# Patient Record
Sex: MALE | Race: WHITE | NOT HISPANIC OR LATINO | Employment: OTHER | URBAN - METROPOLITAN AREA
[De-identification: names, ages, dates, MRNs, and addresses within clinical notes are randomized per-mention and may not be internally consistent; named-entity substitution may affect disease eponyms.]

---

## 2017-01-19 ENCOUNTER — LAB (OUTPATIENT)
Dept: FAMILY MEDICINE CLINIC | Facility: CLINIC | Age: 62
End: 2017-01-19

## 2017-01-19 DIAGNOSIS — Z12.5 PROSTATE CANCER SCREENING: ICD-10-CM

## 2017-01-19 DIAGNOSIS — D69.6 THROMBOCYTOPENIA (HCC): ICD-10-CM

## 2017-01-19 DIAGNOSIS — IMO0001 DIABETES MELLITUS TYPE 1, UNCONTROLLED, WITHOUT COMPLICATIONS: ICD-10-CM

## 2017-01-19 DIAGNOSIS — E78.5 HYPERLIPIDEMIA, UNSPECIFIED: Primary | ICD-10-CM

## 2017-01-19 DIAGNOSIS — I10 BENIGN HYPERTENSION: ICD-10-CM

## 2017-01-19 LAB
ALBUMIN SERPL-MCNC: 4.4 G/DL (ref 3.2–4.8)
ALBUMIN/GLOB SERPL: 1.1 G/DL (ref 1.5–2.5)
ALP SERPL-CCNC: 34 U/L (ref 25–100)
ALT SERPL-CCNC: 63 U/L (ref 7–40)
AST SERPL-CCNC: 60 U/L (ref 0–33)
BASOPHILS # BLD AUTO: 0.03 10*3/MM3 (ref 0–0.2)
BASOPHILS NFR BLD AUTO: 0.5 % (ref 0–1)
BILIRUB SERPL-MCNC: 0.5 MG/DL (ref 0.3–1.2)
BUN SERPL-MCNC: 18 MG/DL (ref 9–23)
BUN/CREAT SERPL: 15 (ref 7–25)
CALCIUM SERPL-MCNC: 10 MG/DL (ref 8.7–10.4)
CHLORIDE SERPL-SCNC: 105 MMOL/L (ref 99–109)
CHOLEST SERPL-MCNC: 122 MG/DL (ref 0–200)
CHOLEST/HDLC SERPL: 3.3 {RATIO}
CO2 SERPL-SCNC: 29 MMOL/L (ref 20–31)
CREAT SERPL-MCNC: 1.2 MG/DL (ref 0.6–1.3)
EOSINOPHIL # BLD AUTO: 0.14 10*3/MM3 (ref 0.1–0.3)
EOSINOPHIL NFR BLD AUTO: 2.4 % (ref 0–3)
ERYTHROCYTE [DISTWIDTH] IN BLOOD BY AUTOMATED COUNT: 13.3 % (ref 11.3–14.5)
GLOBULIN SER CALC-MCNC: 4 GM/DL
GLUCOSE SERPL-MCNC: 127 MG/DL (ref 70–100)
HBA1C MFR BLD: 7.2 % (ref 4.8–5.6)
HCT VFR BLD AUTO: 41.7 % (ref 38.9–50.9)
HDLC SERPL-MCNC: 37 MG/DL (ref 40–60)
HGB BLD-MCNC: 13.7 G/DL (ref 13.1–17.5)
IMM GRANULOCYTES # BLD: 0.01 10*3/MM3 (ref 0–0.03)
IMM GRANULOCYTES NFR BLD: 0.2 % (ref 0–0.6)
LDLC SERPL CALC-MCNC: 56 MG/DL (ref 0–100)
LYMPHOCYTES # BLD AUTO: 2.07 10*3/MM3 (ref 0.6–4.8)
LYMPHOCYTES NFR BLD AUTO: 35.5 % (ref 24–44)
MCH RBC QN AUTO: 31.6 PG (ref 27–31)
MCHC RBC AUTO-ENTMCNC: 32.9 G/DL (ref 32–36)
MCV RBC AUTO: 96.3 FL (ref 80–99)
MONOCYTES # BLD AUTO: 0.65 10*3/MM3 (ref 0–1)
MONOCYTES NFR BLD AUTO: 11.1 % (ref 0–12)
NEUTROPHILS # BLD AUTO: 2.93 10*3/MM3 (ref 1.5–8.3)
NEUTROPHILS NFR BLD AUTO: 50.3 % (ref 41–71)
PLATELET # BLD AUTO: 123 10*3/MM3 (ref 150–450)
POTASSIUM SERPL-SCNC: 4.6 MMOL/L (ref 3.5–5.5)
PROT SERPL-MCNC: 8.4 G/DL (ref 5.7–8.2)
PSA SERPL-MCNC: 0.8 NG/ML (ref 0–4)
RBC # BLD AUTO: 4.33 10*6/MM3 (ref 4.2–5.76)
SODIUM SERPL-SCNC: 147 MMOL/L (ref 132–146)
TRIGL SERPL-MCNC: 144 MG/DL (ref 0–150)
VLDLC SERPL CALC-MCNC: 28.8 MG/DL
WBC # BLD AUTO: 5.83 10*3/MM3 (ref 3.5–10.8)

## 2017-01-20 ENCOUNTER — OFFICE VISIT (OUTPATIENT)
Dept: FAMILY MEDICINE CLINIC | Facility: CLINIC | Age: 62
End: 2017-01-20

## 2017-01-20 ENCOUNTER — RESULTS ENCOUNTER (OUTPATIENT)
Dept: FAMILY MEDICINE CLINIC | Facility: CLINIC | Age: 62
End: 2017-01-20

## 2017-01-20 VITALS
OXYGEN SATURATION: 97 % | SYSTOLIC BLOOD PRESSURE: 144 MMHG | HEIGHT: 67 IN | WEIGHT: 249.5 LBS | DIASTOLIC BLOOD PRESSURE: 82 MMHG | TEMPERATURE: 97.1 F | HEART RATE: 63 BPM | BODY MASS INDEX: 39.16 KG/M2 | RESPIRATION RATE: 18 BRPM

## 2017-01-20 DIAGNOSIS — IMO0001 UNCONTROLLED TYPE 2 DIABETES MELLITUS WITHOUT COMPLICATION, WITHOUT LONG-TERM CURRENT USE OF INSULIN: Primary | ICD-10-CM

## 2017-01-20 DIAGNOSIS — IMO0001 UNCONTROLLED TYPE 2 DIABETES MELLITUS WITHOUT COMPLICATION, WITHOUT LONG-TERM CURRENT USE OF INSULIN: ICD-10-CM

## 2017-01-20 DIAGNOSIS — E78.00 PURE HYPERCHOLESTEROLEMIA: ICD-10-CM

## 2017-01-20 DIAGNOSIS — I25.10 CORONARY ARTERY DISEASE INVOLVING NATIVE CORONARY ARTERY OF NATIVE HEART WITHOUT ANGINA PECTORIS: ICD-10-CM

## 2017-01-20 DIAGNOSIS — I10 ESSENTIAL HYPERTENSION: ICD-10-CM

## 2017-01-20 DIAGNOSIS — R80.9 MICROALBUMINURIA: ICD-10-CM

## 2017-01-20 LAB
POC CREATININE URINE: 200
POC MICROALBUMIN URINE: 150

## 2017-01-20 PROCEDURE — 99214 OFFICE O/P EST MOD 30 MIN: CPT | Performed by: FAMILY MEDICINE

## 2017-01-20 PROCEDURE — 82044 UR ALBUMIN SEMIQUANTITATIVE: CPT | Performed by: FAMILY MEDICINE

## 2017-01-20 NOTE — PROGRESS NOTES
Chief Complaint   Patient presents with   • Diabetes   • Hypertension       Subjective     Diabetes   He presents for his follow-up diabetic visit. He has type 2 diabetes mellitus. His disease course has been stable. There are no hypoglycemic associated symptoms. Pertinent negatives for hypoglycemia include no dizziness. Associated symptoms include blurred vision. Pertinent negatives for diabetes include no chest pain and no visual change. There are no hypoglycemic complications. Symptoms are stable. There are no diabetic complications. Pertinent negatives for diabetic complications include no peripheral neuropathy. (Hands go to sleep but feet ok) When asked about current treatments, none were reported. His weight is increasing steadily (increased 5 pounds). He never participates in exercise. Home blood sugar record trend: does not check. An ACE inhibitor/angiotensin II receptor blocker is being taken. Eye exam is current (6/2016).   Hypertension   This is a chronic problem. The current episode started more than 1 year ago. The problem is unchanged. The problem is controlled. Associated symptoms include blurred vision. Pertinent negatives include no chest pain, palpitations or shortness of breath. There are no associated agents to hypertension. Risk factors for coronary artery disease include dyslipidemia, male gender and obesity. Past treatments include ACE inhibitors. The current treatment provides moderate improvement. There are no compliance problems.  Hypertensive end-organ damage includes CAD/MI. There is no history of angina or kidney disease. There is no history of chronic renal disease.   Coronary Artery Disease   Presents for follow-up visit. Pertinent negatives include no chest pain, chest pressure, chest tightness, dizziness, palpitations or shortness of breath. The symptoms have been stable. Compliance with exercise is poor. Compliance with medications is good.     Frustrated about recent cruise and  poor trip.     Not eating the best right now. Had done well till cruise and then came back and overate.       Past Medical History,Medications, Allergies, and social history was reviewed.    Review of Systems   Constitutional: Negative.    HENT: Negative.    Eyes: Positive for blurred vision.   Respiratory: Negative.  Negative for chest tightness and shortness of breath.    Cardiovascular: Negative.  Negative for chest pain and palpitations.   Gastrointestinal: Negative.    Endocrine: Negative.    Genitourinary: Negative.    Musculoskeletal: Negative.    Neurological: Negative.  Negative for dizziness.   Psychiatric/Behavioral: Negative.        Objective     Physical Exam   Constitutional: He is oriented to person, place, and time. Vital signs are normal. He appears well-developed and well-nourished.   Obese   HENT:   Head: Normocephalic and atraumatic.   Right Ear: Hearing, tympanic membrane, external ear and ear canal normal.   Left Ear: Hearing, tympanic membrane, external ear and ear canal normal.   Nose: Nose normal.   Mouth/Throat: Oropharynx is clear and moist.   Eyes: Conjunctivae, EOM and lids are normal. Pupils are equal, round, and reactive to light.   Neck: Normal range of motion. Neck supple. No thyromegaly present.   Cardiovascular: Normal rate, regular rhythm and normal heart sounds.  Exam reveals no gallop and no friction rub.    No murmur heard.  Pulmonary/Chest: Effort normal and breath sounds normal. No respiratory distress. He has no wheezes. He has no rales.   Abdominal: Soft. Normal appearance and bowel sounds are normal. He exhibits no distension and no mass. There is no tenderness. There is no rebound and no guarding.   Obese   Musculoskeletal: Normal range of motion.   Neurological: He is alert and oriented to person, place, and time. He has normal strength and normal reflexes. No cranial nerve deficit. Coordination normal.   Skin: Skin is warm and dry.   Psychiatric: He has a normal mood  and affect. His speech is normal and behavior is normal. Judgment and thought content normal. Cognition and memory are normal.   Nursing note and vitals reviewed.        Assessment/Plan     Problem List Items Addressed This Visit        Cardiovascular and Mediastinum    Hyperlipidemia    Relevant Orders    Lipid Panel    Coronary artery disease involving native coronary artery of native heart without angina pectoris    Relevant Orders    Comprehensive Metabolic Panel    Lipid Panel    Hypertension    Relevant Orders    Lipid Panel       Endocrine    Type II diabetes mellitus, uncontrolled - Primary    Relevant Orders    Ambulatory Referral to Diabetic Education    Comprehensive Metabolic Panel    Lipid Panel    Hemoglobin A1c    POC Microalbumin    Protein, Urine, 24 Hour    Creatinine Clearance, Urine, 24 Hour      Other Visit Diagnoses     Microalbuminuria        Relevant Orders    Protein, Urine, 24 Hour    Creatinine Clearance, Urine, 24 Hour                DISCUSSION  Overall stable but A1c increased a little.  Recommend diabetes education.  Check labs as noted in 3 months.  Micro-albumen was brought back in by patient and was abnormal.  Recommend check 24 hour urine for protein and creatinine clearance.  Discussed with him the importance of proper nutrition, checking feet, routine eye exams.  Since his A1c is only minimally increased and he has previously in the past but able to get this under control with diet, will give him a chance with diet and then start medication if not improving.  He agrees for diabetes education.    Hypertension.  Stable.  Continue medication.    Coronary artery disease.  Stable.  Follow-up with cardiology.    Hyperlipidemia.  Continue medication.    Return in about 3 months (around 4/20/2017).     MEDICATIONS PRESCRIBED  Requested Prescriptions      No prescriptions requested or ordered in this encounter          Cheo Weiss MD

## 2017-01-20 NOTE — MR AVS SNAPSHOT
Kai Garcia   1/20/2017 8:30 AM   Office Visit    Dept Phone:  [phone]   Encounter #:  [id number]    Provider:  Cheo Weiss MD   Department:  [facility]                Your Full Care Plan              Your Updated Medication List          This list is accurate as of: 1/20/17  9:19 AM.  Always use your most recent med list.                aspirin 81 MG EC tablet       bisoprolol 10 MG tablet   Commonly known as:  ZEBeta   TAKE ONE (1) TABLET BY MOUTH ONCE DAILY       clopidogrel 75 MG tablet   Commonly known as:  PLAVIX   TAKE ONE (1) TABLET BY MOUTH ONCE DAILY       CRESTOR 10 MG tablet   Generic drug:  rosuvastatin   TAKE ONE (1) TABLET BY MOUTH AT BEDTIME       eplerenone 25 MG tablet   Commonly known as:  INSPRA   TAKE ONE (1) TABLET BY MOUTH ONCE DAILY       lisinopril 20 MG tablet   Commonly known as:  PRINIVIL,ZESTRIL       nitroglycerin 0.4 MG SL tablet   Commonly known as:  NITROSTAT   Place 1 tablet under the tongue every 5 (five) minutes as needed for chest pain.       omeprazole 40 MG capsule   Commonly known as:  priLOSEC               We Performed the Following     Ambulatory Referral to Diabetic Education     POC Microalbumin       You Were Diagnosed With        Codes Comments    Uncontrolled type 2 diabetes mellitus without complication, without long-term current use of insulin    -  Primary ICD-10-CM: E11.65  ICD-9-CM: 250.02     Essential hypertension     ICD-10-CM: I10  ICD-9-CM: 401.9     Pure hypercholesterolemia     ICD-10-CM: E78.00  ICD-9-CM: 272.0     Coronary artery disease involving native coronary artery of native heart without angina pectoris     ICD-10-CM: I25.10  ICD-9-CM: 414.01       Instructions     None    Patient Instructions History      Upcoming Appointments     Visit Type Date Time Department    FOLLOW UP 1/20/2017  8:30 AM [facility]    FOLLOW UP 2/1/2017  9:15 AM [facility]    OFFICE VISIT  "2017  9:00 AM [facility]      RebelMail Signup     [facility] RebelMail allows you to send messages to your doctor, view your test results, renew your prescriptions, schedule appointments, and more. To sign up, go to [employer] and click on the Sign Up Now link in the New User? box. Enter your RebelMail Activation Code exactly as it appears below along with the last four digits of your Social Security Number and your Date of Birth () to complete the sign-up process. If you do not sign up before the expiration date, you must request a new code.    RebelMail Activation Code: [email or web address]  Expires: 2017  9:30 AM    If you have questions, you can email [email or web address] or call [phone] to talk to our Rebel[employer] staff. Remember, RebelMail is NOT to be used for urgent needs. For medical emergencies, dial 911.               Other Info from Your Visit           Your Appointments     2017  9:15 AM EST   Follow Up with Uri Willis MD   [facility] CARDIOLOGY (--)    [address] [address] [phone]           Arrive 15 minutes prior to appointment.            2017  9:00 AM EDT   Office Visit with Cheo Weiss MD   [facility] FAM[facility] MEDICINE (--)    [address] [facility] [facility] [address] [phone]           Arrive 15 minutes prior to appointment.              Allergies     Ativan [Lorazepam]  Other (See Comments)    agitation    Zetia [Ezetimibe]  Other (See Comments)    increased LFTs    Zocor [Simvastatin]  Myalgia      Reason for Visit     Diabetes     Hypertension           Vital Signs     Blood Pressure Pulse Temperature Respirations Height Weight    144/82 63 97.1 °F (36.2 °C) (Temporal Artery ) 18 67\" (170.2 cm) 249 lb 8 oz (113 kg)    Oxygen Saturation Body Mass Index Smoking Status             97% 39.08 kg/m2 Former Smoker         Problems and Diagnoses Noted     " Coronary artery disease involving native coronary artery of native heart without angina pectoris    High cholesterol or triglycerides    High blood pressure    Type II diabetes mellitus without control

## 2017-02-01 ENCOUNTER — OFFICE VISIT (OUTPATIENT)
Dept: CARDIOLOGY | Facility: CLINIC | Age: 62
End: 2017-02-01

## 2017-02-01 VITALS
WEIGHT: 249 LBS | HEIGHT: 67 IN | SYSTOLIC BLOOD PRESSURE: 118 MMHG | HEART RATE: 72 BPM | DIASTOLIC BLOOD PRESSURE: 64 MMHG | BODY MASS INDEX: 39.08 KG/M2

## 2017-02-01 DIAGNOSIS — I10 ESSENTIAL HYPERTENSION: ICD-10-CM

## 2017-02-01 DIAGNOSIS — E78.5 DYSLIPIDEMIA: ICD-10-CM

## 2017-02-01 DIAGNOSIS — I25.10 CORONARY ARTERY DISEASE INVOLVING NATIVE CORONARY ARTERY OF NATIVE HEART WITHOUT ANGINA PECTORIS: Primary | ICD-10-CM

## 2017-02-01 PROCEDURE — 99213 OFFICE O/P EST LOW 20 MIN: CPT | Performed by: NURSE PRACTITIONER

## 2017-02-01 NOTE — PROGRESS NOTES
Subjective:     Encounter Date:02/01/2017      Patient ID: Kai Garcia is a 61 y.o. male.    Chief Complaint:Hypertension and Hyperlipidemia  and CAD      PROBLEM LIST:   1. Dissecting aortic aneurysm:  a. 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 Helton, stage I in June 2006 and stage II in March 2007.   b. Followed chronically with annual CTs by Dr. Helton.   2. Coronary artery disease:  a. Cardiac cath prior to “elephant trunk” procedure in June 2006 and the patient underwent CABG at that time consisting of SVG to the OM and SVG to the PDA.   b. On 02/08/2007 - inferoposterior MI, cardiac cath showed 100% left circumflex with severe OM-1 stenosis and 100% vein graft to the left circumflex. RCA was diffuse, 90% ostial stenosis at the SVG to the RCA. LAD had only 40% stenosis.   c. Cardiac cath in March 2007 due to recurrent angina - 2.5 Microdriver stent to the distal native RCA.   3. Dyslipidemia.   4. Hypertension.   5. Diabetes  6. Chronic low back pain/multiple lumbar surgeries, currently disabled:  a. L4-L5 discectomy in 2015.   7. History of bleeding peptic ulcer disease.   8. Severe obstructive sleep apnea.   9. Appendectomy      Allergies   Allergen Reactions   • Ativan [Lorazepam] Other (See Comments)     agitation   • Zetia [Ezetimibe] Other (See Comments)     increased LFTs   • Zocor [Simvastatin] Myalgia         Current Outpatient Prescriptions:   •  aspirin 81 MG EC tablet, Take  by mouth daily., Disp: , Rfl:   •  bisoprolol (ZEBETA) 10 MG tablet, TAKE ONE (1) TABLET BY MOUTH ONCE DAILY, Disp: 90 tablet, Rfl: 2  •  clopidogrel (PLAVIX) 75 MG tablet, TAKE ONE (1) TABLET BY MOUTH ONCE DAILY, Disp: 90 tablet, Rfl: 0  •  CRESTOR 10 MG tablet, TAKE ONE (1) TABLET BY MOUTH AT BEDTIME, Disp: 90 tablet, Rfl: 2  •  eplerenone (INSPRA) 25 MG tablet, TAKE ONE (1) TABLET BY MOUTH ONCE DAILY, Disp: 90 tablet, Rfl:  0  •  lisinopril (PRINIVIL,ZESTRIL) 20 MG tablet, Take  by mouth daily., Disp: , Rfl:   •  nitroglycerin (NITROSTAT) 0.4 MG SL tablet, Place 1 tablet under the tongue every 5 (five) minutes as needed for chest pain., Disp: 25 tablet, Rfl: 5  •  omeprazole (PriLOSEC) 40 MG capsule, Take 40 mg by mouth daily., Disp: , Rfl:         History of Present Illness    Patient is a 61-year-old  male who we are seeing today for annual follow-up of coronary disease.  Since last being seen patient notes to be overall a baseline.  Does still have some recurrent chest pain.  No specific triggering factors noted.  Has some shortness of breath with activity but notes that he has not been able to get out and work like he usually does in the summer.  He is in following with his primary care physician and was recently diagnosed with diabetes.  He is currently attempting dietary changes as treatment.  He has an appointment to see a nutritionist this week.  Denies any syncope or near syncope.  Does note some occasional dizziness.  Overall feels that his baseline for the typical winter months.  Complains of some right arm/hand numbness at times with different position changes.  Complains of decreased energy.  Denies claudication.  Patient has had recent lab work which showed mild LFT elevation.  Patient notes he is taking a significant amount of Tylenol.    The following portions of the patient's history were reviewed and updated as appropriate: allergies, current medications, past family history, past medical history, past social history, past surgical history and problem list.    Family History   Problem Relation Age of Onset   • Coronary artery disease Other    • Rheum arthritis Other    • Heart disease Mother    • Rheum arthritis Mother    • Heart disease Father    • Rheum arthritis Father        Social History   Substance Use Topics   • Smoking status: Former Smoker     Quit date: 1/15/2006   • Smokeless tobacco: Never Used  "  • Alcohol use No         Review of Systems   Constitution: Positive for weakness. Negative for fever and malaise/fatigue.   HENT: Positive for headaches, hearing loss and tinnitus. Negative for nosebleeds.    Eyes: Negative for redness and visual disturbance.   Cardiovascular: Positive for chest pain. Negative for orthopnea, palpitations and paroxysmal nocturnal dyspnea.   Respiratory: Positive for shortness of breath. Negative for cough, snoring, sputum production and wheezing.    Endocrine: Positive for cold intolerance, heat intolerance and polyuria.   Hematologic/Lymphatic: Negative for bleeding problem. Bruises/bleeds easily.   Skin: Negative for flushing, itching and rash.   Musculoskeletal: Positive for back pain and muscle weakness. Negative for falls, joint pain and muscle cramps.   Gastrointestinal: Positive for change in bowel habit. Negative for abdominal pain, diarrhea, heartburn, nausea and vomiting.   Genitourinary: Positive for frequency. Negative for hematuria.   Neurological: Positive for excessive daytime sleepiness and dizziness. Negative for tremors.   Psychiatric/Behavioral: Negative for substance abuse. The patient is not nervous/anxious.           Objective:    height is 67\" (170.2 cm) and weight is 249 lb (113 kg). His blood pressure is 118/64 and his pulse is 72.         Physical Exam   Constitutional: He is oriented to person, place, and time. He appears well-developed and well-nourished. No distress.   HENT:   Head: Normocephalic and atraumatic.   Neck: No tracheal deviation present.   Cardiovascular: Normal rate, regular rhythm and normal heart sounds.    Pulmonary/Chest: Effort normal and breath sounds normal.   Abdominal: Soft. Bowel sounds are normal. There is no tenderness.   Musculoskeletal: He exhibits no edema.   Neurological: He is alert and oriented to person, place, and time.   Skin: Skin is warm and dry.       Procedures          Assessment:   Assessment/Plan      Kai was " seen today for hypertension and hyperlipidemia.    Diagnoses and all orders for this visit:    Coronary artery disease involving native coronary artery of native heart without angina pectoris    Dyslipidemia, on statin therapy    Essential hypertension, controlled      Plan:    At this time we'll make no changes to the patient's medication regimen.  Discussed with the patient and his family they possibility further cardiac testing given his symptoms, however patient would like to see how things go the summer when he is able to be more active.  Discussed with the patient that if he has any worsening of symptoms or concerns of recurrent angina to repeat present to our office for further evaluation.  Labs were reviewed with LFTs in the 60s.  At this time would not place his statin therapy on hold.  This was discussed with the patient and his wife who verbalized understanding.  This was also discussed with Dr. Willis who agrees.  We'll see him back in one year's time or sooner if needed.    CONNOR Islas         Please note that portions of this note may have been completed with a voice recognition program. Efforts were made to edit the dictations, but occasionally words are mistranscribed.

## 2017-02-07 ENCOUNTER — TELEPHONE (OUTPATIENT)
Dept: FAMILY MEDICINE CLINIC | Facility: CLINIC | Age: 62
End: 2017-02-07

## 2017-02-07 ENCOUNTER — OFFICE VISIT (OUTPATIENT)
Dept: DIABETES SERVICES | Facility: HOSPITAL | Age: 62
End: 2017-02-07

## 2017-02-07 PROCEDURE — G0109 DIAB MANAGE TRN IND/GROUP: HCPCS | Performed by: DIETITIAN, REGISTERED

## 2017-02-07 NOTE — TELEPHONE ENCOUNTER
----- Message from Ariadna Lacy sent at 2/7/2017  2:24 PM EST -----  Contact: WALLACE;PT STOP BY  PT REQUESTING A RX CALLED INTO [facility] FOR STRIPS AND NEEDLES FOR ONE TOUCH VERIO    [phone]  MESSAGE OK

## 2017-02-07 NOTE — TELEPHONE ENCOUNTER
OK to call in x 1 + 2 refills. For both. Suspect he means lancets but may need to clarify.   Not sure if he gets 50 or 100  bds

## 2017-02-08 NOTE — TELEPHONE ENCOUNTER
----- Message from Ariadna Naranjo sent at 2/8/2017 11:05 AM EST -----  Contact: SWEETIE  PATIENT REQUESTING :    TEST STRIPS AND FANNIE HENNING

## 2017-03-07 ENCOUNTER — APPOINTMENT (OUTPATIENT)
Dept: DIABETES SERVICES | Facility: HOSPITAL | Age: 62
End: 2017-03-07

## 2017-03-09 RX ORDER — ROSUVASTATIN CALCIUM 10 MG/1
TABLET, COATED ORAL
Qty: 90 TABLET | Refills: 3 | Status: SHIPPED | OUTPATIENT
Start: 2017-03-09 | End: 2018-02-08 | Stop reason: SDUPTHER

## 2017-03-09 RX ORDER — CLOPIDOGREL BISULFATE 75 MG/1
TABLET ORAL
Qty: 90 TABLET | Refills: 3 | Status: SHIPPED | OUTPATIENT
Start: 2017-03-09 | End: 2018-02-08 | Stop reason: SDUPTHER

## 2017-03-09 RX ORDER — LISINOPRIL 20 MG/1
TABLET ORAL
Qty: 90 TABLET | Refills: 3 | Status: SHIPPED | OUTPATIENT
Start: 2017-03-09 | End: 2018-02-08 | Stop reason: SDUPTHER

## 2017-03-09 RX ORDER — OMEPRAZOLE 40 MG/1
CAPSULE, DELAYED RELEASE ORAL
Qty: 90 CAPSULE | Refills: 3 | Status: SHIPPED | OUTPATIENT
Start: 2017-03-09 | End: 2018-03-02 | Stop reason: SDUPTHER

## 2017-03-09 RX ORDER — EPLERENONE 25 MG/1
TABLET, FILM COATED ORAL
Qty: 90 TABLET | Refills: 3 | Status: SHIPPED | OUTPATIENT
Start: 2017-03-09 | End: 2018-02-08 | Stop reason: SDUPTHER

## 2017-03-09 RX ORDER — BISOPROLOL FUMARATE 10 MG/1
TABLET, FILM COATED ORAL
Qty: 90 TABLET | Refills: 3 | Status: SHIPPED | OUTPATIENT
Start: 2017-03-09 | End: 2018-02-08 | Stop reason: SDUPTHER

## 2017-04-10 ENCOUNTER — RESULTS ENCOUNTER (OUTPATIENT)
Dept: FAMILY MEDICINE CLINIC | Facility: CLINIC | Age: 62
End: 2017-04-10

## 2017-04-10 DIAGNOSIS — I25.10 CORONARY ARTERY DISEASE INVOLVING NATIVE CORONARY ARTERY OF NATIVE HEART WITHOUT ANGINA PECTORIS: ICD-10-CM

## 2017-04-10 DIAGNOSIS — IMO0001 UNCONTROLLED TYPE 2 DIABETES MELLITUS WITHOUT COMPLICATION, WITHOUT LONG-TERM CURRENT USE OF INSULIN: ICD-10-CM

## 2017-04-10 DIAGNOSIS — I10 ESSENTIAL HYPERTENSION: ICD-10-CM

## 2017-04-10 DIAGNOSIS — E78.00 PURE HYPERCHOLESTEROLEMIA: ICD-10-CM

## 2017-04-19 LAB
ALBUMIN SERPL-MCNC: 4.6 G/DL (ref 3.2–4.8)
ALBUMIN/GLOB SERPL: 1.3 G/DL (ref 1.5–2.5)
ALP SERPL-CCNC: 38 U/L (ref 25–100)
ALT SERPL-CCNC: 54 U/L (ref 7–40)
AST SERPL-CCNC: 54 U/L (ref 0–33)
BILIRUB SERPL-MCNC: 0.5 MG/DL (ref 0.3–1.2)
BUN SERPL-MCNC: 15 MG/DL (ref 9–23)
BUN/CREAT SERPL: 15 (ref 7–25)
CALCIUM SERPL-MCNC: 10.1 MG/DL (ref 8.7–10.4)
CHLORIDE SERPL-SCNC: 105 MMOL/L (ref 99–109)
CHOLEST SERPL-MCNC: 123 MG/DL (ref 0–200)
CO2 SERPL-SCNC: 32 MMOL/L (ref 20–31)
CREAT SERPL-MCNC: 1 MG/DL (ref 0.6–1.3)
GLOBULIN SER CALC-MCNC: 3.6 GM/DL
GLUCOSE SERPL-MCNC: 128 MG/DL (ref 70–100)
HBA1C MFR BLD: 6.7 % (ref 4.8–5.6)
HDLC SERPL-MCNC: 37 MG/DL (ref 40–60)
LDLC SERPL CALC-MCNC: 62 MG/DL (ref 0–100)
POTASSIUM SERPL-SCNC: 4.8 MMOL/L (ref 3.5–5.5)
PROT SERPL-MCNC: 8.2 G/DL (ref 5.7–8.2)
SODIUM SERPL-SCNC: 141 MMOL/L (ref 132–146)
TRIGL SERPL-MCNC: 119 MG/DL (ref 0–150)
VLDLC SERPL CALC-MCNC: 23.8 MG/DL

## 2017-04-20 ENCOUNTER — OFFICE VISIT (OUTPATIENT)
Dept: FAMILY MEDICINE CLINIC | Facility: CLINIC | Age: 62
End: 2017-04-20

## 2017-04-20 VITALS
TEMPERATURE: 98 F | SYSTOLIC BLOOD PRESSURE: 122 MMHG | DIASTOLIC BLOOD PRESSURE: 76 MMHG | WEIGHT: 246.5 LBS | HEIGHT: 67 IN | OXYGEN SATURATION: 98 % | BODY MASS INDEX: 38.69 KG/M2 | HEART RATE: 68 BPM | RESPIRATION RATE: 16 BRPM

## 2017-04-20 DIAGNOSIS — I10 ESSENTIAL HYPERTENSION: ICD-10-CM

## 2017-04-20 DIAGNOSIS — IMO0001 UNCONTROLLED TYPE 2 DIABETES MELLITUS WITHOUT COMPLICATION, WITHOUT LONG-TERM CURRENT USE OF INSULIN: Primary | ICD-10-CM

## 2017-04-20 DIAGNOSIS — I25.10 CORONARY ARTERY DISEASE INVOLVING NATIVE CORONARY ARTERY OF NATIVE HEART WITHOUT ANGINA PECTORIS: ICD-10-CM

## 2017-04-20 PROCEDURE — 99214 OFFICE O/P EST MOD 30 MIN: CPT | Performed by: FAMILY MEDICINE

## 2017-04-20 RX ORDER — LANCETS 33 GAUGE
EACH MISCELLANEOUS
Qty: 100 EACH | Refills: 5 | Status: SHIPPED | OUTPATIENT
Start: 2017-04-20 | End: 2018-04-23

## 2017-04-20 RX ORDER — LANCETS 33 GAUGE
EACH MISCELLANEOUS
COMMUNITY
Start: 2017-02-08 | End: 2017-04-20 | Stop reason: SDUPTHER

## 2017-04-20 NOTE — PROGRESS NOTES
Chief Complaint   Patient presents with   • Diabetes   • Hyperlipidemia   • Med Refill       Subjective     Diabetes   He presents for his follow-up diabetic visit. He has type 2 diabetes mellitus. His disease course has been stable. There are no hypoglycemic associated symptoms. Pertinent negatives for hypoglycemia include no dizziness. Associated symptoms include blurred vision. Pertinent negatives for diabetes include no chest pain and no visual change. There are no hypoglycemic complications. Symptoms are stable. There are no diabetic complications. Pertinent negatives for diabetic complications include no peripheral neuropathy. (Hands go to sleep but feet ok) When asked about current treatments, none were reported. His weight is increasing steadily (increased 5 pounds). He never participates in exercise. Home blood sugar record trend: checks daily and has been less than 160. An ACE inhibitor/angiotensin II receptor blocker is being taken. Eye exam is current (6/2016).   Hypertension   This is a chronic problem. The current episode started more than 1 year ago. The problem is unchanged. The problem is controlled. Associated symptoms include blurred vision. Pertinent negatives include no chest pain, palpitations or shortness of breath. There are no associated agents to hypertension. Risk factors for coronary artery disease include dyslipidemia, male gender and obesity. Past treatments include ACE inhibitors. The current treatment provides moderate improvement. There are no compliance problems.  Hypertensive end-organ damage includes CAD/MI. There is no history of angina or kidney disease. There is no history of chronic renal disease.   Coronary Artery Disease   Presents for follow-up visit. Pertinent negatives include no chest pain, chest pressure, chest tightness, dizziness, palpitations or shortness of breath. The symptoms have been stable. Compliance with exercise is poor. Compliance with medications is  good.       Hurting all over. Has been working outside and play set.     Past Medical History,Medications, Allergies, and social history was reviewed.    Review of Systems   Constitutional: Negative.    HENT: Negative.    Eyes: Positive for blurred vision.   Respiratory: Negative.  Negative for chest tightness and shortness of breath.    Cardiovascular: Negative.  Negative for chest pain and palpitations.   Gastrointestinal: Negative.    Genitourinary: Negative.    Neurological: Negative.  Negative for dizziness.   Psychiatric/Behavioral: Negative.        Objective     Physical Exam   Constitutional: He is oriented to person, place, and time. Vital signs are normal. He appears well-developed and well-nourished.   Obese   HENT:   Head: Normocephalic and atraumatic.   Right Ear: Hearing, tympanic membrane, external ear and ear canal normal.   Left Ear: Hearing, tympanic membrane, external ear and ear canal normal.   Nose: Nose normal.   Mouth/Throat: Oropharynx is clear and moist.   Eyes: Conjunctivae, EOM and lids are normal. Pupils are equal, round, and reactive to light.   Neck: Normal range of motion. Neck supple. No thyromegaly present.   Cardiovascular: Normal rate, regular rhythm and normal heart sounds.  Exam reveals no friction rub.    No murmur heard.  Pulmonary/Chest: Effort normal and breath sounds normal. No respiratory distress. He has no wheezes. He has no rales.   Abdominal: Soft. Normal appearance and bowel sounds are normal. He exhibits no distension and no mass. There is no tenderness. There is no rebound and no guarding.   Musculoskeletal: He exhibits no edema.    Kai had a diabetic foot exam performed (callous at toes bilateral) today.   During the foot exam he had a monofilament test performed (decreased sensastion of the left great toe. ).    Vascular Status -  His exam exhibits right foot vasculature normal. His exam exhibits no right foot edema. His exam exhibits left foot vasculature  normal. His exam exhibits no left foot edema.  Neurological: He is alert and oriented to person, place, and time. He has normal strength.   Skin: Skin is warm and dry.   Psychiatric: He has a normal mood and affect. His speech is normal and behavior is normal. Cognition and memory are normal.   Nursing note and vitals reviewed.        Assessment/Plan     Problem List Items Addressed This Visit        Cardiovascular and Mediastinum    Coronary artery disease involving native coronary artery of native heart without angina pectoris    Relevant Orders    Comprehensive Metabolic Panel    Lipid Panel With / Chol / HDL Ratio    Hypertension    Relevant Orders    Comprehensive Metabolic Panel    Lipid Panel With / Chol / HDL Ratio       Endocrine    Type II diabetes mellitus, uncontrolled - Primary    Relevant Medications    ONE TOUCH ULTRA TEST test strip    ONETOUCH DELICA LANCETS 33G misc    Other Relevant Orders    Comprehensive Metabolic Panel    Lipid Panel With / Chol / HDL Ratio    Hemoglobin A1c              DISCUSSION  Overall improving.  Numbers improved somewhat.  Continue diet, weight loss and recheck in 3 months.  Reviewed labs with patient.    Check old record for hep c. He does not want testing for this.     Refuses pneumonia shot    Continue current medications.    Return in about 3 months (around 7/20/2017).     MEDICATIONS PRESCRIBED  Requested Prescriptions     Signed Prescriptions Disp Refills   • ONE TOUCH ULTRA TEST test strip 100 each 5     Sig: check sugars daily and prn   • ONETOUCH DELICA LANCETS 33G misc 100 each 5     Sig: check sugars daily and prn          Cheo Wiess MD

## 2017-07-10 ENCOUNTER — RESULTS ENCOUNTER (OUTPATIENT)
Dept: FAMILY MEDICINE CLINIC | Facility: CLINIC | Age: 62
End: 2017-07-10

## 2017-07-10 DIAGNOSIS — I25.10 CORONARY ARTERY DISEASE INVOLVING NATIVE CORONARY ARTERY OF NATIVE HEART WITHOUT ANGINA PECTORIS: ICD-10-CM

## 2017-07-10 DIAGNOSIS — I10 ESSENTIAL HYPERTENSION: ICD-10-CM

## 2017-07-10 DIAGNOSIS — IMO0001 UNCONTROLLED TYPE 2 DIABETES MELLITUS WITHOUT COMPLICATION, WITHOUT LONG-TERM CURRENT USE OF INSULIN: ICD-10-CM

## 2017-07-18 LAB
ALBUMIN SERPL-MCNC: 4.3 G/DL (ref 3.2–4.8)
ALBUMIN/GLOB SERPL: 1.3 G/DL (ref 1.5–2.5)
ALP SERPL-CCNC: 36 U/L (ref 25–100)
ALT SERPL-CCNC: 46 U/L (ref 7–40)
AST SERPL-CCNC: 38 U/L (ref 0–33)
BILIRUB SERPL-MCNC: 0.5 MG/DL (ref 0.3–1.2)
BUN SERPL-MCNC: 14 MG/DL (ref 9–23)
BUN/CREAT SERPL: 14 (ref 7–25)
CALCIUM SERPL-MCNC: 9.7 MG/DL (ref 8.7–10.4)
CHLORIDE SERPL-SCNC: 106 MMOL/L (ref 99–109)
CHOLEST SERPL-MCNC: 115 MG/DL (ref 0–200)
CHOLEST/HDLC SERPL: 3.48 {RATIO}
CO2 SERPL-SCNC: 28 MMOL/L (ref 20–31)
CREAT SERPL-MCNC: 1 MG/DL (ref 0.6–1.3)
GLOBULIN SER CALC-MCNC: 3.2 GM/DL
GLUCOSE SERPL-MCNC: 114 MG/DL (ref 70–100)
HBA1C MFR BLD: 7.2 % (ref 4.8–5.6)
HDLC SERPL-MCNC: 33 MG/DL (ref 40–60)
LDLC SERPL CALC-MCNC: 55 MG/DL (ref 0–100)
POTASSIUM SERPL-SCNC: 4.7 MMOL/L (ref 3.5–5.5)
PROT SERPL-MCNC: 7.5 G/DL (ref 5.7–8.2)
SODIUM SERPL-SCNC: 140 MMOL/L (ref 132–146)
TRIGL SERPL-MCNC: 135 MG/DL (ref 0–150)
VLDLC SERPL CALC-MCNC: 27 MG/DL

## 2017-07-20 ENCOUNTER — OFFICE VISIT (OUTPATIENT)
Dept: FAMILY MEDICINE CLINIC | Facility: CLINIC | Age: 62
End: 2017-07-20

## 2017-07-20 VITALS
RESPIRATION RATE: 16 BRPM | BODY MASS INDEX: 38.77 KG/M2 | OXYGEN SATURATION: 95 % | TEMPERATURE: 96.6 F | SYSTOLIC BLOOD PRESSURE: 124 MMHG | DIASTOLIC BLOOD PRESSURE: 74 MMHG | HEART RATE: 69 BPM | WEIGHT: 247 LBS | HEIGHT: 67 IN

## 2017-07-20 DIAGNOSIS — IMO0001 UNCONTROLLED TYPE 2 DIABETES MELLITUS WITHOUT COMPLICATION, WITHOUT LONG-TERM CURRENT USE OF INSULIN: Primary | ICD-10-CM

## 2017-07-20 DIAGNOSIS — R79.89 ABNORMAL LIVER FUNCTION TESTS: ICD-10-CM

## 2017-07-20 DIAGNOSIS — I10 ESSENTIAL HYPERTENSION: ICD-10-CM

## 2017-07-20 DIAGNOSIS — I25.10 MULTIPLE VESSEL CORONARY ARTERY DISEASE: ICD-10-CM

## 2017-07-20 PROCEDURE — 99214 OFFICE O/P EST MOD 30 MIN: CPT | Performed by: FAMILY MEDICINE

## 2017-07-20 NOTE — PROGRESS NOTES
Chief Complaint   Patient presents with   • Hypertension   • Hyperlipidemia   • Diabetes   • Med Refill       Subjective     Hypertension   This is a chronic problem. The current episode started more than 1 year ago. The problem is unchanged. The problem is controlled. Associated symptoms include blurred vision. Pertinent negatives include no chest pain, palpitations or shortness of breath. There are no associated agents to hypertension. Risk factors for coronary artery disease include dyslipidemia, male gender and obesity. Past treatments include ACE inhibitors. The current treatment provides moderate improvement. There are no compliance problems.  Hypertensive end-organ damage includes CAD/MI. There is no history of angina or kidney disease. There is no history of chronic renal disease.   Hyperlipidemia   He has no history of chronic renal disease. Pertinent negatives include no chest pain or shortness of breath.   Diabetes   He presents for his follow-up diabetic visit. He has type 2 diabetes mellitus. His disease course has been stable. There are no hypoglycemic associated symptoms. Pertinent negatives for hypoglycemia include no dizziness. Associated symptoms include blurred vision. Pertinent negatives for diabetes include no chest pain and no visual change. There are no hypoglycemic complications. Symptoms are stable. There are no diabetic complications. Pertinent negatives for diabetic complications include no peripheral neuropathy. (Hands go to sleep but feet ok) When asked about current treatments, none were reported. His weight is increasing steadily (increased .5 pounds). He is following a generally healthy (has ice cream nightly. Decreased soft drinks) diet. He never participates in exercise. Home blood sugar record trend: 150-160 fasting in am. An ACE inhibitor/angiotensin II receptor blocker is being taken. Eye exam is current (6/2016).   Coronary Artery Disease   Presents for follow-up visit.  "Pertinent negatives include no chest pain, chest pressure, chest tightness, dizziness, palpitations or shortness of breath. Risk factors include hyperlipidemia. The symptoms have been stable. Compliance with exercise is poor. Compliance with medications is good.       Not been active in last 3 weeks due to heat. Gets overheated.     Left abd pain, hurts more to bend or twist for a few months. Has daily pain.   No nausea. BM good. No blood in BM  No melena        left foot hurts some due to DM      Past Medical History,Medications, Allergies, and social history was reviewed.    Review of Systems   Eyes: Positive for blurred vision.   Respiratory: Negative for chest tightness and shortness of breath.    Cardiovascular: Negative for chest pain and palpitations.   Neurological: Negative for dizziness.       Objective     Vitals:    07/20/17 0852   BP: 124/74   Pulse: 69   Resp: 16   Temp: 96.6 °F (35.9 °C)   TempSrc: Temporal Artery    SpO2: 95%   Weight: 247 lb (112 kg)   Height: 67\" (170.2 cm)        Physical Exam   Constitutional: He is oriented to person, place, and time. Vital signs are normal. He appears well-developed and well-nourished.   HENT:   Head: Normocephalic and atraumatic.   Right Ear: Hearing, tympanic membrane, external ear and ear canal normal.   Left Ear: Hearing, tympanic membrane, external ear and ear canal normal.   Nose: Nose normal.   Mouth/Throat: Oropharynx is clear and moist.   Eyes: Conjunctivae, EOM and lids are normal. Pupils are equal, round, and reactive to light.   Neck: Normal range of motion. Neck supple. No thyromegaly present.   Cardiovascular: Normal rate, regular rhythm and normal heart sounds.  Exam reveals no friction rub.    No murmur heard.  Pulmonary/Chest: Effort normal and breath sounds normal. No respiratory distress. He has no wheezes. He has no rales.   Abdominal: Soft. Normal appearance and bowel sounds are normal. He exhibits no distension and no mass. There is " tenderness (mild to light palpatation and if moves or twist) in the left upper quadrant. There is no rebound and no guarding.   Musculoskeletal: He exhibits no edema.   Neurological: He is alert and oriented to person, place, and time. He has normal strength.   Skin: Skin is warm and dry.   Psychiatric: He has a normal mood and affect. His speech is normal and behavior is normal. Cognition and memory are normal.   Nursing note and vitals reviewed.    obese.     Assessment/Plan     Problem List Items Addressed This Visit        Cardiovascular and Mediastinum    Multiple vessel coronary artery disease    Relevant Orders    Comprehensive Metabolic Panel    Lipid Panel With / Chol / HDL Ratio    Hypertension    Relevant Orders    Comprehensive Metabolic Panel    Lipid Panel With / Chol / HDL Ratio       Endocrine    Type II diabetes mellitus, uncontrolled - Primary    Relevant Orders    Comprehensive Metabolic Panel    Lipid Panel With / Chol / HDL Ratio    Hemoglobin A1c       Other    Abnormal liver function tests    Relevant Orders    Comprehensive Metabolic Panel           Follow up: Return in about 3 months (around 10/20/2017).         DISCUSSION  Overall stable and doing well. Continue current medications. Chronic problems noted are stable. Check labs as noted in 3 months and Return in about 3 months (around 10/20/2017).       Review blood work.  A1c increased slightly.  Liver function tests have improved a little.    Continue efforts to lose weight    Blood pressure stable.    Coronary artery disease is stable.  Follow-up with cardiology as scheduled in January 2018.  Continue medications.    Rec decreased sweets and ice cream and increase activity    Follow up in 3 months      MEDICATIONS PRESCRIBED  Requested Prescriptions      No prescriptions requested or ordered in this encounter          Cheo Weiss MD

## 2017-07-25 ENCOUNTER — TELEPHONE (OUTPATIENT)
Dept: FAMILY MEDICINE CLINIC | Facility: CLINIC | Age: 62
End: 2017-07-25

## 2017-07-25 DIAGNOSIS — G47.33 SEVERE OBSTRUCTIVE SLEEP APNEA: Primary | ICD-10-CM

## 2017-07-25 NOTE — TELEPHONE ENCOUNTER
----- Message from Ariadna Lacy sent at 2017 10:29 AM EDT -----  Contact: WALLACE;WIFE CALLED  PT REQUESTING AN UPDATED RX FOR C PAP SUPPLIES-HIS HAS   SENT TO [facility]    [phone]    HX-[phone]

## 2017-10-09 ENCOUNTER — RESULTS ENCOUNTER (OUTPATIENT)
Dept: FAMILY MEDICINE CLINIC | Facility: CLINIC | Age: 62
End: 2017-10-09

## 2017-10-09 DIAGNOSIS — I25.10 MULTIPLE VESSEL CORONARY ARTERY DISEASE: ICD-10-CM

## 2017-10-09 DIAGNOSIS — IMO0001 UNCONTROLLED TYPE 2 DIABETES MELLITUS WITHOUT COMPLICATION, WITHOUT LONG-TERM CURRENT USE OF INSULIN: ICD-10-CM

## 2017-10-09 DIAGNOSIS — I10 ESSENTIAL HYPERTENSION: ICD-10-CM

## 2017-10-09 DIAGNOSIS — R79.89 ABNORMAL LIVER FUNCTION TESTS: ICD-10-CM

## 2017-10-19 LAB
ALBUMIN SERPL-MCNC: 4.4 G/DL (ref 3.2–4.8)
ALBUMIN/GLOB SERPL: 1.4 G/DL (ref 1.5–2.5)
ALP SERPL-CCNC: 36 U/L (ref 25–100)
ALT SERPL-CCNC: 47 U/L (ref 7–40)
AST SERPL-CCNC: 38 U/L (ref 0–33)
BILIRUB SERPL-MCNC: 0.7 MG/DL (ref 0.3–1.2)
BUN SERPL-MCNC: 18 MG/DL (ref 9–23)
BUN/CREAT SERPL: 18 (ref 7–25)
CALCIUM SERPL-MCNC: 9.9 MG/DL (ref 8.7–10.4)
CHLORIDE SERPL-SCNC: 104 MMOL/L (ref 99–109)
CHOLEST SERPL-MCNC: 126 MG/DL (ref 0–200)
CHOLEST/HDLC SERPL: 3.6 {RATIO}
CO2 SERPL-SCNC: 26 MMOL/L (ref 20–31)
CREAT SERPL-MCNC: 1 MG/DL (ref 0.6–1.3)
GFR SERPLBLD CREATININE-BSD FMLA CKD-EPI: 75 ML/MIN/1.73
GFR SERPLBLD CREATININE-BSD FMLA CKD-EPI: 91 ML/MIN/1.73
GLOBULIN SER CALC-MCNC: 3.2 GM/DL
GLUCOSE SERPL-MCNC: 141 MG/DL (ref 70–100)
HBA1C MFR BLD: 7 % (ref 4.8–5.6)
HDLC SERPL-MCNC: 35 MG/DL (ref 40–60)
LDLC SERPL CALC-MCNC: 61 MG/DL (ref 0–100)
POTASSIUM SERPL-SCNC: 4.2 MMOL/L (ref 3.5–5.5)
PROT SERPL-MCNC: 7.6 G/DL (ref 5.7–8.2)
SODIUM SERPL-SCNC: 137 MMOL/L (ref 132–146)
TRIGL SERPL-MCNC: 148 MG/DL (ref 0–150)
VLDLC SERPL CALC-MCNC: 29.6 MG/DL

## 2017-10-20 ENCOUNTER — OFFICE VISIT (OUTPATIENT)
Dept: FAMILY MEDICINE CLINIC | Facility: CLINIC | Age: 62
End: 2017-10-20

## 2017-10-20 VITALS
HEART RATE: 60 BPM | DIASTOLIC BLOOD PRESSURE: 82 MMHG | SYSTOLIC BLOOD PRESSURE: 120 MMHG | WEIGHT: 251 LBS | TEMPERATURE: 97.4 F | BODY MASS INDEX: 39.31 KG/M2

## 2017-10-20 DIAGNOSIS — E78.00 PURE HYPERCHOLESTEROLEMIA: ICD-10-CM

## 2017-10-20 DIAGNOSIS — F51.02 ADJUSTMENT INSOMNIA: ICD-10-CM

## 2017-10-20 DIAGNOSIS — R10.12 LUQ ABDOMINAL PAIN: ICD-10-CM

## 2017-10-20 DIAGNOSIS — I25.10 CORONARY ARTERY DISEASE INVOLVING NATIVE CORONARY ARTERY OF NATIVE HEART WITHOUT ANGINA PECTORIS: ICD-10-CM

## 2017-10-20 DIAGNOSIS — G47.33 OBSTRUCTIVE SLEEP APNEA SYNDROME: ICD-10-CM

## 2017-10-20 DIAGNOSIS — IMO0001 UNCONTROLLED TYPE 2 DIABETES MELLITUS WITHOUT COMPLICATION, WITHOUT LONG-TERM CURRENT USE OF INSULIN: Primary | ICD-10-CM

## 2017-10-20 DIAGNOSIS — I71.40 ABDOMINAL AORTIC ANEURYSM (AAA) WITHOUT RUPTURE (HCC): ICD-10-CM

## 2017-10-20 DIAGNOSIS — I10 ESSENTIAL HYPERTENSION: ICD-10-CM

## 2017-10-20 PROCEDURE — 99214 OFFICE O/P EST MOD 30 MIN: CPT | Performed by: FAMILY MEDICINE

## 2017-10-20 RX ORDER — ZOLPIDEM TARTRATE 10 MG/1
5-10 TABLET ORAL NIGHTLY PRN
Qty: 15 TABLET | Refills: 1 | Status: SHIPPED | OUTPATIENT
Start: 2017-10-20 | End: 2018-07-23 | Stop reason: SDUPTHER

## 2017-10-20 NOTE — PROGRESS NOTES
Assessment/Plan     Problem List Items Addressed This Visit        Cardiovascular and Mediastinum    Aortic aneurysm    Relevant Orders    CT Abdomen Pelvis With Contrast    Hyperlipidemia    Relevant Orders    Comprehensive Metabolic Panel    Lipid Panel With / Chol / HDL Ratio    Coronary artery disease involving native coronary artery of native heart without angina pectoris    Hypertension       Endocrine    Type II diabetes mellitus, uncontrolled - Primary    Relevant Orders    Comprehensive Metabolic Panel    Lipid Panel With / Chol / HDL Ratio    Hemoglobin A1c       Other    Sleep apnea      Other Visit Diagnoses     LUQ abdominal pain        Relevant Orders    CT Abdomen Pelvis With Contrast    Adjustment insomnia        Relevant Medications    zolpidem (AMBIEN) 10 MG tablet           Follow up: Return in about 3 months (around 1/20/2018), or if symptoms worsen or fail to improve.     DISCUSSION    Review blood work.  All stable.  A1c is a little better.  Although his diet does not sound like it is doing well.  Recommend continuing to decrease carbohydrates and improved nutrition.  Recheck in 3 months.    Left upper quadrant abdominal pain.  History of abdominal aortic aneurysm.  Check CT scan as noted.  The pain is been present for more than 1 year so possibly could have a small hernia but we will check the aneurysm while doing the scan.    Insomnia.  Increased stress.  Refilled Ambien 10 mg one half to one as needed.    Coronary artery disease.  Hyperlipidemia.  Stable.  Continue medication.    Obstructive sleep apnea on CPAP.  Doing well.    Elevated liver function tests.  Mildly increased still but overall stable    Hypertension.  Stable.  Continue medication.      Overall, continue to work on diet, weight loss.      Recheck labs in 3 months.    MEDICATIONS PRESCRIBED  Requested Prescriptions     Signed Prescriptions Disp Refills   • zolpidem (AMBIEN) 10 MG tablet 15 tablet 1     Sig: Take 0.5-1  tablets by mouth At Night As Needed for Sleep.            Loi dated on 10/20/2017   was reviewed and appropriate.        -------------------------------------------    Subjective     Chief Complaint   Patient presents with   • Diabetes     3 month f/u, requesting sleeping medicaiton       Diabetes   He presents for his follow-up diabetic visit. He has type 2 diabetes mellitus. His disease course has been stable. There are no hypoglycemic associated symptoms. Pertinent negatives for hypoglycemia include no dizziness. Pertinent negatives for diabetes include no chest pain and no visual change. There are no hypoglycemic complications. Symptoms are stable. There are no diabetic complications. Pertinent negatives for diabetic complications include no peripheral neuropathy. (Hands go to sleep but feet ok) When asked about current treatments, none were reported. His weight is increasing steadily (increased a little this time). He is following a generally healthy (has ice cream nightly. Still eating. ) diet. He never participates in exercise. Home blood sugar record trend: Sugars 171-175 in 14 days. An ACE inhibitor/angiotensin II receptor blocker is being taken. Eye exam is current (8/2017).   Hypertension   This is a chronic problem. The current episode started more than 1 year ago. The problem is unchanged. The problem is controlled. Associated symptoms include shortness of breath (has to rest some but working alot). Pertinent negatives include no chest pain or palpitations. There are no associated agents to hypertension. Risk factors for coronary artery disease include dyslipidemia, male gender and obesity. Past treatments include ACE inhibitors. The current treatment provides moderate improvement. There are no compliance problems.  Hypertensive end-organ damage includes CAD/MI. There is no history of angina or kidney disease. There is no history of chronic renal disease.   Hyperlipidemia   This is a chronic problem.  He has no history of chronic renal disease. 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 Pertinent negatives include no chest pain. Current antihyperlipidemic treatment includes statins. The current treatment provides moderate improvement of lipids. There are no compliance problems.    Coronary Artery Disease   Presents for follow-up visit. Symptoms include shortness of breath (has to rest some but working alot). Pertinent negatives include no chest pain, chest pressure, chest tightness, dizziness or palpitations. Risk factors include hyperlipidemia. The symptoms have been stable. Compliance with exercise is poor. Compliance with medications is good.     Abd Pain  Left side abd pain is still there.   No better. Does not think it is a muscle.  No nausea  occ diarrhea  Loose stool  No blood in BM  Colon: 5 yrs in Jan 2018. + polyps. then. His GI left.       Insomnia  Not sleeping well and had some Ambien (helped). Takes occ. 10 of 20 left. 2-3 years old.     Increased stress, Son moving to [place]. Harder to sleep.     LUCIAN on CPAP  uses cpap nightly and during day if nap  Using 9 hrs per day  AHI but he does not know it        Past Medical History,Medications, Allergies, and social history was reviewed.    Review of Systems   Constitutional: Negative.    HENT: Negative.    Respiratory: Positive for shortness of breath (has to rest some but working alot). Negative for chest tightness.    Cardiovascular: Negative.  Negative for chest pain and palpitations.   Gastrointestinal: Positive for abdominal pain. Negative for blood in stool.   Musculoskeletal: Positive for myalgias (due to extra work helping son).   Neurological: Negative.  Negative for dizziness.   Psychiatric/Behavioral: Positive for sleep disturbance.       Objective     Vitals:    10/20/17 0831   BP: 120/82   BP Location: Right arm   Patient Position: Sitting   Cuff Size:  Large Adult   Pulse: 60   Temp: 97.4 °F (36.3 °C)   Weight: 251 lb (114 kg)        Physical Exam   Constitutional: He is oriented to person, place, and time. Vital signs are normal. He appears well-developed and well-nourished.   HENT:   Head: Normocephalic and atraumatic.   Right Ear: Hearing, tympanic membrane, external ear and ear canal normal.   Left Ear: Hearing, tympanic membrane, external ear and ear canal normal.   Nose: Nose normal.   Mouth/Throat: Oropharynx is clear and moist.   Eyes: Conjunctivae, EOM and lids are normal. Pupils are equal, round, and reactive to light.   Neck: Normal range of motion. Neck supple. No thyromegaly present.   Cardiovascular: Normal rate, regular rhythm and normal heart sounds.  Exam reveals no friction rub.    No murmur heard.  Pulmonary/Chest: Effort normal and breath sounds normal. No respiratory distress. He has no wheezes. He has no rales.   Abdominal: Soft. Normal appearance and bowel sounds are normal. He exhibits no distension and no mass. There is tenderness (mild to light palpatation, some mild bulging LUQ. Not red or hot). There is no rebound and no guarding.   Musculoskeletal: He exhibits no edema.   Neurological: He is alert and oriented to person, place, and time. He has normal strength.   Skin: Skin is warm and dry.   Psychiatric: He has a normal mood and affect. His speech is normal and behavior is normal. Cognition and memory are normal.   Nursing note and vitals reviewed.              Cheo Weiss MD

## 2017-10-23 ENCOUNTER — HOSPITAL ENCOUNTER (OUTPATIENT)
Dept: CT IMAGING | Facility: HOSPITAL | Age: 62
Discharge: HOME | End: 2017-10-23
Admitting: FAMILY MEDICINE

## 2017-10-23 DIAGNOSIS — I71.40 ABDOMINAL AORTIC ANEURYSM (AAA) WITHOUT RUPTURE (HCC): ICD-10-CM

## 2017-10-23 DIAGNOSIS — R10.12 LUQ ABDOMINAL PAIN: ICD-10-CM

## 2017-10-23 PROCEDURE — 74177 CT ABD & PELVIS W/CONTRAST: CPT

## 2017-10-23 PROCEDURE — 0 IOPAMIDOL 61 % SOLUTION: Performed by: FAMILY MEDICINE

## 2017-10-23 RX ADMIN — IOPAMIDOL 95 ML: 612 INJECTION, SOLUTION INTRAVENOUS at 13:30

## 2018-01-15 ENCOUNTER — RESULTS ENCOUNTER (OUTPATIENT)
Dept: FAMILY MEDICINE CLINIC | Facility: CLINIC | Age: 63
End: 2018-01-15

## 2018-01-15 DIAGNOSIS — E78.00 PURE HYPERCHOLESTEROLEMIA: ICD-10-CM

## 2018-01-15 DIAGNOSIS — IMO0001 UNCONTROLLED TYPE 2 DIABETES MELLITUS WITHOUT COMPLICATION, WITHOUT LONG-TERM CURRENT USE OF INSULIN: ICD-10-CM

## 2018-01-17 LAB
ALBUMIN SERPL-MCNC: 4.3 G/DL (ref 3.2–4.8)
ALBUMIN/GLOB SERPL: 1.3 G/DL (ref 1.5–2.5)
ALP SERPL-CCNC: 41 U/L (ref 25–100)
ALT SERPL-CCNC: 66 U/L (ref 7–40)
AST SERPL-CCNC: 60 U/L (ref 0–33)
BILIRUB SERPL-MCNC: 0.7 MG/DL (ref 0.3–1.2)
BUN SERPL-MCNC: 13 MG/DL (ref 9–23)
BUN/CREAT SERPL: 11.8 (ref 7–25)
CALCIUM SERPL-MCNC: 9.4 MG/DL (ref 8.7–10.4)
CHLORIDE SERPL-SCNC: 102 MMOL/L (ref 99–109)
CHOLEST SERPL-MCNC: 118 MG/DL (ref 0–200)
CHOLEST/HDLC SERPL: 3.81 {RATIO}
CO2 SERPL-SCNC: 28 MMOL/L (ref 20–31)
CREAT SERPL-MCNC: 1.1 MG/DL (ref 0.6–1.3)
GLOBULIN SER CALC-MCNC: 3.4 GM/DL
GLUCOSE SERPL-MCNC: 160 MG/DL (ref 70–100)
HBA1C MFR BLD: 8 % (ref 4.8–5.6)
HDLC SERPL-MCNC: 31 MG/DL (ref 40–60)
LDLC SERPL CALC-MCNC: 63 MG/DL (ref 0–100)
POTASSIUM SERPL-SCNC: 4.3 MMOL/L (ref 3.5–5.5)
PROT SERPL-MCNC: 7.7 G/DL (ref 5.7–8.2)
SODIUM SERPL-SCNC: 139 MMOL/L (ref 132–146)
TRIGL SERPL-MCNC: 120 MG/DL (ref 0–150)
VLDLC SERPL CALC-MCNC: 24 MG/DL

## 2018-01-19 ENCOUNTER — OFFICE VISIT (OUTPATIENT)
Dept: FAMILY MEDICINE CLINIC | Facility: CLINIC | Age: 63
End: 2018-01-19

## 2018-01-19 VITALS
BODY MASS INDEX: 40.1 KG/M2 | WEIGHT: 255.5 LBS | TEMPERATURE: 97.5 F | OXYGEN SATURATION: 96 % | HEART RATE: 68 BPM | HEIGHT: 67 IN | DIASTOLIC BLOOD PRESSURE: 84 MMHG | RESPIRATION RATE: 16 BRPM | SYSTOLIC BLOOD PRESSURE: 122 MMHG

## 2018-01-19 DIAGNOSIS — I10 ESSENTIAL HYPERTENSION: ICD-10-CM

## 2018-01-19 DIAGNOSIS — E78.00 PURE HYPERCHOLESTEROLEMIA: ICD-10-CM

## 2018-01-19 DIAGNOSIS — I25.10 MULTIPLE VESSEL CORONARY ARTERY DISEASE: ICD-10-CM

## 2018-01-19 DIAGNOSIS — E11.65 UNCONTROLLED TYPE 2 DIABETES MELLITUS WITH HYPERGLYCEMIA, WITHOUT LONG-TERM CURRENT USE OF INSULIN (HCC): Primary | ICD-10-CM

## 2018-01-19 PROCEDURE — 99214 OFFICE O/P EST MOD 30 MIN: CPT | Performed by: FAMILY MEDICINE

## 2018-01-19 RX ORDER — METFORMIN HYDROCHLORIDE 500 MG/1
500 TABLET, FILM COATED, EXTENDED RELEASE ORAL
Qty: 30 TABLET | Refills: 3 | Status: SHIPPED | OUTPATIENT
Start: 2018-01-19 | End: 2018-04-23 | Stop reason: SDUPTHER

## 2018-01-19 NOTE — PROGRESS NOTES
Assessment/Plan     Problem List Items Addressed This Visit        Cardiovascular and Mediastinum    Multiple vessel coronary artery disease    Hyperlipidemia    Relevant Orders    Comprehensive Metabolic Panel    Lipid Panel With / Chol / HDL Ratio    Hypertension    Relevant Orders    Comprehensive Metabolic Panel    Lipid Panel With / Chol / HDL Ratio       Endocrine    Type II diabetes mellitus, uncontrolled - Primary    Relevant Medications    metFORMIN (GLUMETZA) 500 MG (MOD) 24 hr tablet    Other Relevant Orders    Comprehensive Metabolic Panel    Hemoglobin A1c    Lipid Panel With / Chol / HDL Ratio           Follow up: Return in about 3 months (around 4/19/2018), or if symptoms worsen or fail to improve.     DISCUSSION  Must lose weight by decreasing carbohydrates, sweets. Goal is to lose 10 pounds over the next 3 months.  Blood pressure stable  Follow up with cardiology in Feb as scheduled. Call if chest pain develops. He does have exertional shortness of breath but unchanged and chronic.     Start Metformin  mg one po daily .  Call with any side effects.  Side effects of diarrhea explained.  Recheck labs and follow up in 3 months    Recheck labs in 3 months.  Orders placed.    MEDICATIONS PRESCRIBED  Requested Prescriptions     Signed Prescriptions Disp Refills   • metFORMIN (GLUMETZA) 500 MG (MOD) 24 hr tablet 30 tablet 3     Sig: Take 1 tablet by mouth Daily With Breakfast.            -------------------------------------------    Subjective     Chief Complaint   Patient presents with   • Diabetes     3 month follow up   • Hyperlipidemia   • Hypertension   • Med Refill       Diabetes   He presents for his follow-up diabetic visit. He has type 2 diabetes mellitus. His disease course has been worsening. There are no hypoglycemic associated symptoms. Pertinent negatives for hypoglycemia include no dizziness. Pertinent negatives for diabetes include no chest pain and no visual change. There are no  hypoglycemic complications. Symptoms are stable. There are no diabetic complications. Pertinent negatives for diabetic complications include no peripheral neuropathy. When asked about current treatments, none were reported. His weight is increasing steadily (increased a little this time). He is following a generally unhealthy (eating alot of cookies and candy) diet. He never participates in exercise. Home blood sugar record trend: 195 average. An ACE inhibitor/angiotensin II receptor blocker is being taken. Eye exam is current (8/2017).   Hyperlipidemia   This is a chronic problem. He has no history of chronic renal disease. Associated symptoms include myalgias (with work) and shortness of breath (with activity and not new). Pertinent negatives include no chest pain. Current antihyperlipidemic treatment includes statins. The current treatment provides moderate improvement of lipids. There are no compliance problems.    Hypertension   This is a chronic problem. The current episode started more than 1 year ago. The problem is unchanged. The problem is controlled. Associated symptoms include shortness of breath (with activity and not new). Pertinent negatives include no chest pain or palpitations. There are no associated agents to hypertension. Risk factors for coronary artery disease include dyslipidemia, male gender and obesity. Past treatments include ACE inhibitors. The current treatment provides moderate improvement. There are no compliance problems.  Hypertensive end-organ damage includes CAD/MI. There is no history of angina or kidney disease. There is no history of chronic renal disease.   Coronary Artery Disease   Presents for follow-up visit. Symptoms include shortness of breath (with activity and not new). Pertinent negatives include no chest pain, chest pressure, chest tightness, dizziness, leg swelling or palpitations. (Chest burning at times, arms burning, legs burning with exercise. No pain like prior  "MI) Risk factors include hyperlipidemia. The symptoms have been stable. Compliance with medications is good.           Past Medical History,Medications, Allergies, and social history was reviewed.    Review of Systems   Constitutional: Positive for unexpected weight change (increased 4 pounds (increased cookies)).   HENT: Negative.    Respiratory: Positive for shortness of breath (with activity and not new). Negative for chest tightness.    Cardiovascular: Negative for chest pain, palpitations and leg swelling.   Gastrointestinal: Negative.         No change in chronic abdominal pain   Genitourinary: Negative.    Musculoskeletal: Positive for myalgias (with work).   Neurological: Negative.  Negative for dizziness.   Psychiatric/Behavioral: Negative.        Objective     Vitals:    01/19/18 0824   BP: 122/84   Pulse: 68   Resp: 16   Temp: 97.5 °F (36.4 °C)   TempSrc: Temporal Artery    SpO2: 96%   Weight: 116 kg (255 lb 8 oz)   Height: 170.2 cm (67.01\")        Physical Exam   Constitutional: He is oriented to person, place, and time. Vital signs are normal. He appears well-developed and well-nourished.   HENT:   Head: Normocephalic and atraumatic.   Right Ear: Hearing, tympanic membrane, external ear and ear canal normal.   Left Ear: Hearing, tympanic membrane, external ear and ear canal normal.   Nose: Nose normal.   Mouth/Throat: Oropharynx is clear and moist.   Eyes: Conjunctivae, EOM and lids are normal. Pupils are equal, round, and reactive to light.   Neck: Normal range of motion. Neck supple. No thyromegaly present.   Cardiovascular: Normal rate, regular rhythm and normal heart sounds.  Exam reveals no friction rub.    No murmur heard.  Pulmonary/Chest: Effort normal and breath sounds normal. No respiratory distress. He has no wheezes. He has no rales.   Abdominal: Soft. Normal appearance and bowel sounds are normal. He exhibits no distension and no mass. There is tenderness (mild to light palpatation. " diffusely , no change). There is no rebound and no guarding.   Musculoskeletal: He exhibits no edema.   Neurological: He is alert and oriented to person, place, and time. He has normal strength.   Skin: Skin is warm and dry.   Psychiatric: He has a normal mood and affect. His speech is normal and behavior is normal. Cognition and memory are normal.   Nursing note and vitals reviewed.    Review blood work with him in A1c is increased 8.0.  Liver function tests also increased a little with his weight gain.  Cholesterol is good.          Cheo Weiss MD

## 2018-02-07 ENCOUNTER — OFFICE VISIT (OUTPATIENT)
Dept: CARDIOLOGY | Facility: CLINIC | Age: 63
End: 2018-02-07

## 2018-02-07 VITALS
HEART RATE: 66 BPM | BODY MASS INDEX: 39.87 KG/M2 | WEIGHT: 254 LBS | HEIGHT: 67 IN | SYSTOLIC BLOOD PRESSURE: 130 MMHG | DIASTOLIC BLOOD PRESSURE: 83 MMHG

## 2018-02-07 DIAGNOSIS — E78.5 DYSLIPIDEMIA: ICD-10-CM

## 2018-02-07 DIAGNOSIS — I25.10 CORONARY ARTERY DISEASE INVOLVING NATIVE CORONARY ARTERY OF NATIVE HEART WITHOUT ANGINA PECTORIS: ICD-10-CM

## 2018-02-07 DIAGNOSIS — I71.9 AORTIC ANEURYSM WITHOUT RUPTURE, UNSPECIFIED PORTION OF AORTA (HCC): Primary | ICD-10-CM

## 2018-02-07 DIAGNOSIS — I10 ESSENTIAL HYPERTENSION: ICD-10-CM

## 2018-02-07 PROCEDURE — 93010 ELECTROCARDIOGRAM REPORT: CPT | Performed by: INTERNAL MEDICINE

## 2018-02-07 PROCEDURE — 99214 OFFICE O/P EST MOD 30 MIN: CPT | Performed by: INTERNAL MEDICINE

## 2018-02-07 RX ORDER — ISOSORBIDE MONONITRATE 60 MG/1
60 TABLET, EXTENDED RELEASE ORAL DAILY
Qty: 30 TABLET | Refills: 3 | Status: SHIPPED | OUTPATIENT
Start: 2018-02-07 | End: 2018-04-11

## 2018-02-07 NOTE — PROGRESS NOTES
Subjective:     Encounter Date:02/07/2018      Patient ID: Kai Garcia is a 62 y.o. male.    Chief Complaint: Aortic Aneurysm and Multiple vessel coronary artery disease      PROBLEM LIST:  1. Dissecting aortic aneurysm:  a. 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 Helton, stage I in June 2006 and stage II in March 2007.   b. Followed chronically with annual CTs by Dr. Helton.   2. Coronary artery disease:  a. Cardiac cath prior to “elephant trunk” procedure in June 2006 and the patient underwent CABG at that time consisting of SVG to the OM and SVG to the PDA.   b. On 02/08/2007 - inferoposterior MI, cardiac cath showed 100% left circumflex with severe OM-1 stenosis and 100% vein graft to the left circumflex. RCA was diffuse, 90% ostial stenosis at the SVG to the RCA. LAD had only 40% stenosis.   c. Cardiac cath in March 2007 due to recurrent angina - 2.5 Microdriver stent to the distal native RCA.   3. Dyslipidemia.   4. Hypertension.   5. Diabetes  6. Chronic low back pain/multiple lumbar surgeries, currently disabled:  a. L4-L5 discectomy in 2015.   7. History of bleeding peptic ulcer disease.   8. Severe obstructive sleep apnea.   9. Appendectomy  1.     History of Present Illness  Patient returns today for follow up with a history of Previous thoracic aortic dissection repair and coronary artery disease.  Since her last visit he notes he is having progressive dyspnea on exertion.  Denies chest pain.  Notes he is short of breath even with normal daily activities at this time.  No orthopnea PND peripheral edema or tachycardia palpitations.  No wheezing or cough..     Allergies   Allergen Reactions   • Ativan [Lorazepam] Other (See Comments)     agitation   • Zetia [Ezetimibe] Other (See Comments)     increased LFTs   • Zocor [Simvastatin] Myalgia         Current Outpatient Prescriptions:   •  aspirin 81 MG EC  tablet, Take  by mouth daily., Disp: , Rfl:   •  bisoprolol (ZEBeta) 10 MG tablet, TAKE ONE (1) TABLET BY MOUTH ONCE DAILY, Disp: 90 tablet, Rfl: 3  •  clopidogrel (PLAVIX) 75 MG tablet, TAKE ONE (1) TABLET BY MOUTH ONCE DAILY, Disp: 90 tablet, Rfl: 3  •  eplerenone (INSPRA) 25 MG tablet, TAKE ONE (1) TABLET BY MOUTH ONCE DAILY, Disp: 90 tablet, Rfl: 3  •  lisinopril (PRINIVIL,ZESTRIL) 20 MG tablet, TAKE ONE (1) TABLET BY MOUTH ONCE DAILY, Disp: 90 tablet, Rfl: 3  •  metFORMIN (GLUMETZA) 500 MG (MOD) 24 hr tablet, Take 1 tablet by mouth Daily With Breakfast., Disp: 30 tablet, Rfl: 3  •  nitroglycerin (NITROSTAT) 0.4 MG SL tablet, Place 1 tablet under the tongue every 5 (five) minutes as needed for chest pain., Disp: 25 tablet, Rfl: 5  •  omeprazole (priLOSEC) 40 MG capsule, TAKE ONE (1) CAPSULE BY MOUTH ONCE DAILY (Patient taking differently: taking 20 mg per day), Disp: 90 capsule, Rfl: 3  •  ONE TOUCH ULTRA TEST test strip, check sugars daily and prn, Disp: 100 each, Rfl: 5  •  ONETOUCH DELICA LANCETS 33G misc, check sugars daily and prn, Disp: 100 each, Rfl: 5  •  rosuvastatin (CRESTOR) 10 MG tablet, TAKE ONE (1) TABLET BY MOUTH AT BEDTIME, Disp: 90 tablet, Rfl: 3  •  isosorbide mononitrate (IMDUR) 60 MG 24 hr tablet, Take 1 tablet by mouth Daily., Disp: 30 tablet, Rfl: 3  •  zolpidem (AMBIEN) 10 MG tablet, Take 0.5-1 tablets by mouth At Night As Needed for Sleep., Disp: 15 tablet, Rfl: 1    The following portions of the patient's history were reviewed and updated as appropriate: allergies, current medications, past family history, past medical history, past social history, past surgical history and problem list.    Review of Systems   Constitution: Positive for weakness. Negative for fever and malaise/fatigue.   HENT: Positive for hearing loss and tinnitus. Negative for nosebleeds.    Eyes: Negative for redness and visual disturbance.   Cardiovascular: Positive for chest pain. Negative for orthopnea,  "palpitations and paroxysmal nocturnal dyspnea.   Respiratory: Positive for shortness of breath. Negative for cough, snoring, sputum production and wheezing.    Endocrine: Positive for cold intolerance, heat intolerance and polyuria.   Hematologic/Lymphatic: Negative for bleeding problem. Bruises/bleeds easily.   Skin: Negative for flushing, itching and rash.   Musculoskeletal: Positive for back pain and muscle weakness. Negative for falls, joint pain and muscle cramps.   Gastrointestinal: Positive for change in bowel habit. Negative for abdominal pain, diarrhea, heartburn, nausea and vomiting.   Genitourinary: Positive for frequency. Negative for hematuria.   Neurological: Positive for excessive daytime sleepiness, dizziness and headaches. Negative for tremors.   Psychiatric/Behavioral: Negative for substance abuse. The patient is not nervous/anxious.           Objective:   Blood pressure 130/83, pulse 66, height 168.9 cm (66.5\"), weight 115 kg (254 lb).      Physical Exam   Constitutional: He is oriented to person, place, and time. He appears well-developed and well-nourished.   HENT:   Mouth/Throat: Oropharynx is clear and moist.   Neck: No JVD present. Carotid bruit is not present. No thyromegaly present.   Cardiovascular: Regular rhythm, S1 normal, S2 normal, normal heart sounds and intact distal pulses.  Exam reveals no gallop, no S3 and no S4.    No murmur heard.  Pulses:       Carotid pulses are 2+ on the right side, and 2+ on the left side.       Radial pulses are 2+ on the right side, and 2+ on the left side.   Pulmonary/Chest: Breath sounds normal.   Abdominal: Soft. Bowel sounds are normal. He exhibits no mass. There is no tenderness.   Musculoskeletal: He exhibits no edema.   Neurological: He is alert and oriented to person, place, and time.   Skin: Skin is warm and dry. No rash noted.       Lab Review:      ECG 12 Lead  Date/Time: 2/7/2018 11:34 AM  Performed by: SARA MARTINEZ  Authorized by: JUAN" "URI   Rhythm: sinus rhythm  Conduction: right bundle branch block  Q waves: II, III and aVF                  Assessment:   Kai was seen today for aortic aneurysm and multiple vessel coronary artery disease.    Diagnoses and all orders for this visit:    Aortic aneurysm without rupture, unspecified portion of aorta  -     CT Angiogram Chest With & Without Contrast    Coronary artery disease involving native coronary artery of native heart without angina pectoris    Dyslipidemia    Essential hypertension    Other orders  -     isosorbide mononitrate (IMDUR) 60 MG 24 hr tablet; Take 1 tablet by mouth Daily.        Impression  1. Coronary artery disease, 10 years status post PCI.  2. Progressive dyspnea on exertion, suspect anginal equivalent.  Now functional class III  3. History of type A aortic dissection status post \"elephant trunk\" procedure 2006  4. Hypertension controlled  5. Dyslipidemia controlled  6. Sleep apnea    Plan:  1. We'll check a CTA of his chest and abdomen to evaluate aneurysm (has not had one more than 4 years).  2. Check echocardiogram.  3. I discussed possibility of cardiac catheter versus GXT.  Patient wishes to defer for now.  We will add indoor 60 mg daily as an anti-anginal.  Understands if symptoms progress he is to contact me for further management.  4. Revisit in 2 MO, or sooner as needed.    Uri Willis MD    "

## 2018-02-08 DIAGNOSIS — I25.10 CORONARY ARTERY DISEASE INVOLVING NATIVE CORONARY ARTERY OF NATIVE HEART WITHOUT ANGINA PECTORIS: ICD-10-CM

## 2018-02-08 RX ORDER — EPLERENONE 25 MG/1
25 TABLET, FILM COATED ORAL DAILY
Qty: 90 TABLET | Refills: 3 | Status: SHIPPED | OUTPATIENT
Start: 2018-02-08 | End: 2018-04-11 | Stop reason: SDUPTHER

## 2018-02-08 RX ORDER — ROSUVASTATIN CALCIUM 10 MG/1
10 TABLET, COATED ORAL DAILY
Qty: 90 TABLET | Refills: 3 | Status: SHIPPED | OUTPATIENT
Start: 2018-02-08 | End: 2018-04-11 | Stop reason: SDUPTHER

## 2018-02-08 RX ORDER — BISOPROLOL FUMARATE 10 MG/1
10 TABLET, FILM COATED ORAL DAILY
Qty: 90 TABLET | Refills: 3 | Status: SHIPPED | OUTPATIENT
Start: 2018-02-08 | End: 2018-04-11 | Stop reason: SDUPTHER

## 2018-02-08 RX ORDER — CLOPIDOGREL BISULFATE 75 MG/1
75 TABLET ORAL DAILY
Qty: 90 TABLET | Refills: 3 | Status: SHIPPED | OUTPATIENT
Start: 2018-02-08 | End: 2018-04-11 | Stop reason: SDUPTHER

## 2018-02-08 RX ORDER — LISINOPRIL 20 MG/1
20 TABLET ORAL DAILY
Qty: 90 TABLET | Refills: 3 | Status: SHIPPED | OUTPATIENT
Start: 2018-02-08 | End: 2018-04-11 | Stop reason: SDUPTHER

## 2018-02-08 RX ORDER — NITROGLYCERIN 0.4 MG/1
0.4 TABLET SUBLINGUAL
Qty: 25 TABLET | Refills: 5 | Status: SHIPPED | OUTPATIENT
Start: 2018-02-08 | End: 2018-04-11 | Stop reason: SDUPTHER

## 2018-02-12 ENCOUNTER — HOSPITAL ENCOUNTER (OUTPATIENT)
Dept: CT IMAGING | Facility: HOSPITAL | Age: 63
Discharge: HOME | End: 2018-02-12
Attending: INTERNAL MEDICINE | Admitting: INTERNAL MEDICINE

## 2018-02-12 PROCEDURE — 71275 CT ANGIOGRAPHY CHEST: CPT

## 2018-02-12 PROCEDURE — 0 IOPAMIDOL PER 1 ML: Performed by: INTERNAL MEDICINE

## 2018-02-12 RX ADMIN — IOPAMIDOL 90 ML: 755 INJECTION, SOLUTION INTRAVENOUS at 10:45

## 2018-02-16 ENCOUNTER — TELEPHONE (OUTPATIENT)
Dept: CARDIOLOGY | Facility: CLINIC | Age: 63
End: 2018-02-16

## 2018-02-16 NOTE — TELEPHONE ENCOUNTER
----- Message from Uri Willis MD sent at 2/15/2018  4:26 PM EST -----  CT scan is unchanged from all this previous.    Called and left VM advising that CT scan was unchanged and to call office with any questions or concerns.

## 2018-03-02 RX ORDER — OMEPRAZOLE 40 MG/1
CAPSULE, DELAYED RELEASE ORAL
Qty: 30 CAPSULE | Refills: 0 | Status: SHIPPED | OUTPATIENT
Start: 2018-03-02 | End: 2018-05-14 | Stop reason: SDUPTHER

## 2018-03-05 ENCOUNTER — TELEPHONE (OUTPATIENT)
Dept: FAMILY MEDICINE CLINIC | Facility: CLINIC | Age: 63
End: 2018-03-05

## 2018-03-05 NOTE — TELEPHONE ENCOUNTER
----- Message from Ariadna Lacy sent at 3/5/2018  9:33 AM EST -----  Contact: W[facility]ACE; WIFE CALLED  PHARMACY HAS INFORMED PT THAT A PA IS NEEDED FOR LANCETS  AND STRIPS    SHE IS CHECKING STATUS    [phone]

## 2018-03-06 NOTE — TELEPHONE ENCOUNTER
Okay to get new meter of insurance choice plus test strips to check sugars twice a day and as needed #100 and lancets #100.  +2 refills each.

## 2018-03-06 NOTE — TELEPHONE ENCOUNTER
PT INSURANCE WILL NO LONGER COVER THIS STYLE OF METER AND STRIPS. MARTIN IS GOING TO SEE WHAT THEY COVER AND GET HIM A NEW METER. IS THIS OK

## 2018-04-05 NOTE — PROGRESS NOTES
Subjective:     Encounter Date:04/11/2018    Primary Care Physician: Cheo Weiss MD      Patient ID: Kai Garcia is a 62 y.o. male.    Chief Complaint: dyspnea, CAD    PROBLEM LIST:  1. Dissecting aortic aneurysm:  a. 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 Helton, stage I in June 2006 and stage II in March 2007.   b. Followed chronically with annual CTs by Dr. Helton. Stable by CT 2018  2. Coronary artery disease:  a. Cardiac cath prior to “elephant trunk” procedure in June 2006 and the patient underwent CABG at that time consisting of SVG to the OM and SVG to the PDA.   b. On 02/08/2007 - inferoposterior MI, cardiac cath showed 100% left circumflex with severe OM-1 stenosis and 100% vein graft to the left circumflex. RCA was diffuse, 90% ostial stenosis at the SVG to the RCA. LAD had only 40% stenosis.   c. Cardiac cath in March 2007 due to recurrent angina - 2.5 Microdriver stent to the distal native RCA.   3. Dyslipidemia.   4. Hypertension.   5. Diabetes  6. Chronic low back pain/multiple lumbar surgeries, currently disabled:  a. L4-L5 discectomy in 2015.   7. History of bleeding peptic ulcer disease.   8. Severe obstructive sleep apnea.   9. Appendectomy      Allergies   Allergen Reactions   • Ativan [Lorazepam] Other (See Comments)     agitation   • Isosorbide Unknown (See Comments)     headaches   • Zetia [Ezetimibe] Other (See Comments)     increased LFTs   • Zocor [Simvastatin] Myalgia         Current Outpatient Prescriptions:   •  aspirin 81 MG EC tablet, Take  by mouth daily., Disp: , Rfl:   •  bisoprolol (ZEBeta) 10 MG tablet, Take 1 tablet by mouth Daily., Disp: 90 tablet, Rfl: 3  •  clopidogrel (PLAVIX) 75 MG tablet, Take 1 tablet by mouth Daily., Disp: 90 tablet, Rfl: 3  •  eplerenone (INSPRA) 25 MG tablet, Take 1 tablet by mouth Daily., Disp: 90 tablet, Rfl: 3  •  lisinopril (PRINIVIL,ZESTRIL)  20 MG tablet, Take 1 tablet by mouth Daily., Disp: 90 tablet, Rfl: 3  •  metFORMIN (GLUMETZA) 500 MG (MOD) 24 hr tablet, Take 1 tablet by mouth Daily With Breakfast., Disp: 30 tablet, Rfl: 3  •  nitroglycerin (NITROSTAT) 0.4 MG SL tablet, Place 1 tablet under the tongue Every 5 (Five) Minutes As Needed for Chest Pain., Disp: 25 tablet, Rfl: 5  •  omeprazole (priLOSEC) 40 MG capsule, TAKE ONE (1) CAPSULE BY MOUTH ONCE DAILY, Disp: 30 capsule, Rfl: 0  •  ONE TOUCH ULTRA TEST test strip, check sugars daily and prn, Disp: 100 each, Rfl: 5  •  ONETOUCH DELICA LANCETS 33G misc, check sugars daily and prn, Disp: 100 each, Rfl: 5  •  rosuvastatin (CRESTOR) 10 MG tablet, Take 1 tablet by mouth Daily., Disp: 90 tablet, Rfl: 3  •  zolpidem (AMBIEN) 10 MG tablet, Take 0.5-1 tablets by mouth At Night As Needed for Sleep., Disp: 15 tablet, Rfl: 1        History of Present Illness    Patient is a 62-year-old  male who we are seeing today for 2 month follow-up of shortness of breath, previous aneurysm, and coronary disease.  Since last being seen he notes overall not be doing any better with his symptoms.  He attempted to take isosorbide but developed a terrible headache and discontinued this medication.  He has since continued to have worsening shortness of breath with physical activity.  No reported chest pain, pressure, tightness.  He did have a CTA of his chest to evaluate his previous aneurysm/repair and this appeared overall unchanged.  No reported syncope, near-syncope, or edema.  Complains of some generalized weakness.  Is been having some trouble sleeping.  Notes that he is having to nap during the day on his CPAP.  He notes after awakening he has good for roughly 4 hours and then becomes significantly fatigued again.    The following portions of the patient's history were reviewed and updated as appropriate: allergies, current medications, past family history, past medical history, past social history, past  "surgical history and problem list.      Social History   Substance Use Topics   • Smoking status: Former Smoker     Quit date: 1/15/2006   • Smokeless tobacco: Never Used   • Alcohol use No         Review of Systems   Constitution: Positive for weakness and malaise/fatigue. Negative for chills, decreased appetite and weight loss.   Cardiovascular: Negative.    Respiratory: Positive for shortness of breath and snoring. Negative for cough and wheezing.    Hematologic/Lymphatic: Negative for bleeding problem. Does not bruise/bleed easily.   Skin: Negative for rash.   Musculoskeletal: Positive for muscle weakness. Negative for myalgias.   Gastrointestinal: Negative for heartburn, nausea and vomiting.          Objective:    height is 170.2 cm (67\") and weight is 114 kg (252 lb). His blood pressure is 120/80 and his pulse is 80.         Physical Exam   Constitutional: He is oriented to person, place, and time. He appears well-developed and well-nourished.   Neck: No JVD present. No tracheal deviation present.   Cardiovascular: Normal rate, regular rhythm, normal heart sounds and intact distal pulses.  Exam reveals no friction rub.    No murmur heard.  Left Hemant's test positive   Pulmonary/Chest: Effort normal and breath sounds normal. No respiratory distress.   Abdominal: Bowel sounds are normal. There is no tenderness.   Musculoskeletal: He exhibits no edema or deformity.   Neurological: He is alert and oriented to person, place, and time.       Procedures          Assessment:   Assessment/Plan      Diagnoses and all orders for this visit:    Coronary artery disease involving native coronary artery of native heart with other form of angina pectoris  -     Case Request Cath Lab: Left Heart Cath    Essential hypertension, controlled    Dyslipidemia, on statin      Plan:    Symptoms are very concerning for progressive anginal symptoms.  Secondary to this we'll proceed to cardiac catheterization plus or minus catheter-based " interventionVia left radial artery (chronic aortic dissection, type B).  Patient has a noted previous 40% LAD lesion in 2007.  Cardiac catheterization will be scheduled at the patient's convenience.  At that time we'll also check testosterone levels, vitamin D, vitamin B12, CBC, BMP and acetylcholine esterase antibodies.  Further recommendations to follow cardiac catheterization.       Faye RYAN scribed this dictation for Dr. Uri Willis.  I, Uri Willis MD, personally performed the services described in this documentation as scribed by the above individual in my presence, and it is both accurate and complete      Dictated utilizing Dragon dictation

## 2018-04-09 ENCOUNTER — RESULTS ENCOUNTER (OUTPATIENT)
Dept: FAMILY MEDICINE CLINIC | Facility: CLINIC | Age: 63
End: 2018-04-09

## 2018-04-09 DIAGNOSIS — I10 ESSENTIAL HYPERTENSION: ICD-10-CM

## 2018-04-09 DIAGNOSIS — E78.00 PURE HYPERCHOLESTEROLEMIA: ICD-10-CM

## 2018-04-09 DIAGNOSIS — E11.65 UNCONTROLLED TYPE 2 DIABETES MELLITUS WITH HYPERGLYCEMIA, WITHOUT LONG-TERM CURRENT USE OF INSULIN (HCC): ICD-10-CM

## 2018-04-11 ENCOUNTER — OFFICE VISIT (OUTPATIENT)
Dept: CARDIOLOGY | Facility: CLINIC | Age: 63
End: 2018-04-11

## 2018-04-11 VITALS
HEIGHT: 67 IN | HEART RATE: 80 BPM | DIASTOLIC BLOOD PRESSURE: 80 MMHG | BODY MASS INDEX: 39.55 KG/M2 | WEIGHT: 252 LBS | SYSTOLIC BLOOD PRESSURE: 120 MMHG

## 2018-04-11 DIAGNOSIS — I25.118 CORONARY ARTERY DISEASE INVOLVING NATIVE CORONARY ARTERY OF NATIVE HEART WITH OTHER FORM OF ANGINA PECTORIS (HCC): Primary | ICD-10-CM

## 2018-04-11 DIAGNOSIS — I10 ESSENTIAL HYPERTENSION: ICD-10-CM

## 2018-04-11 DIAGNOSIS — I71.40 ABDOMINAL AORTIC ANEURYSM (AAA) WITHOUT RUPTURE (HCC): ICD-10-CM

## 2018-04-11 DIAGNOSIS — E78.5 DYSLIPIDEMIA: ICD-10-CM

## 2018-04-11 DIAGNOSIS — I25.10 CORONARY ARTERY DISEASE INVOLVING NATIVE CORONARY ARTERY OF NATIVE HEART WITHOUT ANGINA PECTORIS: ICD-10-CM

## 2018-04-11 PROCEDURE — 99214 OFFICE O/P EST MOD 30 MIN: CPT | Performed by: INTERNAL MEDICINE

## 2018-04-11 RX ORDER — BISOPROLOL FUMARATE 10 MG/1
10 TABLET, FILM COATED ORAL DAILY
Qty: 90 TABLET | Refills: 3 | Status: SHIPPED | OUTPATIENT
Start: 2018-04-11 | End: 2019-03-06 | Stop reason: SDUPTHER

## 2018-04-11 RX ORDER — ROSUVASTATIN CALCIUM 10 MG/1
10 TABLET, COATED ORAL DAILY
Qty: 90 TABLET | Refills: 3 | Status: SHIPPED | OUTPATIENT
Start: 2018-04-11 | End: 2019-03-06 | Stop reason: SDUPTHER

## 2018-04-11 RX ORDER — EPLERENONE 25 MG/1
25 TABLET, FILM COATED ORAL DAILY
Qty: 90 TABLET | Refills: 3 | Status: SHIPPED | OUTPATIENT
Start: 2018-04-11 | End: 2019-07-08 | Stop reason: SDUPTHER

## 2018-04-11 RX ORDER — LISINOPRIL 20 MG/1
20 TABLET ORAL DAILY
Qty: 90 TABLET | Refills: 3 | Status: SHIPPED | OUTPATIENT
Start: 2018-04-11 | End: 2018-04-27 | Stop reason: HOSPADM

## 2018-04-11 RX ORDER — CLOPIDOGREL BISULFATE 75 MG/1
75 TABLET ORAL DAILY
Qty: 90 TABLET | Refills: 3 | Status: SHIPPED | OUTPATIENT
Start: 2018-04-11 | End: 2019-03-06 | Stop reason: SDUPTHER

## 2018-04-11 RX ORDER — NITROGLYCERIN 0.4 MG/1
0.4 TABLET SUBLINGUAL
Qty: 25 TABLET | Refills: 5 | Status: SHIPPED | OUTPATIENT
Start: 2018-04-11 | End: 2019-03-06 | Stop reason: SDUPTHER

## 2018-04-12 PROBLEM — I25.10 CORONARY ARTERY DISEASE INVOLVING NATIVE CORONARY ARTERY OF NATIVE HEART WITHOUT ANGINA PECTORIS: Status: ACTIVE | Noted: 2018-04-12

## 2018-04-13 ENCOUNTER — PREP FOR SURGERY (OUTPATIENT)
Dept: OTHER | Facility: HOSPITAL | Age: 63
End: 2018-04-13

## 2018-04-13 DIAGNOSIS — I20.9 ANGINA PECTORIS (HCC): Primary | ICD-10-CM

## 2018-04-13 RX ORDER — ASPIRIN 325 MG
325 TABLET, DELAYED RELEASE (ENTERIC COATED) ORAL DAILY
Status: CANCELLED | OUTPATIENT
Start: 2018-04-14

## 2018-04-13 RX ORDER — NITROGLYCERIN 0.4 MG/1
0.4 TABLET SUBLINGUAL
Status: CANCELLED | OUTPATIENT
Start: 2018-04-13

## 2018-04-13 RX ORDER — ACETAMINOPHEN 325 MG/1
650 TABLET ORAL EVERY 4 HOURS PRN
Status: CANCELLED | OUTPATIENT
Start: 2018-04-13

## 2018-04-13 RX ORDER — ASPIRIN 325 MG
325 TABLET ORAL ONCE
Status: CANCELLED | OUTPATIENT
Start: 2018-04-13 | End: 2018-04-13

## 2018-04-13 RX ORDER — SODIUM CHLORIDE 0.9 % (FLUSH) 0.9 %
1-10 SYRINGE (ML) INJECTION AS NEEDED
Status: CANCELLED | OUTPATIENT
Start: 2018-04-13

## 2018-04-13 RX ORDER — ONDANSETRON 2 MG/ML
4 INJECTION INTRAMUSCULAR; INTRAVENOUS EVERY 6 HOURS PRN
Status: CANCELLED | OUTPATIENT
Start: 2018-04-13

## 2018-04-20 LAB
ALBUMIN SERPL-MCNC: 4.4 G/DL (ref 3.2–4.8)
ALBUMIN/GLOB SERPL: 1.3 G/DL (ref 1.5–2.5)
ALP SERPL-CCNC: 39 U/L (ref 25–100)
ALT SERPL-CCNC: 62 U/L (ref 7–40)
AST SERPL-CCNC: 58 U/L (ref 0–33)
BILIRUB SERPL-MCNC: 0.6 MG/DL (ref 0.3–1.2)
BUN SERPL-MCNC: 15 MG/DL (ref 9–23)
BUN/CREAT SERPL: 13.6 (ref 7–25)
CALCIUM SERPL-MCNC: 9.5 MG/DL (ref 8.7–10.4)
CHLORIDE SERPL-SCNC: 104 MMOL/L (ref 99–109)
CHOLEST SERPL-MCNC: 117 MG/DL (ref 0–200)
CHOLEST/HDLC SERPL: 3.66 {RATIO}
CO2 SERPL-SCNC: 29 MMOL/L (ref 20–31)
CREAT SERPL-MCNC: 1.1 MG/DL (ref 0.6–1.3)
GFR SERPLBLD CREATININE-BSD FMLA CKD-EPI: 68 ML/MIN/1.73
GFR SERPLBLD CREATININE-BSD FMLA CKD-EPI: 82 ML/MIN/1.73
GLOBULIN SER CALC-MCNC: 3.3 GM/DL
GLUCOSE SERPL-MCNC: 158 MG/DL (ref 70–100)
HBA1C MFR BLD: 7.7 % (ref 4.8–5.6)
HDLC SERPL-MCNC: 32 MG/DL (ref 40–60)
LDLC SERPL CALC-MCNC: 59 MG/DL (ref 0–100)
POTASSIUM SERPL-SCNC: 4.5 MMOL/L (ref 3.5–5.5)
PROT SERPL-MCNC: 7.7 G/DL (ref 5.7–8.2)
SODIUM SERPL-SCNC: 141 MMOL/L (ref 132–146)
TRIGL SERPL-MCNC: 129 MG/DL (ref 0–150)
VLDLC SERPL CALC-MCNC: 25.8 MG/DL

## 2018-04-23 ENCOUNTER — OFFICE VISIT (OUTPATIENT)
Dept: FAMILY MEDICINE CLINIC | Facility: CLINIC | Age: 63
End: 2018-04-23

## 2018-04-23 VITALS
HEART RATE: 72 BPM | WEIGHT: 254.5 LBS | TEMPERATURE: 97.3 F | DIASTOLIC BLOOD PRESSURE: 76 MMHG | BODY MASS INDEX: 39.94 KG/M2 | SYSTOLIC BLOOD PRESSURE: 136 MMHG | HEIGHT: 67 IN

## 2018-04-23 DIAGNOSIS — E78.00 PURE HYPERCHOLESTEROLEMIA: ICD-10-CM

## 2018-04-23 DIAGNOSIS — E11.65 UNCONTROLLED TYPE 2 DIABETES MELLITUS WITH HYPERGLYCEMIA, WITHOUT LONG-TERM CURRENT USE OF INSULIN (HCC): Primary | ICD-10-CM

## 2018-04-23 DIAGNOSIS — I25.10 CORONARY ARTERY DISEASE INVOLVING NATIVE CORONARY ARTERY OF NATIVE HEART WITHOUT ANGINA PECTORIS: ICD-10-CM

## 2018-04-23 DIAGNOSIS — I10 ESSENTIAL HYPERTENSION: ICD-10-CM

## 2018-04-23 PROCEDURE — 99214 OFFICE O/P EST MOD 30 MIN: CPT | Performed by: FAMILY MEDICINE

## 2018-04-23 RX ORDER — METFORMIN HYDROCHLORIDE 500 MG/1
500 TABLET, FILM COATED, EXTENDED RELEASE ORAL
Qty: 30 TABLET | Refills: 5 | Status: SHIPPED | OUTPATIENT
Start: 2018-04-23 | End: 2018-05-14 | Stop reason: SDUPTHER

## 2018-04-23 NOTE — PROGRESS NOTES
Assessment/Plan       Problems Addressed this Visit        Cardiovascular and Mediastinum    Hyperlipidemia    Relevant Orders    Comprehensive Metabolic Panel    Lipid Panel With / Chol / HDL Ratio    Essential hypertension    Relevant Orders    Comprehensive Metabolic Panel    Lipid Panel With / Chol / HDL Ratio    CBC & Differential    Coronary artery disease involving native coronary artery of native heart without angina pectoris    Relevant Orders    CBC & Differential       Endocrine    Type II diabetes mellitus, uncontrolled - Primary    Relevant Medications    metFORMIN (GLUMETZA) 500 MG (MOD) 24 hr tablet    Other Relevant Orders    Comprehensive Metabolic Panel    Lipid Panel With / Chol / HDL Ratio    Hemoglobin A1c      Other Visit Diagnoses    None.           Follow up: Return in about 3 months (around 7/23/2018), or if symptoms worsen or fail to improve.     DISCUSSION  Overall stable but having some issues and need to have heart cath done. . Continue current medications. Chronic problems noted are stable. Check labs as noted and Return in about 3 months (around 7/23/2018), or if symptoms worsen or fail to improve.      Increased sugars may be due to issues with heart so no change in meds at this time. The A1c did decrease some.     Continue with meds, diet changes and wt loss efforts. I am concerned that there is an issue with heart so further plan once cath back.     MEDICATIONS PRESCRIBED  Requested Prescriptions     Signed Prescriptions Disp Refills   • metFORMIN (GLUMETZA) 500 MG (MOD) 24 hr tablet 30 tablet 5     Sig: Take 1 tablet by mouth Daily With Breakfast.          -------------------------------------------    Subjective     Chief Complaint   Patient presents with   • Diabetes     3 month F/U, metformin not helping. Have a heart cath on friday with Dr. Willis.          Diabetes   He presents for his follow-up diabetic visit. He has type 2 diabetes mellitus. His disease course has been  stable. There are no hypoglycemic associated symptoms. Associated symptoms include fatigue. Pertinent negatives for diabetes include no chest pain. There are no hypoglycemic complications. Diabetic complications include heart disease. Risk factors for coronary artery disease include diabetes mellitus, hypertension and dyslipidemia. Current diabetic treatment includes oral agent (monotherapy). He is compliant with treatment all of the time. His weight is stable. He is following a generally healthy diet. He never participates in exercise. He monitors blood glucose at home 1-2 x per day (200s or higher in the am). His home blood glucose trend is increasing steadily. An ACE inhibitor/angiotensin II receptor blocker is being taken. Eye exam is current.   Hypertension   This is a chronic problem. The problem is unchanged. The problem is controlled. Associated symptoms include peripheral edema and shortness of breath. Pertinent negatives include no chest pain. There are no associated agents to hypertension. Current antihypertension treatment includes ACE inhibitors. The current treatment provides moderate improvement. Hypertensive end-organ damage includes CAD/MI.   Coronary Artery Disease   Presents for follow-up visit. Symptoms include leg swelling and shortness of breath. Pertinent negatives include no chest pain, chest pressure or weight gain (no weight loss inspite of diet and metformin). (Sleepy. To have a cath done. Dr Willis. Not feeling right. No energy.) Risk factors include hyperlipidemia. The symptoms have been worsening.   Hyperlipidemia   This is a chronic problem. The current episode started more than 1 year ago. The problem is controlled. Recent lipid tests were reviewed and are normal. Associated symptoms include shortness of breath. Pertinent negatives include no chest pain. The current treatment provides moderate improvement of lipids. There are no compliance problems.  Risk factors for coronary artery  "disease include diabetes mellitus, dyslipidemia and hypertension.       Having heart cath on Friday        Past Medical History,Medications, Allergies, and social history was reviewed.      Review of Systems   Constitutional: Positive for fatigue. Negative for unexpected weight gain (no weight loss inspite of diet and metformin).   HENT: Negative.    Respiratory: Positive for shortness of breath.    Cardiovascular: Positive for leg swelling. Negative for chest pain.   Gastrointestinal: Negative.    Neurological: Negative.    Psychiatric/Behavioral: Negative.        Objective     Vitals:    04/23/18 0824   BP: 136/76   Pulse: 72   Temp: 97.3 °F (36.3 °C)   Weight: 115 kg (254 lb 8 oz)   Height: 170.2 cm (67\")          Physical Exam   Constitutional: He is oriented to person, place, and time. Vital signs are normal. He appears well-developed and well-nourished.   HENT:   Head: Normocephalic and atraumatic.   Right Ear: Hearing, tympanic membrane, external ear and ear canal normal.   Left Ear: Hearing, tympanic membrane, external ear and ear canal normal.   Nose: Nose normal.   Mouth/Throat: Oropharynx is clear and moist.   Eyes: Conjunctivae, EOM and lids are normal. Pupils are equal, round, and reactive to light.   Neck: Normal range of motion. Neck supple. No thyromegaly present.   Cardiovascular: Normal rate, regular rhythm and normal heart sounds.  Exam reveals no friction rub.    No murmur heard.  Pulmonary/Chest: Effort normal and breath sounds normal. No respiratory distress. He has no wheezes. He has no rales.   Abdominal: Soft. Normal appearance and bowel sounds are normal. He exhibits no distension and no mass. There is tenderness (mild to light palpatation. diffusely , no change). There is no rebound and no guarding.   Musculoskeletal: He exhibits no edema.   Neurological: He is alert and oriented to person, place, and time. He has normal strength.   Skin: Skin is warm and dry.   Psychiatric: He has a " normal mood and affect. His speech is normal and behavior is normal. Cognition and memory are normal.   Nursing note and vitals reviewed.    Reviewed labs          Cheo Weiss MD

## 2018-04-27 ENCOUNTER — HOSPITAL ENCOUNTER (OUTPATIENT)
Facility: HOSPITAL | Age: 63
Discharge: HOME | End: 2018-04-27
Attending: INTERNAL MEDICINE | Admitting: INTERNAL MEDICINE

## 2018-04-27 ENCOUNTER — APPOINTMENT (OUTPATIENT)
Dept: GENERAL RADIOLOGY | Facility: HOSPITAL | Age: 63
End: 2018-04-27

## 2018-04-27 VITALS
HEART RATE: 66 BPM | DIASTOLIC BLOOD PRESSURE: 71 MMHG | WEIGHT: 249.78 LBS | SYSTOLIC BLOOD PRESSURE: 124 MMHG | OXYGEN SATURATION: 94 % | HEIGHT: 67 IN | BODY MASS INDEX: 39.2 KG/M2 | TEMPERATURE: 97.9 F | RESPIRATION RATE: 16 BRPM

## 2018-04-27 DIAGNOSIS — I20.9 ANGINA PECTORIS (HCC): ICD-10-CM

## 2018-04-27 DIAGNOSIS — I25.10 CORONARY ARTERY DISEASE INVOLVING NATIVE CORONARY ARTERY OF NATIVE HEART WITHOUT ANGINA PECTORIS: ICD-10-CM

## 2018-04-27 LAB
25(OH)D3 SERPL-MCNC: 16.8 NG/ML
ALBUMIN SERPL-MCNC: 4.4 G/DL (ref 3.2–4.8)
ALBUMIN/GLOB SERPL: 1.3 G/DL (ref 1.5–2.5)
ALP SERPL-CCNC: 40 U/L (ref 25–100)
ALT SERPL W P-5'-P-CCNC: 55 U/L (ref 7–40)
ANION GAP SERPL CALCULATED.3IONS-SCNC: 5 MMOL/L (ref 3–11)
ARTICHOKE IGE QN: 62 MG/DL (ref 0–130)
AST SERPL-CCNC: 54 U/L (ref 0–33)
BILIRUB SERPL-MCNC: 0.4 MG/DL (ref 0.3–1.2)
BUN BLD-MCNC: 16 MG/DL (ref 9–23)
BUN/CREAT SERPL: 16 (ref 7–25)
CALCIUM SPEC-SCNC: 9.3 MG/DL (ref 8.7–10.4)
CHLORIDE SERPL-SCNC: 105 MMOL/L (ref 99–109)
CHOLEST SERPL-MCNC: 118 MG/DL (ref 0–200)
CO2 SERPL-SCNC: 31 MMOL/L (ref 20–31)
CREAT BLD-MCNC: 1 MG/DL (ref 0.6–1.3)
DEPRECATED RDW RBC AUTO: 44.9 FL (ref 37–54)
ERYTHROCYTE [DISTWIDTH] IN BLOOD BY AUTOMATED COUNT: 13 % (ref 11.3–14.5)
GFR SERPL CREATININE-BSD FRML MDRD: 75 ML/MIN/1.73
GLOBULIN UR ELPH-MCNC: 3.3 GM/DL
GLUCOSE BLD-MCNC: 150 MG/DL (ref 70–100)
GLUCOSE BLDC GLUCOMTR-MCNC: 143 MG/DL (ref 70–130)
HBA1C MFR BLD: 7.7 % (ref 4.8–5.6)
HCT VFR BLD AUTO: 39 % (ref 38.9–50.9)
HDLC SERPL-MCNC: 33 MG/DL (ref 40–60)
HGB BLD-MCNC: 13.1 G/DL (ref 13.1–17.5)
MCH RBC QN AUTO: 31.6 PG (ref 27–31)
MCHC RBC AUTO-ENTMCNC: 33.6 G/DL (ref 32–36)
MCV RBC AUTO: 94.2 FL (ref 80–99)
PLATELET # BLD AUTO: 114 10*3/MM3 (ref 150–450)
PMV BLD AUTO: 10.6 FL (ref 6–12)
POTASSIUM BLD-SCNC: 4.8 MMOL/L (ref 3.5–5.5)
PROT SERPL-MCNC: 7.7 G/DL (ref 5.7–8.2)
RBC # BLD AUTO: 4.14 10*6/MM3 (ref 4.2–5.76)
SODIUM BLD-SCNC: 141 MMOL/L (ref 132–146)
TESTOST SERPL-MCNC: 163.61 NG/DL (ref 86.98–780.1)
TRIGL SERPL-MCNC: 185 MG/DL (ref 0–150)
VIT B12 BLD-MCNC: 579 PG/ML (ref 211–911)
WBC NRBC COR # BLD: 4.99 10*3/MM3 (ref 3.5–10.8)

## 2018-04-27 PROCEDURE — 80053 COMPREHEN METABOLIC PANEL: CPT | Performed by: NURSE PRACTITIONER

## 2018-04-27 PROCEDURE — 83519 RIA NONANTIBODY: CPT | Performed by: NURSE PRACTITIONER

## 2018-04-27 PROCEDURE — 83036 HEMOGLOBIN GLYCOSYLATED A1C: CPT | Performed by: NURSE PRACTITIONER

## 2018-04-27 PROCEDURE — C1760 CLOSURE DEV, VASC: HCPCS | Performed by: INTERNAL MEDICINE

## 2018-04-27 PROCEDURE — 82306 VITAMIN D 25 HYDROXY: CPT | Performed by: NURSE PRACTITIONER

## 2018-04-27 PROCEDURE — 85027 COMPLETE CBC AUTOMATED: CPT | Performed by: NURSE PRACTITIONER

## 2018-04-27 PROCEDURE — 71045 X-RAY EXAM CHEST 1 VIEW: CPT

## 2018-04-27 PROCEDURE — 25010000002 FENTANYL CITRATE (PF) 100 MCG/2ML SOLUTION: Performed by: INTERNAL MEDICINE

## 2018-04-27 PROCEDURE — 25010000002 MIDAZOLAM PER 1 MG: Performed by: INTERNAL MEDICINE

## 2018-04-27 PROCEDURE — C1769 GUIDE WIRE: HCPCS | Performed by: INTERNAL MEDICINE

## 2018-04-27 PROCEDURE — 0 IOPAMIDOL PER 1 ML: Performed by: INTERNAL MEDICINE

## 2018-04-27 PROCEDURE — 93459 L HRT ART/GRFT ANGIO: CPT | Performed by: INTERNAL MEDICINE

## 2018-04-27 PROCEDURE — 82962 GLUCOSE BLOOD TEST: CPT

## 2018-04-27 PROCEDURE — 80061 LIPID PANEL: CPT | Performed by: NURSE PRACTITIONER

## 2018-04-27 PROCEDURE — 82607 VITAMIN B-12: CPT | Performed by: NURSE PRACTITIONER

## 2018-04-27 PROCEDURE — 84403 ASSAY OF TOTAL TESTOSTERONE: CPT | Performed by: NURSE PRACTITIONER

## 2018-04-27 PROCEDURE — 36415 COLL VENOUS BLD VENIPUNCTURE: CPT

## 2018-04-27 PROCEDURE — C1894 INTRO/SHEATH, NON-LASER: HCPCS | Performed by: INTERNAL MEDICINE

## 2018-04-27 RX ORDER — SODIUM CHLORIDE 0.9 % (FLUSH) 0.9 %
1-10 SYRINGE (ML) INJECTION AS NEEDED
Status: DISCONTINUED | OUTPATIENT
Start: 2018-04-27 | End: 2018-04-27 | Stop reason: HOSPADM

## 2018-04-27 RX ORDER — NITROGLYCERIN 0.4 MG/1
0.4 TABLET SUBLINGUAL
Status: DISCONTINUED | OUTPATIENT
Start: 2018-04-27 | End: 2018-04-27 | Stop reason: HOSPADM

## 2018-04-27 RX ORDER — FENTANYL CITRATE 50 UG/ML
INJECTION, SOLUTION INTRAMUSCULAR; INTRAVENOUS AS NEEDED
Status: DISCONTINUED | OUTPATIENT
Start: 2018-04-27 | End: 2018-04-27 | Stop reason: HOSPADM

## 2018-04-27 RX ORDER — ACETAMINOPHEN 325 MG/1
650 TABLET ORAL EVERY 4 HOURS PRN
Status: DISCONTINUED | OUTPATIENT
Start: 2018-04-27 | End: 2018-04-27 | Stop reason: HOSPADM

## 2018-04-27 RX ORDER — ACETAMINOPHEN 500 MG
1000 TABLET ORAL EVERY 6 HOURS PRN
COMMUNITY

## 2018-04-27 RX ORDER — SODIUM CHLORIDE 9 MG/ML
1-3 INJECTION, SOLUTION INTRAVENOUS CONTINUOUS
Status: DISCONTINUED | OUTPATIENT
Start: 2018-04-27 | End: 2018-04-27 | Stop reason: HOSPADM

## 2018-04-27 RX ORDER — MIDAZOLAM HYDROCHLORIDE 1 MG/ML
INJECTION INTRAMUSCULAR; INTRAVENOUS AS NEEDED
Status: DISCONTINUED | OUTPATIENT
Start: 2018-04-27 | End: 2018-04-27 | Stop reason: HOSPADM

## 2018-04-27 RX ORDER — ONDANSETRON 2 MG/ML
4 INJECTION INTRAMUSCULAR; INTRAVENOUS EVERY 6 HOURS PRN
Status: DISCONTINUED | OUTPATIENT
Start: 2018-04-27 | End: 2018-04-27 | Stop reason: HOSPADM

## 2018-04-27 RX ORDER — HYDROCODONE BITARTRATE AND ACETAMINOPHEN 5; 325 MG/1; MG/1
1 TABLET ORAL EVERY 4 HOURS PRN
Status: DISCONTINUED | OUTPATIENT
Start: 2018-04-27 | End: 2018-04-27 | Stop reason: HOSPADM

## 2018-04-27 RX ORDER — ASPIRIN 325 MG
325 TABLET, DELAYED RELEASE (ENTERIC COATED) ORAL DAILY
Status: DISCONTINUED | OUTPATIENT
Start: 2018-04-28 | End: 2018-04-27 | Stop reason: HOSPADM

## 2018-04-27 RX ORDER — ASPIRIN 325 MG
325 TABLET ORAL ONCE
Status: COMPLETED | OUTPATIENT
Start: 2018-04-27 | End: 2018-04-27

## 2018-04-27 RX ORDER — LIDOCAINE HYDROCHLORIDE 10 MG/ML
INJECTION, SOLUTION EPIDURAL; INFILTRATION; INTRACAUDAL; PERINEURAL AS NEEDED
Status: DISCONTINUED | OUTPATIENT
Start: 2018-04-27 | End: 2018-04-27 | Stop reason: HOSPADM

## 2018-04-27 RX ADMIN — ASPIRIN 325 MG ORAL TABLET 325 MG: 325 PILL ORAL at 08:38

## 2018-04-27 RX ADMIN — SODIUM CHLORIDE 2.92 ML/KG/HR: 9 INJECTION, SOLUTION INTRAVENOUS at 08:38

## 2018-04-30 ENCOUNTER — TELEPHONE (OUTPATIENT)
Dept: CARDIAC SURGERY | Facility: CLINIC | Age: 63
End: 2018-04-30

## 2018-04-30 ENCOUNTER — DOCUMENTATION (OUTPATIENT)
Dept: CARDIAC REHAB | Facility: HOSPITAL | Age: 63
End: 2018-04-30

## 2018-05-08 LAB
ACHR AB SER-SCNC: <0.03 NMOL/L (ref 0–0.24)
ACHR BLOCK AB/ACHR TOTAL SFR SER: 11 % (ref 0–25)
ACHR MOD AB/ACHR TOTAL SFR SER: <12 % (ref 0–20)

## 2018-05-14 ENCOUNTER — OFFICE VISIT (OUTPATIENT)
Dept: FAMILY MEDICINE CLINIC | Facility: CLINIC | Age: 63
End: 2018-05-14

## 2018-05-14 VITALS
HEIGHT: 67 IN | BODY MASS INDEX: 39.16 KG/M2 | WEIGHT: 249.5 LBS | OXYGEN SATURATION: 95 % | TEMPERATURE: 98.1 F | SYSTOLIC BLOOD PRESSURE: 110 MMHG | HEART RATE: 65 BPM | RESPIRATION RATE: 16 BRPM | DIASTOLIC BLOOD PRESSURE: 72 MMHG

## 2018-05-14 DIAGNOSIS — R20.2 TINGLING IN EXTREMITIES: ICD-10-CM

## 2018-05-14 DIAGNOSIS — I25.10 CORONARY ARTERY DISEASE INVOLVING NATIVE CORONARY ARTERY OF NATIVE HEART WITHOUT ANGINA PECTORIS: Primary | ICD-10-CM

## 2018-05-14 DIAGNOSIS — I50.9 CHRONIC CONGESTIVE HEART FAILURE, UNSPECIFIED CONGESTIVE HEART FAILURE TYPE: ICD-10-CM

## 2018-05-14 DIAGNOSIS — E11.65 UNCONTROLLED TYPE 2 DIABETES MELLITUS WITH HYPERGLYCEMIA, WITHOUT LONG-TERM CURRENT USE OF INSULIN (HCC): ICD-10-CM

## 2018-05-14 PROCEDURE — 99213 OFFICE O/P EST LOW 20 MIN: CPT | Performed by: FAMILY MEDICINE

## 2018-05-14 RX ORDER — METFORMIN HYDROCHLORIDE 500 MG/1
TABLET, EXTENDED RELEASE ORAL
COMMUNITY
Start: 2018-04-23 | End: 2018-05-14 | Stop reason: SDUPTHER

## 2018-05-14 RX ORDER — OMEPRAZOLE 40 MG/1
40 CAPSULE, DELAYED RELEASE ORAL DAILY
Qty: 90 CAPSULE | Refills: 1 | Status: SHIPPED | OUTPATIENT
Start: 2018-05-14 | End: 2018-08-07 | Stop reason: SDUPTHER

## 2018-05-14 RX ORDER — METFORMIN HYDROCHLORIDE 500 MG/1
500 TABLET, FILM COATED, EXTENDED RELEASE ORAL
Qty: 90 TABLET | Refills: 1 | Status: SHIPPED | OUTPATIENT
Start: 2018-05-14 | End: 2018-07-23

## 2018-05-14 NOTE — PROGRESS NOTES
Assessment/Plan       Problems Addressed this Visit        Cardiovascular and Mediastinum    Coronary artery disease involving native coronary artery of native heart without angina pectoris - Primary       Endocrine    Type II diabetes mellitus, uncontrolled    Relevant Medications    metFORMIN (GLUMETZA) 500 MG (MOD) 24 hr tablet      Other Visit Diagnoses     Tingling in extremities        Relevant Orders    EMG & Nerve Conduction Test    Chronic congestive heart failure, unspecified congestive heart failure type                Follow up: No Follow-up on file.     DISCUSSION  Coronary artery disease with recent catheter showed blockages in disease but not amenable to percutaneous treatment.  Medical treatment only at this time.  Also had a basal aneurysm of the heart and ejection fraction of around 30%.  Entresto was started.  At this point, he is not feeling much better but not feeling worse either but he is unclear why he is not able to do the things he used to be able to do without getting short of breath or tired.  I explained to him that with his ejection pressure and congestive heart failure, it is not from feel tired and have the shortness of breath.  Since it is not worsening, will continue current treatment and follow-up with cardiology as scheduled.    Tingling in extremities.  Upper extremity seemed to be worse.  Start with nerve conduction study and EMG of that first and then lower extremities pending findings of the upper extremity.    Diabetes mellitus type 2.  Continue metformin    Congestive heart failure.  Per cardiology.  Continue Entresto.      MEDICATIONS PRESCRIBED  Requested Prescriptions     Signed Prescriptions Disp Refills   • omeprazole (priLOSEC) 40 MG capsule 90 capsule 1     Sig: Take 1 capsule by mouth Daily.   • metFORMIN (GLUMETZA) 500 MG (MOD) 24 hr tablet 90 tablet 1     Sig: Take 1 tablet by mouth Daily With Breakfast.     "    -------------------------------------------    Subjective     Chief Complaint   Patient presents with   • follow up heart cath.   • Med Refill     pt needs all rx for 90 day supply         History of Present Illness    Follow up heart cath  + EF 30 %  Basal aneurysm  + CAD but medical fix  + mild chest pain at times  Started on Entresto x 2 weeks ago        Tingling  Gets tingling in the arms and legs at times  Mostly constant in legs and arms  No testing done  Neck pain at times  No injury of the neck.  Hands feel weak.            History   Smoking Status   • Former Smoker   • Quit date: 1/15/2006   Smokeless Tobacco   • Never Used        Past Medical History,Medications, Allergies, and social history was reviewed.      Review of Systems   Constitutional: Positive for fatigue.   HENT: Negative.    Respiratory: Positive for shortness of breath.    Cardiovascular: Positive for chest pain.   Gastrointestinal: Negative.    Psychiatric/Behavioral: Negative.        Objective     Vitals:    05/14/18 0947   BP: 110/72   Pulse: 65   Resp: 16   Temp: 98.1 °F (36.7 °C)   TempSrc: Temporal Artery    SpO2: 95%   Weight: 113 kg (249 lb 8 oz)   Height: 170.2 cm (67.01\")          Physical Exam   Constitutional: He is oriented to person, place, and time. He appears well-developed and well-nourished.   HENT:   Head: Normocephalic and atraumatic.   Right Ear: External ear normal.   Left Ear: External ear normal.   Eyes: EOM are normal. Pupils are equal, round, and reactive to light.   Cardiovascular: Normal rate, regular rhythm and normal heart sounds.    Pulmonary/Chest: Effort normal and breath sounds normal. No respiratory distress. He has no wheezes. He has no rales.   Neurological: He is alert and oriented to person, place, and time.   Skin: Skin is warm and dry.   Psychiatric: He has a normal mood and affect. His behavior is normal.   Nursing note and vitals reviewed.  Good upper extremity strength.  Sensation is decreased " bilaterally upper extremity to light palpation and touch.              Cheo Weiss MD

## 2018-05-17 ENCOUNTER — TELEPHONE (OUTPATIENT)
Dept: FAMILY MEDICINE CLINIC | Facility: CLINIC | Age: 63
End: 2018-05-17

## 2018-05-17 NOTE — TELEPHONE ENCOUNTER
SPOKE WITH PT WIFE AND WE NEED TO PLACE REFERRAL FOR A DR WITHIN [facility] AS PT WILL ONLY SEE DR'S WITH IN [facility].

## 2018-05-17 NOTE — TELEPHONE ENCOUNTER
----- Message from Ariadna Naranjo sent at 5/17/2018  9:34 AM EDT -----  Contact: [facility] OFFICE CALLED AND HE NEEDS A NEW REFERRAL BECAUSE HE HAS [employer]ICARE AND HE NEEDS TO SEE A [facility]

## 2018-05-17 NOTE — TELEPHONE ENCOUNTER
Please call and clarify. Is this for the nerve test? Do we need to change to [facility] or do a referral for Jodie Weiss? bds

## 2018-05-18 NOTE — TELEPHONE ENCOUNTER
It is okay to do it at the radiology department.  I think he just wants to go to a [facility] facility.

## 2018-05-18 NOTE — TELEPHONE ENCOUNTER
These are only done at the [facility] radiology department now. He won't actually see a doctor. If he wants to see a doctor, he will have to see Neurology first. A Neurology referral will have to put entered.

## 2018-06-04 NOTE — PROGRESS NOTES
Subjective:     Encounter Date:06/05/2018    Primary Care Physician: Cheo Weiss MD      Patient ID: Kai Garcia is a 62 y.o. male.    Chief Complaint:Aortic Aneurysm and Multiple vessel coronary artery disease    PROBLEM LIST:  1. Dissecting aortic aneurysm:  a. 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 Helton, stage I in June 2006 and stage II in March 2007.   b. Followed chronically with annual CTs by Dr. Helton. Stable by CT 2018  2. Coronary artery disease:  a. Cardiac cath prior to “elephant trunk” procedure in June 2006 and the patient underwent CABG at that time consisting of SVG to the OM and SVG to the PDA.   b. On 02/08/2007 - inferoposterior MI, cardiac cath showed 100% left circumflex with severe OM-1 stenosis and 100% vein graft to the left circumflex. RCA was diffuse, 90% ostial stenosis at the SVG to the RCA. LAD had only 40% stenosis.   c. Cardiac cath in March 2007 due to recurrent angina - 2.5 Microdriver stent to the distal native RCA.   d. 4/27/18 cardiac catheter occluded vein grafts ×2.  Large posterior basal aneurysm with ejection fraction of 30-35%.  Severe stenosis and distal circumflex which supplies aneurysm.  Non-flow-limiting RCA disease.  3. Dyslipidemia.   4. Hypertension.   5. Diabetes  6. Chronic low back pain/multiple lumbar surgeries, currently disabled:  a. L4-L5 discectomy in 2015.   7. History of bleeding peptic ulcer disease.   8. Severe obstructive sleep apnea.   9. Appendectomy      Allergies   Allergen Reactions   • Ativan [Lorazepam] Other (See Comments)     agitation   • Isosorbide Unknown (See Comments)     headaches   • Zetia [Ezetimibe] Other (See Comments)     increased LFTs   • Zocor [Simvastatin] Myalgia         Current Outpatient Prescriptions:   •  acetaminophen (TYLENOL) 500 MG tablet, Take 500 mg by mouth Every 4 (Four) Hours As Needed for Mild Pain ., Disp: ,  Rfl:   •  aspirin 81 MG EC tablet, Take  by mouth daily., Disp: , Rfl:   •  bisoprolol (ZEBeta) 10 MG tablet, Take 1 tablet by mouth Daily., Disp: 90 tablet, Rfl: 3  •  clopidogrel (PLAVIX) 75 MG tablet, Take 1 tablet by mouth Daily., Disp: 90 tablet, Rfl: 3  •  eplerenone (INSPRA) 25 MG tablet, Take 1 tablet by mouth Daily., Disp: 90 tablet, Rfl: 3  •  metFORMIN (GLUMETZA) 500 MG (MOD) 24 hr tablet, Take 1 tablet by mouth Daily With Breakfast., Disp: 90 tablet, Rfl: 1  •  nitroglycerin (NITROSTAT) 0.4 MG SL tablet, Place 1 tablet under the tongue Every 5 (Five) Minutes As Needed for Chest Pain., Disp: 25 tablet, Rfl: 5  •  omeprazole (priLOSEC) 40 MG capsule, Take 1 capsule by mouth Daily., Disp: 90 capsule, Rfl: 1  •  rosuvastatin (CRESTOR) 10 MG tablet, Take 1 tablet by mouth Daily., Disp: 90 tablet, Rfl: 3  •  sacubitril-valsartan (ENTRESTO) 49-51 MG tablet, Take 1 tablet by mouth Every 12 (Twelve) Hours., Disp: 60 tablet, Rfl: 11  •  zolpidem (AMBIEN) 10 MG tablet, Take 0.5-1 tablets by mouth At Night As Needed for Sleep., Disp: 15 tablet, Rfl: 1        History of Present Illness    Patient returns today for one-month follow-up status post cardiac catheterization and subsequent evaluation showing decreased LVEF with large posterior basal left ventricular aneurysm and no significant coronary artery disease.  He denies chest pain.  He does sometimes have exertional fatigue which she notes is minimally improved since starting the Entresto.  Denies orthopnea PND or peripheral edema.  Does have some labile weights however at home.    The following portions of the patient's history were reviewed and updated as appropriate: allergies, current medications, past family history, past medical history, past social history, past surgical history and problem list.    Social History   Substance Use Topics   • Smoking status: Former Smoker     Quit date: 1/15/2006   • Smokeless tobacco: Never Used   • Alcohol use No  "        Review of Systems   Constitution: Positive for weakness and malaise/fatigue. Negative for chills, decreased appetite and weight loss.   Cardiovascular: Negative.    Respiratory: Positive for shortness of breath and snoring. Negative for cough and wheezing.    Hematologic/Lymphatic: Negative for bleeding problem. Does not bruise/bleed easily.   Skin: Negative for rash.   Musculoskeletal: Positive for muscle weakness. Negative for myalgias.   Gastrointestinal: Negative for heartburn, nausea and vomiting.          Objective:    height is 167.6 cm (66\") and weight is 112 kg (248 lb). His blood pressure is 114/76 and his pulse is 75.         Physical Exam   Constitutional: He is oriented to person, place, and time. He appears well-developed and well-nourished.   HENT:   Mouth/Throat: Oropharynx is clear and moist.   Neck: No JVD present. Carotid bruit is not present. No thyromegaly present.   Cardiovascular: Regular rhythm, S1 normal, S2 normal, normal heart sounds and intact distal pulses.  Exam reveals no gallop, no S3 and no S4.    No murmur heard.  Pulses:       Carotid pulses are 2+ on the right side, and 2+ on the left side.       Radial pulses are 2+ on the right side, and 2+ on the left side.   Pulmonary/Chest: Breath sounds normal.   Abdominal: Soft. Bowel sounds are normal. He exhibits no mass. There is no tenderness.   Musculoskeletal: He exhibits no edema.   Neurological: He is alert and oriented to person, place, and time.   Skin: Skin is warm and dry. No rash noted.       Procedures          Assessment:   Assessment/Plan      Kai was seen today for aortic aneurysm and multiple vessel coronary artery disease.    Diagnoses and all orders for this visit:    Coronary artery disease involving native coronary artery of native heart without angina pectoris    Essential hypertension    Pure hypercholesterolemia    Ischemic cardiomyopathy    Aortic dissection following procedure, subsequent " encounter      1.  Coronary artery disease, with small vessel distal left circumflex supplying only the LV aneurysm.  No angina or ischemia  2.  Ischemic cardio myopathy with evidence of a large posterior basal left ventricular aneurysm.  This abnormal gel when she makes it difficult to assess his LVEF by echocardiograms, and thus he will undergo a cardiac MRI on maximal medical therapy to more adequately define his LVEF, and to guide possible ICD placement.  2.  I do not think he is a biventricular candidate despite his decreased LVEF and right bundle branch block given his lateral wall scar.  We'll discuss this with electrophysiology  4.  Dyslipidemia controlled  5.  History of aortic dissection repair, followed by Dr. Sunitha Willis MD      Dictated utilizing Dragon dictation

## 2018-06-05 ENCOUNTER — OFFICE VISIT (OUTPATIENT)
Dept: CARDIOLOGY | Facility: CLINIC | Age: 63
End: 2018-06-05

## 2018-06-05 VITALS
HEART RATE: 75 BPM | WEIGHT: 248 LBS | SYSTOLIC BLOOD PRESSURE: 114 MMHG | BODY MASS INDEX: 39.86 KG/M2 | HEIGHT: 66 IN | DIASTOLIC BLOOD PRESSURE: 76 MMHG

## 2018-06-05 DIAGNOSIS — I25.10 CORONARY ARTERY DISEASE INVOLVING NATIVE CORONARY ARTERY OF NATIVE HEART WITHOUT ANGINA PECTORIS: Primary | ICD-10-CM

## 2018-06-05 DIAGNOSIS — I10 ESSENTIAL HYPERTENSION: ICD-10-CM

## 2018-06-05 DIAGNOSIS — T81.718D: ICD-10-CM

## 2018-06-05 DIAGNOSIS — I25.5 ISCHEMIC CARDIOMYOPATHY: ICD-10-CM

## 2018-06-05 DIAGNOSIS — E78.00 PURE HYPERCHOLESTEROLEMIA: ICD-10-CM

## 2018-06-05 DIAGNOSIS — I71.00: ICD-10-CM

## 2018-06-05 PROCEDURE — 99214 OFFICE O/P EST MOD 30 MIN: CPT | Performed by: INTERNAL MEDICINE

## 2018-06-15 ENCOUNTER — TELEPHONE (OUTPATIENT)
Dept: CARDIOLOGY | Facility: CLINIC | Age: 63
End: 2018-06-15

## 2018-06-15 ENCOUNTER — HOSPITAL ENCOUNTER (OUTPATIENT)
Dept: MRI IMAGING | Facility: HOSPITAL | Age: 63
Discharge: HOME | End: 2018-06-15
Attending: INTERNAL MEDICINE

## 2018-06-15 DIAGNOSIS — I71.00: ICD-10-CM

## 2018-06-15 DIAGNOSIS — T81.718D: ICD-10-CM

## 2018-06-15 DIAGNOSIS — I25.5 ISCHEMIC CARDIOMYOPATHY: ICD-10-CM

## 2018-06-15 LAB — CREAT BLDA-MCNC: 1 MG/DL (ref 0.6–1.3)

## 2018-06-15 PROCEDURE — 82565 ASSAY OF CREATININE: CPT

## 2018-06-15 NOTE — TELEPHONE ENCOUNTER
"Called patient and advised we can pre med with Valium if he would like to attempt again.  He is agreeable to that, he reports he could not hear \"the lady once I got in the machine\".  States he is hard of hearing and feels like he panicked because he couldn't hear what she was saying.  Advised will reschedule and contact him with specifics.   "

## 2018-06-15 NOTE — TELEPHONE ENCOUNTER
"MRI called to report patient could not complete procedure due to machine feeling \"tight\" and having claustrophobia.    "

## 2018-06-15 NOTE — TELEPHONE ENCOUNTER
Please call patient and see if he would be interested in having pre medication with valium prior to MRI and try again.

## 2018-06-18 NOTE — TELEPHONE ENCOUNTER
Called and left VM for patient advising can have Valium 5 mg, bring with him and take after signing consent, must have someone to drive him home.  Advised will not place orders for anything until he calls office back.

## 2018-06-18 NOTE — TELEPHONE ENCOUNTER
He can have valium 5 mg. He should bring this with him to the hospital and take it after he signs his consent for MRI. Will need someone to drive him home.

## 2018-06-21 NOTE — TELEPHONE ENCOUNTER
Called patient and advised Valium has been called in to our retail pharmacy and he may  on way to MRI.  He verbalized understanding.

## 2018-06-21 NOTE — PROGRESS NOTES
RX for Valium 5 mg, one time dose for pre med MRI,  called in to [facility], for patient to  prior to MRI.

## 2018-06-25 ENCOUNTER — HOSPITAL ENCOUNTER (OUTPATIENT)
Dept: MRI IMAGING | Facility: HOSPITAL | Age: 63
Discharge: HOME | End: 2018-06-25
Attending: INTERNAL MEDICINE | Admitting: INTERNAL MEDICINE

## 2018-06-25 PROCEDURE — A9577 INJ MULTIHANCE: HCPCS | Performed by: INTERNAL MEDICINE

## 2018-06-25 PROCEDURE — 75565 CARD MRI VELOC FLOW MAPPING: CPT

## 2018-06-25 PROCEDURE — 0 GADOBENATE DIMEGLUMINE 529 MG/ML SOLUTION: Performed by: INTERNAL MEDICINE

## 2018-06-25 RX ADMIN — GADOBENATE DIMEGLUMINE 20 ML: 529 INJECTION, SOLUTION INTRAVENOUS at 10:30

## 2018-07-06 ENCOUNTER — TELEPHONE (OUTPATIENT)
Dept: CARDIOLOGY | Facility: CLINIC | Age: 63
End: 2018-07-06

## 2018-07-06 DIAGNOSIS — I25.5 ISCHEMIC CARDIOMYOPATHY: Primary | ICD-10-CM

## 2018-07-06 NOTE — TELEPHONE ENCOUNTER
Annalise Grier RN 31 minutes ago (3:58 PM)         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 He needs an outpatient evaluation soon with either Dr. Tompkins or Ruddy (per [facility]) for evaluation of ICD implant.      Called and advised patient of results above and that Dr. Willis would like him to see either Dr. Fox or Dr. Tompkins for evaluation for ICD.  He verbalized understanding and is aware that office will be contacting him to set up.

## 2018-07-06 NOTE — TELEPHONE ENCOUNTER
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 He needs an outpatient evaluation soon with either Dr. Tompkins or Ruddy (per [facility]) for evaluation of ICD implant.     Called and advised patient of results above and that Dr. Willis would like him to see either Dr. Fox or Dr. Tompkins for evaluation for ICD.  He verbalized understanding and is aware that office will be contacting him to set up.

## 2018-07-10 ENCOUNTER — HOSPITAL ENCOUNTER (OUTPATIENT)
Dept: NEUROLOGY | Facility: HOSPITAL | Age: 63
Discharge: HOME | End: 2018-07-10
Admitting: FAMILY MEDICINE

## 2018-07-10 DIAGNOSIS — R20.2 TINGLING IN EXTREMITIES: ICD-10-CM

## 2018-07-10 PROCEDURE — 95910 NRV CNDJ TEST 7-8 STUDIES: CPT

## 2018-07-10 PROCEDURE — 95886 MUSC TEST DONE W/N TEST COMP: CPT

## 2018-07-16 ENCOUNTER — RESULTS ENCOUNTER (OUTPATIENT)
Dept: FAMILY MEDICINE CLINIC | Facility: CLINIC | Age: 63
End: 2018-07-16

## 2018-07-16 ENCOUNTER — OFFICE VISIT (OUTPATIENT)
Dept: CARDIOLOGY | Facility: CLINIC | Age: 63
End: 2018-07-16

## 2018-07-16 VITALS
DIASTOLIC BLOOD PRESSURE: 78 MMHG | WEIGHT: 250 LBS | BODY MASS INDEX: 39.24 KG/M2 | HEART RATE: 84 BPM | SYSTOLIC BLOOD PRESSURE: 120 MMHG | HEIGHT: 67 IN

## 2018-07-16 DIAGNOSIS — E78.00 PURE HYPERCHOLESTEROLEMIA: ICD-10-CM

## 2018-07-16 DIAGNOSIS — E11.65 UNCONTROLLED TYPE 2 DIABETES MELLITUS WITH HYPERGLYCEMIA, WITHOUT LONG-TERM CURRENT USE OF INSULIN (HCC): ICD-10-CM

## 2018-07-16 DIAGNOSIS — I25.10 MULTIPLE VESSEL CORONARY ARTERY DISEASE: Primary | ICD-10-CM

## 2018-07-16 DIAGNOSIS — I71.00: ICD-10-CM

## 2018-07-16 DIAGNOSIS — T81.718S: ICD-10-CM

## 2018-07-16 DIAGNOSIS — I25.10 CORONARY ARTERY DISEASE INVOLVING NATIVE CORONARY ARTERY OF NATIVE HEART WITHOUT ANGINA PECTORIS: ICD-10-CM

## 2018-07-16 DIAGNOSIS — I25.5 ISCHEMIC CARDIOMYOPATHY: ICD-10-CM

## 2018-07-16 DIAGNOSIS — I10 ESSENTIAL HYPERTENSION: ICD-10-CM

## 2018-07-16 DIAGNOSIS — G47.33 SEVERE OBSTRUCTIVE SLEEP APNEA: ICD-10-CM

## 2018-07-16 DIAGNOSIS — I45.10 RBBB: ICD-10-CM

## 2018-07-16 PROCEDURE — 99204 OFFICE O/P NEW MOD 45 MIN: CPT | Performed by: INTERNAL MEDICINE

## 2018-07-16 PROCEDURE — 93000 ELECTROCARDIOGRAM COMPLETE: CPT | Performed by: INTERNAL MEDICINE

## 2018-07-16 NOTE — PROGRESS NOTES
Kai Garcia  1955    There is no work phone number on file.      07/16/2018    [facility] CARDIOLOGY    Cheo Weiss MD  [address] [id number]    REFERRING DOCTOR : Dr Uri Willis        Patient ID: Kai Garcia is a 62 y.o. male.    Chief Complaint:   Chief Complaint   Patient presents with   • Cardiomyopathy       PROBLEM LIST:  1. Dissecting aortic aneurysm:  a. 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 Helton, stage I in June 2006 and stage II in March 2007.   b. Followed chronically with annual CTs by Dr. Helton. Stable by CT 2018  2. Coronary artery disease:  a. Cardiac cath prior to “elephant trunk” procedure in June 2006 and the patient underwent CABG at that time consisting of SVG to the OM and SVG to the PDA.   b. On 02/08/2007 - inferoposterior MI, cardiac cath showed 100% left circumflex with severe OM-1 stenosis and 100% vein graft to the left circumflex. RCA was diffuse, 90% ostial stenosis at the SVG to the RCA. LAD had only 40% stenosis.   c. Cardiac cath in March 2007 due to recurrent angina - 2.5 Microdriver stent to the distal native RCA.   d. 4/27/18 cardiac catheter occluded vein grafts ×2.  Large posterior basal aneurysm with ejection fraction of 30-35%.  Severe stenosis and distal circumflex which supplies aneurysm.  Non-flow-limiting RCA disease.         1.  Coronary artery disease, with small vessel distal left circumflex supplying only the LV aneurysm.  No angina or ischemia         2.  Ischemic cardiomyopathy with evidence of a large posterior basal left ventricular aneurysm.              E. Cardiac MRI at  27% 6/25/2018  3. Dyslipidemia.   4. Hypertension.   5. Diabetes  6. Chronic low back pain/multiple lumbar surgeries, currently disabled:  a. L4-L5 discectomy in 2015.   7. History of bleeding peptic ulcer disease.   8. Severe  obstructive sleep apnea.   9. Appendectomy    Allergies   Allergen Reactions   • Ativan [Lorazepam] Other (See Comments)     agitation   • Isosorbide Unknown (See Comments)     headaches   • Zetia [Ezetimibe] Other (See Comments)     increased LFTs   • Zocor [Simvastatin] Myalgia       Current Outpatient Prescriptions:   •  acetaminophen (TYLENOL) 500 MG tablet, Take 500 mg by mouth Every 4 (Four) Hours As Needed for Mild Pain ., Disp: , Rfl:   •  aspirin 81 MG EC tablet, Take  by mouth daily., Disp: , Rfl:   •  bisoprolol (ZEBeta) 10 MG tablet, Take 1 tablet by mouth Daily., Disp: 90 tablet, Rfl: 3  •  clopidogrel (PLAVIX) 75 MG tablet, Take 1 tablet by mouth Daily., Disp: 90 tablet, Rfl: 3  •  eplerenone (INSPRA) 25 MG tablet, Take 1 tablet by mouth Daily., Disp: 90 tablet, Rfl: 3  •  metFORMIN (GLUMETZA) 500 MG (MOD) 24 hr tablet, Take 1 tablet by mouth Daily With Breakfast., Disp: 90 tablet, Rfl: 1  •  nitroglycerin (NITROSTAT) 0.4 MG SL tablet, Place 1 tablet under the tongue Every 5 (Five) Minutes As Needed for Chest Pain., Disp: 25 tablet, Rfl: 5  •  omeprazole (priLOSEC) 40 MG capsule, Take 1 capsule by mouth Daily., Disp: 90 capsule, Rfl: 1  •  rosuvastatin (CRESTOR) 10 MG tablet, Take 1 tablet by mouth Daily., Disp: 90 tablet, Rfl: 3  •  sacubitril-valsartan (ENTRESTO) 49-51 MG tablet, Take 1 tablet by mouth Every 12 (Twelve) Hours., Disp: 60 tablet, Rfl: 11  •  zolpidem (AMBIEN) 10 MG tablet, Take 0.5-1 tablets by mouth At Night As Needed for Sleep., Disp: 15 tablet, Rfl: 1    History of Present Illness  Patient presents today for consultation referred by Dr Uri Willis regarding CMP, RBBB and need for a ICD. Overall doing well but has some trouble with SOB and is FC III. No chest pain noted at this time. [facility] in April showed some disease but optimal medical Rx. Has been dealing with a lot of fatigue and has to take a lot of breaks. This has happened in the past 12-18 mths. On optimal medical Rx and  following with Dr Willis. Also, with insomnia. Getting Rx for LUCIAN. Dealing with some pain in joints as well. S/p two back surgeries.     The following portions of the patient's history were reviewed and updated as appropriate: allergies, current medications, past family history, past medical history, past social history, past surgical history and problem list.    Past Medical History:   Diagnosis Date   • Back pain    • Chest pain     Atypical chest pain, noncardiac.  Suspect either musculoskeletal versus gastrointestinal   • Chronic low back pain     /multiple lumbar surgeries, currently disabled: L4-L5 discectomy in 2015.    • Coronary artery disease    • Diabetes mellitus (CMS/HCC)    • Dissecting aortic aneurysm (CMS/HCC)    • Dyslipidemia    • Elevated liver function tests    • History of bleeding peptic ulcer    • Hyperlipidemia    • Hypertension    • Left leg pain    • Lumbar pain    • Morbid obesity (CMS/HCC)    • Severe obstructive sleep apnea    • Shortness of breath          Past Surgical History:   Procedure Laterality Date   • APPENDECTOMY     • ARTERIOGRAM AORTIC  2006    aortic disection with elephant trunk procedure   • CARDIAC CATHETERIZATION N/A 4/27/2018    Procedure: Left Heart Cath;  Surgeon: Uri Willis MD;  Location: [facility] INVASIVE LOCATION;  Service: Cardiovascular   • CORONARY ANGIOPLASTY WITH STENT PLACEMENT  2007    x 2   • CORONARY ARTERY BYPASS GRAFT  2006   • LUMBAR DISCECTOMY     • LUMBAR FUSION         Social History     Social History   • Marital status:      Spouse name: N/A   • Number of children: N/A   • Years of education: N/A     Occupational History   • Not on file.     Social History Main Topics   • Smoking status: Former Smoker     Quit date: 1/15/2006   • Smokeless tobacco: Never Used   • Alcohol use No   • Drug use: No   • Sexual activity: Defer     Other Topics Concern   • Not on file     Social History Narrative   • No narrative on file       Family  "History   Problem Relation Age of Onset   • Coronary artery disease Other    • Rheum arthritis Other    • Heart disease Mother    • Rheum arthritis Mother    • Heart disease Father    • Rheum arthritis Father        REVIEW OF SYSTEMS:   CONSTITUTIONAL: No weight loss, fever, chills, + fatigue  HEENT: Eyes: No visual loss, blurred vision, double vision or yellow sclerae. Ears, Nose, Throat: No hearing loss, sneezing, congestion, runny nose or sore throat.   SKIN: No rash or itching.     RESPIRATORY: No hemoptysis, cough or sputum. + SOB  GASTROINTESTINAL: No anorexia, nausea, vomiting or diarrhea. No abdominal pain, bright red blood per rectum or melena.  GENITOURINARY: No burning on urination, hematuria or increased frequency.  NEUROLOGICAL: No headache, dizziness, syncope, paralysis, ataxia, numbness or tingling in the extremities. No change in bowel or bladder control.   MUSCULOSKELETAL: No  joint pain or stiffness. + back pain  HEMATOLOGIC: No anemia, bleeding or bruising.   LYMPHATICS: No enlarged nodes. No history of splenectomy.   PSYCHIATRIC: No history of depression, anxiety, hallucinations.   ENDOCRINOLOGIC: No reports of sweating, cold or heat intolerance. No polyuria or polydipsia.   Ext: No edema or bruising      The patient's old records including ambulatory rhythm recordings (ECGs, Holter/event monitor) were reviewed and discussed.           Objective:       Vitals:    07/16/18 1558   BP: 120/78   BP Location: Right arm   Patient Position: Sitting   Pulse: 84   Weight: 113 kg (250 lb)   Height: 170.2 cm (67\")       Constitutional: oriented to person, place, and time.  well-developed and well-nourished. No distress.   HENT: Normocephalic.   Eyes: Conjunctivae are normal. No scleral icterus.   Neck: Normal carotid pulses, no hepatojugular reflux and no JVD present. Carotid bruit is not present. No tracheal deviation, no edema and no erythema present. No thyromegaly present.   Cardiovascular: Normal rate, " regular rhythm, S1 normal, S2 normal, normal heart sounds and intact distal pulses.   No extrasystoles are present. PMI is not displaced.  Exam reveals no gallop, no distant heart sounds and no friction rub.    + MARIE  Pulses:       Radial pulses are 2+ on the right side, and 2+ on the left side.       Dorsalis pedis pulses are 2+ on the right side, and 2+ on the left side.   Pulmonary/Chest: Effort normal and breath sounds normal. No respiratory distress. She has no decreased breath sounds.  no wheezes,  Rhonchi or rales.  no tenderness.   Abdominal: Soft. Bowel sounds are normal. She exhibits no distension and no mass. There is no hepatosplenomegaly. There is no tenderness. There is no rebound and no guarding.   Musculoskeletal:  exhibits no edema, tenderness or deformity.   Neurological: is alert and oriented to person, place, and time.   Skin: Skin is warm and dry. No rash noted. No diaphoretic. No cyanosis or erythema. No pallor. Nails show no clubbing.   Psychiatric: Normal mood and affect.Speech is normal and behavior is normal.    Lab Review:   Results for orders placed or performed during the hospital encounter of 06/15/18   POC Creatinine   Result Value Ref Range    Creatinine 1.00 0.60 - 1.30 mg/dL         ECG 12 Lead  Date/Time: 7/16/2018 4:21 PM  Performed by: MARICRUZ ACEVEDO  Authorized by: MARICRUZ ACEVEDO   Rhythm: sinus rhythm  Conduction: right bundle branch block and LAFB  Comments: NSR 84 bpm,  msec,                 Diagnosis:   1. Multiple vessel coronary artery disease  2. Ischemic cardiomyopathy  3. Aortic dissection following procedure, sequela (CMS/HCC)  4. Coronary artery disease involving native coronary artery of native heart without angina pectoris  5. Severe obstructive sleep apnea  6. Essential hypertension  7. RBBB      Assessment & Plan:   1. CAD/CABG and ischemic cardiomyopathy [facility] showed Large posterior basal aneurysm with ejection fraction of 30-35%.  Severe stenosis and  distal circumflex which supplies aneurysm with coronary artery disease, with small vessel distal left circumflex supplying only the LV aneurysm.   Ischemic cardiomyopathy with evidence of a large posterior basal left ventricular aneurysm. On optimal medical Rx. EF on 6/25/2018 MRI showed EF of 27%. FC III symptoms  2. LUCIAN on CPAP  3. HTN stable  4. 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 Aortic Dissection s/p repair    Plan:   Long discussion with the patient and his wife. I think the patient would benefit from a BIV ICD with ischemic CMP EF has been <=35% , on optimal medical therapy, FC III symptoms and RBBB with a QRS duration > 150 msec and LAFB even with the lateral scar. Check Echo on day of the procedure. The risks, benefits, and alternatives of the procedure have been reviewed and the patient wishes to proceed. Continue on current Rx        CC: Dr Uri Fox,   07/16/18  4:20 PM      EMR Dragon/Transcription disclaimer:  This note was created with the use of Dragon

## 2018-07-19 LAB
ALBUMIN SERPL-MCNC: 4.25 G/DL (ref 3.2–4.8)
ALBUMIN/GLOB SERPL: 1.3 G/DL (ref 1.5–2.5)
ALP SERPL-CCNC: 37 U/L (ref 25–100)
ALT SERPL-CCNC: 38 U/L (ref 7–40)
AST SERPL-CCNC: 41 U/L (ref 0–33)
BASOPHILS # BLD AUTO: 0.03 10*3/MM3 (ref 0–0.2)
BASOPHILS NFR BLD AUTO: 0.6 % (ref 0–1)
BILIRUB SERPL-MCNC: 0.5 MG/DL (ref 0.3–1.2)
BUN SERPL-MCNC: 20 MG/DL (ref 9–23)
BUN/CREAT SERPL: 17.9 (ref 7–25)
CALCIUM SERPL-MCNC: 9 MG/DL (ref 8.7–10.4)
CHLORIDE SERPL-SCNC: 105 MMOL/L (ref 99–109)
CHOLEST SERPL-MCNC: 116 MG/DL (ref 0–200)
CHOLEST/HDLC SERPL: 3.41 {RATIO}
CO2 SERPL-SCNC: 27 MMOL/L (ref 20–31)
CREAT SERPL-MCNC: 1.12 MG/DL (ref 0.6–1.3)
EOSINOPHIL # BLD AUTO: 0.11 10*3/MM3 (ref 0–0.3)
EOSINOPHIL NFR BLD AUTO: 2.3 % (ref 0–3)
ERYTHROCYTE [DISTWIDTH] IN BLOOD BY AUTOMATED COUNT: 13.3 % (ref 11.3–14.5)
GLOBULIN SER CALC-MCNC: 3.2 GM/DL
GLUCOSE SERPL-MCNC: 151 MG/DL (ref 70–100)
HBA1C MFR BLD: 8.4 % (ref 4.8–5.6)
HCT VFR BLD AUTO: 39.9 % (ref 38.9–50.9)
HDLC SERPL-MCNC: 34 MG/DL (ref 40–60)
HGB BLD-MCNC: 12.8 G/DL (ref 13.1–17.5)
IMM GRANULOCYTES # BLD: 0.01 10*3/MM3 (ref 0–0.03)
IMM GRANULOCYTES NFR BLD: 0.2 % (ref 0–0.6)
LDLC SERPL CALC-MCNC: 52 MG/DL (ref 0–100)
LYMPHOCYTES # BLD AUTO: 1.87 10*3/MM3 (ref 0.6–4.8)
LYMPHOCYTES NFR BLD AUTO: 39.6 % (ref 24–44)
MCH RBC QN AUTO: 31.5 PG (ref 27–31)
MCHC RBC AUTO-ENTMCNC: 32.1 G/DL (ref 32–36)
MCV RBC AUTO: 98.3 FL (ref 80–99)
MONOCYTES # BLD AUTO: 0.48 10*3/MM3 (ref 0–1)
MONOCYTES NFR BLD AUTO: 10.2 % (ref 0–12)
NEUTROPHILS # BLD AUTO: 2.22 10*3/MM3 (ref 1.5–8.3)
NEUTROPHILS NFR BLD AUTO: 47.1 % (ref 41–71)
PLATELET # BLD AUTO: 110 10*3/MM3 (ref 150–450)
POTASSIUM SERPL-SCNC: 4.6 MMOL/L (ref 3.5–5.5)
PROT SERPL-MCNC: 7.4 G/DL (ref 5.7–8.2)
RBC # BLD AUTO: 4.06 10*6/MM3 (ref 4.2–5.76)
SODIUM SERPL-SCNC: 139 MMOL/L (ref 132–146)
TRIGL SERPL-MCNC: 150 MG/DL (ref 0–150)
VLDLC SERPL CALC-MCNC: 30 MG/DL
WBC # BLD AUTO: 4.72 10*3/MM3 (ref 3.5–10.8)

## 2018-07-23 ENCOUNTER — PREP FOR SURGERY (OUTPATIENT)
Dept: OTHER | Facility: HOSPITAL | Age: 63
End: 2018-07-23

## 2018-07-23 ENCOUNTER — OFFICE VISIT (OUTPATIENT)
Dept: FAMILY MEDICINE CLINIC | Facility: CLINIC | Age: 63
End: 2018-07-23

## 2018-07-23 VITALS
HEIGHT: 67 IN | HEART RATE: 64 BPM | DIASTOLIC BLOOD PRESSURE: 76 MMHG | WEIGHT: 253.5 LBS | TEMPERATURE: 98.5 F | SYSTOLIC BLOOD PRESSURE: 120 MMHG | BODY MASS INDEX: 39.79 KG/M2 | RESPIRATION RATE: 16 BRPM

## 2018-07-23 DIAGNOSIS — F51.01 PRIMARY INSOMNIA: ICD-10-CM

## 2018-07-23 DIAGNOSIS — G58.9 MONONEUROPATHY: ICD-10-CM

## 2018-07-23 DIAGNOSIS — I10 ESSENTIAL HYPERTENSION: ICD-10-CM

## 2018-07-23 DIAGNOSIS — I25.10 CORONARY ARTERY DISEASE INVOLVING NATIVE CORONARY ARTERY OF NATIVE HEART WITHOUT ANGINA PECTORIS: ICD-10-CM

## 2018-07-23 DIAGNOSIS — E78.00 PURE HYPERCHOLESTEROLEMIA: ICD-10-CM

## 2018-07-23 DIAGNOSIS — E11.65 UNCONTROLLED TYPE 2 DIABETES MELLITUS WITH HYPERGLYCEMIA, WITHOUT LONG-TERM CURRENT USE OF INSULIN (HCC): Primary | ICD-10-CM

## 2018-07-23 DIAGNOSIS — I42.8 NON-ISCHEMIC CARDIOMYOPATHY (HCC): Primary | ICD-10-CM

## 2018-07-23 DIAGNOSIS — F51.02 ADJUSTMENT INSOMNIA: ICD-10-CM

## 2018-07-23 LAB
POC CREATININE URINE: 100
POC MICROALBUMIN URINE: 150

## 2018-07-23 PROCEDURE — 99214 OFFICE O/P EST MOD 30 MIN: CPT | Performed by: FAMILY MEDICINE

## 2018-07-23 PROCEDURE — 82044 UR ALBUMIN SEMIQUANTITATIVE: CPT | Performed by: FAMILY MEDICINE

## 2018-07-23 RX ORDER — CEFAZOLIN SODIUM 2 G/100ML
2 INJECTION, SOLUTION INTRAVENOUS
Status: CANCELLED | OUTPATIENT
Start: 2018-07-24 | End: 2018-07-25

## 2018-07-23 RX ORDER — PROMETHAZINE HYDROCHLORIDE 25 MG/ML
12.5 INJECTION, SOLUTION INTRAMUSCULAR; INTRAVENOUS EVERY 4 HOURS PRN
Status: CANCELLED | OUTPATIENT
Start: 2018-07-23 | End: 2018-08-22

## 2018-07-23 RX ORDER — ZOLPIDEM TARTRATE 10 MG/1
10 TABLET ORAL NIGHTLY PRN
Qty: 30 TABLET | Refills: 2 | Status: SHIPPED | OUTPATIENT
Start: 2018-07-23 | End: 2019-03-07 | Stop reason: SDUPTHER

## 2018-07-23 RX ORDER — ACETAMINOPHEN 325 MG/1
650 TABLET ORAL EVERY 4 HOURS PRN
Status: CANCELLED | OUTPATIENT
Start: 2018-07-23 | End: 2019-07-23

## 2018-07-23 RX ORDER — ZOLPIDEM TARTRATE 10 MG/1
10 TABLET ORAL NIGHTLY PRN
Qty: 30 TABLET | Refills: 2 | Status: SHIPPED | OUTPATIENT
Start: 2018-07-23 | End: 2018-07-23 | Stop reason: SDUPTHER

## 2018-07-23 RX ORDER — SODIUM CHLORIDE 0.9 % (FLUSH) 0.9 %
1-10 SYRINGE (ML) INJECTION AS NEEDED
Status: CANCELLED | OUTPATIENT
Start: 2018-07-23 | End: 2019-07-23

## 2018-07-23 NOTE — PROGRESS NOTES
Assessment/Plan       Problems Addressed this Visit        Cardiovascular and Mediastinum    Hyperlipidemia    Essential hypertension    Coronary artery disease involving native coronary artery of native heart without angina pectoris       Endocrine    Type II diabetes mellitus, uncontrolled (CMS/[facility]) - Primary    Relevant Medications    SITagliptin (JANUVIA) 100 MG tablet    Other Relevant Orders    POC Microalbumin (Completed)      Other Visit Diagnoses     Mononeuropathy        Primary insomnia        Adjustment insomnia        Relevant Medications    zolpidem (AMBIEN) 10 MG tablet            Follow up: Return in about 3 months (around 10/23/2018).     DISCUSSION  Diabetes mellitus type 2.  A1c is increased.  Did not tolerate metformin.  Caused diarrhea.  We will try Januvia.  Continue efforts with decreasing carbohydrates in diet and weight loss.    Hypertension.  Stable.    Hyperlipidemia.  Cholesterol is doing well.    Insomnia.  We will try Ambien 10 mg instead of 5 mg.  Side effects explained.  Denies any side effects from previous dosage.    Nerve conduction study showed a neuropathy of the upper extremities.  Peripheral neuropathy mild.  No cervical radiculopathy.  Since it affecting the feet and hands, suspect diabetes related.  B12 previously was normal.    Proceed with ICD placement by cardiology.      MEDICATIONS PRESCRIBED  Requested Prescriptions     Signed Prescriptions Disp Refills   • SITagliptin (JANUVIA) 100 MG tablet 90 tablet 1     Sig: Take 1 tablet by mouth Daily.   • zolpidem (AMBIEN) 10 MG tablet 30 tablet 2     Sig: Take 1 tablet by mouth At Night As Needed for Sleep.          Loi dated on 7/23/2018   was reviewed and appropriate.        -------------------------------------------    Subjective     Chief Complaint   Patient presents with   • Hypertension     3 month follow up   • Hyperlipidemia   • Diabetes   • Med Refill   • Labs Only   • discuss all results of all test     He is  here for follow-up of his regular problems.  He also is going to be undergoing an ICD implantation.        Diabetes   He presents for his follow-up diabetic visit. He has type 2 diabetes mellitus. His disease course has been stable. There are no hypoglycemic associated symptoms. Associated symptoms include chest pain (very little) and fatigue. There are no hypoglycemic complications. Diabetic complications include heart disease. Risk factors for coronary artery disease include diabetes mellitus, hypertension and dyslipidemia. When asked about current treatments, none (tried metformin and caused diarrhea and had to stop) were reported. He is compliant with treatment all of the time. His weight is stable. He is following a generally healthy diet. He never participates in exercise. He monitors blood glucose at home 1-2 x per day (160-170). There is no change in his home blood glucose trend. An ACE inhibitor/angiotensin II receptor blocker is being taken (on entresto). Eye exam is current.   Hypertension   This is a chronic problem. The problem is unchanged. The problem is controlled. Associated symptoms include chest pain (very little), peripheral edema and shortness of breath. There are no associated agents to hypertension. Current antihypertension treatment includes angiotensin blockers. The current treatment provides moderate improvement. Hypertensive end-organ damage includes CAD/MI.   Coronary Artery Disease   Presents for follow-up visit. Symptoms include chest pain (very little), leg swelling and shortness of breath. Pertinent negatives include no chest pressure or weight gain. (Sleepy. To have a cath done. Dr Willis. Not feeling right. No energy.) Risk factors include hyperlipidemia. The symptoms have been stable.   Hyperlipidemia   This is a chronic problem. The current episode started more than 1 year ago. The problem is controlled. Recent lipid tests were reviewed and are normal. Associated symptoms include  "chest pain (very little), myalgias and shortness of breath. The current treatment provides moderate improvement of lipids. There are no compliance problems.  Risk factors for coronary artery disease include diabetes mellitus, dyslipidemia and hypertension.               History   Smoking Status   • Former Smoker   • Quit date: 1/15/2006   Smokeless Tobacco   • Never Used        Past Medical History,Medications, Allergies, and social history was reviewed.      Review of Systems   Constitutional: Positive for fatigue. Negative for unexpected weight gain.   HENT: Negative.    Respiratory: Positive for shortness of breath.    Cardiovascular: Positive for chest pain (very little) and leg swelling.   Gastrointestinal: Negative.    Musculoskeletal: Positive for arthralgias and myalgias.   Neurological: Negative.    Psychiatric/Behavioral: Negative.        Objective     Vitals:    07/23/18 0838   BP: 120/76   Pulse: 64   Resp: 16   Temp: 98.5 °F (36.9 °C)   TempSrc: Temporal Artery    Weight: 115 kg (253 lb 8 oz)   Height: 170.2 cm (67.01\")          Physical Exam   Constitutional: He is oriented to person, place, and time. Vital signs are normal. He appears well-developed and well-nourished.   Obese   HENT:   Head: Normocephalic and atraumatic.   Right Ear: Hearing, tympanic membrane, external ear and ear canal normal.   Left Ear: Hearing, tympanic membrane, external ear and ear canal normal.   Mouth/Throat: Oropharynx is clear and moist.   Eyes: Pupils are equal, round, and reactive to light. Conjunctivae, EOM and lids are normal.   Neck: Normal range of motion. Neck supple. No thyromegaly present.   Cardiovascular: Normal rate, regular rhythm and normal heart sounds.  Exam reveals no friction rub.    No murmur heard.  Pulmonary/Chest: Effort normal and breath sounds normal. No respiratory distress. He has no wheezes. He has no rales.   Abdominal: Soft. Normal appearance and bowel sounds are normal. He exhibits no " distension and no mass. There is no tenderness. There is no rebound and no guarding.   Musculoskeletal: He exhibits no edema.   Neurological: He is alert and oriented to person, place, and time. He has normal strength.   Skin: Skin is warm and dry.   Psychiatric: He has a normal mood and affect. His speech is normal and behavior is normal. Cognition and memory are normal.   Nursing note and vitals reviewed.                Cheo Weiss MD

## 2018-08-07 ENCOUNTER — TELEPHONE (OUTPATIENT)
Dept: FAMILY MEDICINE CLINIC | Facility: CLINIC | Age: 63
End: 2018-08-07

## 2018-08-07 RX ORDER — OMEPRAZOLE 40 MG/1
40 CAPSULE, DELAYED RELEASE ORAL DAILY
Qty: 90 CAPSULE | Refills: 1 | Status: SHIPPED | OUTPATIENT
Start: 2018-08-07 | End: 2019-07-03 | Stop reason: SDUPTHER

## 2018-08-07 NOTE — TELEPHONE ENCOUNTER
----- Message from Sherry Cherry sent at 8/7/2018  1:01 PM EDT -----  Contact: WALLACE, PATIENT  PATIENT NEEDS OMEPRAZOLE 40MG TAKEN 1X  DAY, HE NEEDS A 90 DAY SUPPLY INSTEAD OF 30 DAYS, [facility], [phone] MAY LEAVE A MESSAGE

## 2018-08-08 ENCOUNTER — APPOINTMENT (OUTPATIENT)
Dept: PREADMISSION TESTING | Facility: HOSPITAL | Age: 63
End: 2018-08-08

## 2018-08-08 ENCOUNTER — HOSPITAL ENCOUNTER (OUTPATIENT)
Dept: CARDIOLOGY | Facility: HOSPITAL | Age: 63
Discharge: HOME | End: 2018-08-08
Attending: INTERNAL MEDICINE | Admitting: INTERNAL MEDICINE

## 2018-08-08 DIAGNOSIS — I42.8 NON-ISCHEMIC CARDIOMYOPATHY (HCC): ICD-10-CM

## 2018-08-08 DIAGNOSIS — I25.5 ISCHEMIC CARDIOMYOPATHY: ICD-10-CM

## 2018-08-08 DIAGNOSIS — I45.10 RBBB: ICD-10-CM

## 2018-08-08 LAB
ANION GAP SERPL CALCULATED.3IONS-SCNC: 8 MMOL/L (ref 3–11)
BUN BLD-MCNC: 20 MG/DL (ref 9–23)
BUN/CREAT SERPL: 19.8 (ref 7–25)
CALCIUM SPEC-SCNC: 9.4 MG/DL (ref 8.7–10.4)
CHLORIDE SERPL-SCNC: 107 MMOL/L (ref 99–109)
CO2 SERPL-SCNC: 26 MMOL/L (ref 20–31)
CREAT BLD-MCNC: 1.01 MG/DL (ref 0.6–1.3)
DEPRECATED RDW RBC AUTO: 45.3 FL (ref 37–54)
ERYTHROCYTE [DISTWIDTH] IN BLOOD BY AUTOMATED COUNT: 13 % (ref 11.3–14.5)
GFR SERPL CREATININE-BSD FRML MDRD: 75 ML/MIN/1.73
GLUCOSE BLD-MCNC: 111 MG/DL (ref 70–100)
HBA1C MFR BLD: 8.1 % (ref 4.8–5.6)
HCT VFR BLD AUTO: 39 % (ref 38.9–50.9)
HGB BLD-MCNC: 13 G/DL (ref 13.1–17.5)
INR PPP: 1.1 (ref 0.91–1.09)
MCH RBC QN AUTO: 31.8 PG (ref 27–31)
MCHC RBC AUTO-ENTMCNC: 33.3 G/DL (ref 32–36)
MCV RBC AUTO: 95.4 FL (ref 80–99)
PLATELET # BLD AUTO: 108 10*3/MM3 (ref 150–450)
PMV BLD AUTO: 10.6 FL (ref 6–12)
POTASSIUM BLD-SCNC: 4.4 MMOL/L (ref 3.5–5.5)
PROTHROMBIN TIME: 11.5 SECONDS (ref 9.6–11.5)
RBC # BLD AUTO: 4.09 10*6/MM3 (ref 4.2–5.76)
SODIUM BLD-SCNC: 141 MMOL/L (ref 132–146)
WBC NRBC COR # BLD: 4.87 10*3/MM3 (ref 3.5–10.8)

## 2018-08-08 PROCEDURE — 93308 TTE F-UP OR LMTD: CPT

## 2018-08-08 PROCEDURE — 85027 COMPLETE CBC AUTOMATED: CPT | Performed by: NURSE PRACTITIONER

## 2018-08-08 PROCEDURE — 93306 TTE W/DOPPLER COMPLETE: CPT | Performed by: INTERNAL MEDICINE

## 2018-08-08 PROCEDURE — 80048 BASIC METABOLIC PNL TOTAL CA: CPT | Performed by: NURSE PRACTITIONER

## 2018-08-08 PROCEDURE — 25010000002 SULFUR HEXAFLUORIDE MICROSPH 60.7-25 MG RECONSTITUTED SUSPENSION: Performed by: INTERNAL MEDICINE

## 2018-08-08 PROCEDURE — 93325 DOPPLER ECHO COLOR FLOW MAPG: CPT

## 2018-08-08 PROCEDURE — 85610 PROTHROMBIN TIME: CPT | Performed by: NURSE PRACTITIONER

## 2018-08-08 PROCEDURE — 83036 HEMOGLOBIN GLYCOSYLATED A1C: CPT | Performed by: NURSE PRACTITIONER

## 2018-08-08 PROCEDURE — 93321 DOPPLER ECHO F-UP/LMTD STD: CPT

## 2018-08-08 PROCEDURE — 36415 COLL VENOUS BLD VENIPUNCTURE: CPT

## 2018-08-08 RX ADMIN — SULFUR HEXAFLUORIDE 2 ML: KIT at 11:00

## 2018-08-08 NOTE — DISCHARGE INSTRUCTIONS

## 2018-08-09 ENCOUNTER — HOSPITAL ENCOUNTER (OUTPATIENT)
Facility: HOSPITAL | Age: 63
Discharge: HOME | End: 2018-08-10
Attending: INTERNAL MEDICINE | Admitting: INTERNAL MEDICINE

## 2018-08-09 DIAGNOSIS — I42.8 NON-ISCHEMIC CARDIOMYOPATHY (HCC): ICD-10-CM

## 2018-08-09 DIAGNOSIS — I25.5 ISCHEMIC CARDIOMYOPATHY: ICD-10-CM

## 2018-08-09 DIAGNOSIS — I45.10 RBBB: ICD-10-CM

## 2018-08-09 LAB
GLUCOSE BLDC GLUCOMTR-MCNC: 125 MG/DL (ref 70–130)
GLUCOSE BLDC GLUCOMTR-MCNC: 132 MG/DL (ref 70–130)
GLUCOSE BLDC GLUCOMTR-MCNC: 145 MG/DL (ref 70–130)
GLUCOSE BLDC GLUCOMTR-MCNC: 196 MG/DL (ref 70–130)
MAXIMAL PREDICTED HEART RATE: 157 BPM
STRESS TARGET HR: 133 BPM

## 2018-08-09 PROCEDURE — 25010000003 CEFAZOLIN IN DEXTROSE 2-4 GM/100ML-% SOLUTION: Performed by: INTERNAL MEDICINE

## 2018-08-09 PROCEDURE — A9270 NON-COVERED ITEM OR SERVICE: HCPCS | Performed by: NURSE PRACTITIONER

## 2018-08-09 PROCEDURE — 93641 EP EVL 1/2CHMB PAC CVDFB TST: CPT | Performed by: INTERNAL MEDICINE

## 2018-08-09 PROCEDURE — C1892 INTRO/SHEATH,FIXED,PEEL-AWAY: HCPCS | Performed by: INTERNAL MEDICINE

## 2018-08-09 PROCEDURE — 94799 UNLISTED PULMONARY SVC/PX: CPT

## 2018-08-09 PROCEDURE — 25010000002 ONDANSETRON PER 1 MG: Performed by: INTERNAL MEDICINE

## 2018-08-09 PROCEDURE — 33249 INSJ/RPLCMT DEFIB W/LEAD(S): CPT | Performed by: INTERNAL MEDICINE

## 2018-08-09 PROCEDURE — 63710000001 PANTOPRAZOLE 40 MG TABLET DELAYED-RELEASE: Performed by: NURSE PRACTITIONER

## 2018-08-09 PROCEDURE — 33225 L VENTRIC PACING LEAD ADD-ON: CPT | Performed by: INTERNAL MEDICINE

## 2018-08-09 PROCEDURE — 94660 CPAP INITIATION&MGMT: CPT

## 2018-08-09 PROCEDURE — 63710000001 OXYCODONE-ACETAMINOPHEN 5-325 MG TABLET: Performed by: INTERNAL MEDICINE

## 2018-08-09 PROCEDURE — 93010 ELECTROCARDIOGRAM REPORT: CPT | Performed by: INTERNAL MEDICINE

## 2018-08-09 PROCEDURE — A9270 NON-COVERED ITEM OR SERVICE: HCPCS | Performed by: INTERNAL MEDICINE

## 2018-08-09 PROCEDURE — 63710000001 INSULIN LISPRO (HUMAN) PER 5 UNITS: Performed by: NURSE PRACTITIONER

## 2018-08-09 PROCEDURE — C1882 AICD, OTHER THAN SING/DUAL: HCPCS | Performed by: INTERNAL MEDICINE

## 2018-08-09 PROCEDURE — 25010000002 FENTANYL CITRATE (PF) 100 MCG/2ML SOLUTION: Performed by: INTERNAL MEDICINE

## 2018-08-09 PROCEDURE — C1730 CATH, EP, 19 OR FEW ELECT: HCPCS | Performed by: INTERNAL MEDICINE

## 2018-08-09 PROCEDURE — 93005 ELECTROCARDIOGRAM TRACING: CPT | Performed by: INTERNAL MEDICINE

## 2018-08-09 PROCEDURE — C1777 LEAD, AICD, ENDO SINGLE COIL: HCPCS | Performed by: INTERNAL MEDICINE

## 2018-08-09 PROCEDURE — 82962 GLUCOSE BLOOD TEST: CPT

## 2018-08-09 PROCEDURE — 25010000003 CEFAZOLIN IN DEXTROSE 2-4 GM/100ML-% SOLUTION: Performed by: NURSE PRACTITIONER

## 2018-08-09 PROCEDURE — 63710000001 SACUBITRIL-VALSARTAN 49-51 MG TABLET: Performed by: NURSE PRACTITIONER

## 2018-08-09 PROCEDURE — G0378 HOSPITAL OBSERVATION PER HR: HCPCS

## 2018-08-09 PROCEDURE — 63710000001 ROSUVASTATIN 10 MG TABLET: Performed by: NURSE PRACTITIONER

## 2018-08-09 PROCEDURE — C1900 LEAD, CORONARY VENOUS: HCPCS | Performed by: INTERNAL MEDICINE

## 2018-08-09 PROCEDURE — C1898 LEAD, PMKR, OTHER THAN TRANS: HCPCS | Performed by: INTERNAL MEDICINE

## 2018-08-09 PROCEDURE — 25010000002 MIDAZOLAM PER 1 MG: Performed by: INTERNAL MEDICINE

## 2018-08-09 PROCEDURE — C1769 GUIDE WIRE: HCPCS | Performed by: INTERNAL MEDICINE

## 2018-08-09 PROCEDURE — C1887 CATHETER, GUIDING: HCPCS | Performed by: INTERNAL MEDICINE

## 2018-08-09 DEVICE — PACE/SENSE LEAD
Type: IMPLANTABLE DEVICE | Site: CHEST | Status: FUNCTIONAL
Brand: ACUITY™ X4 SPIRAL S

## 2018-08-09 DEVICE — STEROID-ELUTING BIPOLAR PACE/SENSE AND DEFIBRILLATION LEAD
Type: IMPLANTABLE DEVICE | Site: CHEST | Status: FUNCTIONAL
Brand: ENDOTAK RELIANCE® SG

## 2018-08-09 DEVICE — IMPLANTABLE DEVICE: Type: IMPLANTABLE DEVICE | Site: CHEST | Status: FUNCTIONAL

## 2018-08-09 DEVICE — CARDIAC RESYNCHRONIZATION THERAPY DEFIBRILLATOR
Type: IMPLANTABLE DEVICE | Site: CHEST | Status: FUNCTIONAL
Brand: VIGILANT™ X4 CRT-D

## 2018-08-09 RX ORDER — CEFAZOLIN SODIUM 2 G/100ML
2 INJECTION, SOLUTION INTRAVENOUS EVERY 8 HOURS
Status: COMPLETED | OUTPATIENT
Start: 2018-08-09 | End: 2018-08-10

## 2018-08-09 RX ORDER — BUPIVACAINE HYDROCHLORIDE 5 MG/ML
INJECTION, SOLUTION PERINEURAL AS NEEDED
Status: DISCONTINUED | OUTPATIENT
Start: 2018-08-09 | End: 2018-08-09 | Stop reason: HOSPADM

## 2018-08-09 RX ORDER — SODIUM CHLORIDE 0.9 % (FLUSH) 0.9 %
1-10 SYRINGE (ML) INJECTION AS NEEDED
Status: DISCONTINUED | OUTPATIENT
Start: 2018-08-09 | End: 2018-08-10 | Stop reason: HOSPADM

## 2018-08-09 RX ORDER — DEXTROSE MONOHYDRATE 25 G/50ML
25 INJECTION, SOLUTION INTRAVENOUS
Status: DISCONTINUED | OUTPATIENT
Start: 2018-08-09 | End: 2018-08-10 | Stop reason: HOSPADM

## 2018-08-09 RX ORDER — FENTANYL CITRATE 50 UG/ML
INJECTION, SOLUTION INTRAMUSCULAR; INTRAVENOUS AS NEEDED
Status: DISCONTINUED | OUTPATIENT
Start: 2018-08-09 | End: 2018-08-09 | Stop reason: HOSPADM

## 2018-08-09 RX ORDER — ONDANSETRON 2 MG/ML
INJECTION INTRAMUSCULAR; INTRAVENOUS AS NEEDED
Status: DISCONTINUED | OUTPATIENT
Start: 2018-08-09 | End: 2018-08-09 | Stop reason: HOSPADM

## 2018-08-09 RX ORDER — CLOPIDOGREL BISULFATE 75 MG/1
75 TABLET ORAL DAILY
Status: DISCONTINUED | OUTPATIENT
Start: 2018-08-09 | End: 2018-08-10 | Stop reason: HOSPADM

## 2018-08-09 RX ORDER — ASPIRIN 81 MG/1
81 TABLET ORAL DAILY
Status: DISCONTINUED | OUTPATIENT
Start: 2018-08-09 | End: 2018-08-10 | Stop reason: HOSPADM

## 2018-08-09 RX ORDER — SODIUM CHLORIDE 0.9 % (FLUSH) 0.9 %
1-10 SYRINGE (ML) INJECTION AS NEEDED
Status: DISCONTINUED | OUTPATIENT
Start: 2018-08-09 | End: 2018-08-09 | Stop reason: HOSPADM

## 2018-08-09 RX ORDER — LIDOCAINE HYDROCHLORIDE 10 MG/ML
INJECTION, SOLUTION EPIDURAL; INFILTRATION; INTRACAUDAL; PERINEURAL AS NEEDED
Status: DISCONTINUED | OUTPATIENT
Start: 2018-08-09 | End: 2018-08-09 | Stop reason: HOSPADM

## 2018-08-09 RX ORDER — MIDAZOLAM HYDROCHLORIDE 1 MG/ML
INJECTION INTRAMUSCULAR; INTRAVENOUS AS NEEDED
Status: DISCONTINUED | OUTPATIENT
Start: 2018-08-09 | End: 2018-08-09 | Stop reason: HOSPADM

## 2018-08-09 RX ORDER — OXYCODONE HYDROCHLORIDE AND ACETAMINOPHEN 5; 325 MG/1; MG/1
1 TABLET ORAL EVERY 4 HOURS PRN
Status: DISCONTINUED | OUTPATIENT
Start: 2018-08-09 | End: 2018-08-10 | Stop reason: HOSPADM

## 2018-08-09 RX ORDER — BISOPROLOL FUMARATE 5 MG/1
10 TABLET, FILM COATED ORAL DAILY
Status: DISCONTINUED | OUTPATIENT
Start: 2018-08-10 | End: 2018-08-10 | Stop reason: HOSPADM

## 2018-08-09 RX ORDER — ONDANSETRON 2 MG/ML
4 INJECTION INTRAMUSCULAR; INTRAVENOUS EVERY 6 HOURS PRN
Status: DISCONTINUED | OUTPATIENT
Start: 2018-08-09 | End: 2018-08-10 | Stop reason: HOSPADM

## 2018-08-09 RX ORDER — PANTOPRAZOLE SODIUM 40 MG/1
40 TABLET, DELAYED RELEASE ORAL
Status: DISCONTINUED | OUTPATIENT
Start: 2018-08-09 | End: 2018-08-10 | Stop reason: HOSPADM

## 2018-08-09 RX ORDER — EPLERENONE 25 MG/1
25 TABLET, FILM COATED ORAL DAILY
Status: DISCONTINUED | OUTPATIENT
Start: 2018-08-10 | End: 2018-08-10 | Stop reason: HOSPADM

## 2018-08-09 RX ORDER — PROMETHAZINE HYDROCHLORIDE 25 MG/ML
12.5 INJECTION, SOLUTION INTRAMUSCULAR; INTRAVENOUS EVERY 4 HOURS PRN
Status: DISCONTINUED | OUTPATIENT
Start: 2018-08-09 | End: 2018-08-09 | Stop reason: HOSPADM

## 2018-08-09 RX ORDER — ACETAMINOPHEN 325 MG/1
650 TABLET ORAL EVERY 4 HOURS PRN
Status: DISCONTINUED | OUTPATIENT
Start: 2018-08-09 | End: 2018-08-09 | Stop reason: HOSPADM

## 2018-08-09 RX ORDER — CEFAZOLIN SODIUM 2 G/100ML
2 INJECTION, SOLUTION INTRAVENOUS
Status: COMPLETED | OUTPATIENT
Start: 2018-08-09 | End: 2018-08-09

## 2018-08-09 RX ORDER — SODIUM CHLORIDE 9 MG/ML
1 INJECTION, SOLUTION INTRAVENOUS CONTINUOUS
Status: ACTIVE | OUTPATIENT
Start: 2018-08-09 | End: 2018-08-09

## 2018-08-09 RX ORDER — NICOTINE POLACRILEX 4 MG
15 LOZENGE BUCCAL
Status: DISCONTINUED | OUTPATIENT
Start: 2018-08-09 | End: 2018-08-10 | Stop reason: HOSPADM

## 2018-08-09 RX ORDER — ROSUVASTATIN CALCIUM 10 MG/1
10 TABLET, COATED ORAL NIGHTLY
Status: DISCONTINUED | OUTPATIENT
Start: 2018-08-09 | End: 2018-08-10 | Stop reason: HOSPADM

## 2018-08-09 RX ADMIN — CEFAZOLIN SODIUM 2 G: 2 INJECTION, SOLUTION INTRAVENOUS at 20:02

## 2018-08-09 RX ADMIN — CEFAZOLIN SODIUM 2 G: 2 INJECTION, SOLUTION INTRAVENOUS at 11:44

## 2018-08-09 RX ADMIN — ROSUVASTATIN CALCIUM 10 MG: 10 TABLET, FILM COATED ORAL at 20:02

## 2018-08-09 RX ADMIN — OXYCODONE HYDROCHLORIDE AND ACETAMINOPHEN 1 TABLET: 5; 325 TABLET ORAL at 15:07

## 2018-08-09 RX ADMIN — INSULIN LISPRO 2 UNITS: 100 INJECTION, SOLUTION INTRAVENOUS; SUBCUTANEOUS at 17:22

## 2018-08-09 RX ADMIN — SACUBITRIL AND VALSARTAN 1 TABLET: 49; 51 TABLET, FILM COATED ORAL at 20:02

## 2018-08-09 RX ADMIN — PANTOPRAZOLE SODIUM 40 MG: 40 TABLET, DELAYED RELEASE ORAL at 17:23

## 2018-08-09 RX ADMIN — SODIUM CHLORIDE 1 ML/KG/HR: 9 INJECTION, SOLUTION INTRAVENOUS at 08:53

## 2018-08-09 NOTE — INTERVAL H&P NOTE
H&P updated. The patient was examined and the following changes are noted:  echo pending.    Physical exam performed. ROS obtained. V/S, labs and telemetry reviewed.     Patient presents today for BiV ICD Implant for ischemic cardiomyopathy, EF </=35% (Cath 30-35% and MRI was 30% at ) for greater than 90 days on optimal medical therapy, FC III symptomology and RBBB with QRS > 150 ms and LAFB. The risks, benefits, and alternatives of the procedure have been reviewed and the patient wishes to proceed.     Echo here with poor images and unable to eval EF due to not able to see the aneurysm.     Proceed with procedure as planned.     CONNOR Clark  [facility]  8/9/2018  8:46 AM    I, Jose Fox, have reviewed the note in full and agree with all aspects of the above including physical exam, assessment, labs and plan with changes made accordingly. Face to Face Time was spent with the patient.    Jose Fox DO  08/09/18  10:46 AM

## 2018-08-09 NOTE — H&P (VIEW-ONLY)
Kai Garcia  1955    There is no work phone number on file.      07/16/2018    [facility] CARDIOLOGY    Cheo Weiss MD  [address] [id number]    REFERRING DOCTOR : Dr Uri Willis        Patient ID: Kai Garcia is a 62 y.o. male.    Chief Complaint:   Chief Complaint   Patient presents with   • Cardiomyopathy       PROBLEM LIST:  1. Dissecting aortic aneurysm:  a. 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 Helton, stage I in June 2006 and stage II in March 2007.   b. Followed chronically with annual CTs by Dr. Helton. Stable by CT 2018  2. Coronary artery disease:  a. Cardiac cath prior to “elephant trunk” procedure in June 2006 and the patient underwent CABG at that time consisting of SVG to the OM and SVG to the PDA.   b. On 02/08/2007 - inferoposterior MI, cardiac cath showed 100% left circumflex with severe OM-1 stenosis and 100% vein graft to the left circumflex. RCA was diffuse, 90% ostial stenosis at the SVG to the RCA. LAD had only 40% stenosis.   c. Cardiac cath in March 2007 due to recurrent angina - 2.5 Microdriver stent to the distal native RCA.   d. 4/27/18 cardiac catheter occluded vein grafts ×2.  Large posterior basal aneurysm with ejection fraction of 30-35%.  Severe stenosis and distal circumflex which supplies aneurysm.  Non-flow-limiting RCA disease.         1.  Coronary artery disease, with small vessel distal left circumflex supplying only the LV aneurysm.  No angina or ischemia         2.  Ischemic cardiomyopathy with evidence of a large posterior basal left ventricular aneurysm.              E. Cardiac MRI at  27% 6/25/2018  3. Dyslipidemia.   4. Hypertension.   5. Diabetes  6. Chronic low back pain/multiple lumbar surgeries, currently disabled:  a. L4-L5 discectomy in 2015.   7. History of bleeding peptic ulcer disease.   8. Severe  obstructive sleep apnea.   9. Appendectomy    Allergies   Allergen Reactions   • Ativan [Lorazepam] Other (See Comments)     agitation   • Isosorbide Unknown (See Comments)     headaches   • Zetia [Ezetimibe] Other (See Comments)     increased LFTs   • Zocor [Simvastatin] Myalgia       Current Outpatient Prescriptions:   •  acetaminophen (TYLENOL) 500 MG tablet, Take 500 mg by mouth Every 4 (Four) Hours As Needed for Mild Pain ., Disp: , Rfl:   •  aspirin 81 MG EC tablet, Take  by mouth daily., Disp: , Rfl:   •  bisoprolol (ZEBeta) 10 MG tablet, Take 1 tablet by mouth Daily., Disp: 90 tablet, Rfl: 3  •  clopidogrel (PLAVIX) 75 MG tablet, Take 1 tablet by mouth Daily., Disp: 90 tablet, Rfl: 3  •  eplerenone (INSPRA) 25 MG tablet, Take 1 tablet by mouth Daily., Disp: 90 tablet, Rfl: 3  •  metFORMIN (GLUMETZA) 500 MG (MOD) 24 hr tablet, Take 1 tablet by mouth Daily With Breakfast., Disp: 90 tablet, Rfl: 1  •  nitroglycerin (NITROSTAT) 0.4 MG SL tablet, Place 1 tablet under the tongue Every 5 (Five) Minutes As Needed for Chest Pain., Disp: 25 tablet, Rfl: 5  •  omeprazole (priLOSEC) 40 MG capsule, Take 1 capsule by mouth Daily., Disp: 90 capsule, Rfl: 1  •  rosuvastatin (CRESTOR) 10 MG tablet, Take 1 tablet by mouth Daily., Disp: 90 tablet, Rfl: 3  •  sacubitril-valsartan (ENTRESTO) 49-51 MG tablet, Take 1 tablet by mouth Every 12 (Twelve) Hours., Disp: 60 tablet, Rfl: 11  •  zolpidem (AMBIEN) 10 MG tablet, Take 0.5-1 tablets by mouth At Night As Needed for Sleep., Disp: 15 tablet, Rfl: 1    History of Present Illness  Patient presents today for consultation referred by Dr Uri Willis regarding CMP, RBBB and need for a ICD. Overall doing well but has some trouble with SOB and is FC III. No chest pain noted at this time. [facility] in April showed some disease but optimal medical Rx. Has been dealing with a lot of fatigue and has to take a lot of breaks. This has happened in the past 12-18 mths. On optimal medical Rx and  following with Dr Willis. Also, with insomnia. Getting Rx for LUCIAN. Dealing with some pain in joints as well. S/p two back surgeries.     The following portions of the patient's history were reviewed and updated as appropriate: allergies, current medications, past family history, past medical history, past social history, past surgical history and problem list.    Past Medical History:   Diagnosis Date   • Back pain    • Chest pain     Atypical chest pain, noncardiac.  Suspect either musculoskeletal versus gastrointestinal   • Chronic low back pain     /multiple lumbar surgeries, currently disabled: L4-L5 discectomy in 2015.    • Coronary artery disease    • Diabetes mellitus (CMS/HCC)    • Dissecting aortic aneurysm (CMS/HCC)    • Dyslipidemia    • Elevated liver function tests    • History of bleeding peptic ulcer    • Hyperlipidemia    • Hypertension    • Left leg pain    • Lumbar pain    • Morbid obesity (CMS/HCC)    • Severe obstructive sleep apnea    • Shortness of breath          Past Surgical History:   Procedure Laterality Date   • APPENDECTOMY     • ARTERIOGRAM AORTIC  2006    aortic disection with elephant trunk procedure   • CARDIAC CATHETERIZATION N/A 4/27/2018    Procedure: Left Heart Cath;  Surgeon: Uri Willis MD;  Location: [facility] INVASIVE LOCATION;  Service: Cardiovascular   • CORONARY ANGIOPLASTY WITH STENT PLACEMENT  2007    x 2   • CORONARY ARTERY BYPASS GRAFT  2006   • LUMBAR DISCECTOMY     • LUMBAR FUSION         Social History     Social History   • Marital status:      Spouse name: N/A   • Number of children: N/A   • Years of education: N/A     Occupational History   • Not on file.     Social History Main Topics   • Smoking status: Former Smoker     Quit date: 1/15/2006   • Smokeless tobacco: Never Used   • Alcohol use No   • Drug use: No   • Sexual activity: Defer     Other Topics Concern   • Not on file     Social History Narrative   • No narrative on file       Family  "History   Problem Relation Age of Onset   • Coronary artery disease Other    • Rheum arthritis Other    • Heart disease Mother    • Rheum arthritis Mother    • Heart disease Father    • Rheum arthritis Father        REVIEW OF SYSTEMS:   CONSTITUTIONAL: No weight loss, fever, chills, + fatigue  HEENT: Eyes: No visual loss, blurred vision, double vision or yellow sclerae. Ears, Nose, Throat: No hearing loss, sneezing, congestion, runny nose or sore throat.   SKIN: No rash or itching.     RESPIRATORY: No hemoptysis, cough or sputum. + SOB  GASTROINTESTINAL: No anorexia, nausea, vomiting or diarrhea. No abdominal pain, bright red blood per rectum or melena.  GENITOURINARY: No burning on urination, hematuria or increased frequency.  NEUROLOGICAL: No headache, dizziness, syncope, paralysis, ataxia, numbness or tingling in the extremities. No change in bowel or bladder control.   MUSCULOSKELETAL: No  joint pain or stiffness. + back pain  HEMATOLOGIC: No anemia, bleeding or bruising.   LYMPHATICS: No enlarged nodes. No history of splenectomy.   PSYCHIATRIC: No history of depression, anxiety, hallucinations.   ENDOCRINOLOGIC: No reports of sweating, cold or heat intolerance. No polyuria or polydipsia.   Ext: No edema or bruising      The patient's old records including ambulatory rhythm recordings (ECGs, Holter/event monitor) were reviewed and discussed.           Objective:       Vitals:    07/16/18 1558   BP: 120/78   BP Location: Right arm   Patient Position: Sitting   Pulse: 84   Weight: 113 kg (250 lb)   Height: 170.2 cm (67\")       Constitutional: oriented to person, place, and time.  well-developed and well-nourished. No distress.   HENT: Normocephalic.   Eyes: Conjunctivae are normal. No scleral icterus.   Neck: Normal carotid pulses, no hepatojugular reflux and no JVD present. Carotid bruit is not present. No tracheal deviation, no edema and no erythema present. No thyromegaly present.   Cardiovascular: Normal rate, " regular rhythm, S1 normal, S2 normal, normal heart sounds and intact distal pulses.   No extrasystoles are present. PMI is not displaced.  Exam reveals no gallop, no distant heart sounds and no friction rub.    + MARIE  Pulses:       Radial pulses are 2+ on the right side, and 2+ on the left side.       Dorsalis pedis pulses are 2+ on the right side, and 2+ on the left side.   Pulmonary/Chest: Effort normal and breath sounds normal. No respiratory distress. She has no decreased breath sounds.  no wheezes,  Rhonchi or rales.  no tenderness.   Abdominal: Soft. Bowel sounds are normal. She exhibits no distension and no mass. There is no hepatosplenomegaly. There is no tenderness. There is no rebound and no guarding.   Musculoskeletal:  exhibits no edema, tenderness or deformity.   Neurological: is alert and oriented to person, place, and time.   Skin: Skin is warm and dry. No rash noted. No diaphoretic. No cyanosis or erythema. No pallor. Nails show no clubbing.   Psychiatric: Normal mood and affect.Speech is normal and behavior is normal.    Lab Review:   Results for orders placed or performed during the hospital encounter of 06/15/18   POC Creatinine   Result Value Ref Range    Creatinine 1.00 0.60 - 1.30 mg/dL         ECG 12 Lead  Date/Time: 7/16/2018 4:21 PM  Performed by: MARICRUZ ACEVEDO  Authorized by: MARICRUZ ACEVEDO   Rhythm: sinus rhythm  Conduction: right bundle branch block and LAFB  Comments: NSR 84 bpm,  msec,                 Diagnosis:   1. Multiple vessel coronary artery disease  2. Ischemic cardiomyopathy  3. Aortic dissection following procedure, sequela (CMS/HCC)  4. Coronary artery disease involving native coronary artery of native heart without angina pectoris  5. Severe obstructive sleep apnea  6. Essential hypertension  7. RBBB      Assessment & Plan:   1. CAD/CABG and ischemic cardiomyopathy [facility] showed Large posterior basal aneurysm with ejection fraction of 30-35%.  Severe stenosis and  distal circumflex which supplies aneurysm with coronary artery disease, with small vessel distal left circumflex supplying only the LV aneurysm.   Ischemic cardiomyopathy with evidence of a large posterior basal left ventricular aneurysm. On optimal medical Rx. EF on 6/25/2018 MRI showed EF of 27%. FC III symptoms  2. LUCIAN on CPAP  3. HTN stable  4. 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 Aortic Dissection s/p repair    Plan:   Long discussion with the patient and his wife. I think the patient would benefit from a BIV ICD with ischemic CMP EF has been <=35% , on optimal medical therapy, FC III symptoms and RBBB with a QRS duration > 150 msec and LAFB even with the lateral scar. Check Echo on day of the procedure. The risks, benefits, and alternatives of the procedure have been reviewed and the patient wishes to proceed. Continue on current Rx        CC: Dr Uri Fox,   07/16/18  4:20 PM      EMR Dragon/Transcription disclaimer:  This note was created with the use of Dragon

## 2018-08-10 ENCOUNTER — APPOINTMENT (OUTPATIENT)
Dept: GENERAL RADIOLOGY | Facility: HOSPITAL | Age: 63
End: 2018-08-10

## 2018-08-10 VITALS
HEART RATE: 74 BPM | WEIGHT: 248.9 LBS | RESPIRATION RATE: 16 BRPM | SYSTOLIC BLOOD PRESSURE: 113 MMHG | HEIGHT: 67 IN | BODY MASS INDEX: 39.07 KG/M2 | TEMPERATURE: 97.7 F | DIASTOLIC BLOOD PRESSURE: 76 MMHG | OXYGEN SATURATION: 92 %

## 2018-08-10 LAB — GLUCOSE BLDC GLUCOMTR-MCNC: 133 MG/DL (ref 70–130)

## 2018-08-10 PROCEDURE — 63710000001 CLOPIDOGREL 75 MG TABLET: Performed by: NURSE PRACTITIONER

## 2018-08-10 PROCEDURE — A9270 NON-COVERED ITEM OR SERVICE: HCPCS | Performed by: NURSE PRACTITIONER

## 2018-08-10 PROCEDURE — 94799 UNLISTED PULMONARY SVC/PX: CPT

## 2018-08-10 PROCEDURE — 63710000001 EPLERENONE 25 MG TABLET: Performed by: NURSE PRACTITIONER

## 2018-08-10 PROCEDURE — 93005 ELECTROCARDIOGRAM TRACING: CPT | Performed by: INTERNAL MEDICINE

## 2018-08-10 PROCEDURE — 63710000001 SACUBITRIL-VALSARTAN 49-51 MG TABLET: Performed by: NURSE PRACTITIONER

## 2018-08-10 PROCEDURE — A9270 NON-COVERED ITEM OR SERVICE: HCPCS | Performed by: INTERNAL MEDICINE

## 2018-08-10 PROCEDURE — 25010000003 CEFAZOLIN IN DEXTROSE 2-4 GM/100ML-% SOLUTION: Performed by: INTERNAL MEDICINE

## 2018-08-10 PROCEDURE — 71046 X-RAY EXAM CHEST 2 VIEWS: CPT

## 2018-08-10 PROCEDURE — 94660 CPAP INITIATION&MGMT: CPT

## 2018-08-10 PROCEDURE — 99024 POSTOP FOLLOW-UP VISIT: CPT | Performed by: INTERNAL MEDICINE

## 2018-08-10 PROCEDURE — G0378 HOSPITAL OBSERVATION PER HR: HCPCS

## 2018-08-10 PROCEDURE — 93010 ELECTROCARDIOGRAM REPORT: CPT | Performed by: INTERNAL MEDICINE

## 2018-08-10 PROCEDURE — 82962 GLUCOSE BLOOD TEST: CPT

## 2018-08-10 PROCEDURE — 63710000001 OXYCODONE-ACETAMINOPHEN 5-325 MG TABLET: Performed by: INTERNAL MEDICINE

## 2018-08-10 PROCEDURE — 63710000001 ASPIRIN 81 MG TABLET DELAYED-RELEASE: Performed by: NURSE PRACTITIONER

## 2018-08-10 PROCEDURE — 63710000001 PANTOPRAZOLE 40 MG TABLET DELAYED-RELEASE: Performed by: NURSE PRACTITIONER

## 2018-08-10 PROCEDURE — 63710000001 BISOPROLOL 5 MG TABLET: Performed by: NURSE PRACTITIONER

## 2018-08-10 RX ADMIN — CEFAZOLIN SODIUM 2 G: 2 INJECTION, SOLUTION INTRAVENOUS at 04:10

## 2018-08-10 RX ADMIN — ASPIRIN 81 MG: 81 TABLET, COATED ORAL at 08:10

## 2018-08-10 RX ADMIN — EPLERENONE 25 MG: 25 TABLET ORAL at 08:10

## 2018-08-10 RX ADMIN — BISOPROLOL FUMARATE 10 MG: 5 TABLET ORAL at 08:11

## 2018-08-10 RX ADMIN — PANTOPRAZOLE SODIUM 40 MG: 40 TABLET, DELAYED RELEASE ORAL at 05:55

## 2018-08-10 RX ADMIN — SACUBITRIL AND VALSARTAN 1 TABLET: 49; 51 TABLET, FILM COATED ORAL at 08:10

## 2018-08-10 RX ADMIN — CLOPIDOGREL BISULFATE 75 MG: 75 TABLET ORAL at 08:10

## 2018-08-10 RX ADMIN — OXYCODONE HYDROCHLORIDE AND ACETAMINOPHEN 1 TABLET: 5; 325 TABLET ORAL at 01:05

## 2018-08-10 NOTE — PLAN OF CARE
Problem: Patient Care Overview  Goal: Plan of Care Review  Outcome: Outcome(s) achieved Date Met: 08/10/18   08/10/18 0850   Coping/Psychosocial   Plan of Care Reviewed With patient;spouse  (verbalize understanding of d/c instructions )   Plan of Care Review   Progress improving   OTHER   Outcome Summary Pt VSS, V-paced. Left Chest site clean, dry, & intact, steri-strips present. Pt ambulates with no issue or complaints. Pt and spouse verbalize understanding of d/c instructions.      Goal: Individualization and Mutuality  Outcome: Outcome(s) achieved Date Met: 08/10/18

## 2018-08-10 NOTE — PROGRESS NOTES
[facility]  Cardiovascular Consultation Note  Kai Garcia  2521/1  1955    DATE OF ADMISSION: 8/9/2018  DATE OF FOLLOW UP: 08/10/18    Cheo Weiss MD    Subjective:     Patient ID: Kai Garcia is a 62 y.o. male.    Chief Complaint: cardiomyopathy    Allergies:   Allergies   Allergen Reactions   • Ativan [Lorazepam] Other (See Comments)     agitation   • Zetia [Ezetimibe] Other (See Comments)     increased LFTs   • Zocor [Simvastatin] Myalgia   • Isosorbide Other (See Comments)     headaches   • Metformin And Related Diarrhea       Current medications:    Current Facility-Administered Medications:   •  aspirin EC tablet 81 mg, 81 mg, Oral, Daily, Kuns-Boyer, Lesley B, APRN  •  bisoprolol (ZEBeta) tablet 10 mg, 10 mg, Oral, Daily, Kuns-Boyer, Lesley B, APRN  •  clopidogrel (PLAVIX) tablet 75 mg, 75 mg, Oral, Daily, Kuns-Boyer, Lesley B, APRN  •  dextrose (D50W) solution 25 g, 25 g, Intravenous, Q15 Min PRN, Jaydens-Boyer, Lesley B, APRN  •  dextrose (GLUTOSE) oral gel 15 g, 15 g, Oral, Q15 Min PRN, Jaydens-Boyer, Lesley B, APRN  •  eplerenone (INSPRA) tablet 25 mg, 25 mg, Oral, Daily, Kuns-Boyer, Lesley B, APRN  •  glucagon (human recombinant) (GLUCAGEN DIAGNOSTIC) injection 1 mg, 1 mg, Subcutaneous, PRN, Kuns-Boyer, Lesley B, APRN  •  insulin lispro (humaLOG) injection 0-9 Units, 0-9 Units, Subcutaneous, 4x Daily With Meals & Nightly, Ortiz-Boyer, Lesley B, APRN, 2 Units at 08/09/18 1722  •  ondansetron (ZOFRAN) injection 4 mg, 4 mg, Intravenous, Q6H PRN, Ruddy, Jose, DO  •  oxyCODONE-acetaminophen (PERCOCET) 5-325 MG per tablet 1 tablet, 1 tablet, Oral, Q4H PRN, Ruddy, Jose, DO, 1 tablet at 08/10/18 0105  •  pantoprazole (PROTONIX) EC tablet 40 mg, 40 mg, Oral, Q AM, Lesley Herzog, APRN, 40 mg at 08/10/18 0555  •  rosuvastatin (CRESTOR) tablet 10 mg, 10 mg, Oral, Nightly, Lesley Herzog, APRN, 10 mg at 08/09/18 2002  •   sacubitril-valsartan (ENTRESTO) 49-51 MG tablet 1 tablet, 1 tablet, Oral, Q12H, Lesley Herzog APRN, 1 tablet at 08/09/18 2002  •  sodium chloride 0.9 % flush 1-10 mL, 1-10 mL, Intravenous, PRN, Ruddy, Jose, DO    History of Present Illness:  No acute events overnight. Reports feeling well.     The following portions of the patient's history were reviewed and updated as appropriate: allergies, current medications, past family history, past medical history, past social history, past surgical history and problem list.    ROS:  A complete ROS obtained and negative except as outlined in problem list, HPI and other parts of the note.    Procedures       Objective:       Vitals:    08/10/18 0200 08/10/18 0400 08/10/18 0600 08/10/18 0648   BP: 97/68 98/62 128/92 104/78   BP Location: Right arm Right arm Right arm    Patient Position: Lying Lying Lying    Pulse: 66 70 70 70   Resp: 14 16 18 16   Temp:       TempSrc:       SpO2: 91% 91% 92% 92%   Weight:       Height:           Intake/Output Summary (Last 24 hours) at 08/10/18 0711  Last data filed at 08/10/18 0440   Gross per 24 hour   Intake              100 ml   Output                0 ml   Net              100 ml       Physical Exam:  GENERAL: Well-developed, well-nourished patient in no acute distress.  HEENT: Normocephalic, atraumatic, PERRLA. Moist mucous membranes.  NECK: No JVD present at 30°. No carotid bruits auscultated.  LUNGS: Non labored. Clear to auscultation.  CARDIOVASCULAR: Regular rate and rhythm. No murmurs, gallops or rubs noted.   ABDOMEN: Soft, non-tender, non-distended. Normoactive bowel sounds.  MUSCULOSKELETAL: No gross deformities. No clubbing or cyanosis  EXT: pulses intact, no swelling.  SKIN: Pink, warm. ICD site- no issues.   Neuro: No gross neurologic deficits.    The patient's old records including ambulatory rhythm recordings (ECGs, Holter/event monitor) were reviewed and discussed.      Lab Review:     Results from last 7  days  Lab Units 08/08/18  1155   SODIUM mmol/L 141   POTASSIUM mmol/L 4.4   CHLORIDE mmol/L 107   CO2 mmol/L 26.0   BUN mg/dL 20   CREATININE mg/dL 1.01   GLUCOSE mg/dL 111*   CALCIUM mg/dL 9.4           Results from last 7 days  Lab Units 08/08/18  1155   WBC 10*3/mm3 4.87   HEMOGLOBIN g/dL 13.0*   HEMATOCRIT % 39.0   PLATELETS 10*3/mm3 108*       Results from last 7 days  Lab Units 08/08/18  1155   INR  1.10*                         EKG: V paced 72 bpm  Telemetry: V paced, 70's  CXR: no pneumothorax, appropriate lead placement  Assessment & Plan:   1) Ischemic cardiomyopathy, EF </=35% (Cath 30-35% and MRI was 30% at ; Echo here with poor images and unable to eval EF due to not able to see the aneurysm) for greater than 90 days on optimal medical therapy, FC III symptomology and RBBB with QRS > 150 ms and LAFB.   - s/p BiV ICD implant ([employer]); CXR- no pneumothorax, appropriate lead placement       Disposition: Patient felt to be stable and ready for d/c home today. Will need wound check in 7-10 days and follow up with Dr. Fox in 10-12 weeks. See NERISS.          CONNOR Clark  [facility]  8/10/2018  7:11 AM      I, Jose Fox, have reviewed the note in full and agree with all aspects of the above including physical exam, assessment, labs and plan with changes made accordingly. Face to Face Time was spent with the patient.    Jose Fox DO  08/10/18  7:57 AM

## 2018-08-17 ENCOUNTER — CLINICAL SUPPORT NO REQUIREMENTS (OUTPATIENT)
Dept: CARDIOLOGY | Facility: CLINIC | Age: 63
End: 2018-08-17

## 2018-08-17 ENCOUNTER — OFFICE VISIT (OUTPATIENT)
Dept: CARDIOLOGY | Facility: CLINIC | Age: 63
End: 2018-08-17

## 2018-08-17 DIAGNOSIS — I25.5 ISCHEMIC CARDIOMYOPATHY: Primary | ICD-10-CM

## 2018-08-17 DIAGNOSIS — I42.8 NONISCHEMIC CARDIOMYOPATHY (HCC): ICD-10-CM

## 2018-08-17 DIAGNOSIS — I45.10 RBBB: ICD-10-CM

## 2018-08-17 PROCEDURE — 99024 POSTOP FOLLOW-UP VISIT: CPT | Performed by: INTERNAL MEDICINE

## 2018-08-17 NOTE — PROGRESS NOTES
WOUND CHECK    2018    Kai Garcia, : 1955    WOUND CHECK    B/P: 112/86 (Sitting)  (Standing)     Pulse: 67    Patient has fever: [] Temperature if indicated: 97.7 F    Wound Location: left infraclavicular    Dressing Removed [x]        Old Dressing Appearance:  Clean, dry [x]                 Old, bloody drainage []                            Moist, serous drainage []                Moist, thick yellow/green drainage []       Wound Appearance: Redness []                  Drainage []                  Culture obtained []        Color: N/A     Consistency: none     Amount: none         Gloves used, wound cleansed with sterile 4x4 and peroxide [x]       MD notified [] MD orders:  Antibiotic started []  If checked, type   Other:     Appointment for follow-up scheduled for 3 months post procedure []    Future Appointments  Date Time Provider Department Center   2018 10:15 AM Uri Willis MD [facility] None   10/23/2018 8:45 AM Cheo Weiss MD [facility] None   2018 10:00 AM Jose Fox DO [facility] None           Alma Lacey MA, 18      MD Signature:______________________________ Completed By/Date:

## 2018-09-05 ENCOUNTER — OFFICE VISIT (OUTPATIENT)
Dept: CARDIOLOGY | Facility: CLINIC | Age: 63
End: 2018-09-05

## 2018-09-05 VITALS
DIASTOLIC BLOOD PRESSURE: 80 MMHG | HEART RATE: 77 BPM | SYSTOLIC BLOOD PRESSURE: 128 MMHG | HEIGHT: 69 IN | BODY MASS INDEX: 36.88 KG/M2 | WEIGHT: 249 LBS

## 2018-09-05 DIAGNOSIS — I25.10 CORONARY ARTERY DISEASE INVOLVING NATIVE CORONARY ARTERY OF NATIVE HEART WITHOUT ANGINA PECTORIS: ICD-10-CM

## 2018-09-05 DIAGNOSIS — I50.22 CHRONIC SYSTOLIC CONGESTIVE HEART FAILURE (HCC): ICD-10-CM

## 2018-09-05 DIAGNOSIS — E78.00 PURE HYPERCHOLESTEROLEMIA: ICD-10-CM

## 2018-09-05 DIAGNOSIS — I10 ESSENTIAL HYPERTENSION: ICD-10-CM

## 2018-09-05 DIAGNOSIS — I25.5 ISCHEMIC CARDIOMYOPATHY: Primary | ICD-10-CM

## 2018-09-05 PROCEDURE — 99214 OFFICE O/P EST MOD 30 MIN: CPT | Performed by: INTERNAL MEDICINE

## 2018-09-05 NOTE — PROGRESS NOTES
Encounter Date:09/05/2018    Location: Cheo Chaves MD    Patient ID: Kai Garcia is a 62 y.o. male  Resident of [place], [place].    1955  Subjective:      Chief Complaint/Reason for visit:    Chief Complaint   Patient presents with   • Cardiomyopathy       Problem List:  1. Dissecting aortic aneurysm:  a. 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 Helton, stage I in June 2006 and stage II in March 2007.   b. Followed chronically with annual CTs by Dr. Helton. Stable by CT 2018  2. Coronary artery disease:  a. Cardiac cath prior to “elephant trunk” procedure in June 2006 and the patient underwent CABG at that time consisting of SVG to the OM and SVG to the PDA.   b. On 02/08/2007 - inferoposterior MI, cardiac cath showed 100% left circumflex with severe OM-1 stenosis and 100% vein graft to the left circumflex. RCA was diffuse, 90% ostial stenosis at the SVG to the RCA. LAD had only 40% stenosis.   c. Cardiac cath in March 2007 due to recurrent angina - 2.5 Microdriver stent to the distal native RCA.   d. 4/27/18 cardiac catheter occluded vein grafts ×2.  Large posterior basal aneurysm with ejection fraction of 30-35%.  Severe stenosis and distal circumflex which supplies aneurysm.  Non-flow-limiting RCA disease.  E. MRI 8/18 LVEF 28%  G. S/P Bi- Ventricular ICD- Imagimod 8/9/18, EMILIE Fox MD  3. Dyslipidemia.   4. Hypertension.   5. Diabetes  6. Chronic low back pain/multiple lumbar surgeries, currently disabled:  a. L4-L5 discectomy in 2015.   7. History of bleeding peptic ulcer disease.   8. Severe obstructive sleep apnea.   9. Appendectomy              Allergies   Allergen Reactions     HPI: Mr. Garcia is a 62 yr old white male with the above noted medical history who presents for 1 month hospital follow-up following Bi-V ICD implant.  He notes that his incision still is painful  and burns.  He is unable to lie on his left side for any period of time.  He feels well otherwise and is able to perform his activities of daily living without difficulty.  He can't really tell that his symptoms of dyspnea are improved.  A week after implant he went to buy a new bed. He was lying on a Craftmatic Bed with massaging functions that increased his HR and he felt poorly afterwards.  We called the office today and his recent remote interrogation performed on 9/1/18 showed normal device function.    Social History     Social History   • Marital status:      Spouse name: N/A   • Number of children: N/A   • Years of education: N/A     Occupational History   • Not on file.     Social History Main Topics   • Smoking status: Former Smoker     Packs/day: 3.00     Years: 35.00     Types: Cigarettes     Quit date: 1/15/2006   • Smokeless tobacco: Never Used   • Alcohol use No   • Drug use: No   • Sexual activity: Defer     Other Topics Concern   • Not on file     Social History Narrative   • No narrative on file       family history includes Coronary artery disease in his other; Heart disease in his father and mother; Rheum arthritis in his father, mother, and other.     has a past medical history of Back pain; Chest pain; Chronic low back pain; Coronary artery disease; Diabetes mellitus (CMS/HCC); Dissecting aortic aneurysm (CMS/HCC); Dyslipidemia; Elevated liver function tests; History of bleeding peptic ulcer; History of transfusion (2006); Hyperlipidemia; Hypertension; Left leg pain; Lumbar pain; MI (myocardial infarction) (2007); Morbid obesity (CMS/HCC); LUCIAN on CPAP; Severe obstructive sleep apnea; Shortness of breath; and Wears glasses.    Allergies   Allergen Reactions   • Ativan [Lorazepam] Other (See Comments)     agitation   • Zetia [Ezetimibe] Other (See Comments)     increased LFTs   • Zocor [Simvastatin] Myalgia   • Isosorbide Other (See Comments)     headaches   • Metformin And Related  "Diarrhea         Current Outpatient Prescriptions:   •  acetaminophen (TYLENOL) 500 MG tablet, Take 1,000 mg by mouth Every 6 (Six) Hours As Needed for Mild Pain ., Disp: , Rfl:   •  aspirin 81 MG EC tablet, Take 81 mg by mouth Daily., Disp: , Rfl:   •  bisoprolol (ZEBeta) 10 MG tablet, Take 1 tablet by mouth Daily., Disp: 90 tablet, Rfl: 3  •  clopidogrel (PLAVIX) 75 MG tablet, Take 1 tablet by mouth Daily., Disp: 90 tablet, Rfl: 3  •  eplerenone (INSPRA) 25 MG tablet, Take 1 tablet by mouth Daily., Disp: 90 tablet, Rfl: 3  •  nitroglycerin (NITROSTAT) 0.4 MG SL tablet, Place 1 tablet under the tongue Every 5 (Five) Minutes As Needed for Chest Pain., Disp: 25 tablet, Rfl: 5  •  omeprazole (priLOSEC) 40 MG capsule, Take 1 capsule by mouth Daily., Disp: 90 capsule, Rfl: 1  •  rosuvastatin (CRESTOR) 10 MG tablet, Take 1 tablet by mouth Daily., Disp: 90 tablet, Rfl: 3  •  sacubitril-valsartan (ENTRESTO) 49-51 MG tablet, Take 1 tablet by mouth Every 12 (Twelve) Hours., Disp: 60 tablet, Rfl: 11  •  SITagliptin (JANUVIA) 100 MG tablet, Take 1 tablet by mouth Daily., Disp: 90 tablet, Rfl: 1  •  zolpidem (AMBIEN) 10 MG tablet, Take 1 tablet by mouth At Night As Needed for Sleep., Disp: 30 tablet, Rfl: 2    Review of Systems   Cardiovascular: Positive for dyspnea on exertion and leg swelling. Negative for chest pain, irregular heartbeat, near-syncope, orthopnea, palpitations, paroxysmal nocturnal dyspnea and syncope.   All other systems reviewed and are negative.      Vitals:    09/05/18 1005   BP: 128/80   Pulse: 77   Weight: 113 kg (249 lb)   Height: 175.3 cm (69\")                             Objective:       Physical Exam   Constitutional: He is oriented to person, place, and time. He appears well-developed and well-nourished.   HENT:   Head: Normocephalic and atraumatic.   Cardiovascular: Normal rate, regular rhythm, normal heart sounds and intact distal pulses.  Exam reveals no gallop and no friction rub.    No murmur " heard.  Pulmonary/Chest: Effort normal and breath sounds normal.   Pacemaker site is well-healed.  No erythema or fluctuance.   Abdominal: Soft. Bowel sounds are normal.   Musculoskeletal: Normal range of motion. He exhibits no edema.   Neurological: He is alert and oriented to person, place, and time.   Skin: Skin is warm and dry.   Psychiatric: He has a normal mood and affect. His behavior is normal. Judgment and thought content normal.   Vitals reviewed.      Data Review:   Procedures       Assessment:      Diagnosis Plan   1. Ischemic cardiomyopathy  S/P Bi- V ICD implant 8/9/18   2. Chronic systolic congestive heart failure (CMS/HCC)  Stable on Entresto   3. Coronary artery disease involving native coronary artery of native heart without angina pectoris  Asymptomatic   4. Essential hypertension  Controlled   5. Pure hypercholesterolemia  Statin       Plan:   1. Continue current medical therapy including ASA, BB, Plavix, Entresto and Statin  2. Follow up with Dr Fox for device check in November  3. Follow up with Dr. Willis in 6 months.    Scribed for Uri Willis MD by Rona Ferrera. 9/5/2018  10:57 AM    I, Uri Willis MD, personally performed the services described in this documentation as scribed by the above individual in my presence, and it is both accurate and complete      Please note that portions of this note may have been completed with a voice recognition program. Efforts were made to edit the dictations, but occasionally words are mistranscribed.

## 2018-09-21 ENCOUNTER — TELEPHONE (OUTPATIENT)
Dept: CARDIOLOGY | Facility: CLINIC | Age: 63
End: 2018-09-21

## 2018-09-21 NOTE — TELEPHONE ENCOUNTER
Patient had a BIV ICD on 8/9/18. He stated that he has been having clear drainage from his incision site for about 4 days. Last night his wife pulled out a 3 inch suture from the incision area. He stated that he has no s/s of infection. He is going to come in Monday morning for a wound check.

## 2018-09-24 ENCOUNTER — OFFICE VISIT (OUTPATIENT)
Dept: CARDIOLOGY | Facility: CLINIC | Age: 63
End: 2018-09-24

## 2018-09-24 DIAGNOSIS — I10 ESSENTIAL HYPERTENSION: Primary | ICD-10-CM

## 2018-09-24 PROCEDURE — 99024 POSTOP FOLLOW-UP VISIT: CPT | Performed by: INTERNAL MEDICINE

## 2018-09-24 NOTE — PROGRESS NOTES
2018    Kai Garcia, : 1955    WOUND CHECK    B/P:     Pulse:    Patient has fever: [] Temperature if indicated:     Wound Location: Left Infraclavicular    Dressing Removed [x]        Old Dressing Appearance:  Clean, dry []                 Old, bloody drainage []                            Moist, serous drainage []                Moist, thick yellow/green drainage []       Wound Appearance: Redness []                  Drainage []                  Culture obtained []        Color: N/A     Consistency: n/a     Amount: none         Gloves used, wound cleansed with sterile 4x4 and peroxide [x]       MD notified [] MD orders:    Antibiotic started []  If checked, type   Other:     Notes:    Appointment for follow-up scheduled for 3 months post procedure [x]    Future Appointments  Date Time Provider Department Center   10/23/2018 8:45 AM Cheo Weiss MD [facility] None   2018 10:00 AM Jose Fox DO [facility] None   2019 11:45 AM Uri Willis MD [facility] None           Maxx Reyes MA, 18      MD Signature:______________________________ Completed By/Date:

## 2018-10-22 DIAGNOSIS — E11.65 UNCONTROLLED TYPE 2 DIABETES MELLITUS WITH HYPERGLYCEMIA (HCC): ICD-10-CM

## 2018-10-22 DIAGNOSIS — E78.5 HYPERLIPIDEMIA, UNSPECIFIED HYPERLIPIDEMIA TYPE: ICD-10-CM

## 2018-10-22 DIAGNOSIS — I10 HYPERTENSION, UNSPECIFIED TYPE: Primary | ICD-10-CM

## 2018-10-22 LAB
ALBUMIN SERPL-MCNC: 4.46 G/DL (ref 3.2–4.8)
ALBUMIN/GLOB SERPL: 1.6 G/DL (ref 1.5–2.5)
ALP SERPL-CCNC: 35 U/L (ref 25–100)
ALT SERPL-CCNC: 46 U/L (ref 7–40)
AST SERPL-CCNC: 44 U/L (ref 0–33)
BASOPHILS # BLD AUTO: 0.02 10*3/MM3 (ref 0–0.2)
BASOPHILS NFR BLD AUTO: 0.4 % (ref 0–1)
BILIRUB SERPL-MCNC: 0.6 MG/DL (ref 0.3–1.2)
BUN SERPL-MCNC: 20 MG/DL (ref 9–23)
BUN/CREAT SERPL: 18.9 (ref 7–25)
CALCIUM SERPL-MCNC: 9.1 MG/DL (ref 8.7–10.4)
CHLORIDE SERPL-SCNC: 102 MMOL/L (ref 99–109)
CHOLEST SERPL-MCNC: 115 MG/DL (ref 0–200)
CO2 SERPL-SCNC: 26 MMOL/L (ref 20–31)
CREAT SERPL-MCNC: 1.06 MG/DL (ref 0.6–1.3)
EOSINOPHIL # BLD AUTO: 0.1 10*3/MM3 (ref 0–0.3)
EOSINOPHIL NFR BLD AUTO: 1.9 % (ref 0–3)
ERYTHROCYTE [DISTWIDTH] IN BLOOD BY AUTOMATED COUNT: 13.1 % (ref 11.3–14.5)
GLOBULIN SER CALC-MCNC: 2.7 GM/DL
GLUCOSE SERPL-MCNC: 128 MG/DL (ref 70–100)
HBA1C MFR BLD: 7.4 % (ref 4.8–5.6)
HCT VFR BLD AUTO: 41 % (ref 38.9–50.9)
HDLC SERPL-MCNC: 32 MG/DL (ref 40–60)
HGB BLD-MCNC: 13.5 G/DL (ref 13.1–17.5)
IMM GRANULOCYTES # BLD: 0.01 10*3/MM3 (ref 0–0.03)
IMM GRANULOCYTES NFR BLD: 0.2 % (ref 0–0.6)
LDLC SERPL CALC-MCNC: 58 MG/DL (ref 0–100)
LYMPHOCYTES # BLD AUTO: 1.89 10*3/MM3 (ref 0.6–4.8)
LYMPHOCYTES NFR BLD AUTO: 36.1 % (ref 24–44)
MCH RBC QN AUTO: 32.1 PG (ref 27–31)
MCHC RBC AUTO-ENTMCNC: 32.9 G/DL (ref 32–36)
MCV RBC AUTO: 97.6 FL (ref 80–99)
MONOCYTES # BLD AUTO: 0.51 10*3/MM3 (ref 0–1)
MONOCYTES NFR BLD AUTO: 9.8 % (ref 0–12)
NEUTROPHILS # BLD AUTO: 2.7 10*3/MM3 (ref 1.5–8.3)
NEUTROPHILS NFR BLD AUTO: 51.6 % (ref 41–71)
PLATELET # BLD AUTO: 107 10*3/MM3 (ref 150–450)
POTASSIUM SERPL-SCNC: 4.3 MMOL/L (ref 3.5–5.5)
PROT SERPL-MCNC: 7.2 G/DL (ref 5.7–8.2)
RBC # BLD AUTO: 4.2 10*6/MM3 (ref 4.2–5.76)
SODIUM SERPL-SCNC: 138 MMOL/L (ref 132–146)
TRIGL SERPL-MCNC: 127 MG/DL (ref 0–150)
VLDLC SERPL CALC-MCNC: 25.4 MG/DL
WBC # BLD AUTO: 5.23 10*3/MM3 (ref 3.5–10.8)

## 2018-10-23 ENCOUNTER — OFFICE VISIT (OUTPATIENT)
Dept: FAMILY MEDICINE CLINIC | Facility: CLINIC | Age: 63
End: 2018-10-23

## 2018-10-23 VITALS
WEIGHT: 251 LBS | OXYGEN SATURATION: 97 % | RESPIRATION RATE: 22 BRPM | DIASTOLIC BLOOD PRESSURE: 70 MMHG | HEIGHT: 69 IN | HEART RATE: 66 BPM | BODY MASS INDEX: 37.18 KG/M2 | TEMPERATURE: 98 F | SYSTOLIC BLOOD PRESSURE: 120 MMHG

## 2018-10-23 DIAGNOSIS — E78.00 PURE HYPERCHOLESTEROLEMIA: ICD-10-CM

## 2018-10-23 DIAGNOSIS — I25.10 CORONARY ARTERY DISEASE INVOLVING NATIVE CORONARY ARTERY OF NATIVE HEART WITHOUT ANGINA PECTORIS: ICD-10-CM

## 2018-10-23 DIAGNOSIS — E11.65 UNCONTROLLED TYPE 2 DIABETES MELLITUS WITH HYPERGLYCEMIA (HCC): Primary | ICD-10-CM

## 2018-10-23 DIAGNOSIS — I10 ESSENTIAL HYPERTENSION: ICD-10-CM

## 2018-10-23 PROCEDURE — 99214 OFFICE O/P EST MOD 30 MIN: CPT | Performed by: FAMILY MEDICINE

## 2018-10-23 NOTE — PROGRESS NOTES
Assessment/Plan       Problems Addressed this Visit        Cardiovascular and Mediastinum    Hyperlipidemia    Essential hypertension    Coronary artery disease involving native coronary artery of native heart without angina pectoris       Endocrine    Type II diabetes mellitus, uncontrolled (CMS/[facility]) - Primary            Follow up: Return in about 3 months (around 1/23/2019).     DISCUSSION  Diabetes loss type II.  Uncontrolled.  A1c has improved a little.  Continue efforts with weight loss, decreasing carbohydrates in diet and recheck in 3 months.  He does feel better.    Hypertension.  Blood pressure stable.  Continue medication.    Hyperlipidemia.  Reviewed blood work.  Continue medication.    Coronary artery disease.  Stable.  He is feeling better after having the defibrillator placed but I suspect it is more likely related to the Entresto helping his heart function.  He had ejection fraction of 30% or less depending on which test was done.    He refuses flu shot.    MEDICATIONS PRESCRIBED  Requested Prescriptions      No prescriptions requested or ordered in this encounter          -------------------------------------------    Subjective     Chief Complaint   Patient presents with   • Diabetes     3 month follow up   • Hypertension   • Med Refill   • Labs Only         Diabetes   He presents for his follow-up diabetic visit. He has type 2 diabetes mellitus. His disease course has been stable. There are no hypoglycemic associated symptoms. Pertinent negatives for diabetes include no chest pain and no fatigue. There are no hypoglycemic complications. Diabetic complications include heart disease. Risk factors for coronary artery disease include diabetes mellitus, hypertension and dyslipidemia. When asked about current treatments, none (tried metformin and caused diarrhea and had to stop) were reported. He is compliant with treatment all of the time. His weight is stable. He is following a generally healthy  diet. He never participates in exercise. He monitors blood glucose at home 1-2 x per day (160-170). There is no change in his home blood glucose trend. An ACE inhibitor/angiotensin II receptor blocker is being taken (on entresto). Eye exam is current.   Hypertension   This is a chronic problem. The problem is unchanged. The problem is controlled. Associated symptoms include peripheral edema and shortness of breath (At times with exertion only). Pertinent negatives include no chest pain. There are no associated agents to hypertension. Current antihypertension treatment includes angiotensin blockers. The current treatment provides moderate improvement. Hypertensive end-organ damage includes CAD/MI.   Coronary Artery Disease   Presents for follow-up visit. Symptoms include shortness of breath (At times with exertion only). Pertinent negatives include no chest pain, chest pressure, leg swelling or weight gain. (Sleepy. To have a cath done. Dr Willis. Not feeling right. No energy.) Risk factors include hyperlipidemia. The symptoms have been stable.   Hyperlipidemia   This is a chronic problem. The current episode started more than 1 year ago. The problem is controlled. Recent lipid tests were reviewed and are normal. Associated symptoms include myalgias and shortness of breath (At times with exertion only). Pertinent negatives include no chest pain. The current treatment provides moderate improvement of lipids. There are no compliance problems.  Risk factors for coronary artery disease include diabetes mellitus, dyslipidemia and hypertension.       Had defibrillator placed.  Feeling much better since then.  But was also placed on Entresto.  Ejection fraction previously 30% but was 27% on MRI according to patient's wife.        History   Smoking Status   • Former Smoker   • Packs/day: 3.00   • Years: 35.00   • Types: Cigarettes   • Quit date: 1/15/2006   Smokeless Tobacco   • Never Used        Past Medical  "History,Medications, Allergies, and social history was reviewed.      Review of Systems   Constitutional: Negative for fatigue and unexpected weight gain.   HENT: Negative.    Respiratory: Positive for shortness of breath (At times with exertion only).    Cardiovascular: Negative for chest pain and leg swelling.   Musculoskeletal: Positive for myalgias.   Neurological: Negative.        Objective     Vitals:    10/23/18 0838   BP: 120/70   Pulse: 66   Resp: 22   Temp: 98 °F (36.7 °C)   TempSrc: Temporal Artery    SpO2: 97%   Weight: 114 kg (251 lb)   Height: 175.3 cm (69.02\")          Physical Exam   Constitutional: Vital signs are normal. He appears well-developed and well-nourished.   Obese     HENT:   Head: Normocephalic and atraumatic.   Right Ear: Hearing, tympanic membrane, external ear and ear canal normal.   Left Ear: Hearing, tympanic membrane, external ear and ear canal normal.   Mouth/Throat: Oropharynx is clear and moist.   Eyes: Pupils are equal, round, and reactive to light. Conjunctivae, EOM and lids are normal.   Neck: Normal range of motion. Neck supple. No thyromegaly present.   Cardiovascular: Normal rate, regular rhythm and normal heart sounds.  Exam reveals no friction rub.    No murmur heard.  Pulmonary/Chest: Effort normal and breath sounds normal. No respiratory distress. He has no wheezes. He has no rales.   Abdominal: Soft. Normal appearance and bowel sounds are normal. He exhibits no distension. There is tenderness (mild LLQ and chronic, no change). There is no rebound and no guarding.   Musculoskeletal: He exhibits no edema.   Neurological: He is alert. He has normal strength.   Skin: Skin is warm and dry.   Psychiatric: He has a normal mood and affect. His speech is normal. Cognition and memory are normal.   Nursing note and vitals reviewed.                Cheo Weiss MD    "

## 2018-10-30 ENCOUNTER — TELEPHONE (OUTPATIENT)
Dept: FAMILY MEDICINE CLINIC | Facility: CLINIC | Age: 63
End: 2018-10-30

## 2018-10-30 NOTE — TELEPHONE ENCOUNTER
----- Message from Sherry Cherry sent at 10/30/2018 10:13 AM EDT -----  Contact: [facility]; ORDER NEEDED   Pt needs a new order for cpap supplies. He needs it faxed to [facility] in [place].   He needs the mask tubing and filters for the machine.       [phone]

## 2018-11-19 ENCOUNTER — OFFICE VISIT (OUTPATIENT)
Dept: CARDIOLOGY | Facility: CLINIC | Age: 63
End: 2018-11-19

## 2018-11-19 VITALS
WEIGHT: 252.8 LBS | BODY MASS INDEX: 39.68 KG/M2 | HEART RATE: 69 BPM | SYSTOLIC BLOOD PRESSURE: 142 MMHG | HEIGHT: 67 IN | DIASTOLIC BLOOD PRESSURE: 90 MMHG

## 2018-11-19 DIAGNOSIS — I25.5 ISCHEMIC CARDIOMYOPATHY: ICD-10-CM

## 2018-11-19 DIAGNOSIS — I10 ESSENTIAL HYPERTENSION: ICD-10-CM

## 2018-11-19 DIAGNOSIS — I45.10 RBBB: ICD-10-CM

## 2018-11-19 PROCEDURE — 99213 OFFICE O/P EST LOW 20 MIN: CPT | Performed by: INTERNAL MEDICINE

## 2018-11-19 PROCEDURE — 93284 PRGRMG EVAL IMPLANTABLE DFB: CPT | Performed by: INTERNAL MEDICINE

## 2018-11-19 NOTE — PROGRESS NOTES
Kai Garcia  1955    There is no work phone number on file.      07/16/2018    [facility] CARDIOLOGY    Cheo Weiss MD  [address] [id number]    REFERRING DOCTOR : Dr Uri Willis        Patient ID: Kai Garcia is a 63 y.o. male.    Chief Complaint:   Chief Complaint   Patient presents with   • Hypertension   • Cardiomyopathy   • Multiple vessel coronary artery disease       PROBLEM LIST:  1. Dissecting aortic aneurysm:  a. 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 Helton, stage I in June 2006 and stage II in March 2007.   b. Followed chronically with annual CTs by Dr. Helton. Stable by CT 2018  2. Coronary artery disease:  a. Cardiac cath prior to “elephant trunk” procedure in June 2006 and the patient underwent CABG at that time consisting of SVG to the OM and SVG to the PDA.   b. On 02/08/2007 - inferoposterior MI, cardiac cath showed 100% left circumflex with severe OM-1 stenosis and 100% vein graft to the left circumflex. RCA was diffuse, 90% ostial stenosis at the SVG to the RCA. LAD had only 40% stenosis.   c. Cardiac cath in March 2007 due to recurrent angina - 2.5 Microdriver stent to the distal native RCA.   d. 4/27/18 cardiac catheter occluded vein grafts ×2.  Large posterior basal aneurysm with ejection fraction of 30-35%.  Severe stenosis and distal circumflex which supplies aneurysm.  Non-flow-limiting RCA disease.         1.  Coronary artery disease, with small vessel distal left circumflex supplying only the LV aneurysm.  No angina or ischemia         2.  Ischemic cardiomyopathy with evidence of a large posterior basal left ventricular aneurysm.              E. Cardiac MRI at  27% 6/25/2018  3. Dyslipidemia.   4. Hypertension.   5. Diabetes  6. Chronic low back pain/multiple lumbar surgeries, currently disabled:  a. L4-L5 discectomy in  2015.   7. History of bleeding peptic ulcer disease.   8. Severe obstructive sleep apnea.   9. Appendectomy    Allergies   Allergen Reactions   • Ativan [Lorazepam] Other (See Comments)     agitation   • Zetia [Ezetimibe] Other (See Comments)     increased LFTs   • Zocor [Simvastatin] Myalgia   • Isosorbide Other (See Comments)     headaches   • Metformin And Related Diarrhea       Current Outpatient Medications:   •  acetaminophen (TYLENOL) 500 MG tablet, Take 1,000 mg by mouth Every 6 (Six) Hours As Needed for Mild Pain ., Disp: , Rfl:   •  aspirin 81 MG EC tablet, Take 81 mg by mouth Daily., Disp: , Rfl:   •  bisoprolol (ZEBeta) 10 MG tablet, Take 1 tablet by mouth Daily., Disp: 90 tablet, Rfl: 3  •  clopidogrel (PLAVIX) 75 MG tablet, Take 1 tablet by mouth Daily., Disp: 90 tablet, Rfl: 3  •  eplerenone (INSPRA) 25 MG tablet, Take 1 tablet by mouth Daily., Disp: 90 tablet, Rfl: 3  •  nitroglycerin (NITROSTAT) 0.4 MG SL tablet, Place 1 tablet under the tongue Every 5 (Five) Minutes As Needed for Chest Pain., Disp: 25 tablet, Rfl: 5  •  omeprazole (priLOSEC) 40 MG capsule, Take 1 capsule by mouth Daily., Disp: 90 capsule, Rfl: 1  •  rosuvastatin (CRESTOR) 10 MG tablet, Take 1 tablet by mouth Daily., Disp: 90 tablet, Rfl: 3  •  sacubitril-valsartan (ENTRESTO) 49-51 MG tablet, Take 1 tablet by mouth Every 12 (Twelve) Hours., Disp: 60 tablet, Rfl: 11  •  SITagliptin (JANUVIA) 100 MG tablet, Take 1 tablet by mouth Daily., Disp: 90 tablet, Rfl: 1  •  zolpidem (AMBIEN) 10 MG tablet, Take 1 tablet by mouth At Night As Needed for Sleep., Disp: 30 tablet, Rfl: 2    History of Present Illness  Patient presents today for follow up by Dr Uri Willis regarding CMP, RBBB. Overall doing well but has some trouble with SOB and is FC III. No chest pain noted at this time. [facility] in April showed some disease but optimal medical Rx. Has been dealing with a lot of fatigue and has to take a lot of breaks. This has happened in the past  "12-18 mths. On optimal medical Rx and following with Dr Willis.  Getting Rx for LUCIAN. Dealing with some pain in joints as well. S/p two back surgeries.    Breathing and fatigue has improved but still dealing with SOB on a daily basis. He says that he feels better since the ICD but not back to baseline.     The following portions of the patient's history were reviewed and updated as appropriate: allergies, current medications, past family history, past medical history, past social history, past surgical history and problem list.    Past Medical History:   Diagnosis Date   • Back pain    • Chest pain     Atypical chest pain, noncardiac.  Suspect either musculoskeletal versus gastrointestinal   • Chronic low back pain     /multiple lumbar surgeries, currently disabled: L4-L5 discectomy in 2015.    • Coronary artery disease    • Diabetes mellitus (CMS/HCC)     type 2, diagnosed withing past 6months, checks fsbg qam, last a1c 8.4 7-19-18   • Dissecting aortic aneurysm (CMS/HCC)    • Dyslipidemia    • Elevated liver function tests    • History of bleeding peptic ulcer    • History of transfusion 2006    no reaction    • Hyperlipidemia    • Hypertension    • Left leg pain    • Lumbar pain    • MI (myocardial infarction) (CMS/HCC) 2007   • Morbid obesity (CMS/HCC)    • LUCIAN on CPAP     setting 12   • Severe obstructive sleep apnea    • Shortness of breath    • Wears glasses          The patient's old records including ambulatory rhythm recordings (ECGs, Holter/event monitor) were reviewed and discussed.           Objective:       Vitals:    11/19/18 1002   BP: 142/90   BP Location: Right arm   Patient Position: Sitting   Pulse: 69   Weight: 115 kg (252 lb 12.8 oz)   Height: 170.2 cm (67\")       Constitutional: oriented to person, place, and time.  well-developed and well-nourished. No distress.   HENT: Normocephalic.   Eyes: Conjunctivae are normal. No scleral icterus.   Neck: Normal carotid pulses, no hepatojugular reflux " and no JVD present. Carotid bruit is not present. No tracheal deviation, no edema and no erythema present. No thyromegaly present.   Cardiovascular: Normal rate, regular rhythm, S1 normal, S2 normal, normal heart sounds and intact distal pulses.   No extrasystoles are present. PMI is not displaced.  Exam reveals no gallop, no distant heart sounds and no friction rub.    + MARIE  Pulses:       Radial pulses are 2+ on the right side, and 2+ on the left side.       Dorsalis pedis pulses are 2+ on the right side, and 2+ on the left side.   Pulmonary/Chest: Effort normal and breath sounds normal. No respiratory distress. She has no decreased breath sounds.  no wheezes,  Rhonchi or rales.  no tenderness.   Abdominal: Soft. Bowel sounds are normal. She exhibits no distension and no mass. There is no hepatosplenomegaly. There is no tenderness. There is no rebound and no guarding.   Musculoskeletal:  exhibits no edema, tenderness or deformity.   Neurological: is alert and oriented to person, place, and time.   Skin: Skin is warm and dry. No rash noted. No diaphoretic. No cyanosis or erythema. No pallor. Nails show no clubbing.   Psychiatric: Normal mood and affect.Speech is normal and behavior is normal.  ICD site ok    Lab Review:   Results for orders placed or performed in visit on 10/22/18   Comprehensive metabolic panel   Result Value Ref Range    Glucose 128 (H) 70 - 100 mg/dL    BUN 20 9 - 23 mg/dL    Creatinine 1.06 0.60 - 1.30 mg/dL    eGFR Non African Am 70 >60 mL/min/1.73    eGFR African Am 85 >60 mL/min/1.73    BUN/Creatinine Ratio 18.9 7.0 - 25.0    Sodium 138 132 - 146 mmol/L    Potassium 4.3 3.5 - 5.5 mmol/L    Chloride 102 99 - 109 mmol/L    Total CO2 26.0 20.0 - 31.0 mmol/L    Calcium 9.1 8.7 - 10.4 mg/dL    Total Protein 7.2 5.7 - 8.2 g/dL    Albumin 4.46 3.20 - 4.80 g/dL    Globulin 2.7 gm/dL    A/G Ratio 1.6 1.5 - 2.5 g/dL    Total Bilirubin 0.6 0.3 - 1.2 mg/dL    Alkaline Phosphatase 35 25 - 100 U/L    AST  (SGOT) 44 (H) 0 - 33 U/L    ALT (SGPT) 46 (H) 7 - 40 U/L   Lipid panel   Result Value Ref Range    Total Cholesterol 115 0 - 200 mg/dL    Triglycerides 127 0 - 150 mg/dL    HDL Cholesterol 32 (L) 40 - 60 mg/dL    VLDL Cholesterol 25.4 mg/dL    LDL Cholesterol  58 0 - 100 mg/dL   Hemoglobin A1c   Result Value Ref Range    Hemoglobin A1C 7.40 (H) 4.80 - 5.60 %   CBC w AUTO Differential   Result Value Ref Range    WBC 5.23 3.50 - 10.80 10*3/mm3    RBC 4.20 4.20 - 5.76 10*6/mm3    Hemoglobin 13.5 13.1 - 17.5 g/dL    Hematocrit 41.0 38.9 - 50.9 %    MCV 97.6 80.0 - 99.0 fL    MCH 32.1 (H) 27.0 - 31.0 pg    MCHC 32.9 32.0 - 36.0 g/dL    RDW 13.1 11.3 - 14.5 %    Platelets 107 (L) 150 - 450 10*3/mm3    Neutrophil Rel % 51.6 41.0 - 71.0 %    Lymphocyte Rel % 36.1 24.0 - 44.0 %    Monocyte Rel % 9.8 0.0 - 12.0 %    Eosinophil Rel % 1.9 0.0 - 3.0 %    Basophil Rel % 0.4 0.0 - 1.0 %    Neutrophils Absolute 2.70 1.50 - 8.30 10*3/mm3    Lymphocytes Absolute 1.89 0.60 - 4.80 10*3/mm3    Monocytes Absolute 0.51 0.00 - 1.00 10*3/mm3    Eosinophils Absolute 0.10 0.00 - 0.30 10*3/mm3    Basophils Absolute 0.02 0.00 - 0.20 10*3/mm3    Immature Granulocyte Rel % 0.2 0.0 - 0.6 %    Immature Grans Absolute 0.01 0.00 - 0.03 10*3/mm3       Procedures     BiV ICD Bsc   As BiVp  RA < 1%, Biv 99%  Normal check today with no major changes or events.         Diagnosis:   1. Multiple vessel coronary artery disease  2. Ischemic cardiomyopathy  3. Aortic dissection following procedure, sequela (CMS/HCC)  4. Coronary artery disease involving native coronary artery of native heart without angina pectoris  5. Severe obstructive sleep apnea  6. Essential hypertension  7. RBBB      Assessment & Plan:   1. CAD/CABG and ischemic cardiomyopathy LHC showed Large posterior basal aneurysm with ejection fraction of 30-35%.  Severe stenosis and distal circumflex which supplies aneurysm with coronary artery disease, with small vessel distal left circumflex  supplying only the LV aneurysm.   Ischemic cardiomyopathy with evidence of a large posterior basal left ventricular aneurysm. On optimal medical Rx. EF on 6/25/2018 MRI showed EF of 27%. FC III symptoms. S/P BiV ICD Aug 2018 with normal check.   2. LUCIAN on CPAP  3. HTN stable  4. 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 Aortic Dissection s/p repair  6. Follow up in 6 mths or sooner. Will check a echo in Feb when he says Dr Willis in Feb 2019        CC: Dr Uri Fox,   11/19/18  10:23 AM      EMR Dragon/Transcription disclaimer:  This note was created with the use of Dragon

## 2018-11-26 DIAGNOSIS — E11.65 UNCONTROLLED TYPE 2 DIABETES MELLITUS WITH HYPERGLYCEMIA, WITHOUT LONG-TERM CURRENT USE OF INSULIN (HCC): ICD-10-CM

## 2018-11-26 RX ORDER — SITAGLIPTIN 100 MG/1
TABLET, FILM COATED ORAL
Qty: 90 TABLET | Refills: 0 | Status: SHIPPED | OUTPATIENT
Start: 2018-11-26 | End: 2019-01-22

## 2019-01-10 DIAGNOSIS — E11.65 UNCONTROLLED TYPE 2 DIABETES MELLITUS WITH HYPERGLYCEMIA, WITHOUT LONG-TERM CURRENT USE OF INSULIN (HCC): ICD-10-CM

## 2019-01-11 RX ORDER — SITAGLIPTIN 100 MG/1
TABLET, FILM COATED ORAL
Qty: 90 TABLET | Refills: 0 | Status: SHIPPED | OUTPATIENT
Start: 2019-01-11 | End: 2019-07-03 | Stop reason: SDUPTHER

## 2019-01-17 ENCOUNTER — CLINICAL SUPPORT NO REQUIREMENTS (OUTPATIENT)
Dept: CARDIOLOGY | Facility: CLINIC | Age: 64
End: 2019-01-17

## 2019-01-17 DIAGNOSIS — I45.10 RBBB: ICD-10-CM

## 2019-01-17 DIAGNOSIS — I25.5 ISCHEMIC CARDIOMYOPATHY: Primary | ICD-10-CM

## 2019-01-17 PROCEDURE — 93295 DEV INTERROG REMOTE 1/2/MLT: CPT | Performed by: INTERNAL MEDICINE

## 2019-01-17 PROCEDURE — 93296 REM INTERROG EVL PM/IDS: CPT | Performed by: INTERNAL MEDICINE

## 2019-01-21 DIAGNOSIS — I10 ESSENTIAL HYPERTENSION: ICD-10-CM

## 2019-01-21 DIAGNOSIS — E11.65 UNCONTROLLED TYPE 2 DIABETES MELLITUS WITH HYPERGLYCEMIA (HCC): ICD-10-CM

## 2019-01-21 DIAGNOSIS — Z11.59 NEED FOR HEPATITIS C SCREENING TEST: ICD-10-CM

## 2019-01-21 DIAGNOSIS — E78.00 PURE HYPERCHOLESTEROLEMIA: Primary | ICD-10-CM

## 2019-01-22 ENCOUNTER — OFFICE VISIT (OUTPATIENT)
Dept: FAMILY MEDICINE CLINIC | Facility: CLINIC | Age: 64
End: 2019-01-22

## 2019-01-22 VITALS
WEIGHT: 248 LBS | RESPIRATION RATE: 20 BRPM | DIASTOLIC BLOOD PRESSURE: 80 MMHG | TEMPERATURE: 97.5 F | HEIGHT: 67 IN | HEART RATE: 68 BPM | BODY MASS INDEX: 38.92 KG/M2 | SYSTOLIC BLOOD PRESSURE: 136 MMHG

## 2019-01-22 DIAGNOSIS — E11.65 UNCONTROLLED TYPE 2 DIABETES MELLITUS WITH HYPERGLYCEMIA (HCC): ICD-10-CM

## 2019-01-22 DIAGNOSIS — M25.511 ACUTE PAIN OF RIGHT SHOULDER: ICD-10-CM

## 2019-01-22 DIAGNOSIS — E78.00 PURE HYPERCHOLESTEROLEMIA: ICD-10-CM

## 2019-01-22 DIAGNOSIS — E11.65 UNCONTROLLED TYPE 2 DIABETES MELLITUS WITH HYPERGLYCEMIA (HCC): Primary | ICD-10-CM

## 2019-01-22 DIAGNOSIS — I25.10 CORONARY ARTERY DISEASE INVOLVING NATIVE CORONARY ARTERY OF NATIVE HEART WITHOUT ANGINA PECTORIS: ICD-10-CM

## 2019-01-22 DIAGNOSIS — Z12.5 PROSTATE CANCER SCREENING: Primary | ICD-10-CM

## 2019-01-22 DIAGNOSIS — I10 ESSENTIAL HYPERTENSION: ICD-10-CM

## 2019-01-22 LAB
ALBUMIN SERPL-MCNC: 4.38 G/DL (ref 3.2–4.8)
ALBUMIN/GLOB SERPL: 1.7 G/DL (ref 1.5–2.5)
ALP SERPL-CCNC: 33 U/L (ref 25–100)
ALT SERPL-CCNC: 36 U/L (ref 7–40)
AST SERPL-CCNC: 33 U/L (ref 0–33)
BASOPHILS # BLD AUTO: 0.02 10*3/MM3 (ref 0–0.2)
BASOPHILS NFR BLD AUTO: 0.4 % (ref 0–1)
BILIRUB SERPL-MCNC: 0.7 MG/DL (ref 0.3–1.2)
BUN SERPL-MCNC: 14 MG/DL (ref 9–23)
BUN/CREAT SERPL: 13.9 (ref 7–25)
CALCIUM SERPL-MCNC: 9.1 MG/DL (ref 8.7–10.4)
CHLORIDE SERPL-SCNC: 105 MMOL/L (ref 99–109)
CHOLEST SERPL-MCNC: 128 MG/DL (ref 0–200)
CHOLEST/HDLC SERPL: 3.76 {RATIO}
CO2 SERPL-SCNC: 29 MMOL/L (ref 20–31)
CREAT SERPL-MCNC: 1.01 MG/DL (ref 0.6–1.3)
EOSINOPHIL # BLD AUTO: 0.12 10*3/MM3 (ref 0–0.3)
EOSINOPHIL NFR BLD AUTO: 2.3 % (ref 0–3)
ERYTHROCYTE [DISTWIDTH] IN BLOOD BY AUTOMATED COUNT: 13 % (ref 11.3–14.5)
GLOBULIN SER CALC-MCNC: 2.6 GM/DL
GLUCOSE SERPL-MCNC: 132 MG/DL (ref 70–100)
HBA1C MFR BLD: 7.3 % (ref 4.8–5.6)
HCT VFR BLD AUTO: 41 % (ref 38.9–50.9)
HCV AB S/CO SERPL IA: 0.1 S/CO RATIO (ref 0–0.9)
HDLC SERPL-MCNC: 34 MG/DL (ref 40–60)
HGB BLD-MCNC: 13.7 G/DL (ref 13.1–17.5)
IMM GRANULOCYTES # BLD AUTO: 0.01 10*3/MM3 (ref 0–0.03)
IMM GRANULOCYTES NFR BLD AUTO: 0.2 % (ref 0–0.6)
LDLC SERPL CALC-MCNC: 57 MG/DL (ref 0–100)
LYMPHOCYTES # BLD AUTO: 1.72 10*3/MM3 (ref 0.6–4.8)
LYMPHOCYTES NFR BLD AUTO: 33.7 % (ref 24–44)
MCH RBC QN AUTO: 31.8 PG (ref 27–31)
MCHC RBC AUTO-ENTMCNC: 33.4 G/DL (ref 32–36)
MCV RBC AUTO: 95.1 FL (ref 80–99)
MONOCYTES # BLD AUTO: 0.49 10*3/MM3 (ref 0–1)
MONOCYTES NFR BLD AUTO: 9.6 % (ref 0–12)
NEUTROPHILS # BLD AUTO: 2.75 10*3/MM3 (ref 1.5–8.3)
NEUTROPHILS NFR BLD AUTO: 53.8 % (ref 41–71)
PLATELET # BLD AUTO: 102 10*3/MM3 (ref 150–450)
POTASSIUM SERPL-SCNC: 4.4 MMOL/L (ref 3.5–5.5)
PROT SERPL-MCNC: 7 G/DL (ref 5.7–8.2)
RBC # BLD AUTO: 4.31 10*6/MM3 (ref 4.2–5.76)
SODIUM SERPL-SCNC: 140 MMOL/L (ref 132–146)
TRIGL SERPL-MCNC: 185 MG/DL (ref 0–150)
VLDLC SERPL CALC-MCNC: 37 MG/DL
WBC # BLD AUTO: 5.11 10*3/MM3 (ref 3.5–10.8)

## 2019-01-22 PROCEDURE — 99214 OFFICE O/P EST MOD 30 MIN: CPT | Performed by: FAMILY MEDICINE

## 2019-01-22 NOTE — PROGRESS NOTES
Assessment/Plan       Problems Addressed this Visit        Cardiovascular and Mediastinum    Hyperlipidemia    Essential hypertension    Coronary artery disease involving native coronary artery of native heart without angina pectoris       Endocrine    Type II diabetes mellitus, uncontrolled (CMS/[facility]) - Primary      Other Visit Diagnoses     Acute pain of right shoulder                Follow up: Return in about 3 months (around 4/22/2019), or if symptoms worsen or fail to improve.     DISCUSSION  Overall stable and doing well. Continue current medications. Chronic problems noted are stable.  Reviewed labs and Return in about 3 months (around 4/22/2019), or if symptoms worsen or fail to improve.       he has lost some weight as well.  Continue efforts to change diet and lose weight.    Hypertension is stable.    Coronary artery disease.  Stable.    Reviewed blood work.  A1c is stable.    Right shoulder pain.  Suspect rotator cuff tendinitis or possible even tear.  He does not wish any further evaluation at this time.  Continue treatment at home and start gentle range of motion exercises but if not improving in the next 2-3 weeks, he will need further evaluation.  He expressed understanding.  May need formal physical therapy.    MEDICATIONS PRESCRIBED  Requested Prescriptions      No prescriptions requested or ordered in this encounter                -------------------------------------------    Subjective     Chief Complaint   Patient presents with   • Hypertension     f/u          Hypertension   This is a chronic problem. The problem is unchanged. The problem is controlled. Associated symptoms include peripheral edema. Pertinent negatives include no chest pain (if colds, some chest pain ) or shortness of breath (only if overdoes it). There are no associated agents to hypertension. Current antihypertension treatment includes angiotensin blockers. The current treatment provides moderate improvement. Hypertensive  end-organ damage includes CAD/MI.   Diabetes   He presents for his follow-up diabetic visit. He has type 2 diabetes mellitus. His disease course has been stable. There are no hypoglycemic associated symptoms. Pertinent negatives for diabetes include no chest pain (if colds, some chest pain ) and no fatigue. There are no hypoglycemic complications. Diabetic complications include heart disease. Risk factors for coronary artery disease include diabetes mellitus, hypertension and dyslipidemia. When asked about current treatments, none (tried metformin and caused diarrhea and had to stop) were reported. He is compliant with treatment all of the time. His weight is stable. He is following a generally healthy diet. He never participates in exercise. He monitors blood glucose at home 1-2 x per day (125-130). There is no change in his home blood glucose trend. An ACE inhibitor/angiotensin II receptor blocker is being taken (on entresto). Eye exam is current.   Coronary Artery Disease   Presents for follow-up visit. Pertinent negatives include no chest pain (if colds, some chest pain ), chest pressure, leg swelling, shortness of breath (only if overdoes it) or weight gain. (Sleepy. To have a cath done. Dr Willis. Not feeling right. No energy.) Risk factors include hyperlipidemia. The symptoms have been stable.   Hyperlipidemia   This is a chronic problem. The current episode started more than 1 year ago. The problem is controlled. Recent lipid tests were reviewed and are normal. Pertinent negatives include no chest pain (if colds, some chest pain ), myalgias (shoulder) or shortness of breath (only if overdoes it). The current treatment provides moderate improvement of lipids. There are no compliance problems.  Risk factors for coronary artery disease include diabetes mellitus, dyslipidemia and hypertension.       Right arm pain   Had pulled on a log splitter to try and start it and hurt since Jan (2-3 weeks)  In the right  "shoulder  Using icy hot  Decreased sleep due to pain   Not improving  No swelling            Social History     Tobacco Use   Smoking Status Former Smoker   • Packs/day: 3.00   • Years: 35.00   • Pack years: 105.00   • Types: Cigarettes   • Last attempt to quit: 1/15/2006   • Years since quittin.0   Smokeless Tobacco Never Used        Past Medical History,Medications, Allergies, and social history was reviewed.      Review of Systems   Constitutional: Negative.  Negative for fatigue and unexpected weight gain.   Respiratory: Negative.  Negative for shortness of breath (only if overdoes it).    Cardiovascular: Negative.  Negative for chest pain (if colds, some chest pain ) and leg swelling.   Gastrointestinal: Negative.    Musculoskeletal: Positive for arthralgias. Negative for myalgias (shoulder).   Neurological: Negative.    Psychiatric/Behavioral: Negative.        Objective     Vitals:    19 0834   BP: 136/80   Pulse: 68   Resp: 20   Temp: 97.5 °F (36.4 °C)   Weight: 112 kg (248 lb)   Height: 170.2 cm (67\")          Physical Exam   Constitutional: Vital signs are normal. He appears well-developed and well-nourished.   Obese     HENT:   Head: Normocephalic and atraumatic.   Right Ear: Hearing, tympanic membrane, external ear and ear canal normal.   Left Ear: Hearing, tympanic membrane, external ear and ear canal normal.   Mouth/Throat: Oropharynx is clear and moist.   Eyes: Conjunctivae, EOM and lids are normal. Pupils are equal, round, and reactive to light.   Neck: Normal range of motion. Neck supple. No thyromegaly present.   Cardiovascular: Normal rate, regular rhythm and normal heart sounds. Exam reveals no friction rub.   No murmur heard.  Pulmonary/Chest: Effort normal and breath sounds normal. No respiratory distress. He has no wheezes. He has no rales.   Abdominal: Soft. Normal appearance and bowel sounds are normal. He exhibits no distension. There is tenderness (mild LLQ and chronic, no " change). There is no rebound and no guarding.   Musculoskeletal: He exhibits no edema.        Right shoulder: He exhibits decreased range of motion and tenderness (anterior).   Positive impingement signs with abduction.  Pain anteriorly with empty can test.   Neurological: He is alert. He has normal strength.   Skin: Skin is warm and dry.   Psychiatric: He has a normal mood and affect. His speech is normal. Cognition and memory are normal.   Nursing note and vitals reviewed.                Cheo Weiss MD

## 2019-03-05 NOTE — PROGRESS NOTES
Subjective:     Encounter Date:03/06/2019    Primary Care Physician: Cheo Weiss MD      Patient ID: Kai Garcia is a 63 y.o. male.    Chief Complaint:Coronary Artery Disease and Cardiomyopathy    Problem List:  1. Dissecting aortic aneurysm:  a. 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 Helton, stage I in June 2006 and stage II in March 2007.   b. Followed chronically with annual CTs by Dr. Helton. Stable by CT 2018  2. Coronary artery disease:  a. Cardiac cath prior to “elephant trunk” procedure in June 2006 and the patient underwent CABG at that time consisting of SVG to the OM and SVG to the PDA.   b. On 02/08/2007 - inferoposterior MI, cardiac cath showed 100% left circumflex with severe OM-1 stenosis and 100% vein graft to the left circumflex. RCA was diffuse, 90% ostial stenosis at the SVG to the RCA. LAD had only 40% stenosis.   c. Cardiac cath in March 2007 due to recurrent angina - 2.5 Microdriver stent to the distal native RCA.   d. 4/27/18 cardiac catheter occluded vein grafts ×2.  Large posterior basal aneurysm with ejection fraction of 30-35%.  Severe stenosis and distal circumflex which supplies aneurysm.  Non-flow-limiting RCA disease.  E. MRI 8/18 LVEF 28%  G. S/P Bi- Ventricular ICD- [employer] 8/9/18, EMILIE Fox MD  3. Dyslipidemia.   4. Hypertension.   5. Diabetes  6. Chronic low back pain/multiple lumbar surgeries, currently disabled:  a. L4-L5 discectomy in 2015.   7. History of bleeding peptic ulcer disease.   8. Severe obstructive sleep apnea.   9. Appendectomy      Allergies   Allergen Reactions   • Ativan [Lorazepam] Other (See Comments)     agitation   • Zetia [Ezetimibe] Other (See Comments)     increased LFTs   • Zocor [Simvastatin] Myalgia   • Isosorbide Other (See Comments)     headaches   • Metformin And Related Diarrhea         Current Outpatient Medications:   •   "acetaminophen (TYLENOL) 500 MG tablet, Take 1,000 mg by mouth Every 6 (Six) Hours As Needed for Mild Pain ., Disp: , Rfl:   •  aspirin 81 MG EC tablet, Take 81 mg by mouth Daily., Disp: , Rfl:   •  bisoprolol (ZEBeta) 10 MG tablet, Take 1 tablet by mouth Daily., Disp: 90 tablet, Rfl: 3  •  clopidogrel (PLAVIX) 75 MG tablet, Take 1 tablet by mouth Daily., Disp: 90 tablet, Rfl: 3  •  eplerenone (INSPRA) 25 MG tablet, Take 1 tablet by mouth Daily., Disp: 90 tablet, Rfl: 3  •  JANUVIA 100 MG tablet, TAKE ONE TABLET BY MOUTH EVERY DAY, Disp: 90 tablet, Rfl: 0  •  nitroglycerin (NITROSTAT) 0.4 MG SL tablet, Place 1 tablet under the tongue Every 5 (Five) Minutes As Needed for Chest Pain., Disp: 25 tablet, Rfl: 5  •  omeprazole (priLOSEC) 40 MG capsule, Take 1 capsule by mouth Daily., Disp: 90 capsule, Rfl: 1  •  rosuvastatin (CRESTOR) 10 MG tablet, Take 1 tablet by mouth Daily., Disp: 90 tablet, Rfl: 3  •  sacubitril-valsartan (ENTRESTO) 49-51 MG tablet, Take 1 tablet by mouth Every 12 (Twelve) Hours., Disp: 60 tablet, Rfl: 11  •  zolpidem (AMBIEN) 10 MG tablet, Take 1 tablet by mouth At Night As Needed for Sleep., Disp: 30 tablet, Rfl: 2        History of Present Illness    Returns today for six-month follow-up of his ischemic cardiomyopathy, aortic dissection, and recent ICD, biventricular.  Patient is doing very well and denies any exertional symptoms, and is at his baseline.  He noted however approximately 10 days ago having 3-4 days of intermittent \"vibrating\", where his above his pacemaker device, which he describes as \"my pacemaker was vibrating\".  Associate with muscle movement, was easily palpable by his wife.  There is no presyncope or syncope.    The following portions of the patient's history were reviewed and updated as appropriate: allergies, current medications, past family history, past medical history, past social history, past surgical history and problem list.      Social History     Tobacco Use   • " "Smoking status: Former Smoker     Packs/day: 3.00     Years: 35.00     Pack years: 105.00     Types: Cigarettes     Last attempt to quit: 1/15/2006     Years since quittin.1   • Smokeless tobacco: Never Used   Substance Use Topics   • Alcohol use: No   • Drug use: No         Review of Systems   Constitution: Positive for weakness and malaise/fatigue. Negative for chills, decreased appetite and weight loss.   HENT: Positive for hearing loss and tinnitus.    Eyes: Positive for blurred vision.   Cardiovascular: Negative.    Respiratory: Positive for shortness of breath. Negative for cough and wheezing.    Hematologic/Lymphatic: Negative for bleeding problem. Does not bruise/bleed easily.   Skin: Negative for rash.   Musculoskeletal: Positive for arthritis, back pain, joint pain, joint swelling, muscle weakness, myalgias and neck pain.   Gastrointestinal: Positive for change in bowel habit. Negative for heartburn, nausea and vomiting.   Neurological: Positive for headaches, loss of balance and numbness.          Objective:    height is 170.2 cm (67\") and weight is 113 kg (249 lb). His blood pressure is 132/80 and his pulse is 70. His oxygen saturation is 98%.         Physical Exam   Constitutional: He is oriented to person, place, and time. He appears well-developed and well-nourished.   HENT:   Mouth/Throat: Oropharynx is clear and moist.   Neck: No JVD present. Carotid bruit is not present. No thyromegaly present.   Cardiovascular: Regular rhythm, S1 normal, S2 normal, normal heart sounds and intact distal pulses. Exam reveals no gallop, no S3 and no S4.   No murmur heard.  Pulses:       Carotid pulses are 2+ on the right side, and 2+ on the left side.       Radial pulses are 2+ on the right side, and 2+ on the left side.   Pulmonary/Chest: Breath sounds normal.   Abdominal: Soft. Bowel sounds are normal. He exhibits no mass. There is no tenderness.   Musculoskeletal: He exhibits no edema.   Neurological: He is " alert and oriented to person, place, and time.   Skin: Skin is warm and dry. No rash noted.       Procedures    Biventricular ICD check showed normal lead thresholds and impedances.  Ostomy barrier 10 years.  There were no arrhythmias or therapies delivered.  No evidence of muscle stimulation or diaphragmatic stimulation seen.  Despite patient's symptoms, could not reproduce, nor is or evidence of any extra generator potentials to stimulate muscle twitching.      Assessment:   Assessment/Plan      Kai was seen today for coronary artery disease and cardiomyopathy.    Diagnoses and all orders for this visit:    Aortic dissection following procedure, sequela (CMS/HCC)    Multiple vessel coronary artery disease    Pure hypercholesterolemia    Essential hypertension      .  Ischemic cardia myopathy.  No current heart failure.  Large posterior basal aneurysm with a EF of 30%.  2.  Coronary artery disease stable without angina  3.  Chronic aortic dissection, status post remote surgical intervention, stable.  4.  Hypertension well-controlled  5.  Dyslipidemia well controlled on high-dose statin  6.  Symptomatic muscle twitching over her left subclavian/pectoral ICD site, however no evidence on examination of etiology of this.     Nations: Continue current medical therapy.  If symptoms recur patient is to keep a log as to the events surrounding this, as well as the dates and times.  Perhaps even see us during an episode so we can delineate the etiology of his complaints.  Possible revisit in 6 months time or as needed symptom change    Dictated utilizing Dragon dictation

## 2019-03-06 ENCOUNTER — OFFICE VISIT (OUTPATIENT)
Dept: CARDIOLOGY | Facility: CLINIC | Age: 64
End: 2019-03-06

## 2019-03-06 VITALS
WEIGHT: 249 LBS | DIASTOLIC BLOOD PRESSURE: 80 MMHG | HEART RATE: 70 BPM | BODY MASS INDEX: 39.08 KG/M2 | OXYGEN SATURATION: 98 % | HEIGHT: 67 IN | SYSTOLIC BLOOD PRESSURE: 132 MMHG

## 2019-03-06 DIAGNOSIS — E78.00 PURE HYPERCHOLESTEROLEMIA: ICD-10-CM

## 2019-03-06 DIAGNOSIS — I25.10 MULTIPLE VESSEL CORONARY ARTERY DISEASE: ICD-10-CM

## 2019-03-06 DIAGNOSIS — I25.10 CORONARY ARTERY DISEASE INVOLVING NATIVE CORONARY ARTERY OF NATIVE HEART WITHOUT ANGINA PECTORIS: ICD-10-CM

## 2019-03-06 DIAGNOSIS — T81.718S: Primary | ICD-10-CM

## 2019-03-06 DIAGNOSIS — I10 ESSENTIAL HYPERTENSION: ICD-10-CM

## 2019-03-06 DIAGNOSIS — I71.00: Primary | ICD-10-CM

## 2019-03-06 PROCEDURE — 99214 OFFICE O/P EST MOD 30 MIN: CPT | Performed by: INTERNAL MEDICINE

## 2019-03-06 PROCEDURE — 93284 PRGRMG EVAL IMPLANTABLE DFB: CPT | Performed by: INTERNAL MEDICINE

## 2019-03-06 RX ORDER — CLOPIDOGREL BISULFATE 75 MG/1
75 TABLET ORAL DAILY
Qty: 90 TABLET | Refills: 3 | Status: SHIPPED | OUTPATIENT
Start: 2019-03-06 | End: 2020-04-09

## 2019-03-06 RX ORDER — NITROGLYCERIN 0.4 MG/1
0.4 TABLET SUBLINGUAL
Qty: 25 TABLET | Refills: 5 | Status: SHIPPED | OUTPATIENT
Start: 2019-03-06 | End: 2021-07-21 | Stop reason: SDUPTHER

## 2019-03-06 RX ORDER — BISOPROLOL FUMARATE 10 MG/1
10 TABLET, FILM COATED ORAL DAILY
Qty: 90 TABLET | Refills: 3 | Status: SHIPPED | OUTPATIENT
Start: 2019-03-06 | End: 2020-04-09

## 2019-03-06 RX ORDER — ROSUVASTATIN CALCIUM 10 MG/1
10 TABLET, COATED ORAL DAILY
Qty: 90 TABLET | Refills: 3 | Status: SHIPPED | OUTPATIENT
Start: 2019-03-06 | End: 2020-04-09

## 2019-03-07 ENCOUNTER — TELEPHONE (OUTPATIENT)
Dept: CARDIOLOGY | Facility: CLINIC | Age: 64
End: 2019-03-07

## 2019-03-07 ENCOUNTER — TELEPHONE (OUTPATIENT)
Dept: FAMILY MEDICINE CLINIC | Facility: CLINIC | Age: 64
End: 2019-03-07

## 2019-03-07 DIAGNOSIS — F51.02 ADJUSTMENT INSOMNIA: ICD-10-CM

## 2019-03-07 RX ORDER — ZOLPIDEM TARTRATE 10 MG/1
10 TABLET ORAL NIGHTLY PRN
Qty: 30 TABLET | Refills: 2 | Status: SHIPPED | OUTPATIENT
Start: 2019-03-07 | End: 2019-07-23 | Stop reason: SDUPTHER

## 2019-03-07 NOTE — TELEPHONE ENCOUNTER
Called [facility] for clarification on Entresto cost prior to attempting prior auth.  They report cost for 180 tablets is $40.00, which is what patient paid when picked up last month.

## 2019-03-07 NOTE — TELEPHONE ENCOUNTER
----- Message from Sherry Cherry sent at 3/7/2019 11:23 AM EST -----  Contact: toshia lr    zolpidem (AMBIEN) 10 MG tablet Take 1 tablet by mouth At Night As Needed for Sleep.    Preferred Pharmacies      [facility] - [address] - [phone]  - [phone] (Phone)  [phone] (Fax)

## 2019-04-03 ENCOUNTER — TELEPHONE (OUTPATIENT)
Dept: FAMILY MEDICINE CLINIC | Facility: CLINIC | Age: 64
End: 2019-04-03

## 2019-04-15 ENCOUNTER — RESULTS ENCOUNTER (OUTPATIENT)
Dept: FAMILY MEDICINE CLINIC | Facility: CLINIC | Age: 64
End: 2019-04-15

## 2019-04-15 DIAGNOSIS — Z12.5 PROSTATE CANCER SCREENING: ICD-10-CM

## 2019-04-15 DIAGNOSIS — E11.65 UNCONTROLLED TYPE 2 DIABETES MELLITUS WITH HYPERGLYCEMIA (HCC): ICD-10-CM

## 2019-04-15 DIAGNOSIS — E78.00 PURE HYPERCHOLESTEROLEMIA: ICD-10-CM

## 2019-04-20 LAB
ALBUMIN SERPL-MCNC: 4.9 G/DL (ref 3.5–5.2)
ALBUMIN/GLOB SERPL: 1.7 G/DL
ALP SERPL-CCNC: 29 U/L (ref 39–117)
ALT SERPL-CCNC: 34 U/L (ref 1–41)
AST SERPL-CCNC: 27 U/L (ref 1–40)
BILIRUB SERPL-MCNC: 0.5 MG/DL (ref 0.2–1.2)
BUN SERPL-MCNC: 16 MG/DL (ref 8–23)
BUN/CREAT SERPL: 15.7 (ref 7–25)
CALCIUM SERPL-MCNC: 9.6 MG/DL (ref 8.6–10.5)
CHLORIDE SERPL-SCNC: 102 MMOL/L (ref 98–107)
CHOLEST SERPL-MCNC: 120 MG/DL (ref 0–200)
CHOLEST/HDLC SERPL: 3.33 {RATIO}
CO2 SERPL-SCNC: 27.3 MMOL/L (ref 22–29)
CREAT SERPL-MCNC: 1.02 MG/DL (ref 0.76–1.27)
GLOBULIN SER CALC-MCNC: 2.9 GM/DL
GLUCOSE SERPL-MCNC: 133 MG/DL (ref 65–99)
HBA1C MFR BLD: 6.8 % (ref 4.8–5.6)
HDLC SERPL-MCNC: 36 MG/DL (ref 40–60)
LDLC SERPL CALC-MCNC: 56 MG/DL (ref 0–100)
POTASSIUM SERPL-SCNC: 4.4 MMOL/L (ref 3.5–5.2)
PROT SERPL-MCNC: 7.8 G/DL (ref 6–8.5)
PSA SERPL-MCNC: 0.95 NG/ML (ref 0–4)
SODIUM SERPL-SCNC: 140 MMOL/L (ref 136–145)
TRIGL SERPL-MCNC: 139 MG/DL (ref 0–150)
VLDLC SERPL CALC-MCNC: 27.8 MG/DL

## 2019-04-23 ENCOUNTER — OFFICE VISIT (OUTPATIENT)
Dept: FAMILY MEDICINE CLINIC | Facility: CLINIC | Age: 64
End: 2019-04-23

## 2019-04-23 VITALS
RESPIRATION RATE: 18 BRPM | WEIGHT: 245 LBS | BODY MASS INDEX: 36.29 KG/M2 | HEIGHT: 69 IN | DIASTOLIC BLOOD PRESSURE: 72 MMHG | TEMPERATURE: 97.4 F | SYSTOLIC BLOOD PRESSURE: 138 MMHG | OXYGEN SATURATION: 96 % | HEART RATE: 78 BPM

## 2019-04-23 DIAGNOSIS — Z86.010 HISTORY OF COLON POLYPS: ICD-10-CM

## 2019-04-23 DIAGNOSIS — G47.33 OSA ON CPAP: ICD-10-CM

## 2019-04-23 DIAGNOSIS — E11.65 UNCONTROLLED TYPE 2 DIABETES MELLITUS WITH HYPERGLYCEMIA (HCC): Primary | ICD-10-CM

## 2019-04-23 DIAGNOSIS — Z99.89 OSA ON CPAP: ICD-10-CM

## 2019-04-23 DIAGNOSIS — Z12.11 COLON CANCER SCREENING: ICD-10-CM

## 2019-04-23 DIAGNOSIS — I10 ESSENTIAL HYPERTENSION: ICD-10-CM

## 2019-04-23 DIAGNOSIS — E78.00 PURE HYPERCHOLESTEROLEMIA: ICD-10-CM

## 2019-04-23 PROCEDURE — 99214 OFFICE O/P EST MOD 30 MIN: CPT | Performed by: FAMILY MEDICINE

## 2019-04-23 RX ORDER — LANCETS
EACH MISCELLANEOUS
COMMUNITY
Start: 2019-04-16 | End: 2019-06-07

## 2019-04-23 RX ORDER — BLOOD GLUCOSE CONTROL HIGH,LOW
EACH MISCELLANEOUS
COMMUNITY
Start: 2019-04-16 | End: 2019-06-07

## 2019-04-23 RX ORDER — BLOOD SUGAR DIAGNOSTIC
STRIP MISCELLANEOUS
COMMUNITY
Start: 2019-04-16 | End: 2019-06-07

## 2019-04-23 NOTE — PROGRESS NOTES
Assessment/Plan       Problems Addressed this Visit        Cardiovascular and Mediastinum    Hyperlipidemia    Essential hypertension       Endocrine    Type II diabetes mellitus, uncontrolled (CMS/[facility]) - Primary      Other Visit Diagnoses     Colon cancer screening        Relevant Orders    Ambulatory Referral For Screening Colonoscopy    History of colon polyps        Relevant Orders    Ambulatory Referral For Screening Colonoscopy            Follow up: Return in about 3 months (around 7/23/2019), or if symptoms worsen or fail to improve.     DISCUSSION  Diabetes mellitus type 2.  A1c has continued to improve.  It is now 6.8.  Continue weight loss efforts, diet changes.    Hypertension.  Blood pressure stable.  Continue bisoprolol.    Hyperlipidemia.  Lipids are also stable and doing well.    History of colon polyps and due for colon cancer screening.  Order for colonoscopy was placed.    Obstructive sleep apnea on CPAP.  Having some issues with his machine.  He will take to the [facility] to have checked.    Follow-up in 3 months or sooner with any problems.      MEDICATIONS PRESCRIBED  Requested Prescriptions      No prescriptions requested or ordered in this encounter              -------------------------------------------    Subjective     Chief Complaint   Patient presents with   • Hyperlipidemia     follow up   • Numbness     bilateral extremities         Hypertension   This is a chronic problem. The problem is unchanged. The problem is controlled. Associated symptoms include chest pain (not bad. sees cardiology), peripheral edema and shortness of breath (has to stop at times). There are no associated agents to hypertension. Current antihypertension treatment includes angiotensin blockers. The current treatment provides moderate improvement. Hypertensive end-organ damage includes CAD/MI.   Diabetes   He presents for his follow-up diabetic visit. He has type 2 diabetes mellitus. His disease course has  been stable. There are no hypoglycemic associated symptoms. Associated symptoms include chest pain (not bad. sees cardiology). Pertinent negatives for diabetes include no fatigue. There are no hypoglycemic complications. Diabetic complications include heart disease. Risk factors for coronary artery disease include diabetes mellitus, hypertension and dyslipidemia. When asked about current treatments, none (tried metformin and caused diarrhea and had to stop) were reported. He is compliant with treatment all of the time. His weight is stable. He is following a generally healthy diet. He never participates in exercise. He monitors blood glucose at home 1-2 x per day (130 to 132 usually). There is no change in his home blood glucose trend. An ACE inhibitor/angiotensin II receptor blocker is being taken (on entresto). Eye exam is current.   Coronary Artery Disease   Presents for follow-up visit. Symptoms include chest pain (not bad. sees cardiology) and shortness of breath (has to stop at times). Pertinent negatives include no chest pressure, leg swelling or weight gain. (Sleepy. To have a cath done. Dr Willis. Not feeling right. No energy.) Risk factors include hyperlipidemia. The symptoms have been stable.   Hyperlipidemia   This is a chronic problem. The current episode started more than 1 year ago. The problem is controlled. Recent lipid tests were reviewed and are normal. Associated symptoms include chest pain (not bad. sees cardiology) and shortness of breath (has to stop at times). Pertinent negatives include no myalgias (shoulder). The current treatment provides moderate improvement of lipids. There are no compliance problems.  Risk factors for coronary artery disease include diabetes mellitus, dyslipidemia and hypertension.       Left leg pain   From left hip to lower leg  No back pain   Had back surg in the past  Hoping not that again   Had discectomy    Left upper chest vibrates and Dr Willis looking into  "it      LUCIAN CPAP  Machine pressure drops form 12 to 10  Decreased sleep now  Got machine form [facility].   2 years old machine          Social History     Tobacco Use   Smoking Status Former Smoker   • Packs/day: 3.00   • Years: 35.00   • Pack years: 105.00   • Types: Cigarettes   • Last attempt to quit: 1/15/2006   • Years since quittin.2   Smokeless Tobacco Never Used        Past Medical History,Medications, Allergies, and social history was reviewed.      Review of Systems   Constitutional: Negative.  Negative for fatigue and unexpected weight gain.   HENT: Negative.    Respiratory: Positive for shortness of breath (has to stop at times).    Cardiovascular: Positive for chest pain (not bad. sees cardiology). Negative for leg swelling.   Gastrointestinal: Positive for abdominal pain (intermittent pain LUQ).   Musculoskeletal: Negative for myalgias (shoulder).   Neurological: Negative.    Psychiatric/Behavioral: Negative.        Objective     Vitals:    19 0859   BP: 138/72   Pulse: 78   Resp: 18   Temp: 97.4 °F (36.3 °C)   SpO2: 96%   Weight: 111 kg (245 lb)   Height: 175.3 cm (69.02\")          Physical Exam   Constitutional: Vital signs are normal. He appears well-developed and well-nourished.   HENT:   Head: Normocephalic and atraumatic.   Right Ear: Hearing, tympanic membrane, external ear and ear canal normal.   Left Ear: Hearing, tympanic membrane, external ear and ear canal normal.   Mouth/Throat: Oropharynx is clear and moist.   Eyes: Conjunctivae, EOM and lids are normal. Pupils are equal, round, and reactive to light.   Neck: Normal range of motion. Neck supple. No thyromegaly present.   Cardiovascular: Normal rate, regular rhythm and normal heart sounds. Exam reveals no friction rub.   No murmur heard.  Pulmonary/Chest: Effort normal and breath sounds normal. No respiratory distress. He has no wheezes. He has no rales.   Abdominal: Soft. Normal appearance and bowel sounds are normal. He exhibits " no distension and no mass. There is no tenderness. There is no rebound and no guarding.   Musculoskeletal: He exhibits no edema.   Neurological: He is alert. He has normal strength.   Skin: Skin is warm and dry.   Psychiatric: He has a normal mood and affect. His speech is normal. Cognition and memory are normal.   Nursing note and vitals reviewed.                Cheo Weiss MD

## 2019-04-25 ENCOUNTER — CLINICAL SUPPORT NO REQUIREMENTS (OUTPATIENT)
Dept: CARDIOLOGY | Facility: CLINIC | Age: 64
End: 2019-04-25

## 2019-04-25 DIAGNOSIS — I25.5 ISCHEMIC CARDIOMYOPATHY: ICD-10-CM

## 2019-04-25 PROCEDURE — 93295 DEV INTERROG REMOTE 1/2/MLT: CPT | Performed by: INTERNAL MEDICINE

## 2019-04-25 PROCEDURE — 93296 REM INTERROG EVL PM/IDS: CPT | Performed by: INTERNAL MEDICINE

## 2019-04-26 ENCOUNTER — PREP FOR SURGERY (OUTPATIENT)
Dept: OTHER | Facility: HOSPITAL | Age: 64
End: 2019-04-26

## 2019-04-26 ENCOUNTER — TELEPHONE (OUTPATIENT)
Dept: FAMILY MEDICINE CLINIC | Facility: CLINIC | Age: 64
End: 2019-04-26

## 2019-04-26 DIAGNOSIS — Z12.11 SCREEN FOR COLON CANCER: Primary | ICD-10-CM

## 2019-04-29 ENCOUNTER — TELEPHONE (OUTPATIENT)
Dept: GASTROENTEROLOGY | Facility: CLINIC | Age: 64
End: 2019-04-29

## 2019-04-29 PROBLEM — Z12.11 SCREEN FOR COLON CANCER: Status: ACTIVE | Noted: 2019-04-29

## 2019-04-29 NOTE — TELEPHONE ENCOUNTER
PATIENT IS HAVING A COLONOSCOPY ON June 14TH. PATIENT IS CURRENTLY ON PLAVIX. PATIENT WILL NEED TO STOP THIS PRIOR. PLEASE ADVISE HOW LONG HE CAN STOP PLAVIX AND GIVE CARDIAC CLEARANCE.     THANKS

## 2019-04-30 NOTE — TELEPHONE ENCOUNTER
CALLED PATIENT, S/W PATIENTS WIFE. TOLD HER TO HOLD 5 DAYS PRIOR TO PROCEDURE. VERBALIZED UNDERSTANDING.

## 2019-06-07 ENCOUNTER — TELEPHONE (OUTPATIENT)
Dept: GASTROENTEROLOGY | Facility: CLINIC | Age: 64
End: 2019-06-07

## 2019-06-07 ENCOUNTER — APPOINTMENT (OUTPATIENT)
Dept: PREADMISSION TESTING | Facility: HOSPITAL | Age: 64
End: 2019-06-07

## 2019-06-07 VITALS — HEIGHT: 67 IN | WEIGHT: 247.58 LBS | BODY MASS INDEX: 38.86 KG/M2

## 2019-06-07 LAB
DEPRECATED RDW RBC AUTO: 43.1 FL (ref 37–54)
ERYTHROCYTE [DISTWIDTH] IN BLOOD BY AUTOMATED COUNT: 12.5 % (ref 12.3–15.4)
HCT VFR BLD AUTO: 40.7 % (ref 37.5–51)
HGB BLD-MCNC: 13.6 G/DL (ref 13–17.7)
INR PPP: 1.15 (ref 0.85–1.16)
MCH RBC QN AUTO: 31.7 PG (ref 26.6–33)
MCHC RBC AUTO-ENTMCNC: 33.4 G/DL (ref 31.5–35.7)
MCV RBC AUTO: 94.9 FL (ref 79–97)
PLATELET # BLD AUTO: 111 10*3/MM3 (ref 140–450)
PMV BLD AUTO: 11.1 FL (ref 6–12)
PROTHROMBIN TIME: 14.2 SECONDS (ref 11.2–14.3)
RBC # BLD AUTO: 4.29 10*6/MM3 (ref 4.14–5.8)
WBC NRBC COR # BLD: 6.18 10*3/MM3 (ref 3.4–10.8)

## 2019-06-07 PROCEDURE — 85610 PROTHROMBIN TIME: CPT | Performed by: ANESTHESIOLOGY

## 2019-06-07 PROCEDURE — 93005 ELECTROCARDIOGRAM TRACING: CPT

## 2019-06-07 PROCEDURE — 85027 COMPLETE CBC AUTOMATED: CPT | Performed by: ANESTHESIOLOGY

## 2019-06-07 PROCEDURE — 93010 ELECTROCARDIOGRAM REPORT: CPT | Performed by: INTERNAL MEDICINE

## 2019-06-07 PROCEDURE — 36415 COLL VENOUS BLD VENIPUNCTURE: CPT

## 2019-06-07 NOTE — PAT
Patient has home monitoring .pacemaker check on chart. Clearance on chart from Dr. Ferreira with last office note.

## 2019-06-07 NOTE — DISCHARGE INSTRUCTIONS
The following information and instructions were given:    Do not eat, drink, smoke or chew gum after midnight the night before surgery. This also includes no mints.  Take all routine, prescribed medications including heart and blood pressure medicines with a sip of water unless otherwise instructed by your physician.   Do NOT take diabetic medication unless instructed by your physician.    If you were instructed to drink 20 ounces (or until full) of Gatorade or G2 (if diabetic) the morning of surgery, the Gatorade or G2 must be completed 1 hour before arrival time on the day of surgery .  No RED Gatorade or G2.      DO NOT shave for two days before your procedure.  Do not wear makeup.      DO NOT wear fingernail polish (gel/regular) and/or acrylic/artificial nails on the day of surgery.   If you have had a recent manicure and would rather not remove polish or artificial nails, then the minimum requirement is that the polish/artificial nails must be removed from the middle finger on each hand.      If you are having surgery/procedure on an upper extremity, then fingernail polish/artificial fingernails must be removed for surgery.  NO EXCEPTIONS.      If you are having surgery/procedure on a lower extremity, then toenail polish on both extremities must be removed for surgery.  NO EXCEPTIONS.    Remove all jewelry (advise to go to jeweler if unable to remove).  Jewelry, especially rings, can no longer be taped for surgery.    Leave anything you consider valuable at home.    Leave your suitcase in the car until after your surgery.    Bring the following with you the day of your procedure (when applicable)       -picture ID and insurance cards       -Co-pay/deductible required by insurance       -Medications in the original bottles (not a list) including all over-the-counter medications if not brought to PAT       -Copy of advance directive, living will or power of  documents if not brought to PAT       -CPAP or  BIPAP mask and tubing (do not bring machine)       -Skin prep instruction(s) sheet       -PAT Pass    Education booklet, brochure, handout or binder related to procedure given to patient.    When applicable, an ERAS booklet was given to patient.    Pain Control After Surgery handout given to patient.    Respirex use (handout given to patient) and pneumonia prevention education provided.    Signs and Symptoms of infection discussed.    DVT Prevention education given.  Stressing the importance of ambulation.    Please apply Chlorhexadine wipes to surgical area (if instructed) the night before procedure and the AM of procedure and document date/time of applications on skin prep instruction sheet.        Colonoscopy in endo

## 2019-06-11 DIAGNOSIS — Z12.11 SCREENING FOR COLON CANCER: Primary | ICD-10-CM

## 2019-06-17 ENCOUNTER — ANESTHESIA EVENT (OUTPATIENT)
Dept: GASTROENTEROLOGY | Facility: HOSPITAL | Age: 64
End: 2019-06-17

## 2019-06-17 RX ORDER — FAMOTIDINE 10 MG/ML
20 INJECTION, SOLUTION INTRAVENOUS ONCE
Status: CANCELLED | OUTPATIENT
Start: 2019-06-17 | End: 2019-06-17

## 2019-06-17 RX ORDER — FAMOTIDINE 20 MG/1
20 TABLET, FILM COATED ORAL ONCE
Status: CANCELLED | OUTPATIENT
Start: 2019-06-17 | End: 2019-06-17

## 2019-06-17 RX ORDER — LIDOCAINE HYDROCHLORIDE 10 MG/ML
0.5 INJECTION, SOLUTION EPIDURAL; INFILTRATION; INTRACAUDAL; PERINEURAL ONCE AS NEEDED
Status: CANCELLED | OUTPATIENT
Start: 2019-06-17

## 2019-06-18 ENCOUNTER — HOSPITAL ENCOUNTER (OUTPATIENT)
Facility: HOSPITAL | Age: 64
Setting detail: HOSPITAL OUTPATIENT SURGERY
Discharge: HOME | End: 2019-06-18
Attending: INTERNAL MEDICINE | Admitting: INTERNAL MEDICINE

## 2019-06-18 ENCOUNTER — ANESTHESIA (OUTPATIENT)
Dept: GASTROENTEROLOGY | Facility: HOSPITAL | Age: 64
End: 2019-06-18

## 2019-06-18 VITALS
HEIGHT: 67 IN | OXYGEN SATURATION: 97 % | RESPIRATION RATE: 18 BRPM | SYSTOLIC BLOOD PRESSURE: 122 MMHG | HEART RATE: 62 BPM | BODY MASS INDEX: 37.67 KG/M2 | TEMPERATURE: 97.9 F | DIASTOLIC BLOOD PRESSURE: 77 MMHG | WEIGHT: 240 LBS

## 2019-06-18 DIAGNOSIS — Z12.11 SCREEN FOR COLON CANCER: ICD-10-CM

## 2019-06-18 LAB
GLUCOSE BLDC GLUCOMTR-MCNC: 127 MG/DL (ref 70–130)
GLUCOSE BLDC GLUCOMTR-MCNC: 135 MG/DL (ref 70–130)
POTASSIUM BLD-SCNC: 4.7 MMOL/L (ref 3.5–5.2)

## 2019-06-18 PROCEDURE — 84132 ASSAY OF SERUM POTASSIUM: CPT | Performed by: ANESTHESIOLOGY

## 2019-06-18 PROCEDURE — 82962 GLUCOSE BLOOD TEST: CPT

## 2019-06-18 PROCEDURE — 88305 TISSUE EXAM BY PATHOLOGIST: CPT | Performed by: INTERNAL MEDICINE

## 2019-06-18 PROCEDURE — S0260 H&P FOR SURGERY: HCPCS | Performed by: INTERNAL MEDICINE

## 2019-06-18 PROCEDURE — 25010000002 PROPOFOL 10 MG/ML EMULSION: Performed by: NURSE ANESTHETIST, CERTIFIED REGISTERED

## 2019-06-18 RX ORDER — LIDOCAINE HYDROCHLORIDE 10 MG/ML
INJECTION, SOLUTION EPIDURAL; INFILTRATION; INTRACAUDAL; PERINEURAL AS NEEDED
Status: DISCONTINUED | OUTPATIENT
Start: 2019-06-18 | End: 2019-06-18 | Stop reason: SURG

## 2019-06-18 RX ORDER — SODIUM CHLORIDE, SODIUM LACTATE, POTASSIUM CHLORIDE, CALCIUM CHLORIDE 600; 310; 30; 20 MG/100ML; MG/100ML; MG/100ML; MG/100ML
9 INJECTION, SOLUTION INTRAVENOUS CONTINUOUS
Status: CANCELLED | OUTPATIENT
Start: 2019-06-18

## 2019-06-18 RX ORDER — SODIUM CHLORIDE 0.9 % (FLUSH) 0.9 %
3 SYRINGE (ML) INJECTION EVERY 12 HOURS SCHEDULED
Status: DISCONTINUED | OUTPATIENT
Start: 2019-06-18 | End: 2019-06-18 | Stop reason: HOSPADM

## 2019-06-18 RX ORDER — PROPOFOL 10 MG/ML
VIAL (ML) INTRAVENOUS AS NEEDED
Status: DISCONTINUED | OUTPATIENT
Start: 2019-06-18 | End: 2019-06-18 | Stop reason: SURG

## 2019-06-18 RX ORDER — ONDANSETRON 2 MG/ML
4 INJECTION INTRAMUSCULAR; INTRAVENOUS ONCE AS NEEDED
Status: DISCONTINUED | OUTPATIENT
Start: 2019-06-18 | End: 2019-06-18 | Stop reason: HOSPADM

## 2019-06-18 RX ORDER — FAMOTIDINE 10 MG/ML
20 INJECTION, SOLUTION INTRAVENOUS ONCE
Status: CANCELLED | OUTPATIENT
Start: 2019-06-18 | End: 2019-06-18

## 2019-06-18 RX ORDER — LIDOCAINE HYDROCHLORIDE 10 MG/ML
0.5 INJECTION, SOLUTION EPIDURAL; INFILTRATION; INTRACAUDAL; PERINEURAL ONCE AS NEEDED
Status: CANCELLED | OUTPATIENT
Start: 2019-06-18

## 2019-06-18 RX ORDER — FAMOTIDINE 20 MG/1
20 TABLET, FILM COATED ORAL ONCE
Status: CANCELLED | OUTPATIENT
Start: 2019-06-18 | End: 2019-06-18

## 2019-06-18 RX ORDER — HYDRALAZINE HYDROCHLORIDE 20 MG/ML
5 INJECTION INTRAMUSCULAR; INTRAVENOUS
Status: DISCONTINUED | OUTPATIENT
Start: 2019-06-18 | End: 2019-06-18 | Stop reason: HOSPADM

## 2019-06-18 RX ORDER — EPHEDRINE SULFATE 50 MG/ML
5 INJECTION, SOLUTION INTRAVENOUS ONCE AS NEEDED
Status: DISCONTINUED | OUTPATIENT
Start: 2019-06-18 | End: 2019-06-18 | Stop reason: HOSPADM

## 2019-06-18 RX ORDER — LABETALOL HYDROCHLORIDE 5 MG/ML
5 INJECTION, SOLUTION INTRAVENOUS
Status: DISCONTINUED | OUTPATIENT
Start: 2019-06-18 | End: 2019-06-18 | Stop reason: HOSPADM

## 2019-06-18 RX ORDER — SODIUM CHLORIDE 0.9 % (FLUSH) 0.9 %
3-10 SYRINGE (ML) INJECTION AS NEEDED
Status: DISCONTINUED | OUTPATIENT
Start: 2019-06-18 | End: 2019-06-18 | Stop reason: HOSPADM

## 2019-06-18 RX ORDER — ALBUTEROL SULFATE 2.5 MG/3ML
2.5 SOLUTION RESPIRATORY (INHALATION) ONCE AS NEEDED
Status: DISCONTINUED | OUTPATIENT
Start: 2019-06-18 | End: 2019-06-18 | Stop reason: HOSPADM

## 2019-06-18 RX ORDER — SODIUM CHLORIDE, SODIUM LACTATE, POTASSIUM CHLORIDE, CALCIUM CHLORIDE 600; 310; 30; 20 MG/100ML; MG/100ML; MG/100ML; MG/100ML
9 INJECTION, SOLUTION INTRAVENOUS CONTINUOUS
Status: DISCONTINUED | OUTPATIENT
Start: 2019-06-18 | End: 2019-06-18 | Stop reason: HOSPADM

## 2019-06-18 RX ORDER — SODIUM CHLORIDE 0.9 % (FLUSH) 0.9 %
3-10 SYRINGE (ML) INJECTION AS NEEDED
Status: CANCELLED | OUTPATIENT
Start: 2019-06-18

## 2019-06-18 RX ORDER — SODIUM CHLORIDE 0.9 % (FLUSH) 0.9 %
3 SYRINGE (ML) INJECTION EVERY 12 HOURS SCHEDULED
Status: CANCELLED | OUTPATIENT
Start: 2019-06-18

## 2019-06-18 RX ADMIN — PROPOFOL 80 MG: 10 INJECTION, EMULSION INTRAVENOUS at 09:51

## 2019-06-18 RX ADMIN — LIDOCAINE HYDROCHLORIDE 50 MG: 10 INJECTION, SOLUTION EPIDURAL; INFILTRATION; INTRACAUDAL; PERINEURAL at 09:51

## 2019-06-18 RX ADMIN — PROPOFOL 10 MG: 10 INJECTION, EMULSION INTRAVENOUS at 09:57

## 2019-06-18 RX ADMIN — PROPOFOL 30 MG: 10 INJECTION, EMULSION INTRAVENOUS at 09:54

## 2019-06-18 RX ADMIN — EPHEDRINE SULFATE 10 MG: 50 INJECTION INTRAMUSCULAR; INTRAVENOUS; SUBCUTANEOUS at 09:51

## 2019-06-18 RX ADMIN — PROPOFOL 20 MG: 10 INJECTION, EMULSION INTRAVENOUS at 10:06

## 2019-06-18 RX ADMIN — PROPOFOL 20 MG: 10 INJECTION, EMULSION INTRAVENOUS at 10:00

## 2019-06-18 RX ADMIN — SODIUM CHLORIDE, POTASSIUM CHLORIDE, SODIUM LACTATE AND CALCIUM CHLORIDE 9 ML/HR: 600; 310; 30; 20 INJECTION, SOLUTION INTRAVENOUS at 08:55

## 2019-06-18 NOTE — ANESTHESIA PREPROCEDURE EVALUATION
Anesthesia Evaluation     Patient summary reviewed and Nursing notes reviewed   NPO Solid Status: > 8 hours  NPO Liquid Status: > 8 hours           Airway   Mallampati: III  TM distance: <3 FB  Neck ROM: limited  Difficult intubation highly probable  Dental - normal exam     Pulmonary - normal exam   (+) a smoker Former, shortness of breath, sleep apnea on CPAP,   Cardiovascular   Exercise tolerance: poor (<4 METS)    ECG reviewed  Rhythm: regular  Rate: normal    (+) pacemaker ICD, pacemaker interrogated 1-3 months ago, hypertension, past MI  >12 months, CAD, CABG, dysrhythmias (RBBB), CHF (EF 30%), PVD (h/o Aortic dissection), hyperlipidemia,     ROS comment: Maurano note cardiac clearance    Neuro/Psych  GI/Hepatic/Renal/Endo    (+) obesity,  GERD well controlled,  diabetes mellitus type 2 poorly controlled,   (-) hepatitis, liver disease, no renal disease, hypothyroidism    Musculoskeletal     (+) back pain,   Abdominal    Substance History      OB/GYN          Other   (+) arthritis         Phys Exam Other: Note hx of sleep apnoea              Anesthesia Plan    ASA 4     general   (Propofol)  intravenous induction   Anesthetic plan, all risks, benefits, and alternatives have been provided, discussed and informed consent has been obtained with: patient.    Plan discussed with CRNA.

## 2019-06-18 NOTE — CONSULTS
[facility]    Referring Provider: Meir Salguero MD    Reason for Consultation: screening colonoscopy    Chief complaint screening colonoscopy    History of present illness:  Kai Garcia is a 63 y.o. male who is in inpatient endoscopy for a screening colonoscopy. He has a history of aortic dissection and candelario. He has had polyps.    Allergies:  Ativan [lorazepam]; Zetia [ezetimibe]; Zocor [simvastatin]; Isosorbide; and Metformin and related    Scheduled Meds:    sodium chloride 3 mL Intravenous Q12H        Infusions:    lactated ringers 9 mL/hr Last Rate: 9 mL/hr (06/18/19 0855)       PRN Meds:  sodium chloride    Home Meds:  Medications Prior to Admission   Medication Sig Dispense Refill Last Dose   • aspirin 81 MG EC tablet Take 81 mg by mouth Daily.   6/17/2019 at am   • bisoprolol (ZEBeta) 10 MG tablet Take 1 tablet by mouth Daily. 90 tablet 3 6/18/2019 at 0500   • eplerenone (INSPRA) 25 MG tablet Take 1 tablet by mouth Daily. 90 tablet 3 6/18/2019 at 0500   • omeprazole (priLOSEC) 40 MG capsule Take 1 capsule by mouth Daily. 90 capsule 1 6/17/2019 at pm   • rosuvastatin (CRESTOR) 10 MG tablet Take 1 tablet by mouth Daily. 90 tablet 3 6/18/2019 at 0500   • sacubitril-valsartan (ENTRESTO) 49-51 MG tablet Take 1 tablet by mouth Every 12 (Twelve) Hours. 180 tablet 3 6/17/2019 at am   • Sod Picosulfate-Mag Ox-Cit Acd 10-3.5-12 MG-GM -GM/160ML solution Take 1 kit by mouth Take As Directed. Follow instructions that were mailed to your home. If you didn't receive these call [phone]. 5 bottle 0 6/17/2019 at 2300   • acetaminophen (TYLENOL) 500 MG tablet Take 1,000 mg by mouth Every 6 (Six) Hours As Needed for Mild Pain .   6/15/2019   • clopidogrel (PLAVIX) 75 MG tablet Take 1 tablet by mouth Daily. (Patient taking differently: Take 75 mg by mouth Daily. Per dr angela patient states to stop plavix 5 days prior to surgery) 90 tablet 3 6/12/2019   • JANUVIA 100 MG tablet TAKE ONE TABLET BY  "MOUTH EVERY DAY 90 tablet 0 6/16/2019   • nitroglycerin (NITROSTAT) 0.4 MG SL tablet Place 1 tablet under the tongue Every 5 (Five) Minutes As Needed for Chest Pain. 25 tablet 5 More than a month at Unknown time   • zolpidem (AMBIEN) 10 MG tablet Take 1 tablet by mouth At Night As Needed for Sleep. 30 tablet 2 6/14/2019       ROS: Review of Systems  All other systems reviewed and are negative.    PAST MED HX: Pt  has a past medical history of Back pain, Chest pain, Chronic low back pain, Coronary artery disease, Diabetes mellitus (CMS/HCC), Dissecting aortic aneurysm (CMS/HCC), Dyslipidemia, Elevated liver function tests, History of bleeding peptic ulcer, History of transfusion (2006), Hyperlipidemia, Hypertension, Left leg pain, Lumbar pain, MI (myocardial infarction) (CMS/HCC) (2007), Morbid obesity (CMS/HCC), LUCIAN on CPAP, Severe obstructive sleep apnea, Shortness of breath, and Wears glasses.  PAST SURG HX: Pt  has a past surgical history that includes Lumbar fusion; Lumbar discectomy; Coronary artery bypass graft (2006); Coronary angioplasty with stent (2007); Arteriogram Aortic (2006); Cardiac catheterization (N/A, 4/27/2018); Appendectomy; Cardiac catheterization (04/2018); Cardiac electrophysiology procedure (N/A, 8/9/2018); and Colonoscopy.  FAM HX: family history includes Coronary artery disease in his other; Heart disease in his father and mother; Rheum arthritis in his father, mother, and other.  SOC HX: Pt  reports that he quit smoking about 13 years ago. His smoking use included cigarettes. He has a 105.00 pack-year smoking history. He has never used smokeless tobacco. He reports that he does not drink alcohol or use drugs.    /78 (BP Location: Left arm, Patient Position: Lying)   Pulse 62   Temp 97.3 °F (36.3 °C)   Resp 16   Ht 170.2 cm (67\")   Wt 109 kg (240 lb)   SpO2 94%   BMI 37.59 kg/m²     Physical Exam  Wt Readings from Last 3 Encounters:   06/18/19 109 kg (240 lb)   06/07/19 112 kg " (247 lb 9.2 oz)   04/23/19 111 kg (245 lb)   ,body mass index is 37.59 kg/m².    General Well developed; well nourished; no acute distress.   ENT No icteris  Neck Neck supple; trachea midline. No thyromegaly  Resp CTA; no rhonchi, rales, or wheezes.  Respiration effort normal  CV nontachycardic  GI Abd soft, NT, ND, normal active bowel sounds.  No HSM.  No abd hernia  Skin No rash; no lesions; no bruises.  Skin turgor normal  Musc No clubbing; no cyanosis.  Gait steady  Psych Oriented to time, place, and person.  Appropriate affect        Results Review:   I reviewed the patient's new clinical results.    Lab Results   Component Value Date    WBC 6.18 06/07/2019    HGB 13.6 06/07/2019    HCT 40.7 06/07/2019    MCV 94.9 06/07/2019     (L) 06/07/2019       Lab Results   Component Value Date    GLUCOSE 111 (H) 08/08/2018    BUN 16 04/19/2019    CREATININE 1.02 04/19/2019    EGFRIFNONA 74 04/19/2019    EGFRIFAFRI 89 04/19/2019    BCR 15.7 04/19/2019    CO2 27.3 04/19/2019    CALCIUM 9.6 04/19/2019    PROTENTOTREF 7.8 04/19/2019    ALBUMIN 4.90 04/19/2019    LABIL2 1.7 04/19/2019    AST 27 04/19/2019    ALT 34 04/19/2019       ASSESSMENTS/PLANS  1.) HCM  Due to comorbidities, we are doing a screening colonoscopy in inpatient endoscopy.      The risk of endoscopy was discussed including the risk of anesthesia, bowel perforation, bleeding, missed lesion, infection, and death should a severe complication occur.  The patient determined that the diagnostic benefit outweighed the risk.             I discussed the patients findings and my recommendations with the patient    Meir Salguero MD  06/18/19  9:11 AM

## 2019-06-18 NOTE — ANESTHESIA POSTPROCEDURE EVALUATION
Patient: Kai Garcia    Procedure Summary     Date:  06/18/19 Room / Location:   [facility]    Anesthesia Start:  0948 Anesthesia Stop:      Procedure:  COLONOSCOPY (N/A ) Diagnosis:       Screen for colon cancer      (Screen for colon cancer [Z12.11])    Surgeon:  Meir Salguero MD Provider:  Nasreen Robison MD    Anesthesia Type:  general ASA Status:  4          Anesthesia Type: general  Last vitals  BP   134/78 (06/18/19 0842) 124/59   Temp   97.3 °F (36.3 °C) (06/18/19 0842) 97.7   Pulse   62 (06/18/19 0842) 70   Resp   16 (06/18/19 0842)  16   SpO2   94 % (06/18/19 0842) 96%     Post Anesthesia Care and Evaluation    Patient location during evaluation: PACU  Patient participation: complete - patient participated  Level of consciousness: awake and alert  Pain score: 0  Pain management: adequate  Airway patency: patent  Anesthetic complications: No anesthetic complications  PONV Status: none  Cardiovascular status: hemodynamically stable and acceptable  Respiratory status: nonlabored ventilation, acceptable and nasal cannula  Hydration status: acceptable

## 2019-06-19 LAB
CYTO UR: NORMAL
LAB AP CASE REPORT: NORMAL
LAB AP CLINICAL INFORMATION: NORMAL
PATH REPORT.FINAL DX SPEC: NORMAL
PATH REPORT.GROSS SPEC: NORMAL

## 2019-07-03 DIAGNOSIS — E11.65 UNCONTROLLED TYPE 2 DIABETES MELLITUS WITH HYPERGLYCEMIA, WITHOUT LONG-TERM CURRENT USE OF INSULIN (HCC): ICD-10-CM

## 2019-07-03 RX ORDER — OMEPRAZOLE 40 MG/1
CAPSULE, DELAYED RELEASE ORAL
Qty: 90 CAPSULE | Refills: 0 | Status: SHIPPED | OUTPATIENT
Start: 2019-07-03 | End: 2019-10-03 | Stop reason: SDUPTHER

## 2019-07-03 RX ORDER — SITAGLIPTIN 100 MG/1
TABLET, FILM COATED ORAL
Qty: 90 TABLET | Refills: 0 | Status: SHIPPED | OUTPATIENT
Start: 2019-07-03 | End: 2019-10-03 | Stop reason: SDUPTHER

## 2019-07-08 RX ORDER — EPLERENONE 25 MG/1
25 TABLET, FILM COATED ORAL DAILY
Qty: 90 TABLET | Refills: 3 | Status: SHIPPED | OUTPATIENT
Start: 2019-07-08 | End: 2020-07-06

## 2019-07-22 DIAGNOSIS — E11.65 UNCONTROLLED TYPE 2 DIABETES MELLITUS WITH HYPERGLYCEMIA (HCC): ICD-10-CM

## 2019-07-22 DIAGNOSIS — E78.00 PURE HYPERCHOLESTEROLEMIA: Primary | ICD-10-CM

## 2019-07-23 ENCOUNTER — OFFICE VISIT (OUTPATIENT)
Dept: FAMILY MEDICINE CLINIC | Facility: CLINIC | Age: 64
End: 2019-07-23

## 2019-07-23 VITALS
RESPIRATION RATE: 18 BRPM | SYSTOLIC BLOOD PRESSURE: 120 MMHG | BODY MASS INDEX: 39.16 KG/M2 | DIASTOLIC BLOOD PRESSURE: 70 MMHG | HEART RATE: 68 BPM | HEIGHT: 67 IN | WEIGHT: 249.5 LBS | TEMPERATURE: 98.7 F

## 2019-07-23 DIAGNOSIS — Z99.89 OSA ON CPAP: ICD-10-CM

## 2019-07-23 DIAGNOSIS — E78.00 PURE HYPERCHOLESTEROLEMIA: ICD-10-CM

## 2019-07-23 DIAGNOSIS — I10 ESSENTIAL HYPERTENSION: ICD-10-CM

## 2019-07-23 DIAGNOSIS — G47.33 OSA ON CPAP: ICD-10-CM

## 2019-07-23 DIAGNOSIS — G47.09 OTHER INSOMNIA: ICD-10-CM

## 2019-07-23 DIAGNOSIS — I25.10 CORONARY ARTERY DISEASE INVOLVING NATIVE CORONARY ARTERY OF NATIVE HEART WITHOUT ANGINA PECTORIS: ICD-10-CM

## 2019-07-23 DIAGNOSIS — E11.65 UNCONTROLLED TYPE 2 DIABETES MELLITUS WITH HYPERGLYCEMIA (HCC): Primary | ICD-10-CM

## 2019-07-23 LAB
ALBUMIN SERPL-MCNC: 4.7 G/DL (ref 3.5–5.2)
ALBUMIN/GLOB SERPL: 1.7 G/DL
ALP SERPL-CCNC: 34 U/L (ref 39–117)
ALT SERPL-CCNC: 44 U/L (ref 1–41)
AST SERPL-CCNC: 41 U/L (ref 1–40)
BASOPHILS # BLD AUTO: 0.04 10*3/MM3 (ref 0–0.2)
BASOPHILS NFR BLD AUTO: 0.7 % (ref 0–1.5)
BILIRUB SERPL-MCNC: 0.4 MG/DL (ref 0.2–1.2)
BUN SERPL-MCNC: 14 MG/DL (ref 8–23)
BUN/CREAT SERPL: 12.1 (ref 7–25)
CALCIUM SERPL-MCNC: 9.3 MG/DL (ref 8.6–10.5)
CHLORIDE SERPL-SCNC: 101 MMOL/L (ref 98–107)
CO2 SERPL-SCNC: 28.4 MMOL/L (ref 22–29)
CREAT SERPL-MCNC: 1.16 MG/DL (ref 0.76–1.27)
EOSINOPHIL # BLD AUTO: 0.13 10*3/MM3 (ref 0–0.4)
EOSINOPHIL NFR BLD AUTO: 2.4 % (ref 0.3–6.2)
ERYTHROCYTE [DISTWIDTH] IN BLOOD BY AUTOMATED COUNT: 13.2 % (ref 12.3–15.4)
GLOBULIN SER CALC-MCNC: 2.7 GM/DL
GLUCOSE SERPL-MCNC: 153 MG/DL (ref 65–99)
HBA1C MFR BLD: 7.2 % (ref 4.8–5.6)
HCT VFR BLD AUTO: 42.4 % (ref 37.5–51)
HGB BLD-MCNC: 13.6 G/DL (ref 13–17.7)
IMM GRANULOCYTES # BLD AUTO: 0.02 10*3/MM3 (ref 0–0.05)
IMM GRANULOCYTES NFR BLD AUTO: 0.4 % (ref 0–0.5)
LYMPHOCYTES # BLD AUTO: 1.62 10*3/MM3 (ref 0.7–3.1)
LYMPHOCYTES NFR BLD AUTO: 30.1 % (ref 19.6–45.3)
MCH RBC QN AUTO: 31.9 PG (ref 26.6–33)
MCHC RBC AUTO-ENTMCNC: 32.1 G/DL (ref 31.5–35.7)
MCV RBC AUTO: 99.3 FL (ref 79–97)
MONOCYTES # BLD AUTO: 0.6 10*3/MM3 (ref 0.1–0.9)
MONOCYTES NFR BLD AUTO: 11.1 % (ref 5–12)
NEUTROPHILS # BLD AUTO: 2.98 10*3/MM3 (ref 1.7–7)
NEUTROPHILS NFR BLD AUTO: 55.3 % (ref 42.7–76)
NRBC BLD AUTO-RTO: 0 /100 WBC (ref 0–0.2)
PLATELET # BLD AUTO: 105 10*3/MM3 (ref 140–450)
POTASSIUM SERPL-SCNC: 4.6 MMOL/L (ref 3.5–5.2)
PROT SERPL-MCNC: 7.4 G/DL (ref 6–8.5)
RBC # BLD AUTO: 4.27 10*6/MM3 (ref 4.14–5.8)
SODIUM SERPL-SCNC: 141 MMOL/L (ref 136–145)
WBC # BLD AUTO: 5.39 10*3/MM3 (ref 3.4–10.8)

## 2019-07-23 PROCEDURE — 99214 OFFICE O/P EST MOD 30 MIN: CPT | Performed by: FAMILY MEDICINE

## 2019-07-23 RX ORDER — ZOLPIDEM TARTRATE 10 MG/1
10 TABLET ORAL NIGHTLY PRN
Qty: 30 TABLET | Refills: 5 | Status: SHIPPED | OUTPATIENT
Start: 2019-07-23 | End: 2020-03-02

## 2019-07-23 NOTE — PROGRESS NOTES
Assessment/Plan       Problems Addressed this Visit        Cardiovascular and Mediastinum    Hyperlipidemia    Essential hypertension    Coronary artery disease involving native coronary artery of native heart without angina pectoris       Respiratory    LUCIAN on CPAP       Endocrine    Type II diabetes mellitus, uncontrolled (CMS/formerly [facility]) - Primary      Other Visit Diagnoses     Other insomnia        Relevant Medications    zolpidem (AMBIEN) 10 MG tablet             Follow up: Return in about 3 months (around 10/23/2019).     DISCUSSION  Diabetes mellitus type 2.  A1c is increased a little.  Continue efforts to lose weight, decrease carbohydrates in diet.  Continue current medications including Januvia.    Hypertension.  Blood pressure stable on current medications.    Known coronary artery disease.  Continue follow-up with cardiology as needed.    Hyperlipidemia.  Last lipid panel was good.  Continue current medications.    Obstructive sleep apnea.  On CPAP.  Tolerating machine well.  Needs scription for supplies.    Insomnia.  Continue Ambien.  Doing well with this.  No reported side effects.  No memory loss.  Medication working well.      MEDICATIONS PRESCRIBED  Requested Prescriptions     Signed Prescriptions Disp Refills   • zolpidem (AMBIEN) 10 MG tablet 30 tablet 5     Sig: Take 1 tablet by mouth At Night As Needed for Sleep.            Loi dated on 7/23/2019 was ordered and pending manual process.      -------------------------------------------    Subjective     Chief Complaint   Patient presents with   • Diabetes     3 month f/u, med refills    • talk about cpap   • go over blood work         Diabetes   He presents for his follow-up diabetic visit. He has type 2 diabetes mellitus. His disease course has been stable. There are no hypoglycemic associated symptoms. Pertinent negatives for diabetes include no chest pain and no fatigue. There are no hypoglycemic complications. Diabetic complications include  heart disease. Risk factors for coronary artery disease include diabetes mellitus, hypertension and dyslipidemia. Current diabetic treatment includes oral agent (monotherapy) (Januvia). He is compliant with treatment all of the time. His weight is stable. He is following a generally healthy diet. He never participates in exercise. He monitors blood glucose at home 1-2 x per day (140-150). There is no change in his home blood glucose trend. An ACE inhibitor/angiotensin II receptor blocker is being taken (on entresto). Eye exam is current.   Hypertension   This is a chronic problem. The problem is unchanged. The problem is controlled. Associated symptoms include peripheral edema and shortness of breath (worse when hot). Pertinent negatives include no chest pain. There are no associated agents to hypertension. Current antihypertension treatment includes angiotensin blockers. The current treatment provides moderate improvement. Hypertensive end-organ damage includes CAD/MI.   Coronary Artery Disease   Presents for follow-up visit. Symptoms include shortness of breath (worse when hot). Pertinent negatives include no chest pain, chest pressure, leg swelling or weight gain. (Sleepy. To have a cath done. Dr Willis. Not feeling right. No energy.) Risk factors include hyperlipidemia. The symptoms have been stable.   Hyperlipidemia   This is a chronic problem. The current episode started more than 1 year ago. The problem is controlled. Recent lipid tests were reviewed and are normal. Associated symptoms include shortness of breath (worse when hot). Pertinent negatives include no chest pain or myalgias (aches with activities). Current antihyperlipidemic treatment includes statins. The current treatment provides moderate improvement of lipids. There are no compliance problems.  Risk factors for coronary artery disease include diabetes mellitus, dyslipidemia and hypertension.       CPAP  LUCIAN  Uses 9 hrs per night  Sleep meds help  "as well  No issues with the machine  Feels better with the machine  Uses for naps as well    Colon 2019  To repeat in 3 yrs  + polyp  Dr Salguero    Hearing aids make ringing worse and will not wear them    Insomnia.  Ambien is working well.  No side effects.  No memory loss.    Social History     Tobacco Use   Smoking Status Former Smoker   • Packs/day: 3.00   • Years: 35.00   • Pack years: 105.00   • Types: Cigarettes   • Last attempt to quit: 1/15/2006   • Years since quittin.5   Smokeless Tobacco Never Used        Past Medical History,Medications, Allergies, and social history was reviewed.      Review of Systems   Constitutional: Negative.  Negative for fatigue and unexpected weight gain.   HENT: Negative.    Respiratory: Positive for shortness of breath (worse when hot).    Cardiovascular: Negative.  Negative for chest pain and leg swelling.   Gastrointestinal: Negative.    Musculoskeletal: Negative for myalgias (aches with activities).   Neurological: Negative.    Psychiatric/Behavioral: Negative.        Objective     Vitals:    19 0835   BP: 120/70   Pulse: 68   Resp: 18   Temp: 98.7 °F (37.1 °C)   Weight: 113 kg (249 lb 8 oz)   Height: 170.2 cm (67\")          Physical Exam   Constitutional: Vital signs are normal. He appears well-developed and well-nourished.   HENT:   Head: Normocephalic and atraumatic.   Right Ear: Hearing, tympanic membrane, external ear and ear canal normal.   Left Ear: Hearing, tympanic membrane, external ear and ear canal normal.   Mouth/Throat: Oropharynx is clear and moist.   Eyes: Conjunctivae, EOM and lids are normal. Pupils are equal, round, and reactive to light.   Neck: Normal range of motion. Neck supple. No thyromegaly present.   Cardiovascular: Normal rate, regular rhythm and normal heart sounds. Exam reveals no friction rub.   No murmur heard.  Pulmonary/Chest: Effort normal and breath sounds normal. No respiratory distress. He has no wheezes. He has no rales. "   Abdominal: Soft. Normal appearance and bowel sounds are normal. He exhibits no distension and no mass. There is tenderness (diffuse, no change, chronic). There is no rebound and no guarding.   Musculoskeletal: He exhibits no edema.   Neurological: He is alert. He has normal strength.   Skin: Skin is warm and dry.   Psychiatric: He has a normal mood and affect. His speech is normal. Cognition and memory are normal.   Nursing note and vitals reviewed.                Cheo Weiss MD

## 2019-07-25 ENCOUNTER — CLINICAL SUPPORT NO REQUIREMENTS (OUTPATIENT)
Dept: CARDIOLOGY | Facility: CLINIC | Age: 64
End: 2019-07-25

## 2019-07-25 DIAGNOSIS — I25.5 ISCHEMIC CARDIOMYOPATHY: Primary | ICD-10-CM

## 2019-07-25 PROCEDURE — 93295 DEV INTERROG REMOTE 1/2/MLT: CPT | Performed by: NURSE PRACTITIONER

## 2019-07-25 PROCEDURE — 93296 REM INTERROG EVL PM/IDS: CPT | Performed by: NURSE PRACTITIONER

## 2019-09-25 ENCOUNTER — OFFICE VISIT (OUTPATIENT)
Dept: CARDIOLOGY | Facility: CLINIC | Age: 64
End: 2019-09-25

## 2019-09-25 VITALS
DIASTOLIC BLOOD PRESSURE: 80 MMHG | HEIGHT: 67 IN | HEART RATE: 69 BPM | BODY MASS INDEX: 38.92 KG/M2 | OXYGEN SATURATION: 97 % | SYSTOLIC BLOOD PRESSURE: 130 MMHG | WEIGHT: 248 LBS

## 2019-09-25 DIAGNOSIS — I10 ESSENTIAL HYPERTENSION: ICD-10-CM

## 2019-09-25 DIAGNOSIS — I25.10 MULTIPLE VESSEL CORONARY ARTERY DISEASE: ICD-10-CM

## 2019-09-25 DIAGNOSIS — E78.00 PURE HYPERCHOLESTEROLEMIA: ICD-10-CM

## 2019-09-25 DIAGNOSIS — I25.5 ISCHEMIC CARDIOMYOPATHY: ICD-10-CM

## 2019-09-25 DIAGNOSIS — T81.718S: Primary | ICD-10-CM

## 2019-09-25 DIAGNOSIS — I71.00: Primary | ICD-10-CM

## 2019-09-25 PROCEDURE — 99214 OFFICE O/P EST MOD 30 MIN: CPT | Performed by: INTERNAL MEDICINE

## 2019-09-25 PROCEDURE — 93284 PRGRMG EVAL IMPLANTABLE DFB: CPT | Performed by: INTERNAL MEDICINE

## 2019-09-25 NOTE — PROGRESS NOTES
Subjective:     Encounter Date:09/25/2019    Primary Care Physician: Cheo Weiss MD      Patient ID: Kai Garcia is a 64 y.o. male.    Chief Complaint:Follow-up    Problem List:  1. Dissecting aortic aneurysm:  a. 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 Helton, stage I in June 2006 and stage II in March 2007.   b. Followed chronically with annual CTs by Dr. Helton. Stable by CT 2018  2. Coronary artery disease:  a. Cardiac cath prior to “elephant trunk” procedure in June 2006 and the patient underwent CABG at that time consisting of SVG to the OM and SVG to the PDA.   b. On 02/08/2007 - inferoposterior MI, cardiac cath showed 100% left circumflex with severe OM-1 stenosis and 100% vein graft to the left circumflex. RCA was diffuse, 90% ostial stenosis at the SVG to the RCA. LAD had only 40% stenosis.   c. Cardiac cath in March 2007 due to recurrent angina - 2.5 Microdriver stent to the distal native RCA.   d. 4/27/18 cardiac catheter occluded vein grafts ×2.  Large posterior basal aneurysm with ejection fraction of 30-35%.  Severe stenosis and distal circumflex which supplies aneurysm.  Non-flow-limiting RCA disease.  E. MRI 8/18 LVEF 28%  G. S/P Bi- Ventricular ICD- [employer] 8/9/18, EMILIE Fox MD  3. Dyslipidemia.   4. Hypertension.   5. Diabetes  6. Chronic low back pain/multiple lumbar surgeries, currently disabled:  a. L4-L5 discectomy in 2015.   7. History of bleeding peptic ulcer disease.   8. Severe obstructive sleep apnea.   9. Appendectomy      Allergies   Allergen Reactions   • Ativan [Lorazepam] Other (See Comments)     agitation   • Zetia [Ezetimibe] Other (See Comments)     increased LFTs   • Zocor [Simvastatin] Myalgia   • Isosorbide Other (See Comments)     headaches   • Metformin And Related Diarrhea         Current Outpatient Medications:   •  acetaminophen (TYLENOL) 500 MG tablet,  Take 1,000 mg by mouth Every 6 (Six) Hours As Needed for Mild Pain ., Disp: , Rfl:   •  aspirin 81 MG EC tablet, Take 81 mg by mouth Daily., Disp: , Rfl:   •  bisoprolol (ZEBeta) 10 MG tablet, Take 1 tablet by mouth Daily., Disp: 90 tablet, Rfl: 3  •  clopidogrel (PLAVIX) 75 MG tablet, Take 1 tablet by mouth Daily. (Patient taking differently: Take 75 mg by mouth Daily. Per dr angela patient states to stop plavix 5 days prior to surgery), Disp: 90 tablet, Rfl: 3  •  eplerenone (INSPRA) 25 MG tablet, Take 1 tablet by mouth Daily., Disp: 90 tablet, Rfl: 3  •  JANUVIA 100 MG tablet, TAKE ONE TABLET BY MOUTH EVERY DAY, Disp: 90 tablet, Rfl: 0  •  nitroglycerin (NITROSTAT) 0.4 MG SL tablet, Place 1 tablet under the tongue Every 5 (Five) Minutes As Needed for Chest Pain., Disp: 25 tablet, Rfl: 5  •  omeprazole (priLOSEC) 40 MG capsule, TAKE ONE CAPSULE BY MOUTH EVERY DAY, Disp: 90 capsule, Rfl: 0  •  rosuvastatin (CRESTOR) 10 MG tablet, Take 1 tablet by mouth Daily., Disp: 90 tablet, Rfl: 3  •  sacubitril-valsartan (ENTRESTO) 49-51 MG tablet, Take 1 tablet by mouth Every 12 (Twelve) Hours., Disp: 180 tablet, Rfl: 3  •  zolpidem (AMBIEN) 10 MG tablet, Take 1 tablet by mouth At Night As Needed for Sleep., Disp: 30 tablet, Rfl: 5        History of Present Illness    Patient returns today for follow-up of chronic aortic dissection coronary disease ischemic cardia myopathy.  Is now 1 year status post by V ICD.  Overall he feels well.  No exertional symptoms.  He does have an episode of occasional waxing and waning left and right chest pain that is occurs at both rest and exertion.  He notes this started after building a chicken coop, and notes since then it is typically worse if he tries to run a chain saw or a weed Mike.  No orthopnea PND.  Thinks his discomfort is dissimilar to what he had previously with his coronary disease or his dissecting aneurysm.    The following portions of the patient's history were reviewed and  "updated as appropriate: allergies, current medications, past family history, past medical history, past social history, past surgical history and problem list.      Social History     Tobacco Use   • Smoking status: Former Smoker     Packs/day: 3.00     Years: 35.00     Pack years: 105.00     Types: Cigarettes     Last attempt to quit: 1/15/2006     Years since quittin.7   • Smokeless tobacco: Never Used   Substance Use Topics   • Alcohol use: No   • Drug use: No         Review of Systems   Constitution: Positive for malaise/fatigue and weight gain. Negative for chills, decreased appetite and weight loss.   HENT: Positive for ear pain and tinnitus.    Eyes: Positive for blurred vision.   Cardiovascular: Positive for chest pain and leg swelling.   Respiratory: Positive for shortness of breath. Negative for cough and wheezing.    Hematologic/Lymphatic: Negative for bleeding problem. Does not bruise/bleed easily.   Skin: Negative for rash.   Musculoskeletal: Positive for arthritis, back pain, joint pain, joint swelling, muscle cramps, muscle weakness and neck pain. Negative for myalgias.   Gastrointestinal: Positive for change in bowel habit. Negative for heartburn, nausea and vomiting.   Neurological: Positive for light-headedness.          Objective:    height is 170.2 cm (67\") and weight is 112 kg (248 lb). His blood pressure is 130/80 and his pulse is 69. His oxygen saturation is 97%.         Physical Exam   Constitutional: He is oriented to person, place, and time. He appears well-developed and well-nourished.   HENT:   Mouth/Throat: Oropharynx is clear and moist.   Neck: No JVD present. Carotid bruit is not present. No thyromegaly present.   Cardiovascular: Regular rhythm, S1 normal, S2 normal, normal heart sounds and intact distal pulses. Exam reveals no gallop, no S3 and no S4.   No murmur heard.  Pulses:       Carotid pulses are 2+ on the right side, and 2+ on the left side.       Radial pulses are 2+ on " "the right side, and 2+ on the left side.   Pulmonary/Chest: Breath sounds normal.   Abdominal: Soft. Bowel sounds are normal. He exhibits no mass. There is no tenderness.   Musculoskeletal: He exhibits no edema.   Neurological: He is alert and oriented to person, place, and time.   Skin: Skin is warm and dry. No rash noted.       Procedures  Device check:  Less than 1% atrially paced 100% biventricular paced.  Estimated 10 years of battery life.  Normal charge time.  No therapies delivered.  No arrhythmias.        Assessment:   Assessment/Plan      Kai was seen today for follow-up.    Diagnoses and all orders for this visit:    Aortic dissection following procedure, sequela (CMS/HCC)    Multiple vessel coronary artery disease    Pure hypercholesterolemia    Essential hypertension    Ischemic cardiomyopathy      1.  Dissecting aortic aneurysm status post \"elephant trunk\" procedure 2007.  2.  Coronary artery disease status post CABG with known occluded grafts.  3.  Decreased LVEF with a large posterior basal aneurysm.  Status post normal function of by V ICD.  Hypertension controlled  Dyslipidemia controlled  Chest pain, atypical, but each of his previous presentations were also atypical.    Recommendations 1 check echocardiogram with contrast to evaluate his LVEF.  2.  Regarding his atypical chest pain, we will repeat CTA of the chest abdomen to ensure there is been no change with this.  3.  If the above are normal, simply reassurance.  This had no significant CAD by cath one year ago.     Uri Willis MD      Dictated utilizing Dragon dictation  "

## 2019-10-03 DIAGNOSIS — E11.65 UNCONTROLLED TYPE 2 DIABETES MELLITUS WITH HYPERGLYCEMIA, WITHOUT LONG-TERM CURRENT USE OF INSULIN (HCC): ICD-10-CM

## 2019-10-03 RX ORDER — OMEPRAZOLE 40 MG/1
CAPSULE, DELAYED RELEASE ORAL
Qty: 90 CAPSULE | Refills: 0 | Status: SHIPPED | OUTPATIENT
Start: 2019-10-03 | End: 2020-01-13

## 2019-10-03 RX ORDER — SITAGLIPTIN 100 MG/1
TABLET, FILM COATED ORAL
Qty: 90 TABLET | Refills: 0 | Status: SHIPPED | OUTPATIENT
Start: 2019-10-03 | End: 2020-01-13

## 2019-10-18 DIAGNOSIS — I10 ESSENTIAL HYPERTENSION: ICD-10-CM

## 2019-10-18 DIAGNOSIS — E11.65 UNCONTROLLED TYPE 2 DIABETES MELLITUS WITH HYPERGLYCEMIA, WITHOUT LONG-TERM CURRENT USE OF INSULIN (HCC): Primary | ICD-10-CM

## 2019-10-18 DIAGNOSIS — E78.00 PURE HYPERCHOLESTEROLEMIA: ICD-10-CM

## 2019-10-19 LAB
ALBUMIN SERPL-MCNC: 4.6 G/DL (ref 3.5–5.2)
ALBUMIN/GLOB SERPL: 1.5 G/DL
ALP SERPL-CCNC: 31 U/L (ref 39–117)
ALT SERPL-CCNC: 41 U/L (ref 1–41)
AST SERPL-CCNC: 34 U/L (ref 1–40)
BASOPHILS # BLD AUTO: 0.04 10*3/MM3 (ref 0–0.2)
BASOPHILS NFR BLD AUTO: 0.8 % (ref 0–1.5)
BILIRUB SERPL-MCNC: 0.6 MG/DL (ref 0.2–1.2)
BUN SERPL-MCNC: 13 MG/DL (ref 8–23)
BUN/CREAT SERPL: 12.9 (ref 7–25)
CALCIUM SERPL-MCNC: 9.3 MG/DL (ref 8.6–10.5)
CHLORIDE SERPL-SCNC: 101 MMOL/L (ref 98–107)
CHOLEST SERPL-MCNC: 123 MG/DL (ref 0–200)
CHOLEST/HDLC SERPL: 3.42 {RATIO}
CO2 SERPL-SCNC: 25 MMOL/L (ref 22–29)
CREAT SERPL-MCNC: 1.01 MG/DL (ref 0.76–1.27)
EOSINOPHIL # BLD AUTO: 0.12 10*3/MM3 (ref 0–0.4)
EOSINOPHIL NFR BLD AUTO: 2.5 % (ref 0.3–6.2)
ERYTHROCYTE [DISTWIDTH] IN BLOOD BY AUTOMATED COUNT: 12.7 % (ref 12.3–15.4)
GLOBULIN SER CALC-MCNC: 3.1 GM/DL
GLUCOSE SERPL-MCNC: 146 MG/DL (ref 65–99)
HBA1C MFR BLD: 7.2 % (ref 4.8–5.6)
HCT VFR BLD AUTO: 39.4 % (ref 37.5–51)
HDLC SERPL-MCNC: 36 MG/DL (ref 40–60)
HGB BLD-MCNC: 13.6 G/DL (ref 13–17.7)
IMM GRANULOCYTES # BLD AUTO: 0.02 10*3/MM3 (ref 0–0.05)
IMM GRANULOCYTES NFR BLD AUTO: 0.4 % (ref 0–0.5)
LDLC SERPL CALC-MCNC: 59 MG/DL (ref 0–100)
LYMPHOCYTES # BLD AUTO: 1.41 10*3/MM3 (ref 0.7–3.1)
LYMPHOCYTES NFR BLD AUTO: 29.6 % (ref 19.6–45.3)
MCH RBC QN AUTO: 33 PG (ref 26.6–33)
MCHC RBC AUTO-ENTMCNC: 34.5 G/DL (ref 31.5–35.7)
MCV RBC AUTO: 95.6 FL (ref 79–97)
MONOCYTES # BLD AUTO: 0.43 10*3/MM3 (ref 0.1–0.9)
MONOCYTES NFR BLD AUTO: 9 % (ref 5–12)
NEUTROPHILS # BLD AUTO: 2.74 10*3/MM3 (ref 1.7–7)
NEUTROPHILS NFR BLD AUTO: 57.7 % (ref 42.7–76)
NRBC BLD AUTO-RTO: 0 /100 WBC (ref 0–0.2)
PLATELET # BLD AUTO: 97 10*3/MM3 (ref 140–450)
POTASSIUM SERPL-SCNC: 4.3 MMOL/L (ref 3.5–5.2)
PROT SERPL-MCNC: 7.7 G/DL (ref 6–8.5)
RBC # BLD AUTO: 4.12 10*6/MM3 (ref 4.14–5.8)
SODIUM SERPL-SCNC: 140 MMOL/L (ref 136–145)
TRIGL SERPL-MCNC: 138 MG/DL (ref 0–150)
VLDLC SERPL CALC-MCNC: 27.6 MG/DL
WBC # BLD AUTO: 4.76 10*3/MM3 (ref 3.4–10.8)

## 2019-10-21 ENCOUNTER — OFFICE VISIT (OUTPATIENT)
Dept: FAMILY MEDICINE CLINIC | Facility: CLINIC | Age: 64
End: 2019-10-21

## 2019-10-21 VITALS
OXYGEN SATURATION: 99 % | HEART RATE: 70 BPM | SYSTOLIC BLOOD PRESSURE: 130 MMHG | DIASTOLIC BLOOD PRESSURE: 80 MMHG | WEIGHT: 246 LBS | RESPIRATION RATE: 18 BRPM | BODY MASS INDEX: 38.53 KG/M2 | TEMPERATURE: 96.8 F

## 2019-10-21 DIAGNOSIS — I25.10 CORONARY ARTERY DISEASE INVOLVING NATIVE CORONARY ARTERY OF NATIVE HEART WITHOUT ANGINA PECTORIS: ICD-10-CM

## 2019-10-21 DIAGNOSIS — E78.00 PURE HYPERCHOLESTEROLEMIA: ICD-10-CM

## 2019-10-21 DIAGNOSIS — M54.50 LUMBAR PAIN: ICD-10-CM

## 2019-10-21 DIAGNOSIS — R07.89 OTHER CHEST PAIN: Primary | ICD-10-CM

## 2019-10-21 DIAGNOSIS — E11.65 UNCONTROLLED TYPE 2 DIABETES MELLITUS WITH HYPERGLYCEMIA, WITHOUT LONG-TERM CURRENT USE OF INSULIN (HCC): ICD-10-CM

## 2019-10-21 DIAGNOSIS — I10 ESSENTIAL HYPERTENSION: ICD-10-CM

## 2019-10-21 LAB
POC CREATININE URINE: 100
POC MICROALBUMIN URINE: 150

## 2019-10-21 PROCEDURE — 82044 UR ALBUMIN SEMIQUANTITATIVE: CPT | Performed by: FAMILY MEDICINE

## 2019-10-21 PROCEDURE — 99214 OFFICE O/P EST MOD 30 MIN: CPT | Performed by: FAMILY MEDICINE

## 2019-10-21 RX ORDER — CYCLOBENZAPRINE HCL 10 MG
10 TABLET ORAL 3 TIMES DAILY PRN
Qty: 30 TABLET | Refills: 1 | Status: SHIPPED | OUTPATIENT
Start: 2019-10-21 | End: 2020-01-13

## 2019-10-21 NOTE — PROGRESS NOTES
Assessment/Plan       Problems Addressed this Visit        Cardiovascular and Mediastinum    Hyperlipidemia    Essential hypertension    Coronary artery disease involving native coronary artery of native heart without angina pectoris      Other Visit Diagnoses     Other chest pain    -  Primary    Lumbar pain        Left leg pain       Relevant Medications    cyclobenzaprine (FLEXERIL) 10 MG tablet    Uncontrolled type 2 diabetes mellitus with hyperglycemia, without long-term current use of insulin (CMS/[facility])        Relevant Orders    POC Microalbumin (Completed)            Follow up: Return in about 3 months (around 1/21/2020), or if symptoms worsen or fail to improve.     DISCUSSION  Chest pain.  Continue follow-up with cardiology.  Having CT angiogram done.    Lumbar back pain.  Most likely disc problem.  Left leg pain as well.  Trial of Flexeril.  Not able to take anti-inflammatory medications.  Would try to avoid prednisone at this time.  Call if not improving.    Uncontrolled diabetes mellitus type 2.  A1c is stable at 7.2.  Continue current medications.  Recheck in 3 months.    Hypertension.  Blood pressure stable.    Hyperlipidemia.  Lipids are doing well.    Coronary artery disease.  Continue follow-up with cardiology.      MEDICATIONS PRESCRIBED  Requested Prescriptions     Signed Prescriptions Disp Refills   • cyclobenzaprine (FLEXERIL) 10 MG tablet 30 tablet 1     Sig: Take 1 tablet by mouth 3 (Three) Times a Day As Needed for Muscle Spasms.        -------------------------------------------    Subjective     Chief Complaint   Patient presents with   • Follow-up     3 month    • Chest Pain     30-60 days    • Back Pain     pain from lower back to his L leg and knee   • Trouble sleeping     has to take a pill to sleep    • Med Refill     omeprazole, januvia         Chest Pain    This is a recurrent (x 2 months, saw Dr Willis and has ECHO and CT scan done) problem. The problem occurs intermittently. The  problem has been unchanged. The pain is moderate. Quality: pounding. The pain does not radiate. Associated symptoms include abdominal pain (left sided pain still there), back pain and shortness of breath (many days, no change). Pertinent negatives include no cough, exertional chest pressure, fever or near-syncope. Associated with: bending. He has tried nothing for the symptoms. The treatment provided no relief.   His past medical history is significant for CAD, heart disease and hyperlipidemia. Prior workup: pending.   Back Pain   This is a new (left low back, hard to move) problem. The current episode started more than 1 month ago. The problem occurs intermittently. The problem is unchanged. The pain is present in the lumbar spine (left). The pain radiates to the left thigh, left knee and left foot. The pain is moderate. The symptoms are aggravated by bending. Associated symptoms include abdominal pain (left sided pain still there) and chest pain. Pertinent negatives include no fever. He has tried nothing for the symptoms. The treatment provided no relief.   Diabetes   He presents for his follow-up diabetic visit. He has type 2 diabetes mellitus. His disease course has been stable. Associated symptoms include chest pain. Pertinent negatives for diabetes include no fatigue. There are no hypoglycemic complications. Diabetic complications include heart disease. Risk factors for coronary artery disease include diabetes mellitus, hypertension and dyslipidemia. Current diabetic treatment includes oral agent (monotherapy) (Januvia). He is compliant with treatment all of the time. His weight is decreasing steadily (lost 2 pounds). He is following a generally healthy diet. He never participates in exercise. He monitors blood glucose at home 1-2 x per day (135-145 and occ 150-170). There is no change in his home blood glucose trend. An ACE inhibitor/angiotensin II receptor blocker is being taken (on entresto). Eye exam is  current.   Hypertension   This is a chronic problem. The problem is unchanged. The problem is controlled. Associated symptoms include chest pain, peripheral edema and shortness of breath (many days, no change). There are no associated agents to hypertension. Current antihypertension treatment includes angiotensin blockers. The current treatment provides moderate improvement. Hypertensive end-organ damage includes CAD/MI.   Coronary Artery Disease   Presents for follow-up visit. Symptoms include chest pain and shortness of breath (many days, no change). Pertinent negatives include no chest pressure, leg swelling or weight gain. (Sleepy. To have a cath done. Dr Willis. Not feeling right. No energy.) Risk factors include hyperlipidemia. The symptoms have been worsening (testing pending per cardiology).   Hyperlipidemia   This is a chronic problem. The current episode started more than 1 year ago. The problem is controlled. Recent lipid tests were reviewed and are normal. Associated symptoms include chest pain and shortness of breath (many days, no change). Pertinent negatives include no myalgias (aches with activities). Current antihyperlipidemic treatment includes statins. The current treatment provides moderate improvement of lipids. There are no compliance problems.  Risk factors for coronary artery disease include diabetes mellitus, dyslipidemia and hypertension.       Insomnia  Sleep meds help, takes 2 hrs to work  Did not take one last night            Social History     Tobacco Use   Smoking Status Former Smoker   • Packs/day: 3.00   • Years: 35.00   • Pack years: 105.00   • Types: Cigarettes   • Last attempt to quit: 1/15/2006   • Years since quittin.7   Smokeless Tobacco Never Used          Past Medical History,Medications, Allergies, and social history was reviewed.        Patient's Body mass index is 38.53 kg/m². BMI is above normal parameters. Recommendations include: Continue diet changes, decrease  carbs, increase activity once cleared by cardiology.         Review of Systems   Constitutional: Negative for fatigue, fever and unexpected weight gain.   HENT: Negative.  Negative for congestion.    Respiratory: Positive for shortness of breath (many days, no change). Negative for cough.    Cardiovascular: Positive for chest pain. Negative for leg swelling and near-syncope.   Gastrointestinal: Positive for abdominal pain (left sided pain still there).   Musculoskeletal: Positive for back pain. Negative for myalgias (aches with activities).   Neurological: Negative.    Psychiatric/Behavioral: Positive for sleep disturbance.       Objective     Vitals:    10/21/19 0832   BP: 130/80   Pulse: 70   Resp: 18   Temp: 96.8 °F (36 °C)   SpO2: 99%   Weight: 112 kg (246 lb)          Physical Exam   Constitutional: Vital signs are normal. He appears well-developed and well-nourished.   HENT:   Head: Normocephalic and atraumatic.   Right Ear: Hearing, tympanic membrane, external ear and ear canal normal.   Left Ear: Hearing, tympanic membrane, external ear and ear canal normal.   Mouth/Throat: Oropharynx is clear and moist.   Eyes: Conjunctivae, EOM and lids are normal. Pupils are equal, round, and reactive to light.   Neck: Normal range of motion. Neck supple. No thyromegaly present.   Cardiovascular: Normal rate, regular rhythm and normal heart sounds. Exam reveals no friction rub.   No murmur heard.  Pulmonary/Chest: Effort normal and breath sounds normal. No respiratory distress. He has no wheezes. He has no rales.   Abdominal: Soft. Normal appearance and bowel sounds are normal. He exhibits no distension and no mass. There is tenderness (LLQ). There is no rebound and no guarding.   Musculoskeletal: He exhibits no edema.        Lumbar back: He exhibits decreased range of motion and tenderness (left paraspinous area).   Neurological: He is alert. He has normal strength. He displays no atrophy and no tremor. Gait (antalgic  gait) abnormal.   Reflex Scores:       Patellar reflexes are 2+ on the right side and 1+ on the left side.  Left + SLR   Skin: Skin is warm and dry.   Psychiatric: He has a normal mood and affect. His speech is normal. Cognition and memory are normal.   Nursing note and vitals reviewed.                Cheo Weiss MD

## 2019-10-24 ENCOUNTER — CLINICAL SUPPORT NO REQUIREMENTS (OUTPATIENT)
Dept: CARDIOLOGY | Facility: CLINIC | Age: 64
End: 2019-10-24

## 2019-10-24 DIAGNOSIS — I25.5 ISCHEMIC CARDIOMYOPATHY: ICD-10-CM

## 2019-10-24 PROCEDURE — 93296 REM INTERROG EVL PM/IDS: CPT | Performed by: INTERNAL MEDICINE

## 2019-10-24 PROCEDURE — 93295 DEV INTERROG REMOTE 1/2/MLT: CPT | Performed by: INTERNAL MEDICINE

## 2019-10-31 ENCOUNTER — HOSPITAL ENCOUNTER (OUTPATIENT)
Dept: CT IMAGING | Facility: HOSPITAL | Age: 64
Discharge: HOME | End: 2019-10-31

## 2019-10-31 ENCOUNTER — HOSPITAL ENCOUNTER (OUTPATIENT)
Dept: CARDIOLOGY | Facility: HOSPITAL | Age: 64
Discharge: HOME | End: 2019-10-31
Admitting: INTERNAL MEDICINE

## 2019-10-31 VITALS — HEIGHT: 67 IN | BODY MASS INDEX: 38.61 KG/M2 | WEIGHT: 246 LBS

## 2019-10-31 DIAGNOSIS — T81.718S: ICD-10-CM

## 2019-10-31 DIAGNOSIS — I71.00: ICD-10-CM

## 2019-10-31 DIAGNOSIS — I25.5 ISCHEMIC CARDIOMYOPATHY: ICD-10-CM

## 2019-10-31 DIAGNOSIS — I25.10 MULTIPLE VESSEL CORONARY ARTERY DISEASE: ICD-10-CM

## 2019-10-31 LAB
BH CV ECHO MEAS - AO MAX PG (FULL): 2.4 MMHG
BH CV ECHO MEAS - AO MAX PG: 6 MMHG
BH CV ECHO MEAS - AO MEAN PG (FULL): 1.3 MMHG
BH CV ECHO MEAS - AO MEAN PG: 3.4 MMHG
BH CV ECHO MEAS - AO ROOT AREA (BSA CORRECTED): 1.6
BH CV ECHO MEAS - AO ROOT AREA: 9.6 CM^2
BH CV ECHO MEAS - AO ROOT DIAM: 3.5 CM
BH CV ECHO MEAS - AO V2 MAX: 117.7 CM/SEC
BH CV ECHO MEAS - AO V2 MEAN: 88 CM/SEC
BH CV ECHO MEAS - AO V2 VTI: 28.8 CM
BH CV ECHO MEAS - AVA(I,A): 2.9 CM^2
BH CV ECHO MEAS - AVA(I,D): 2.9 CM^2
BH CV ECHO MEAS - AVA(V,A): 2.6 CM^2
BH CV ECHO MEAS - AVA(V,D): 2.6 CM^2
BH CV ECHO MEAS - BSA(HAYCOCK): 2.3 M^2
BH CV ECHO MEAS - BSA: 2.2 M^2
BH CV ECHO MEAS - BZI_BMI: 38.5 KILOGRAMS/M^2
BH CV ECHO MEAS - BZI_METRIC_HEIGHT: 170.2 CM
BH CV ECHO MEAS - BZI_METRIC_WEIGHT: 111.6 KG
BH CV ECHO MEAS - EDV(CUBED): 78.7 ML
BH CV ECHO MEAS - EDV(MOD-SP2): 81.1 ML
BH CV ECHO MEAS - EDV(MOD-SP4): 158 ML
BH CV ECHO MEAS - EDV(TEICH): 82.4 ML
BH CV ECHO MEAS - EF(CUBED): 72.7 %
BH CV ECHO MEAS - EF(MOD-SP2): 57.7 %
BH CV ECHO MEAS - EF(MOD-SP4): 66.2 %
BH CV ECHO MEAS - EF(TEICH): 64.8 %
BH CV ECHO MEAS - ESV(CUBED): 21.4 ML
BH CV ECHO MEAS - ESV(MOD-SP2): 34.3 ML
BH CV ECHO MEAS - ESV(MOD-SP4): 53.4 ML
BH CV ECHO MEAS - ESV(TEICH): 29 ML
BH CV ECHO MEAS - FS: 35.2 %
BH CV ECHO MEAS - IVS/LVPW: 1
BH CV ECHO MEAS - IVSD: 1.4 CM
BH CV ECHO MEAS - LA DIMENSION: 4.2 CM
BH CV ECHO MEAS - LA/AO: 1.2
BH CV ECHO MEAS - LAD MAJOR: 5.3 CM
BH CV ECHO MEAS - LAT PEAK E' VEL: 9.2 CM/SEC
BH CV ECHO MEAS - LATERAL E/E' RATIO: 9.2
BH CV ECHO MEAS - LV DIASTOLIC VOL/BSA (35-75): 71.6 ML/M^2
BH CV ECHO MEAS - LV MASS(C)D: 227 GRAMS
BH CV ECHO MEAS - LV MASS(C)DI: 102.8 GRAMS/M^2
BH CV ECHO MEAS - LV MAX PG: 3.6 MMHG
BH CV ECHO MEAS - LV MEAN PG: 2.1 MMHG
BH CV ECHO MEAS - LV SYSTOLIC VOL/BSA (12-30): 24.2 ML/M^2
BH CV ECHO MEAS - LV V1 MAX: 95.2 CM/SEC
BH CV ECHO MEAS - LV V1 MEAN: 65.8 CM/SEC
BH CV ECHO MEAS - LV V1 VTI: 25.9 CM
BH CV ECHO MEAS - LVIDD: 4.3 CM
BH CV ECHO MEAS - LVIDS: 2.8 CM
BH CV ECHO MEAS - LVLD AP2: 8.2 CM
BH CV ECHO MEAS - LVLD AP4: 8.6 CM
BH CV ECHO MEAS - LVLS AP2: 7.9 CM
BH CV ECHO MEAS - LVLS AP4: 6.3 CM
BH CV ECHO MEAS - LVOT AREA (M): 3.1 CM^2
BH CV ECHO MEAS - LVOT AREA: 3.2 CM^2
BH CV ECHO MEAS - LVOT DIAM: 2 CM
BH CV ECHO MEAS - LVPWD: 1.4 CM
BH CV ECHO MEAS - MED PEAK E' VEL: 5.4 CM/SEC
BH CV ECHO MEAS - MEDIAL E/E' RATIO: 15.7
BH CV ECHO MEAS - MV A MAX VEL: 82 CM/SEC
BH CV ECHO MEAS - MV DEC TIME: 0.3 SEC
BH CV ECHO MEAS - MV E MAX VEL: 85.4 CM/SEC
BH CV ECHO MEAS - MV E/A: 1
BH CV ECHO MEAS - MV MAX PG: 3.1 MMHG
BH CV ECHO MEAS - MV MEAN PG: 1.8 MMHG
BH CV ECHO MEAS - MV V2 MAX: 88 CM/SEC
BH CV ECHO MEAS - MV V2 MEAN: 63.3 CM/SEC
BH CV ECHO MEAS - MV V2 VTI: 22.3 CM
BH CV ECHO MEAS - MVA(VTI): 3.7 CM^2
BH CV ECHO MEAS - PA ACC TIME: 0.17 SEC
BH CV ECHO MEAS - PA MAX PG: 4.7 MMHG
BH CV ECHO MEAS - PA PR(ACCEL): 2.9 MMHG
BH CV ECHO MEAS - PA V2 MAX: 108.6 CM/SEC
BH CV ECHO MEAS - PI END-D VEL: 103.7 CM/SEC
BH CV ECHO MEAS - RAP SYSTOLE: 8 MMHG
BH CV ECHO MEAS - SI(AO): 125.1 ML/M^2
BH CV ECHO MEAS - SI(CUBED): 25.9 ML/M^2
BH CV ECHO MEAS - SI(LVOT): 37.6 ML/M^2
BH CV ECHO MEAS - SI(MOD-SP2): 21.2 ML/M^2
BH CV ECHO MEAS - SI(MOD-SP4): 47.4 ML/M^2
BH CV ECHO MEAS - SI(TEICH): 24.2 ML/M^2
BH CV ECHO MEAS - SV(AO): 276.1 ML
BH CV ECHO MEAS - SV(CUBED): 57.2 ML
BH CV ECHO MEAS - SV(LVOT): 83.1 ML
BH CV ECHO MEAS - SV(MOD-SP2): 46.8 ML
BH CV ECHO MEAS - SV(MOD-SP4): 104.6 ML
BH CV ECHO MEAS - SV(TEICH): 53.4 ML
BH CV ECHO MEASUREMENTS AVERAGE E/E' RATIO: 11.7
BH CV VAS BP LEFT ARM: NORMAL MMHG
BH CV XLRA - RV BASE: 4.7 CM
BH CV XLRA - RV LENGTH: 6.9 CM
BH CV XLRA - RV MID: 4.1 CM
LEFT ATRIUM VOLUME INDEX: 28.4 ML/M^2
LEFT ATRIUM VOLUME: 62.7 ML
LV EF 2D ECHO EST: 40 %

## 2019-10-31 PROCEDURE — 25010000002 SULFUR HEXAFLUORIDE MICROSPH 60.7-25 MG RECONSTITUTED SUSPENSION: Performed by: INTERNAL MEDICINE

## 2019-10-31 PROCEDURE — 93306 TTE W/DOPPLER COMPLETE: CPT

## 2019-10-31 PROCEDURE — 71275 CT ANGIOGRAPHY CHEST: CPT

## 2019-10-31 PROCEDURE — 0 IOPAMIDOL PER 1 ML: Performed by: INTERNAL MEDICINE

## 2019-10-31 PROCEDURE — 74174 CTA ABD&PLVS W/CONTRAST: CPT

## 2019-10-31 PROCEDURE — 93306 TTE W/DOPPLER COMPLETE: CPT | Performed by: INTERNAL MEDICINE

## 2019-10-31 RX ADMIN — IOPAMIDOL 95 ML: 755 INJECTION, SOLUTION INTRAVENOUS at 10:25

## 2019-10-31 RX ADMIN — SULFUR HEXAFLUORIDE 2 ML: KIT at 14:00

## 2019-11-04 ENCOUNTER — TELEPHONE (OUTPATIENT)
Dept: CARDIOLOGY | Facility: CLINIC | Age: 64
End: 2019-11-04

## 2019-11-04 NOTE — TELEPHONE ENCOUNTER
Left Message advising no significant change in echo    ----- Message from Uri Willis MD sent at 11/1/2019  1:08 PM EDT -----  No significant change

## 2020-01-13 DIAGNOSIS — M54.50 LUMBAR PAIN: ICD-10-CM

## 2020-01-13 DIAGNOSIS — E11.65 UNCONTROLLED TYPE 2 DIABETES MELLITUS WITH HYPERGLYCEMIA, WITHOUT LONG-TERM CURRENT USE OF INSULIN (HCC): ICD-10-CM

## 2020-01-13 RX ORDER — CYCLOBENZAPRINE HCL 10 MG
TABLET ORAL
Qty: 30 TABLET | Refills: 0 | Status: SHIPPED | OUTPATIENT
Start: 2020-01-13 | End: 2020-03-02

## 2020-01-13 RX ORDER — OMEPRAZOLE 40 MG/1
CAPSULE, DELAYED RELEASE ORAL
Qty: 90 CAPSULE | Refills: 0 | Status: SHIPPED | OUTPATIENT
Start: 2020-01-13 | End: 2020-04-09

## 2020-01-13 RX ORDER — SITAGLIPTIN 100 MG/1
TABLET, FILM COATED ORAL
Qty: 90 TABLET | Refills: 0 | Status: SHIPPED | OUTPATIENT
Start: 2020-01-13 | End: 2020-04-09

## 2020-01-17 DIAGNOSIS — E11.65 UNCONTROLLED TYPE 2 DIABETES MELLITUS WITH HYPERGLYCEMIA, WITHOUT LONG-TERM CURRENT USE OF INSULIN (HCC): Primary | ICD-10-CM

## 2020-01-17 DIAGNOSIS — I10 ESSENTIAL HYPERTENSION: ICD-10-CM

## 2020-01-17 DIAGNOSIS — E78.00 PURE HYPERCHOLESTEROLEMIA: ICD-10-CM

## 2020-01-18 LAB
ALBUMIN SERPL-MCNC: 4.4 G/DL (ref 3.5–5.2)
ALBUMIN/GLOB SERPL: 1.5 G/DL
ALP SERPL-CCNC: 36 U/L (ref 39–117)
ALT SERPL-CCNC: 41 U/L (ref 1–41)
AST SERPL-CCNC: 46 U/L (ref 1–40)
BASOPHILS # BLD AUTO: 0.06 10*3/MM3 (ref 0–0.2)
BASOPHILS NFR BLD AUTO: 1.1 % (ref 0–1.5)
BILIRUB SERPL-MCNC: 0.5 MG/DL (ref 0.2–1.2)
BUN SERPL-MCNC: 16 MG/DL (ref 8–23)
BUN/CREAT SERPL: 15 (ref 7–25)
CALCIUM SERPL-MCNC: 9.5 MG/DL (ref 8.6–10.5)
CHLORIDE SERPL-SCNC: 100 MMOL/L (ref 98–107)
CHOLEST SERPL-MCNC: 123 MG/DL (ref 0–200)
CHOLEST/HDLC SERPL: 3.84 {RATIO}
CO2 SERPL-SCNC: 24.7 MMOL/L (ref 22–29)
CREAT SERPL-MCNC: 1.07 MG/DL (ref 0.76–1.27)
EOSINOPHIL # BLD AUTO: 0.15 10*3/MM3 (ref 0–0.4)
EOSINOPHIL NFR BLD AUTO: 2.6 % (ref 0.3–6.2)
ERYTHROCYTE [DISTWIDTH] IN BLOOD BY AUTOMATED COUNT: 12.7 % (ref 12.3–15.4)
GLOBULIN SER CALC-MCNC: 2.9 GM/DL
GLUCOSE SERPL-MCNC: 167 MG/DL (ref 65–99)
HBA1C MFR BLD: 8.5 % (ref 4.8–5.6)
HCT VFR BLD AUTO: 41.3 % (ref 37.5–51)
HDLC SERPL-MCNC: 32 MG/DL (ref 40–60)
HGB BLD-MCNC: 14.1 G/DL (ref 13–17.7)
IMM GRANULOCYTES # BLD AUTO: 0.02 10*3/MM3 (ref 0–0.05)
IMM GRANULOCYTES NFR BLD AUTO: 0.4 % (ref 0–0.5)
LDLC SERPL CALC-MCNC: 51 MG/DL (ref 0–100)
LYMPHOCYTES # BLD AUTO: 1.75 10*3/MM3 (ref 0.7–3.1)
LYMPHOCYTES NFR BLD AUTO: 30.8 % (ref 19.6–45.3)
MCH RBC QN AUTO: 32.6 PG (ref 26.6–33)
MCHC RBC AUTO-ENTMCNC: 34.1 G/DL (ref 31.5–35.7)
MCV RBC AUTO: 95.6 FL (ref 79–97)
MONOCYTES # BLD AUTO: 0.68 10*3/MM3 (ref 0.1–0.9)
MONOCYTES NFR BLD AUTO: 12 % (ref 5–12)
NEUTROPHILS # BLD AUTO: 3.03 10*3/MM3 (ref 1.7–7)
NEUTROPHILS NFR BLD AUTO: 53.1 % (ref 42.7–76)
NRBC BLD AUTO-RTO: 0 /100 WBC (ref 0–0.2)
PLATELET # BLD AUTO: 110 10*3/MM3 (ref 140–450)
POTASSIUM SERPL-SCNC: 4.8 MMOL/L (ref 3.5–5.2)
PROT SERPL-MCNC: 7.3 G/DL (ref 6–8.5)
RBC # BLD AUTO: 4.32 10*6/MM3 (ref 4.14–5.8)
SODIUM SERPL-SCNC: 138 MMOL/L (ref 136–145)
TRIGL SERPL-MCNC: 202 MG/DL (ref 0–150)
VLDLC SERPL CALC-MCNC: 40.4 MG/DL
WBC # BLD AUTO: 5.69 10*3/MM3 (ref 3.4–10.8)

## 2020-01-21 ENCOUNTER — OFFICE VISIT (OUTPATIENT)
Dept: FAMILY MEDICINE CLINIC | Facility: CLINIC | Age: 65
End: 2020-01-21

## 2020-01-21 VITALS
HEIGHT: 67 IN | DIASTOLIC BLOOD PRESSURE: 78 MMHG | BODY MASS INDEX: 39.39 KG/M2 | RESPIRATION RATE: 18 BRPM | SYSTOLIC BLOOD PRESSURE: 122 MMHG | HEART RATE: 72 BPM | WEIGHT: 251 LBS | TEMPERATURE: 97.2 F

## 2020-01-21 DIAGNOSIS — I10 ESSENTIAL HYPERTENSION: ICD-10-CM

## 2020-01-21 DIAGNOSIS — E11.65 UNCONTROLLED TYPE 2 DIABETES MELLITUS WITH HYPERGLYCEMIA, WITHOUT LONG-TERM CURRENT USE OF INSULIN (HCC): Primary | ICD-10-CM

## 2020-01-21 DIAGNOSIS — I25.10 CORONARY ARTERY DISEASE INVOLVING NATIVE CORONARY ARTERY OF NATIVE HEART WITHOUT ANGINA PECTORIS: ICD-10-CM

## 2020-01-21 DIAGNOSIS — E78.00 PURE HYPERCHOLESTEROLEMIA: ICD-10-CM

## 2020-01-21 PROCEDURE — 99214 OFFICE O/P EST MOD 30 MIN: CPT | Performed by: FAMILY MEDICINE

## 2020-01-21 NOTE — PROGRESS NOTES
Assessment/Plan       Problems Addressed this Visit        Cardiovascular and Mediastinum    Hyperlipidemia    Essential hypertension    Coronary artery disease involving native coronary artery of native heart without angina pectoris    Relevant Medications    Empagliflozin (JARDIANCE) 10 MG tablet      Other Visit Diagnoses     Uncontrolled type 2 diabetes mellitus with hyperglycemia, without long-term current use of insulin (CMS/[facility])    -  Primary    Relevant Medications    Empagliflozin (JARDIANCE) 10 MG tablet            Follow up: Return in about 3 months (around 4/21/2020).     DISCUSSION  Start Jardiance due to increasing A1c and heart disease.  A1c is increased to 8.5.    Continue diet changes and wt loss efforts    HTN. BP controlled    Hyperlipidemia and CAD. Stable. To see cardiology in April    Reported PAd from [employer]. Await result but no clinical disease seen on exam today.         MEDICATIONS PRESCRIBED  Requested Prescriptions     Signed Prescriptions Disp Refills   • Empagliflozin (JARDIANCE) 10 MG tablet 90 tablet 1     Sig: Take 10 mg by mouth Daily.              -------------------------------------------    Subjective     Chief Complaint   Patient presents with   • Diabetes     3 month f/u          Diabetes   He presents for his follow-up diabetic visit. He has type 2 diabetes mellitus. His disease course has been stable. There are no hypoglycemic associated symptoms. Pertinent negatives for diabetes include no chest pain and no fatigue. There are no hypoglycemic complications. Diabetic complications include heart disease. Risk factors for coronary artery disease include diabetes mellitus, hypertension and dyslipidemia. Current diabetic treatment includes oral agent (monotherapy) (Januvia). He is compliant with treatment all of the time. His weight is stable. He is following a generally healthy diet. He never participates in exercise. He monitors blood glucose at home 1-2 x per day (140-150).  There is no change in his home blood glucose trend. An ACE inhibitor/angiotensin II receptor blocker is being taken (on entresto). Eye exam is current.   Hypertension   This is a chronic problem. The problem is unchanged. The problem is controlled. Associated symptoms include peripheral edema. Pertinent negatives include no chest pain or shortness of breath. There are no associated agents to hypertension. Current antihypertension treatment includes angiotensin blockers. The current treatment provides moderate improvement. Hypertensive end-organ damage includes CAD/MI.   Coronary Artery Disease   Presents for follow-up visit. Pertinent negatives include no chest pain, chest pressure, leg swelling, shortness of breath or weight gain. (Occ may get some chest sx with cold weather when bending but not heart pain  ) Risk factors include hyperlipidemia. The symptoms have been stable.   Hyperlipidemia   This is a chronic problem. The current episode started more than 1 year ago. The problem is controlled. Recent lipid tests were reviewed and are normal. Pertinent negatives include no chest pain, myalgias (muscle spasms in left leg, lower) or shortness of breath. Current antihyperlipidemic treatment includes statins. The current treatment provides moderate improvement of lipids. There are no compliance problems.  Risk factors for coronary artery disease include diabetes mellitus, dyslipidemia and hypertension.             Michael came out and said he had PAD  A test was done  Does get some pain when walking        Social History     Tobacco Use   Smoking Status Former Smoker   • Packs/day: 3.00   • Years: 35.00   • Pack years: 105.00   • Types: Cigarettes   • Last attempt to quit: 1/15/2006   • Years since quittin.0   Smokeless Tobacco Never Used          Past Medical History,Medications, Allergies, and social history was reviewed.          Review of Systems   Constitutional: Negative.  Negative for fatigue and  "unexpected weight gain.   HENT: Negative.    Respiratory: Negative.  Negative for shortness of breath.    Cardiovascular: Negative.  Negative for chest pain and leg swelling.   Gastrointestinal: Positive for abdominal pain (chronic, no change).   Genitourinary: Negative.    Musculoskeletal: Positive for back pain. Negative for myalgias (muscle spasms in left leg, lower).   Neurological: Negative.    Psychiatric/Behavioral: Negative.        Objective     Vitals:    01/21/20 0830   BP: 122/78   Pulse: 72   Resp: 18   Temp: 97.2 °F (36.2 °C)   Weight: 114 kg (251 lb)   Height: 170.2 cm (67\")          Physical Exam   Constitutional: Vital signs are normal. He appears well-developed and well-nourished.   obese   HENT:   Head: Normocephalic and atraumatic.   Right Ear: Hearing, tympanic membrane, external ear and ear canal normal.   Left Ear: Hearing, tympanic membrane, external ear and ear canal normal.   Mouth/Throat: Oropharynx is clear and moist.   Eyes: Pupils are equal, round, and reactive to light. Conjunctivae, EOM and lids are normal.   Neck: Normal range of motion. Neck supple. No thyromegaly present.   Cardiovascular: Normal rate, regular rhythm and normal heart sounds. Exam reveals no friction rub.   No murmur heard.  Pulmonary/Chest: Effort normal and breath sounds normal. No respiratory distress. He has no wheezes. He has no rales.   Abdominal: Soft. Normal appearance and bowel sounds are normal. He exhibits no distension and no mass. There is no tenderness. There is no rebound and no guarding.   Musculoskeletal: He exhibits edema (trace).    Kai had a diabetic foot exam performed today.   During the foot exam he had a monofilament test performed (decreased sensation, left great toe).  Vascular Status -  His right foot exhibits abnormal foot edema (trace ). His left foot exhibits abnormal foot edema (trace).  Skin Integrity  -  His right foot skin is intact.His left foot skin is intact..  Neurological: He " is alert. He has normal strength.   Skin: Skin is warm and dry.   Psychiatric: He has a normal mood and affect. His speech is normal. Cognition and memory are normal.   Nursing note and vitals reviewed.    Hair is present on toes. Faint DP pulses bilaterally. No cyanosis. Skin intact on feet. No rashes              Cheo Weiss MD

## 2020-01-30 ENCOUNTER — CLINICAL SUPPORT NO REQUIREMENTS (OUTPATIENT)
Dept: CARDIOLOGY | Facility: CLINIC | Age: 65
End: 2020-01-30

## 2020-01-30 DIAGNOSIS — I25.5 ISCHEMIC CARDIOMYOPATHY: Primary | ICD-10-CM

## 2020-01-30 PROCEDURE — 93296 REM INTERROG EVL PM/IDS: CPT | Performed by: INTERNAL MEDICINE

## 2020-01-30 PROCEDURE — 93295 DEV INTERROG REMOTE 1/2/MLT: CPT | Performed by: INTERNAL MEDICINE

## 2020-03-02 ENCOUNTER — TELEPHONE (OUTPATIENT)
Dept: FAMILY MEDICINE CLINIC | Facility: CLINIC | Age: 65
End: 2020-03-02

## 2020-03-02 DIAGNOSIS — M54.50 LUMBAR PAIN: ICD-10-CM

## 2020-03-02 DIAGNOSIS — G47.09 OTHER INSOMNIA: ICD-10-CM

## 2020-03-02 RX ORDER — ZOLPIDEM TARTRATE 10 MG/1
TABLET ORAL
Qty: 30 TABLET | Refills: 0 | Status: SHIPPED | OUTPATIENT
Start: 2020-03-02 | End: 2020-04-09

## 2020-03-02 RX ORDER — CYCLOBENZAPRINE HCL 10 MG
TABLET ORAL
Qty: 30 TABLET | Refills: 2 | Status: SHIPPED | OUTPATIENT
Start: 2020-03-02 | End: 2020-10-01

## 2020-04-09 DIAGNOSIS — G47.09 OTHER INSOMNIA: ICD-10-CM

## 2020-04-09 DIAGNOSIS — E11.65 UNCONTROLLED TYPE 2 DIABETES MELLITUS WITH HYPERGLYCEMIA, WITHOUT LONG-TERM CURRENT USE OF INSULIN (HCC): ICD-10-CM

## 2020-04-09 RX ORDER — BISOPROLOL FUMARATE 10 MG/1
TABLET, FILM COATED ORAL
Qty: 90 TABLET | Refills: 2 | Status: SHIPPED | OUTPATIENT
Start: 2020-04-09 | End: 2020-10-02

## 2020-04-09 RX ORDER — SITAGLIPTIN 100 MG/1
TABLET, FILM COATED ORAL
Qty: 90 TABLET | Refills: 1 | Status: SHIPPED | OUTPATIENT
Start: 2020-04-09 | End: 2020-10-01

## 2020-04-09 RX ORDER — ROSUVASTATIN CALCIUM 10 MG/1
TABLET, COATED ORAL
Qty: 90 TABLET | Refills: 1 | Status: SHIPPED | OUTPATIENT
Start: 2020-04-09 | End: 2020-10-01

## 2020-04-09 RX ORDER — CLOPIDOGREL BISULFATE 75 MG/1
TABLET ORAL
Qty: 90 TABLET | Refills: 2 | Status: SHIPPED | OUTPATIENT
Start: 2020-04-09 | End: 2020-10-02

## 2020-04-09 RX ORDER — CLOPIDOGREL BISULFATE 75 MG/1
TABLET ORAL
Qty: 90 TABLET | Refills: 0 | OUTPATIENT
Start: 2020-04-09

## 2020-04-09 RX ORDER — ZOLPIDEM TARTRATE 10 MG/1
TABLET ORAL
Qty: 30 TABLET | Refills: 1 | Status: SHIPPED | OUTPATIENT
Start: 2020-04-09 | End: 2020-07-28 | Stop reason: SDUPTHER

## 2020-04-09 RX ORDER — OMEPRAZOLE 40 MG/1
CAPSULE, DELAYED RELEASE ORAL
Qty: 90 CAPSULE | Refills: 1 | Status: SHIPPED | OUTPATIENT
Start: 2020-04-09 | End: 2020-10-01

## 2020-04-09 RX ORDER — SACUBITRIL AND VALSARTAN 49; 51 MG/1; MG/1
TABLET, FILM COATED ORAL
Qty: 180 TABLET | Refills: 1 | Status: SHIPPED | OUTPATIENT
Start: 2020-04-09 | End: 2020-10-01

## 2020-04-28 ENCOUNTER — OFFICE VISIT (OUTPATIENT)
Dept: FAMILY MEDICINE CLINIC | Facility: CLINIC | Age: 65
End: 2020-04-28

## 2020-04-28 ENCOUNTER — TELEPHONE (OUTPATIENT)
Dept: FAMILY MEDICINE CLINIC | Facility: CLINIC | Age: 65
End: 2020-04-28

## 2020-04-28 VITALS
SYSTOLIC BLOOD PRESSURE: 116 MMHG | HEIGHT: 67 IN | OXYGEN SATURATION: 98 % | BODY MASS INDEX: 38.14 KG/M2 | TEMPERATURE: 97.4 F | HEART RATE: 76 BPM | WEIGHT: 243 LBS | DIASTOLIC BLOOD PRESSURE: 78 MMHG

## 2020-04-28 DIAGNOSIS — E11.65 UNCONTROLLED TYPE 2 DIABETES MELLITUS WITH HYPERGLYCEMIA, WITHOUT LONG-TERM CURRENT USE OF INSULIN: Primary | ICD-10-CM

## 2020-04-28 DIAGNOSIS — E78.00 PURE HYPERCHOLESTEROLEMIA: ICD-10-CM

## 2020-04-28 DIAGNOSIS — I10 ESSENTIAL HYPERTENSION: ICD-10-CM

## 2020-04-28 DIAGNOSIS — E11.65 UNCONTROLLED TYPE 2 DIABETES MELLITUS WITH HYPERGLYCEMIA, WITHOUT LONG-TERM CURRENT USE OF INSULIN (HCC): Primary | ICD-10-CM

## 2020-04-28 LAB
ALBUMIN SERPL-MCNC: 4.6 G/DL (ref 3.5–5.2)
ALBUMIN/GLOB SERPL: 1.4 G/DL
ALP SERPL-CCNC: 36 U/L (ref 39–117)
ALT SERPL-CCNC: 42 U/L (ref 1–41)
AST SERPL-CCNC: 34 U/L (ref 1–40)
BILIRUB SERPL-MCNC: 0.5 MG/DL (ref 0.2–1.2)
BUN SERPL-MCNC: 18 MG/DL (ref 8–23)
BUN/CREAT SERPL: 17.6 (ref 7–25)
CALCIUM SERPL-MCNC: 9.6 MG/DL (ref 8.6–10.5)
CHLORIDE SERPL-SCNC: 102 MMOL/L (ref 98–107)
CHOLEST SERPL-MCNC: 135 MG/DL (ref 0–200)
CHOLEST/HDLC SERPL: 3.75 {RATIO}
CO2 SERPL-SCNC: 25.5 MMOL/L (ref 22–29)
CREAT SERPL-MCNC: 1.02 MG/DL (ref 0.76–1.27)
GLOBULIN SER CALC-MCNC: 3.4 GM/DL
GLUCOSE SERPL-MCNC: 128 MG/DL (ref 65–99)
HBA1C MFR BLD: 7.4 % (ref 4.8–5.6)
HDLC SERPL-MCNC: 36 MG/DL (ref 40–60)
LDLC SERPL CALC-MCNC: 62 MG/DL (ref 0–100)
POTASSIUM SERPL-SCNC: 4.3 MMOL/L (ref 3.5–5.2)
PROT SERPL-MCNC: 8 G/DL (ref 6–8.5)
SODIUM SERPL-SCNC: 140 MMOL/L (ref 136–145)
TRIGL SERPL-MCNC: 184 MG/DL (ref 0–150)
VLDLC SERPL CALC-MCNC: 36.8 MG/DL (ref 5–40)

## 2020-04-28 PROCEDURE — 99214 OFFICE O/P EST MOD 30 MIN: CPT | Performed by: FAMILY MEDICINE

## 2020-04-28 RX ORDER — LANCETS
EACH MISCELLANEOUS
Qty: 200 EACH | Refills: 3 | Status: ON HOLD | OUTPATIENT
Start: 2020-04-28 | End: 2021-04-01

## 2020-04-28 NOTE — PROGRESS NOTES
Assessment/Plan       Problems Addressed this Visit        Cardiovascular and Mediastinum    Hyperlipidemia    Relevant Orders    Comprehensive Metabolic Panel    Lipid Panel With / Chol / HDL Ratio    Essential hypertension    Relevant Orders    Comprehensive Metabolic Panel    Lipid Panel With / Chol / HDL Ratio      Other Visit Diagnoses     Uncontrolled type 2 diabetes mellitus with hyperglycemia, without long-term current use of insulin (CMS/[facility])    -  Primary    Relevant Orders    Comprehensive Metabolic Panel    Lipid Panel With / Chol / HDL Ratio    Hemoglobin A1c            Follow up: Return in about 3 months (around 7/28/2020).     DISCUSSION  Overall stable and doing well. Continue current medications. Chronic problems noted are stable. Check labs as noted and Return in about 3 months (around 7/28/2020).       Further plan once labs back.    Patient will call back with what type of test strips he is currently using.  Needs refill.    Diabetes mellitus.  Check A1c to assess stability.    Hypertension is doing well on current medications.          MEDICATIONS PRESCRIBED  Requested Prescriptions      No prescriptions requested or ordered in this encounter          -------------------------------------------    Subjective     Chief Complaint   Patient presents with   • 3mo f/u DM     needs Accu Check test strips and lancents rf  (140-150's FBS)    • Hypertension         Hypertension   This is a chronic problem. The problem is unchanged. The problem is controlled. Associated symptoms include malaise/fatigue (no change) and shortness of breath (little  ). Pertinent negatives include no chest pain or peripheral edema. There are no associated agents to hypertension. Current antihypertension treatment includes angiotensin blockers. The current treatment provides moderate improvement. Hypertensive end-organ damage includes CAD/MI.   Diabetes   He presents for his follow-up diabetic visit. He has type 2 diabetes  mellitus. His disease course has been stable. There are no hypoglycemic associated symptoms. Pertinent negatives for diabetes include no chest pain and no fatigue. There are no hypoglycemic complications. Diabetic complications include heart disease. Risk factors for coronary artery disease include diabetes mellitus, hypertension and dyslipidemia. Current diabetic treatment includes oral agent (monotherapy) (Januvia). He is compliant with treatment all of the time. His weight is stable. He is following a generally healthy diet. He never participates in exercise. He monitors blood glucose at home 1-2 x per day (140-150 most days, ave 142). There is no change in his home blood glucose trend. An ACE inhibitor/angiotensin II receptor blocker is being taken (on entresto). Eye exam is current.   Coronary Artery Disease   Presents for follow-up visit. Symptoms include shortness of breath (little  ). Pertinent negatives include no chest pain, chest pressure, leg swelling or weight gain. (Occ may get some chest sx with cold weather when bending but not heart pain  ) Risk factors include hyperlipidemia. The symptoms have been stable.   Hyperlipidemia   This is a chronic problem. The current episode started more than 1 year ago. The problem is controlled. Recent lipid tests were reviewed and are normal. Associated symptoms include shortness of breath (little  ). Pertinent negatives include no chest pain or myalgias (muscle spasms in left leg, lower). Current antihyperlipidemic treatment includes statins. The current treatment provides moderate improvement of lipids. There are no compliance problems.  Risk factors for coronary artery disease include diabetes mellitus, dyslipidemia and hypertension.       Lancets : accucheck softclix bid   Test strips        Social History     Tobacco Use   Smoking Status Former Smoker   • Packs/day: 3.00   • Years: 35.00   • Pack years: 105.00   • Types: Cigarettes   • Last attempt to quit:  "1/15/2006   • Years since quittin.2   Smokeless Tobacco Never Used          Past Medical History,Medications, Allergies, and social history was reviewed.          Review of Systems   Constitutional: Positive for malaise/fatigue (no change). Negative for fatigue and unexpected weight gain.   Respiratory: Positive for shortness of breath (little  ).    Cardiovascular: Negative for chest pain and leg swelling.   Musculoskeletal: Negative for myalgias (muscle spasms in left leg, lower).       Objective     Vitals:    20 1050   BP: 116/78   Pulse: 76   Temp: 97.4 °F (36.3 °C)   SpO2: 98%   Weight: 110 kg (243 lb)   Height: 170.2 cm (67\")          Physical Exam   Constitutional: Vital signs are normal. He appears well-developed and well-nourished.   HENT:   Head: Normocephalic and atraumatic.   Right Ear: Hearing, tympanic membrane, external ear and ear canal normal.   Left Ear: Hearing, tympanic membrane, external ear and ear canal normal.   Mouth/Throat: Oropharynx is clear and moist.   Eyes: Pupils are equal, round, and reactive to light. Conjunctivae, EOM and lids are normal.   Neck: Normal range of motion. Neck supple. No thyromegaly present.   Cardiovascular: Normal rate, regular rhythm and normal heart sounds. Exam reveals no friction rub.   No murmur heard.  Pulmonary/Chest: Effort normal and breath sounds normal. No respiratory distress. He has no wheezes. He has no rales.   Abdominal: Soft. Normal appearance and bowel sounds are normal. He exhibits no distension and no mass. There is tenderness (left side , chronic, no change). There is no rebound and no guarding.   Musculoskeletal: He exhibits no edema.   Neurological: He is alert. He has normal strength.   Skin: Skin is warm and dry.   Psychiatric: He has a normal mood and affect. His speech is normal. Cognition and memory are normal.   Nursing note and vitals reviewed.                Cheo Weiss MD    "

## 2020-04-28 NOTE — TELEPHONE ENCOUNTER
Called and left detailed message on vm stating that test strips and lancets have been sent to pharmacy.

## 2020-04-28 NOTE — TELEPHONE ENCOUNTER
PT'S WIFE JOSY IS REQUESTING A REFILL FOR   ACCU CHEK AMELIE PLUS LANCETS AND STRIPS      JOSY CALL BACK [phone]    [facility] MAIL DELIVERY

## 2020-06-15 ENCOUNTER — OFFICE VISIT (OUTPATIENT)
Dept: FAMILY MEDICINE CLINIC | Facility: CLINIC | Age: 65
End: 2020-06-15

## 2020-06-15 ENCOUNTER — TELEPHONE (OUTPATIENT)
Dept: FAMILY MEDICINE CLINIC | Facility: CLINIC | Age: 65
End: 2020-06-15

## 2020-06-15 VITALS
RESPIRATION RATE: 18 BRPM | SYSTOLIC BLOOD PRESSURE: 122 MMHG | HEART RATE: 76 BPM | HEIGHT: 67 IN | DIASTOLIC BLOOD PRESSURE: 72 MMHG | WEIGHT: 237 LBS | TEMPERATURE: 98.4 F | BODY MASS INDEX: 37.2 KG/M2

## 2020-06-15 DIAGNOSIS — R31.9 HEMATURIA, UNSPECIFIED TYPE: ICD-10-CM

## 2020-06-15 DIAGNOSIS — R30.0 DYSURIA: Primary | ICD-10-CM

## 2020-06-15 LAB
BILIRUB BLD-MCNC: NEGATIVE MG/DL
CLARITY, POC: CLEAR
COLOR UR: YELLOW
GLUCOSE UR STRIP-MCNC: ABNORMAL MG/DL
KETONES UR QL: NEGATIVE
LEUKOCYTE EST, POC: NEGATIVE
NITRITE UR-MCNC: NEGATIVE MG/ML
PH UR: 5.5 [PH] (ref 5–8)
PROT UR STRIP-MCNC: ABNORMAL MG/DL
RBC # UR STRIP: ABNORMAL /UL
SP GR UR: 1.03 (ref 1–1.03)
UROBILINOGEN UR QL: NORMAL

## 2020-06-15 PROCEDURE — 99213 OFFICE O/P EST LOW 20 MIN: CPT | Performed by: FAMILY MEDICINE

## 2020-06-15 PROCEDURE — 81003 URINALYSIS AUTO W/O SCOPE: CPT | Performed by: FAMILY MEDICINE

## 2020-06-15 NOTE — TELEPHONE ENCOUNTER
Please call and confirm current symptoms.  The Jardiance can cause a yeast infection but unlikely to cause a urinary tract infection.  He may need to be seen.

## 2020-06-15 NOTE — TELEPHONE ENCOUNTER
Burning w/ urination. When he does go it's just dribbles per Wife. Pt was 'out in the field.' Encouraged they c/b and make an appt. Wife understood.

## 2020-06-15 NOTE — PROGRESS NOTES
Assessment/Plan       Problems Addressed this Visit     None      Visit Diagnoses     Dysuria    -  Primary    Relevant Orders    Urine Culture - Urine, Urine, Clean Catch    POC Urinalysis Dipstick, Automated (Completed)    Hematuria, unspecified type        Relevant Orders    Urine Culture - Urine, Urine, Clean Catch            Follow up: Return if symptoms worsen or fail to improve, for follow up depends on review of labs and testing.     DISCUSSION  Dysuria and increased frequency of urination.  May be due to the Jardiance.  We will hold that.  Since symptoms are better, will wait for culture results.  He will call if the symptoms return or worsen before then.  Overall he is improving since he stopped the Jardiance.  The blood in the urine was just trace on the test.          MEDICATIONS PRESCRIBED  Requested Prescriptions      No prescriptions requested or ordered in this encounter            -------------------------------------------    Subjective     Chief Complaint   Patient presents with   • Urinary Tract Infection         History of Present Illness    Dysuria  Had been on Jardiance  Has felt better since   Increased freq of urine while on med    Seems better since off the med  Had itching     Burning when urinates  Better in last 24 hours     No fever  + chills  ( better now)          Social History     Tobacco Use   Smoking Status Former Smoker   • Packs/day: 3.00   • Years: 35.00   • Pack years: 105.00   • Types: Cigarettes   • Last attempt to quit: 1/15/2006   • Years since quittin.4   Smokeless Tobacco Never Used          Past Medical History,Medications, Allergies, and social history was reviewed.          Review of Systems   Constitutional: Negative.    HENT: Negative.    Respiratory: Negative.    Cardiovascular: Negative.    Genitourinary: Positive for dysuria and frequency. Negative for discharge.       Objective     Vitals:    06/15/20 1654   BP: 122/72   Pulse: 76   Resp: 18   Temp: 98.4  "°F (36.9 °C)   Weight: 108 kg (237 lb)   Height: 170.2 cm (67\")          Physical Exam   Constitutional: He appears well-developed and well-nourished.   HENT:   Head: Normocephalic and atraumatic.   Eyes: Pupils are equal, round, and reactive to light. EOM are normal.   Pulmonary/Chest: Effort normal.   Abdominal: Soft. He exhibits no distension. There is no tenderness. There is no rebound and no guarding.   Neurological: He is alert.   Skin: Skin is warm and dry.   Psychiatric: He has a normal mood and affect. His behavior is normal.   Nursing note and vitals reviewed.    Urinalysis shows trace blood and glucose.  Otherwise negative.            Cheo Weiss MD    "

## 2020-06-15 NOTE — TELEPHONE ENCOUNTER
PT CALLED STATING Empagliflozin (JARDIANCE) 10 MG tablet HAS CAUSED A BLADDER INFECTION    PT STATED HE STOPPED TAKING THE MEDICATION AND IS REQUESTING A SCRIPT FOR THE BLADDER INFECTION    PT STATED HE STARTED EXPERIENCING THIS ON 6/10    [facility] - [address] [phone] [facility] [phone] FX     PLEASE ADVISE AT: [phone]

## 2020-06-15 NOTE — TELEPHONE ENCOUNTER
Please call.  He does need to be seen.  We can try and add him on later today at the end of the day or early in the morning tomorrow.

## 2020-06-17 LAB
BACTERIA UR CULT: NO GROWTH
BACTERIA UR CULT: NORMAL
Lab: NORMAL

## 2020-07-06 DIAGNOSIS — I25.10 CORONARY ARTERY DISEASE INVOLVING NATIVE CORONARY ARTERY OF NATIVE HEART WITHOUT ANGINA PECTORIS: ICD-10-CM

## 2020-07-06 DIAGNOSIS — E11.65 UNCONTROLLED TYPE 2 DIABETES MELLITUS WITH HYPERGLYCEMIA, WITHOUT LONG-TERM CURRENT USE OF INSULIN (HCC): ICD-10-CM

## 2020-07-06 RX ORDER — EPLERENONE 25 MG/1
TABLET, FILM COATED ORAL
Qty: 90 TABLET | Refills: 3 | Status: SHIPPED | OUTPATIENT
Start: 2020-07-06 | End: 2021-06-28

## 2020-07-06 RX ORDER — EMPAGLIFLOZIN 10 MG/1
TABLET, FILM COATED ORAL
Qty: 90 TABLET | Refills: 0 | Status: SHIPPED | OUTPATIENT
Start: 2020-07-06 | End: 2020-07-08

## 2020-07-08 ENCOUNTER — OFFICE VISIT (OUTPATIENT)
Dept: CARDIOLOGY | Facility: CLINIC | Age: 65
End: 2020-07-08

## 2020-07-08 VITALS
DIASTOLIC BLOOD PRESSURE: 78 MMHG | WEIGHT: 241 LBS | HEIGHT: 66 IN | HEART RATE: 87 BPM | BODY MASS INDEX: 38.73 KG/M2 | SYSTOLIC BLOOD PRESSURE: 120 MMHG

## 2020-07-08 DIAGNOSIS — I73.9 PAD (PERIPHERAL ARTERY DISEASE) (HCC): ICD-10-CM

## 2020-07-08 DIAGNOSIS — T81.718S: Primary | ICD-10-CM

## 2020-07-08 DIAGNOSIS — I25.10 MULTIPLE VESSEL CORONARY ARTERY DISEASE: ICD-10-CM

## 2020-07-08 DIAGNOSIS — E78.00 PURE HYPERCHOLESTEROLEMIA: ICD-10-CM

## 2020-07-08 DIAGNOSIS — I71.00: Primary | ICD-10-CM

## 2020-07-08 DIAGNOSIS — I10 ESSENTIAL HYPERTENSION: ICD-10-CM

## 2020-07-08 PROCEDURE — 99214 OFFICE O/P EST MOD 30 MIN: CPT | Performed by: INTERNAL MEDICINE

## 2020-07-08 PROCEDURE — 93284 PRGRMG EVAL IMPLANTABLE DFB: CPT | Performed by: INTERNAL MEDICINE

## 2020-07-08 NOTE — PROGRESS NOTES
Subjective:     Encounter Date:07/08/2020    Primary Care Physician: Cheo Weiss MD      Patient ID: Kai Garcia is a 64 y.o. male.    Chief Complaint:Essential hypertension and Multiple vessel coronary artery disease    Problem List:  1. Dissecting aortic aneurysm:  a. 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 Helton, stage I in June 2006 and stage II in March 2007.   b. Followed chronically with annual CTs by Dr. Helton. Stable by CT 2019  2. Coronary artery disease:  a. Cardiac cath prior to “elephant trunk” procedure in June 2006 and the patient underwent CABG at that time consisting of SVG to the OM and SVG to the PDA.   b. On 02/08/2007 - inferoposterior MI, cardiac cath showed 100% left circumflex with severe OM-1 stenosis and 100% vein graft to the left circumflex. RCA was diffuse, 90% ostial stenosis at the SVG to the RCA. LAD had only 40% stenosis.   c. Cardiac cath in March 2007 due to recurrent angina - 2.5 Microdriver stent to the distal native RCA.   d. 4/27/18 cardiac catheter occluded vein grafts ×2.  Large posterior basal aneurysm with ejection fraction of 30-35%.  Severe stenosis and distal circumflex which supplies aneurysm.  Non-flow-limiting RCA disease.  E. MRI 8/18 LVEF 28%  G. S/P Bi- Ventricular ICD- [place] 8/9/18, EMILIE Fox MD  3. Dyslipidemia.   4. Hypertension.   5. Diabetes  6. Chronic low back pain/multiple lumbar surgeries, currently disabled:  a. L4-L5 discectomy in 2015.   7. History of bleeding peptic ulcer disease.   8. Severe obstructive sleep apnea.   9. Appendectomy        Allergies   Allergen Reactions   • Ativan [Lorazepam] Other (See Comments)     agitation   • Zetia [Ezetimibe] Other (See Comments)     increased LFTs   • Zocor [Simvastatin] Myalgia   • Isosorbide Other (See Comments)     headaches   • Metformin And Related Diarrhea         Current Outpatient  Medications:   •  Accu-Chek Softclix Lancets lancets, Check blood sugars twice a day or as needed, Disp: 200 each, Rfl: 3  •  acetaminophen (TYLENOL) 500 MG tablet, Take 1,000 mg by mouth Every 6 (Six) Hours As Needed for Mild Pain ., Disp: , Rfl:   •  aspirin 81 MG EC tablet, Take 81 mg by mouth Daily., Disp: , Rfl:   •  bisoprolol (ZEBeta) 10 MG tablet, TAKE ONE TABLET BY MOUTH ONCE DAILY, Disp: 90 tablet, Rfl: 2  •  clopidogrel (PLAVIX) 75 MG tablet, TAKE ONE TABLET BY MOUTH ONCE DAILY, Disp: 90 tablet, Rfl: 2  •  cyclobenzaprine (FLEXERIL) 10 MG tablet, TAKE ONE TABLET BY MOUTH 3 TIMES A DAY AS NEEDED FOR MUSCLE SPASMS, Disp: 30 tablet, Rfl: 2  •  ENTRESTO 49-51 MG tablet, TAKE ONE TABLET BY MOUTH EVERY 12 HOURS, Disp: 180 tablet, Rfl: 1  •  eplerenone (INSPRA) 25 MG tablet, TAKE ONE TABLET BY MOUTH EVERY DAY, Disp: 90 tablet, Rfl: 3  •  glucose blood (Accu-Chek Jodie Plus) test strip, Check sugars twice a day or as needed, Disp: 200 each, Rfl: 3  •  JANUVIA 100 MG tablet, TAKE ONE TABLET BY MOUTH EVERY DAY, Disp: 90 tablet, Rfl: 1  •  JARDIANCE 10 MG tablet, TAKE ONE TABLET BY MOUTH EVERY DAY, Disp: 90 tablet, Rfl: 0  •  nitroglycerin (NITROSTAT) 0.4 MG SL tablet, Place 1 tablet under the tongue Every 5 (Five) Minutes As Needed for Chest Pain., Disp: 25 tablet, Rfl: 5  •  omeprazole (priLOSEC) 40 MG capsule, TAKE ONE CAPSULE BY MOUTH EVERY DAY, Disp: 90 capsule, Rfl: 1  •  rosuvastatin (CRESTOR) 10 MG tablet, TAKE ONE TABLET BY MOUTH ONCE DAILY, Disp: 90 tablet, Rfl: 1  •  zolpidem (AMBIEN) 10 MG tablet, TAKE ONE TABLET BY MOUTH EVERY EVENING AS NEEDED FOR SLEEP, Disp: 30 tablet, Rfl: 1        History of Present Illness    Returns today for 6-month follow-up for aortic dissection, coronary artery disease, ICD, cardiomyopathy.  Since her last visit he believes his functional capacity is unchanged.  He is primarily working with the Cub[place] in his backyard, denies any chest pain or change in his chronic dyspnea.   "Denies orthopnea PND presyncope or syncope.  His major recent issue is that he was started on Jardiance and developed a UTI/fungal infection which necessitated stopping this medication.  He also notes some malaise fatigue.  He has some severe left lower extremity pain which he attributes to his sciatica and is going to see his orthopedic surgeon for this.  He apparently had an KATLYN done for reasons that he is unclear about was told he had some blockage in his left leg.  Of note, he cut his foot on stairs 3 weeks ago, taking a \"gouge\" out of the ball of his foot.  He notes this is slowly healing, but is not been red, or does not have any drainage.  He has no fevers.    The following portions of the patient's history were reviewed and updated as appropriate: allergies, current medications, past family history, past medical history, past social history, past surgical history and problem list.      Social History     Tobacco Use   • Smoking status: Former Smoker     Packs/day: 3.00     Years: 35.00     Pack years: 105.00     Types: Cigarettes     Last attempt to quit: 1/15/2006     Years since quittin.4   • Smokeless tobacco: Never Used   Substance Use Topics   • Alcohol use: No   • Drug use: No         ROS       Objective:   /78   Pulse 87   Ht 167.6 cm (66\")   Wt 109 kg (241 lb)   BMI 38.90 kg/m²         Physical Exam   Constitutional: He is oriented to person, place, and time. He appears well-developed and well-nourished.   HENT:   Mouth/Throat: Oropharynx is clear and moist.   Neck: No JVD present. Carotid bruit is not present. No thyromegaly present.   Cardiovascular: Regular rhythm, S1 normal, S2 normal, normal heart sounds and intact distal pulses. Exam reveals no gallop, no S3 and no S4.   No murmur heard.  Pulses:       Carotid pulses are 2+ on the right side, and 2+ on the left side.       Radial pulses are 2+ on the right side, and 2+ on the left side.        Dorsalis pedis pulses are 2+ on the " right side, and 1+ on the left side.        Posterior tibial pulses are 2+ on the right side, and 1+ on the left side.   Pulmonary/Chest: Breath sounds normal.   Abdominal: Soft. Bowel sounds are normal. He exhibits no mass. There is no tenderness.   Musculoskeletal: He exhibits no edema.   Neurological: He is alert and oriented to person, place, and time.   Skin: Skin is warm and dry. No rash noted.       Procedures  Device check:  Normal device check.  99% by ventricularly paced.  Battery life of 9 years.  No events.  No changes.        Assessment:   Assessment/Plan      Kai was seen today for essential hypertension and multiple vessel coronary artery disease.    Diagnoses and all orders for this visit:    Aortic dissection following procedure, sequela (CMS/[facility])    Multiple vessel coronary artery disease    Pure hypercholesterolemia    Essential hypertension    PAD (peripheral artery disease) (CMS/[facility])      1.  Coronary artery disease, known occluded grafts.  No current angina.  2.  Ischemic cardiomyopathy last LVEF approximately 30%.  Normal functioning biventricular pacemaker.  No heart failure currently.  Chronic functional class II.  3.  Chronic aortic dissection, type a status post elephant trunk procedure.  Stable by last CT 2019.  4.  Hypertension, well controlled  5.  Dyslipidemia well-controlled  6.  PAD, per patient abnormal left ABIs.  Poorly healing left lower extremity wound.  7.  Ischemic cardiomyopathy, with LV aneurysm, stable.  Anticoagulated    Recommendations  1.  Continue current medical therapy  2.  Discussed the patient did need to keep a close eye on his left lower extremity wound.  He understands if he develops any fevers redness, or drainage to contact our office immediately consider consider lower extremity revascularization.  3.  Revisit 6 months or PRN symptom change    Uri Willis MD    .         Dictated utilizing Dragon dictation

## 2020-07-28 ENCOUNTER — OFFICE VISIT (OUTPATIENT)
Dept: FAMILY MEDICINE CLINIC | Facility: CLINIC | Age: 65
End: 2020-07-28

## 2020-07-28 ENCOUNTER — LAB (OUTPATIENT)
Dept: FAMILY MEDICINE CLINIC | Facility: CLINIC | Age: 65
End: 2020-07-28

## 2020-07-28 VITALS
WEIGHT: 239 LBS | DIASTOLIC BLOOD PRESSURE: 76 MMHG | TEMPERATURE: 98.2 F | BODY MASS INDEX: 38.41 KG/M2 | HEART RATE: 78 BPM | RESPIRATION RATE: 18 BRPM | HEIGHT: 66 IN | SYSTOLIC BLOOD PRESSURE: 124 MMHG

## 2020-07-28 DIAGNOSIS — I10 ESSENTIAL HYPERTENSION: ICD-10-CM

## 2020-07-28 DIAGNOSIS — I25.10 CORONARY ARTERY DISEASE INVOLVING NATIVE CORONARY ARTERY OF NATIVE HEART WITHOUT ANGINA PECTORIS: ICD-10-CM

## 2020-07-28 DIAGNOSIS — I73.9 PAD (PERIPHERAL ARTERY DISEASE) (HCC): ICD-10-CM

## 2020-07-28 DIAGNOSIS — E11.65 UNCONTROLLED TYPE 2 DIABETES MELLITUS WITH HYPERGLYCEMIA, WITHOUT LONG-TERM CURRENT USE OF INSULIN (HCC): Primary | ICD-10-CM

## 2020-07-28 DIAGNOSIS — Z12.5 PROSTATE CANCER SCREENING: ICD-10-CM

## 2020-07-28 DIAGNOSIS — G47.09 OTHER INSOMNIA: ICD-10-CM

## 2020-07-28 DIAGNOSIS — E78.00 PURE HYPERCHOLESTEROLEMIA: ICD-10-CM

## 2020-07-28 PROCEDURE — 99214 OFFICE O/P EST MOD 30 MIN: CPT | Performed by: FAMILY MEDICINE

## 2020-07-28 RX ORDER — ZOLPIDEM TARTRATE 10 MG/1
10 TABLET ORAL NIGHTLY PRN
Qty: 30 TABLET | Refills: 2 | Status: SHIPPED | OUTPATIENT
Start: 2020-07-28 | End: 2021-05-24

## 2020-07-28 NOTE — PROGRESS NOTES
Assessment/Plan       Problems Addressed this Visit        Cardiovascular and Mediastinum    Hyperlipidemia    Relevant Orders    Comprehensive Metabolic Panel    Lipid Panel With / Chol / HDL Ratio    Coronary artery disease involving native coronary artery of native heart    Hypertension    Relevant Orders    CBC & Differential      Other Visit Diagnoses     Uncontrolled type 2 diabetes mellitus with hyperglycemia, without long-term current use of insulin (CMS/[facility])    -  Primary    Relevant Orders    Hemoglobin A1c    PAD (peripheral artery disease) (CMS/[facility])        Relevant Orders    Comprehensive Metabolic Panel    Lipid Panel With / Chol / HDL Ratio    Other insomnia        Relevant Medications    zolpidem (AMBIEN) 10 MG tablet    Prostate cancer screening        Relevant Orders    PSA Screen            Follow up: Return in about 3 months (around 10/28/2020).     DISCUSSION  Diabetes mellitus type 2.  Hyperglycemia.  Check labs as noted.  Further plan once labs back.  He did not tolerate Jardiance.  He is now only on Januvia.    Coronary disease.  Stable.  Sees cardiology.    Hypertension.  Blood pressure stable.    Hyperlipidemia.  Check CMP and lipid panel.    Peripheral arterial disease.  Left lower extremity.  Stable.    Insomnia.  Refilled Ambien.    Prostate cancer screening.  Check PSA.      Recent back pain is improving.  He will follow-up if it returns.    MEDICATIONS PRESCRIBED  Requested Prescriptions     Signed Prescriptions Disp Refills   • zolpidem (AMBIEN) 10 MG tablet 30 tablet 2     Sig: Take 1 tablet by mouth At Night As Needed for Sleep.            Loi dated on 7/28/2020   was reviewed and appropriate.        -------------------------------------------    Subjective     Chief Complaint   Patient presents with   • Diabetes     3 month f/u and labs.         Diabetes   He presents for his follow-up diabetic visit. He has type 2 diabetes mellitus. His disease course has been stable. There  are no hypoglycemic associated symptoms. Pertinent negatives for diabetes include no chest pain and no fatigue. There are no hypoglycemic complications. Diabetic complications include heart disease. Risk factors for coronary artery disease include diabetes mellitus, hypertension and dyslipidemia. Current diabetic treatment includes oral agent (monotherapy) (Januvia). He is compliant with treatment all of the time. His weight is stable. He is following a generally healthy diet. He never participates in exercise. He monitors blood glucose at home 1-2 x per day (140-150 most days, ave 142). Home blood sugar record trend: ave 149. An ACE inhibitor/angiotensin II receptor blocker is being taken (on entresto). Eye exam is current.   Hypertension   This is a chronic problem. The problem is unchanged. The problem is controlled. Pertinent negatives include no chest pain, malaise/fatigue, peripheral edema or shortness of breath. There are no associated agents to hypertension. Current antihypertension treatment includes angiotensin blockers. The current treatment provides moderate improvement. Hypertensive end-organ damage includes CAD/MI.   Coronary Artery Disease   Presents for follow-up visit. Pertinent negatives include no chest pain, chest pressure, leg swelling, shortness of breath or weight gain. (Sees Dr Willis  ) Risk factors include hyperlipidemia. The symptoms have been stable.   Hyperlipidemia   This is a chronic problem. The current episode started more than 1 year ago. The problem is controlled. Recent lipid tests were reviewed and are normal. Pertinent negatives include no chest pain, myalgias (muscle spasms in left leg, lower) or shortness of breath. Current antihyperlipidemic treatment includes statins. The current treatment provides moderate improvement of lipids. There are no compliance problems.  Risk factors for coronary artery disease include diabetes mellitus, dyslipidemia and hypertension.       Back  "pain   Fell , down steps   Feeling better now  Had prior back surg 2015  Pain getting better  Cut foot when fell and took awhile to heal  Michael LIM who visited said decreased blood flow to the left leg( came in )    Pain in the left leg due to the back pain     Left leg , decreased circulation and had seen Dr Willis.   Had a cut on the foot and now better. Healed now.     PAD  Left leg  marquis low  Some pain with walking  Recent cut healed well      Social History     Tobacco Use   Smoking Status Former Smoker   • Packs/day: 3.00   • Years: 35.00   • Pack years: 105.00   • Types: Cigarettes   • Last attempt to quit: 1/15/2006   • Years since quittin.5   Smokeless Tobacco Never Used          Past Medical History,Medications, Allergies, and social history was reviewed.          Review of Systems   Constitutional: Negative for fatigue, malaise/fatigue and unexpected weight gain.   Respiratory: Negative for shortness of breath.    Cardiovascular: Negative for chest pain and leg swelling.   Musculoskeletal: Negative for myalgias (muscle spasms in left leg, lower).       Objective     Vitals:    20 0830   BP: 124/76   Pulse: 78   Resp: 18   Temp: 98.2 °F (36.8 °C)   Weight: 108 kg (239 lb)   Height: 167.6 cm (66\")          Physical Exam   Constitutional: Vital signs are normal. He appears well-developed and well-nourished.   HENT:   Head: Normocephalic and atraumatic.   Right Ear: Hearing, tympanic membrane, external ear and ear canal normal.   Left Ear: Hearing, tympanic membrane, external ear and ear canal normal.   Mouth/Throat: Oropharynx is clear and moist.   Eyes: Pupils are equal, round, and reactive to light. Conjunctivae, EOM and lids are normal.   Neck: Normal range of motion. Neck supple. No thyromegaly present.   Cardiovascular: Normal rate, regular rhythm and normal heart sounds. Exam reveals no friction rub.   No murmur heard.  Pulmonary/Chest: Effort normal and breath sounds normal. No " respiratory distress. He has no wheezes. He has no rales.   Abdominal: Soft. Normal appearance and bowel sounds are normal. He exhibits no distension and no mass. There is no tenderness. There is no rebound and no guarding.   obese   Musculoskeletal: He exhibits edema (trace ).   Neurological: He is alert. He has normal strength.   Skin: Skin is warm and dry.   Psychiatric: He has a normal mood and affect. His speech is normal and behavior is normal. Cognition and memory are normal.   Nursing note and vitals reviewed.                Cheo Weiss MD

## 2020-07-29 DIAGNOSIS — R97.20 ELEVATED PSA: Primary | ICD-10-CM

## 2020-07-29 LAB
ALBUMIN SERPL-MCNC: 4.8 G/DL (ref 3.5–5.2)
ALBUMIN/GLOB SERPL: 1.7 G/DL
ALP SERPL-CCNC: 35 U/L (ref 39–117)
ALT SERPL-CCNC: 36 U/L (ref 1–41)
AST SERPL-CCNC: 34 U/L (ref 1–40)
BASOPHILS # BLD AUTO: 0.04 10*3/MM3 (ref 0–0.2)
BASOPHILS NFR BLD AUTO: 0.7 % (ref 0–1.5)
BILIRUB SERPL-MCNC: 0.5 MG/DL (ref 0–1.2)
BUN SERPL-MCNC: 16 MG/DL (ref 8–23)
BUN/CREAT SERPL: 14.7 (ref 7–25)
CALCIUM SERPL-MCNC: 9.3 MG/DL (ref 8.6–10.5)
CHLORIDE SERPL-SCNC: 100 MMOL/L (ref 98–107)
CHOLEST SERPL-MCNC: 119 MG/DL (ref 0–200)
CHOLEST/HDLC SERPL: 3.31 {RATIO}
CO2 SERPL-SCNC: 29.6 MMOL/L (ref 22–29)
CREAT SERPL-MCNC: 1.09 MG/DL (ref 0.76–1.27)
EOSINOPHIL # BLD AUTO: 0.12 10*3/MM3 (ref 0–0.4)
EOSINOPHIL NFR BLD AUTO: 2 % (ref 0.3–6.2)
ERYTHROCYTE [DISTWIDTH] IN BLOOD BY AUTOMATED COUNT: 13.1 % (ref 12.3–15.4)
GLOBULIN SER CALC-MCNC: 2.8 GM/DL
GLUCOSE SERPL-MCNC: 137 MG/DL (ref 65–99)
HBA1C MFR BLD: 7.7 % (ref 4.8–5.6)
HCT VFR BLD AUTO: 40.3 % (ref 37.5–51)
HDLC SERPL-MCNC: 36 MG/DL (ref 40–60)
HGB BLD-MCNC: 13.5 G/DL (ref 13–17.7)
IMM GRANULOCYTES # BLD AUTO: 0.01 10*3/MM3 (ref 0–0.05)
IMM GRANULOCYTES NFR BLD AUTO: 0.2 % (ref 0–0.5)
LDLC SERPL CALC-MCNC: 56 MG/DL (ref 0–100)
LYMPHOCYTES # BLD AUTO: 1.68 10*3/MM3 (ref 0.7–3.1)
LYMPHOCYTES NFR BLD AUTO: 28.2 % (ref 19.6–45.3)
MCH RBC QN AUTO: 30.9 PG (ref 26.6–33)
MCHC RBC AUTO-ENTMCNC: 33.5 G/DL (ref 31.5–35.7)
MCV RBC AUTO: 92.2 FL (ref 79–97)
MONOCYTES # BLD AUTO: 0.58 10*3/MM3 (ref 0.1–0.9)
MONOCYTES NFR BLD AUTO: 9.7 % (ref 5–12)
NEUTROPHILS # BLD AUTO: 3.53 10*3/MM3 (ref 1.7–7)
NEUTROPHILS NFR BLD AUTO: 59.2 % (ref 42.7–76)
NRBC BLD AUTO-RTO: 0.2 /100 WBC (ref 0–0.2)
PLATELET # BLD AUTO: 108 10*3/MM3 (ref 140–450)
POTASSIUM SERPL-SCNC: 4.3 MMOL/L (ref 3.5–5.2)
PROT SERPL-MCNC: 7.6 G/DL (ref 6–8.5)
PSA SERPL-MCNC: 4.33 NG/ML (ref 0–4)
RBC # BLD AUTO: 4.37 10*6/MM3 (ref 4.14–5.8)
SODIUM SERPL-SCNC: 136 MMOL/L (ref 136–145)
TRIGL SERPL-MCNC: 135 MG/DL (ref 0–150)
VLDLC SERPL CALC-MCNC: 27 MG/DL
WBC # BLD AUTO: 5.96 10*3/MM3 (ref 3.4–10.8)

## 2020-09-14 ENCOUNTER — OFFICE VISIT (OUTPATIENT)
Dept: UROLOGY | Age: 65
End: 2020-09-14

## 2020-09-14 VITALS
TEMPERATURE: 97.1 F | OXYGEN SATURATION: 99 % | HEART RATE: 70 BPM | WEIGHT: 239 LBS | RESPIRATION RATE: 18 BRPM | BODY MASS INDEX: 38.41 KG/M2 | SYSTOLIC BLOOD PRESSURE: 128 MMHG | DIASTOLIC BLOOD PRESSURE: 88 MMHG | HEIGHT: 66 IN

## 2020-09-14 DIAGNOSIS — R97.20 ELEVATED PROSTATE SPECIFIC ANTIGEN (PSA): Primary | ICD-10-CM

## 2020-09-14 LAB
BILIRUB BLD-MCNC: NEGATIVE MG/DL
CLARITY, POC: CLEAR
COLOR UR: YELLOW
GLUCOSE UR STRIP-MCNC: ABNORMAL MG/DL
KETONES UR QL: ABNORMAL
LEUKOCYTE EST, POC: ABNORMAL
NITRITE UR-MCNC: NEGATIVE MG/ML
PH UR: 6 [PH] (ref 5–8)
PROT UR STRIP-MCNC: ABNORMAL MG/DL
RBC # UR STRIP: NEGATIVE /UL
SP GR UR: 1.02 (ref 1–1.03)
UROBILINOGEN UR QL: NORMAL

## 2020-09-14 PROCEDURE — 99204 OFFICE O/P NEW MOD 45 MIN: CPT | Performed by: UROLOGY

## 2020-09-14 PROCEDURE — 81003 URINALYSIS AUTO W/O SCOPE: CPT | Performed by: UROLOGY

## 2020-09-14 RX ORDER — SULFAMETHOXAZOLE AND TRIMETHOPRIM 800; 160 MG/1; MG/1
1 TABLET ORAL 2 TIMES DAILY
Qty: 28 TABLET | Refills: 0 | Status: SHIPPED | OUTPATIENT
Start: 2020-09-14 | End: 2020-09-28

## 2020-09-14 NOTE — PROGRESS NOTES
Chief Complaint  Elevated PSA    Referring Provider  Cheo Weiss MD    HPI  Mr. Garcia is a 65 y.o.  male who presents with an elevated PSA to 4.3  He does not have a family history of prostate cancer.    Patient complains of hematuria When he had a UTI.  He says he has started taking Jardiance at that time, and he had Dr. Weiss stopped it after that.  He denies any urinary tract infection since having stopped it.  Patient denies fevers, chills, nausea, vomiting, constipation, or flank pain.    Past  history is negative for BPH, frequent UTIs, asymptomatic painless gross hematuria, stones or other renal diseases.     He denies shortness of breath, leg swelling, calf pain or bone pain.     He has chronic lower urinary tract symptoms, with his IPSS scanned in the chart.  He states that he has urgency and frequency ever since past heart procedures.  They overall did not bother him that much.    Past Medical History  Past Medical History:   Diagnosis Date   • Back pain    • Chest pain     Atypical chest pain, noncardiac.  Suspect either musculoskeletal versus gastrointestinal   • Chronic low back pain     /multiple lumbar surgeries, currently disabled: L4-L5 discectomy in 2015.    • Coronary artery disease    • Diabetes mellitus (CMS/HCC)     type 2, diagnosed withing past 6months, checks fsbg qam, last a1c 8.4 7-19-18   • Dissecting aortic aneurysm (CMS/HCC)    • Dyslipidemia    • Elevated liver function tests    • History of bleeding peptic ulcer    • History of transfusion 2006    no reaction    • Hyperlipidemia    • Hypertension    • Left leg pain    • Lumbar pain    • MI (myocardial infarction) (CMS/HCC) 2007   • Morbid obesity (CMS/HCC)    • LUCIAN on CPAP     setting 12   • Severe obstructive sleep apnea    • Shortness of breath    • Wears glasses        Past Surgical History  Past Surgical History:   Procedure Laterality Date   • APPENDECTOMY     • ARTERIOGRAM AORTIC  2006    aortic disection with  elephant trunk procedure   • CARDIAC CATHETERIZATION N/A 4/27/2018    Procedure: Left Heart Cath;  Surgeon: Uri Willis MD;  Location:  [facility];  Service: Cardiovascular   • CARDIAC CATHETERIZATION  04/2018   • CARDIAC ELECTROPHYSIOLOGY PROCEDURE N/A 8/9/2018    Procedure: Biv ICD Implant w/limited echo on arrival;  Surgeon: Jose Fox DO;  Location:  [facility];  Service: Cardiology   • COLONOSCOPY     • COLONOSCOPY N/A 6/18/2019    Procedure: COLONOSCOPY;  Surgeon: Meir Salguero MD;  Location:  [facility];  Service: Gastroenterology   • CORONARY ANGIOPLASTY WITH STENT PLACEMENT  2007    x 2   • CORONARY ARTERY BYPASS GRAFT  2006   • LUMBAR DISCECTOMY     • LUMBAR FUSION         Medications    Current Outpatient Medications:   •  Accu-Chek Softclix Lancets lancets, Check blood sugars twice a day or as needed, Disp: 200 each, Rfl: 3  •  acetaminophen (TYLENOL) 500 MG tablet, Take 1,000 mg by mouth Every 6 (Six) Hours As Needed for Mild Pain ., Disp: , Rfl:   •  aspirin 81 MG EC tablet, Take 81 mg by mouth Daily., Disp: , Rfl:   •  bisoprolol (ZEBeta) 10 MG tablet, TAKE ONE TABLET BY MOUTH ONCE DAILY, Disp: 90 tablet, Rfl: 2  •  clopidogrel (PLAVIX) 75 MG tablet, TAKE ONE TABLET BY MOUTH ONCE DAILY, Disp: 90 tablet, Rfl: 2  •  cyclobenzaprine (FLEXERIL) 10 MG tablet, TAKE ONE TABLET BY MOUTH 3 TIMES A DAY AS NEEDED FOR MUSCLE SPASMS, Disp: 30 tablet, Rfl: 2  •  ENTRESTO 49-51 MG tablet, TAKE ONE TABLET BY MOUTH EVERY 12 HOURS, Disp: 180 tablet, Rfl: 1  •  eplerenone (INSPRA) 25 MG tablet, TAKE ONE TABLET BY MOUTH EVERY DAY, Disp: 90 tablet, Rfl: 3  •  glucose blood (Accu-Chek Jodie Plus) test strip, Check sugars twice a day or as needed, Disp: 200 each, Rfl: 3  •  JANUVIA 100 MG tablet, TAKE ONE TABLET BY MOUTH EVERY DAY, Disp: 90 tablet, Rfl: 1  •  nitroglycerin (NITROSTAT) 0.4 MG SL tablet, Place 1 tablet under the tongue Every 5 (Five) Minutes As Needed  for Chest Pain., Disp: 25 tablet, Rfl: 5  •  omeprazole (priLOSEC) 40 MG capsule, TAKE ONE CAPSULE BY MOUTH EVERY DAY, Disp: 90 capsule, Rfl: 1  •  rosuvastatin (CRESTOR) 10 MG tablet, TAKE ONE TABLET BY MOUTH ONCE DAILY, Disp: 90 tablet, Rfl: 1  •  zolpidem (AMBIEN) 10 MG tablet, Take 1 tablet by mouth At Night As Needed for Sleep., Disp: 30 tablet, Rfl: 2    Allergies  Allergies   Allergen Reactions   • Ativan [Lorazepam] Other (See Comments)     agitation   • Zetia [Ezetimibe] Other (See Comments)     increased LFTs   • Zocor [Simvastatin] Myalgia   • Jardiance [Empagliflozin] Unknown - Low Severity     Blood in urine and burning sensation   • Isosorbide Other (See Comments)     headaches   • Metformin And Related Diarrhea       Social History  Social History     Tobacco Use   • Smoking status: Former Smoker     Packs/day: 3.00     Years: 35.00     Pack years: 105.00     Types: Cigarettes     Quit date: 1/15/2006     Years since quittin.6   • Smokeless tobacco: Never Used   Substance Use Topics   • Alcohol use: No   • Drug use: No       Family History  Family History   Problem Relation Age of Onset   • Coronary artery disease Other    • Rheum arthritis Other    • Heart disease Mother    • Rheum arthritis Mother    • Heart disease Father    • Rheum arthritis Father       He has no family history of prostate, bladder or kidney cancer.    Review of Systems  Constitutional: No fevers or chills  Skin: Negative for rash  Endocrine: No heat/cold intolerance   Cardiovascular: Negative for chest pain or dyspnea on exertion  Respiratory: Negative for shortness of breath or wheezing  Gastrointestinal: No constipation, nausea or vomiting  Genitourinary: Negative for new lower urinary tract symptoms, current gross hematuria or dysuria.  Musculoskeletal: No flank pain  Neurological:  Negative for frequent headaches or dizziness  Lymph/Heme: Negative for leg swelling or calf pain.    Physical Exam  Visit Vitals  /88  "  Pulse 70   Temp 97.1 °F (36.2 °C)   Resp 18   Ht 167.6 cm (65.98\")   Wt 108 kg (239 lb)   SpO2 99%   BMI 38.59 kg/m²     Constitutional: NAD, WDWN.   HEENT: NCAT. Conjunctivae normal.  MMM.    Cardiovascular: Regular rate.  Pulmonary/Chest: Respirations are even and non-labored bilaterally.  Abdominal: Soft. No distension, tenderness, masses or guarding. No CVA tenderness.  Neurological: A + O x 3.  Cranial Nerves II-XII grossly intact. Normal gait.  Extremities: ELVIN x 4, Warm. No clubbing.  No cyanosis.    Skin: Pink, warm and dry.  No rashes noted.    Genitourinary  Penis: circumcised penis, glans normal, no penile discharge.  No rashes/lesions.    Testes: descended bilaterally, no masses, nontender to palpation. Remainder of scrotal contents normal. No hernia appreciated.  Perineum:  No perineal pain with palpation.  Rectal:  Normal tone, no masses.  Prostate:  40 grams.  Symmetric, non-tender, anodular and no induration.      Labs  Brief Urine Lab Results  (Last result in the past 365 days)      Color   Clarity   Blood   Leuk Est   Nitrite   Protein   CREAT   Urine HCG        09/14/20 1103 Yellow Clear Negative Moderate (2+) Negative 1+               Lab Results   Component Value Date    PSA 4.330 (H) 07/28/2020    PSA 0.953 04/19/2019    PSA 0.800 01/19/2017           Assessment  Mr. Garcia is a 65 y.o.  male who presents with elevated PSA. Hx of UTI, PSA tested 2 mo after infection.  Significant cardiac history.    I explained to the patient that an elevated PSA is a marker of risk of prostate cancer and a prostate biopsy would be the next step in diagnosis. I explained that sampling error can occur with any biopsy and there is a risk of potentially missing a cancer that may be present.    I discussed the risks, benefits, and alternatives to prostate biopsy, including hematuria, hematochezia, and hematospermia.  I also discussed the risk of diagnosing a clinically-insignificant prostate cancer.  I " discussed the risks of sepsis, which can be minimized by prophylactic antibiotics.     Plan  1.  2 weeks Bactrim  2.  FU in 4 weeks w/ repeat PSA    No follow-ups on file.    Pablo Perez MD

## 2020-10-01 DIAGNOSIS — E11.65 UNCONTROLLED TYPE 2 DIABETES MELLITUS WITH HYPERGLYCEMIA, WITHOUT LONG-TERM CURRENT USE OF INSULIN (HCC): ICD-10-CM

## 2020-10-01 DIAGNOSIS — M54.50 LUMBAR PAIN: ICD-10-CM

## 2020-10-01 RX ORDER — SITAGLIPTIN 100 MG/1
TABLET, FILM COATED ORAL
Qty: 90 TABLET | Refills: 1 | Status: ON HOLD | OUTPATIENT
Start: 2020-10-01 | End: 2021-04-01

## 2020-10-01 RX ORDER — CYCLOBENZAPRINE HCL 10 MG
TABLET ORAL
Qty: 30 TABLET | Refills: 2 | Status: SHIPPED | OUTPATIENT
Start: 2020-10-01 | End: 2021-02-01

## 2020-10-01 RX ORDER — PANTOPRAZOLE SODIUM 40 MG/1
40 TABLET, DELAYED RELEASE ORAL DAILY
Qty: 90 TABLET | Refills: 1 | Status: SHIPPED | OUTPATIENT
Start: 2020-10-01 | End: 2020-11-20 | Stop reason: SDUPTHER

## 2020-10-01 NOTE — TELEPHONE ENCOUNTER
Please call.  We received a refill request for omeprazole.  He is also on Plavix.  There can be an interaction between the omeprazole and Plavix.  Has he ever been on one called Protonix.  The Protonix and the Plavix is safe to take together.  Let me know.  We should change to the Protonix.

## 2020-10-02 RX ORDER — CLOPIDOGREL BISULFATE 75 MG/1
TABLET ORAL
Qty: 90 TABLET | Refills: 0 | Status: ON HOLD | OUTPATIENT
Start: 2020-10-02 | End: 2021-04-01

## 2020-10-02 RX ORDER — BISOPROLOL FUMARATE 10 MG/1
TABLET, FILM COATED ORAL
Qty: 90 TABLET | Refills: 0 | Status: ON HOLD | OUTPATIENT
Start: 2020-10-02 | End: 2021-04-01

## 2020-10-02 RX ORDER — ROSUVASTATIN CALCIUM 10 MG/1
TABLET, COATED ORAL
Qty: 90 TABLET | Refills: 3 | Status: SHIPPED | OUTPATIENT
Start: 2020-10-02 | End: 2020-10-05

## 2020-10-02 RX ORDER — SACUBITRIL AND VALSARTAN 49; 51 MG/1; MG/1
TABLET, FILM COATED ORAL
Qty: 180 TABLET | Refills: 3 | Status: SHIPPED | OUTPATIENT
Start: 2020-10-02 | End: 2020-10-05

## 2020-10-05 RX ORDER — ROSUVASTATIN CALCIUM 10 MG/1
TABLET, COATED ORAL
Qty: 90 TABLET | Refills: 0 | Status: SHIPPED | OUTPATIENT
Start: 2020-10-05 | End: 2021-07-21 | Stop reason: SDUPTHER

## 2020-10-05 RX ORDER — SACUBITRIL AND VALSARTAN 49; 51 MG/1; MG/1
TABLET, FILM COATED ORAL
Qty: 180 TABLET | Refills: 0 | Status: SHIPPED | OUTPATIENT
Start: 2020-10-05 | End: 2021-07-21 | Stop reason: SDUPTHER

## 2020-10-07 ENCOUNTER — LAB REQUISITION (OUTPATIENT)
Dept: LAB | Facility: HOSPITAL | Age: 65
End: 2020-10-07

## 2020-10-07 DIAGNOSIS — Z00.00 ROUTINE GENERAL MEDICAL EXAMINATION AT A HEALTH CARE FACILITY: ICD-10-CM

## 2020-10-08 LAB
Lab: NORMAL
PSA FREE MFR SERPL: 33.2 %
PSA FREE SERPL-MCNC: 0.63 NG/ML
PSA SERPL-MCNC: 1.9 NG/ML (ref 0–4)

## 2020-10-12 ENCOUNTER — OFFICE VISIT (OUTPATIENT)
Dept: UROLOGY | Age: 65
End: 2020-10-12

## 2020-10-12 VITALS — WEIGHT: 239 LBS | RESPIRATION RATE: 17 BRPM | HEIGHT: 66 IN | BODY MASS INDEX: 38.41 KG/M2

## 2020-10-12 DIAGNOSIS — R97.20 ELEVATED PSA: Primary | ICD-10-CM

## 2020-10-12 PROCEDURE — 99213 OFFICE O/P EST LOW 20 MIN: CPT | Performed by: UROLOGY

## 2020-10-12 NOTE — PROGRESS NOTES
Chief Complaint  Elevated PSA    HPI  Mr. Garcia is a 65 y.o.  male who presents for follow-up of elevated PSA to 4.3    He presents today for follow-up.  He is no longer having any groin pain.  He says that the Bactrim made his nipples hurt.  This has resolved.  His PSA is now within the normal range.      For historical review,  Patient complains of hematuria When he had a UTI.  He says he has started taking Jardiance at that time, and he had Dr. Weiss stopped it after that.  He denies any urinary tract infection since having stopped it.  Patient denies fevers, chills, nausea, vomiting, constipation, or flank pain.  Past  history is negative for BPH, frequent UTIs, asymptomatic painless gross hematuria, stones or other renal diseases.   He denies shortness of breath, leg swelling, calf pain or bone pain.  He has chronic lower urinary tract symptoms, with his IPSS scanned in the chart.  He states that he has urgency and frequency ever since past heart procedures.  They overall did not bother him that much.    Past Medical History  Past Medical History:   Diagnosis Date   • Back pain    • Chest pain     Atypical chest pain, noncardiac.  Suspect either musculoskeletal versus gastrointestinal   • Chronic low back pain     /multiple lumbar surgeries, currently disabled: L4-L5 discectomy in 2015.    • Coronary artery disease    • Diabetes mellitus (CMS/[facility])     type 2, diagnosed withing past 6months, checks fsbg qam, last a1c 8.4 7-19-18   • Dissecting aortic aneurysm (CMS/[facility])    • Dyslipidemia    • Elevated liver function tests    • History of bleeding peptic ulcer    • History of transfusion 2006    no reaction    • Hyperlipidemia    • Hypertension    • Left leg pain    • Lumbar pain    • MI (myocardial infarction) (CMS/HCC) 2007   • Morbid obesity (CMS/HCC)    • LUCIAN on CPAP     setting 12   • Severe obstructive sleep apnea    • Shortness of breath    • Wears glasses        Past Surgical History  Past  Surgical History:   Procedure Laterality Date   • APPENDECTOMY     • ARTERIOGRAM AORTIC  2006    aortic disection with elephant trunk procedure   • CARDIAC CATHETERIZATION N/A 4/27/2018    Procedure: Left Heart Cath;  Surgeon: Uri Willis MD;  Location:  [facility];  Service: Cardiovascular   • CARDIAC CATHETERIZATION  04/2018   • CARDIAC ELECTROPHYSIOLOGY PROCEDURE N/A 8/9/2018    Procedure: Biv ICD Implant w/limited echo on arrival;  Surgeon: Jose Fox DO;  Location:  [facility];  Service: Cardiology   • COLONOSCOPY     • COLONOSCOPY N/A 6/18/2019    Procedure: COLONOSCOPY;  Surgeon: Meir Salguero MD;  Location:  [facility];  Service: Gastroenterology   • CORONARY ANGIOPLASTY WITH STENT PLACEMENT  2007    x 2   • CORONARY ARTERY BYPASS GRAFT  2006   • LUMBAR DISCECTOMY     • LUMBAR FUSION         Medications    Current Outpatient Medications:   •  Accu-Chek Softclix Lancets lancets, Check blood sugars twice a day or as needed, Disp: 200 each, Rfl: 3  •  acetaminophen (TYLENOL) 500 MG tablet, Take 1,000 mg by mouth Every 6 (Six) Hours As Needed for Mild Pain ., Disp: , Rfl:   •  aspirin 81 MG EC tablet, Take 81 mg by mouth Daily., Disp: , Rfl:   •  bisoprolol (ZEBeta) 10 MG tablet, TAKE ONE TABLET BY MOUTH ONCE DAILY, Disp: 90 tablet, Rfl: 0  •  clopidogrel (PLAVIX) 75 MG tablet, TAKE ONE TABLET BY MOUTH ONCE DAILY, Disp: 90 tablet, Rfl: 0  •  cyclobenzaprine (FLEXERIL) 10 MG tablet, TAKE ONE TABLET BY MOUTH 3 TIMES A DAY AS NEEDED FOR MUSCLE SPASMS, Disp: 30 tablet, Rfl: 2  •  Entresto 49-51 MG tablet, TAKE ONE TABLET BY MOUTH EVERY 12 HOURS, Disp: 180 tablet, Rfl: 0  •  eplerenone (INSPRA) 25 MG tablet, TAKE ONE TABLET BY MOUTH EVERY DAY, Disp: 90 tablet, Rfl: 3  •  glucose blood (Accu-Chek Jodie Plus) test strip, Check sugars twice a day or as needed, Disp: 200 each, Rfl: 3  •  Januvia 100 MG tablet, TAKE ONE TABLET BY MOUTH EVERY DAY, Disp: 90 tablet,  Rfl: 1  •  nitroglycerin (NITROSTAT) 0.4 MG SL tablet, Place 1 tablet under the tongue Every 5 (Five) Minutes As Needed for Chest Pain., Disp: 25 tablet, Rfl: 5  •  pantoprazole (Protonix) 40 MG EC tablet, Take 1 tablet by mouth Daily., Disp: 90 tablet, Rfl: 1  •  rosuvastatin (CRESTOR) 10 MG tablet, TAKE ONE TABLET BY MOUTH ONCE DAILY, Disp: 90 tablet, Rfl: 0  •  zolpidem (AMBIEN) 10 MG tablet, Take 1 tablet by mouth At Night As Needed for Sleep., Disp: 30 tablet, Rfl: 2    Allergies  Allergies   Allergen Reactions   • Ativan [Lorazepam] Other (See Comments)     agitation   • Zetia [Ezetimibe] Other (See Comments)     increased LFTs   • Zocor [Simvastatin] Myalgia   • Jardiance [Empagliflozin] Unknown - Low Severity     Blood in urine and burning sensation   • Isosorbide Other (See Comments)     headaches   • Metformin And Related Diarrhea       Social History  Social History     Tobacco Use   • Smoking status: Former Smoker     Packs/day: 3.00     Years: 35.00     Pack years: 105.00     Types: Cigarettes     Quit date: 1/15/2006     Years since quittin.7   • Smokeless tobacco: Never Used   Substance Use Topics   • Alcohol use: No   • Drug use: No       Family History  Family History   Problem Relation Age of Onset   • Coronary artery disease Other    • Rheum arthritis Other    • Heart disease Mother    • Rheum arthritis Mother    • Heart disease Father    • Rheum arthritis Father       He has no family history of prostate, bladder or kidney cancer.    Review of Systems  Constitutional: No fevers or chills  Skin: Negative for rash  Endocrine: No heat/cold intolerance   Cardiovascular: Negative for chest pain or dyspnea on exertion  Respiratory: Negative for shortness of breath or wheezing  Gastrointestinal: No constipation, nausea or vomiting  Genitourinary: Negative for new lower urinary tract symptoms, current gross hematuria or dysuria.  Musculoskeletal: No flank pain  Neurological:  Negative for frequent  "headaches or dizziness  Lymph/Heme: Negative for leg swelling or calf pain.    Physical Exam  Visit Vitals  Resp 17   Ht 167.6 cm (65.98\")   Wt 108 kg (239 lb)   BMI 38.59 kg/m²     Constitutional: NAD, WDWN.   HEENT: NCAT. Conjunctivae normal.  MMM.    Cardiovascular: Regular rate.  Pulmonary/Chest: Respirations are even and non-labored bilaterally.  Abdominal: Soft. No distension, tenderness, masses or guarding. No CVA tenderness.  Neurological: A + O x 3.  Cranial Nerves II-XII grossly intact. Normal gait.  Extremities: ELVIN x 4, Warm. No clubbing.  No cyanosis.    Skin: Pink, warm and dry.  No rashes noted.      Labs  Brief Urine Lab Results  (Last result in the past 365 days)      Color   Clarity   Blood   Leuk Est   Nitrite   Protein   CREAT   Urine HCG        09/14/20 1103 Yellow Clear Negative Moderate (2+) Negative 1+               Lab Results   Component Value Date    PSA 1.9 10/08/2020    PSA 4.330 (H) 07/28/2020    PSA 0.953 04/19/2019           Assessment  Mr. Garcia is a 65 y.o.  male who presents with elevated PSA. Hx of UTI, PSA tested 2 mo after infection.  Significant cardiac history.  Repeat PSA normal after 2 weeks of Bactrim.      Plan  1.  I recommended yearly PSA screening with his PCP until the age of 69.  2.  Follow-up with me as needed.    No follow-ups on file.    Pablo Perez MD  "

## 2020-10-14 ENCOUNTER — RESULTS ENCOUNTER (OUTPATIENT)
Dept: UROLOGY | Age: 65
End: 2020-10-14

## 2020-10-14 DIAGNOSIS — R97.20 ELEVATED PROSTATE SPECIFIC ANTIGEN (PSA): ICD-10-CM

## 2020-11-02 ENCOUNTER — OFFICE VISIT (OUTPATIENT)
Dept: FAMILY MEDICINE CLINIC | Facility: CLINIC | Age: 65
End: 2020-11-02

## 2020-11-02 ENCOUNTER — TELEPHONE (OUTPATIENT)
Dept: FAMILY MEDICINE CLINIC | Facility: CLINIC | Age: 65
End: 2020-11-02

## 2020-11-02 ENCOUNTER — HOSPITAL ENCOUNTER (OUTPATIENT)
Dept: GENERAL RADIOLOGY | Facility: HOSPITAL | Age: 65
Discharge: HOME | End: 2020-11-02
Admitting: PHYSICIAN ASSISTANT

## 2020-11-02 VITALS
HEART RATE: 78 BPM | RESPIRATION RATE: 18 BRPM | BODY MASS INDEX: 39.21 KG/M2 | TEMPERATURE: 98.7 F | WEIGHT: 244 LBS | HEIGHT: 66 IN | DIASTOLIC BLOOD PRESSURE: 80 MMHG | SYSTOLIC BLOOD PRESSURE: 126 MMHG

## 2020-11-02 DIAGNOSIS — M54.42 ACUTE MIDLINE LOW BACK PAIN WITH LEFT-SIDED SCIATICA: ICD-10-CM
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
## 2020-11-02 DIAGNOSIS — M25.552 BILATERAL HIP PAIN: ICD-10-CM

## 2020-11-02 DIAGNOSIS — E66.01 MORBID OBESITY (HCC): ICD-10-CM

## 2020-11-02 DIAGNOSIS — S09.90XA INJURY OF HEAD, INITIAL ENCOUNTER: ICD-10-CM

## 2020-11-02 DIAGNOSIS — M25.551 BILATERAL HIP PAIN: ICD-10-CM

## 2020-11-02 DIAGNOSIS — E78.00 PURE HYPERCHOLESTEROLEMIA: ICD-10-CM

## 2020-11-02 DIAGNOSIS — Z51.81 MEDICATION MONITORING ENCOUNTER: ICD-10-CM

## 2020-11-02 DIAGNOSIS — M16.12 OSTEOARTHRITIS OF LEFT HIP, UNSPECIFIED OSTEOARTHRITIS TYPE: ICD-10-CM

## 2020-11-02 DIAGNOSIS — E11.65 UNCONTROLLED TYPE 2 DIABETES MELLITUS WITH HYPERGLYCEMIA (HCC): ICD-10-CM

## 2020-11-02 PROCEDURE — 72110 X-RAY EXAM L-2 SPINE 4/>VWS: CPT

## 2020-11-02 PROCEDURE — 99214 OFFICE O/P EST MOD 30 MIN: CPT | Performed by: PHYSICIAN ASSISTANT

## 2020-11-02 PROCEDURE — 73521 X-RAY EXAM HIPS BI 2 VIEWS: CPT

## 2020-11-02 NOTE — PROGRESS NOTES
"Subjective   Kai Av Garcia is a 65 y.o. male.     History of Present Illness   Pt presents with CC of low back and hip pain   Fell while feeding the chickens [place] his property on 10/19  Slipped and fell back on concrete landing on R side. Struck his head. Had HA for a few days. This has resolved. Has bump on back of head. No laceration. Denies ongoing HAs or dizziness. Thinks he may have briefly lost consciousness. Fall not witnessed. Pt is on plavix   Having to ambulate with cane   Pain radiating down L leg. Feels numb and \"asleep\" when going to stand after sitting   L hip pain radiating to groin. L hip pain greater than right   Hx of chronic low back issues. Has had back surgery in the past  Pt has tried heat and tylenol with mild relief. Also tried muscle relaxer he had for other concern   Denies wanting pain medication     Pt notes he was due for his routine blood work for DM and hyperlipidemia. Requesting labs today     The following portions of the patient's history were reviewed and updated as appropriate: allergies, current medications, past family history, past medical history, past social history, past surgical history and problem list.    Review of Systems   Constitutional: Negative.  Negative for chills and fever.   HENT: Negative for congestion, ear pain, postnasal drip, sinus pressure, sneezing and sore throat.    Eyes: Negative.    Respiratory: Negative.  Negative for cough, chest tightness, shortness of breath and wheezing.    Cardiovascular: Negative.  Negative for chest pain, palpitations and leg swelling.   Gastrointestinal: Negative for vomiting.   Genitourinary: Negative.  Negative for difficulty urinating, dysuria, flank pain, frequency, hematuria and urgency.   Musculoskeletal: Positive for arthralgias, back pain and gait problem. Negative for joint swelling, myalgias, neck pain and neck stiffness.   Skin: Negative.  Negative for color change, pallor, rash and wound.   Neurological: Negative " "for dizziness, syncope, weakness, light-headedness, numbness and headaches.       Objective    Blood pressure 126/80, pulse 78, temperature 98.7 °F (37.1 °C), resp. rate 18, height 167.6 cm (66\"), weight 111 kg (244 lb).   Body mass index is 39.38 kg/m².    Physical Exam  Vitals signs and nursing note reviewed.   Constitutional:       Appearance: He is well-developed.   HENT:      Head: Normocephalic and atraumatic.      Right Ear: Tympanic membrane and ear canal normal.      Left Ear: Tympanic membrane, ear canal and external ear normal.      Nose: Nose normal.      Mouth/Throat:      Pharynx: No oropharyngeal exudate.   Eyes:      Conjunctiva/sclera: Conjunctivae normal.   Neck:      Musculoskeletal: Normal range of motion and neck supple.      Thyroid: No thyromegaly.      Trachea: No tracheal deviation.   Cardiovascular:      Rate and Rhythm: Normal rate and regular rhythm.      Heart sounds: Normal heart sounds. No murmur. No friction rub. No gallop.    Pulmonary:      Effort: Pulmonary effort is normal. No respiratory distress.      Breath sounds: Normal breath sounds. No wheezing or rales.   Chest:      Chest wall: No tenderness.   Musculoskeletal:         General: No deformity.      Right hip: He exhibits tenderness and bony tenderness. He exhibits normal range of motion and normal strength.      Left hip: He exhibits decreased range of motion, decreased strength, tenderness and bony tenderness.      Lumbar back: He exhibits decreased range of motion, tenderness, bony tenderness, pain and spasm.   Lymphadenopathy:      Cervical: No cervical adenopathy.   Skin:     General: Skin is warm and dry.   Neurological:      Mental Status: He is alert and oriented to person, place, and time.   Psychiatric:         Behavior: Behavior normal.         Thought Content: Thought content normal.         Judgment: Judgment normal.         Assessment/Plan   Diagnoses and all orders for this visit:    1. Fall, initial encounter " (Primary)  -     XR Spine Lumbar Complete 4+VW  -     XR Hips Bilateral With or Without Pelvis 2 View  -     Ambulatory Referral to Orthopedic Surgery    2. Acute midline low back pain with left-sided sciatica  -     XR Spine Lumbar Complete 4+VW  -     Ambulatory Referral to Orthopedic Surgery    3. Bilateral hip pain  -     XR Hips Bilateral With or Without Pelvis 2 View  -     Ambulatory Referral to Orthopedic Surgery    4. Injury of head, initial encounter    5. Pure hypercholesterolemia  -     CBC & Differential  -     Comprehensive Metabolic Panel  -     Lipid Panel    6. Uncontrolled type 2 diabetes mellitus with hyperglycemia (CMS/HCC)  -     CBC & Differential  -     Comprehensive Metabolic Panel  -     Hemoglobin A1c    7. Medication monitoring encounter  -     Magnesium    8. Osteoarthritis of left hip, unspecified osteoarthritis type  -     Ambulatory Referral to Orthopedic Surgery    9. Morbid obesity (CMS/[facility])        Proceed with XRs as noted. XRs showed severe degenerative changes of L hip with mild to moderate degenerative changes of R hip. Low back also showed significant degenerative changes throughout   Referral to ortho placed    labs as noted

## 2020-11-02 NOTE — TELEPHONE ENCOUNTER
PATRICIA; PT CALLED    PT WOULD LIKE TO SEE DR HELLER  IF POSSIBLE BUT DOES NOT WANT  TO WAIT FOR LONG.    [phone]

## 2020-11-03 LAB
ALBUMIN SERPL-MCNC: 4.6 G/DL (ref 3.8–4.8)
ALBUMIN/GLOB SERPL: 1.4 {RATIO} (ref 1.2–2.2)
ALP SERPL-CCNC: 40 IU/L (ref 39–117)
ALT SERPL-CCNC: 52 IU/L (ref 0–44)
AST SERPL-CCNC: 58 IU/L (ref 0–40)
BASOPHILS # BLD AUTO: 0 X10E3/UL (ref 0–0.2)
BASOPHILS NFR BLD AUTO: 1 %
BILIRUB SERPL-MCNC: 0.6 MG/DL (ref 0–1.2)
BUN SERPL-MCNC: 17 MG/DL (ref 8–27)
BUN/CREAT SERPL: 17 (ref 10–24)
CALCIUM SERPL-MCNC: 9.5 MG/DL (ref 8.6–10.2)
CHLORIDE SERPL-SCNC: 101 MMOL/L (ref 96–106)
CHOLEST SERPL-MCNC: 127 MG/DL (ref 100–199)
CO2 SERPL-SCNC: 28 MMOL/L (ref 20–29)
CREAT SERPL-MCNC: 1.03 MG/DL (ref 0.76–1.27)
EOSINOPHIL # BLD AUTO: 0.1 X10E3/UL (ref 0–0.4)
EOSINOPHIL NFR BLD AUTO: 2 %
ERYTHROCYTE [DISTWIDTH] IN BLOOD BY AUTOMATED COUNT: 12.5 % (ref 11.6–15.4)
GLOBULIN SER CALC-MCNC: 3.4 G/DL (ref 1.5–4.5)
GLUCOSE SERPL-MCNC: 140 MG/DL (ref 65–99)
HBA1C MFR BLD: 8.2 % (ref 4.8–5.6)
HCT VFR BLD AUTO: 40.6 % (ref 37.5–51)
HDLC SERPL-MCNC: 36 MG/DL
HGB BLD-MCNC: 13.6 G/DL (ref 13–17.7)
IMM GRANULOCYTES # BLD AUTO: 0 X10E3/UL (ref 0–0.1)
IMM GRANULOCYTES NFR BLD AUTO: 0 %
LDLC SERPL CALC-MCNC: 66 MG/DL (ref 0–99)
LYMPHOCYTES # BLD AUTO: 1.8 X10E3/UL (ref 0.7–3.1)
LYMPHOCYTES NFR BLD AUTO: 30 %
MAGNESIUM SERPL-MCNC: 1.9 MG/DL (ref 1.6–2.3)
MCH RBC QN AUTO: 31.6 PG (ref 26.6–33)
MCHC RBC AUTO-ENTMCNC: 33.5 G/DL (ref 31.5–35.7)
MCV RBC AUTO: 94 FL (ref 79–97)
MONOCYTES # BLD AUTO: 0.6 X10E3/UL (ref 0.1–0.9)
MONOCYTES NFR BLD AUTO: 10 %
NEUTROPHILS # BLD AUTO: 3.4 X10E3/UL (ref 1.4–7)
NEUTROPHILS NFR BLD AUTO: 57 %
PLATELET # BLD AUTO: 123 X10E3/UL (ref 150–450)
POTASSIUM SERPL-SCNC: 4.6 MMOL/L (ref 3.5–5.2)
PROT SERPL-MCNC: 8 G/DL (ref 6–8.5)
RBC # BLD AUTO: 4.31 X10E6/UL (ref 4.14–5.8)
SODIUM SERPL-SCNC: 139 MMOL/L (ref 134–144)
TRIGL SERPL-MCNC: 144 MG/DL (ref 0–149)
VLDLC SERPL CALC-MCNC: 25 MG/DL (ref 5–40)
WBC # BLD AUTO: 6 X10E3/UL (ref 3.4–10.8)

## 2020-11-03 NOTE — TELEPHONE ENCOUNTER
Referral placed. If he has not heard back about scheduling in the next few days, call and ask to speak to referral coordinatorPrasad

## 2020-11-20 ENCOUNTER — OFFICE VISIT (OUTPATIENT)
Dept: FAMILY MEDICINE CLINIC | Facility: CLINIC | Age: 65
End: 2020-11-20

## 2020-11-20 VITALS
BODY MASS INDEX: 39.37 KG/M2 | HEIGHT: 66 IN | RESPIRATION RATE: 18 BRPM | DIASTOLIC BLOOD PRESSURE: 82 MMHG | TEMPERATURE: 98.2 F | HEART RATE: 78 BPM | WEIGHT: 245 LBS | SYSTOLIC BLOOD PRESSURE: 152 MMHG

## 2020-11-20 DIAGNOSIS — S32.2XXD CLOSED FRACTURE OF COCCYX WITH ROUTINE HEALING, SUBSEQUENT ENCOUNTER: ICD-10-CM

## 2020-11-20 DIAGNOSIS — E11.65 UNCONTROLLED TYPE 2 DIABETES MELLITUS WITH HYPERGLYCEMIA (HCC): ICD-10-CM

## 2020-11-20 DIAGNOSIS — E78.00 PURE HYPERCHOLESTEROLEMIA: ICD-10-CM

## 2020-11-20 DIAGNOSIS — S82.002D CLOSED NONDISPLACED FRACTURE OF LEFT PATELLA WITH ROUTINE HEALING, UNSPECIFIED FRACTURE MORPHOLOGY, SUBSEQUENT ENCOUNTER: ICD-10-CM
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
## 2020-11-20 DIAGNOSIS — Z00.00 WELL ADULT EXAM: ICD-10-CM

## 2020-11-20 DIAGNOSIS — Z00.00 MEDICARE ANNUAL WELLNESS VISIT, SUBSEQUENT: Primary | ICD-10-CM

## 2020-11-20 PROCEDURE — 96160 PT-FOCUSED HLTH RISK ASSMT: CPT | Performed by: FAMILY MEDICINE

## 2020-11-20 PROCEDURE — 99397 PER PM REEVAL EST PAT 65+ YR: CPT | Performed by: FAMILY MEDICINE

## 2020-11-20 PROCEDURE — G0439 PPPS, SUBSEQ VISIT: HCPCS | Performed by: FAMILY MEDICINE

## 2020-11-20 RX ORDER — PANTOPRAZOLE SODIUM 40 MG/1
40 TABLET, DELAYED RELEASE ORAL DAILY
Qty: 90 TABLET | Refills: 1 | Status: SHIPPED | OUTPATIENT
Start: 2020-11-20 | End: 2021-06-28

## 2020-11-20 RX ORDER — HYDROCODONE BITARTRATE AND ACETAMINOPHEN 5; 325 MG/1; MG/1
.5-1 TABLET ORAL EVERY 6 HOURS PRN
Qty: 15 TABLET | Refills: 0 | Status: SHIPPED | OUTPATIENT
Start: 2020-11-20 | End: 2021-04-19

## 2020-11-20 NOTE — PROGRESS NOTES
The ABCs of the Annual Wellness Visit  Subsequent Medicare Wellness Visit    Chief Complaint   Patient presents with   • Medicare Wellness-subsequent       Subjective   History of Present Illness:  Kai Garcia is a 65 y.o. male who presents for a Subsequent Medicare Wellness Visit.    Fell in chicken coup and hurt back. 10/19/2020  Saw Dr Hernandez and has fx coccyx, fx left patella  Needs to see a specialist for the knee.   Needs something for the pain   Takes 2 tylenol and helps only with heat but not much help  Most of the knee in the hip and back and pain when standing on knee      Insomnia  On ambien for sleep as needed. Has needed since the fall.   On 10 mg Ambien    DM2   On Januvia now only.  A1c 8.2  Has tried to eat better. Tried to decrease carbs. Tries to decrease portions.        Elevated PSA. Was on antibiotic and got better. Recheck in 1 year         HEALTH RISK ASSESSMENT    Recent Hospitalizations:  No hospitalization(s) within the last year.    Current Medical Providers:  Patient Care Team:  Cheo Weiss MD as PCP - General  Uri Willis MD as Consulting Physician (Cardiology)    Smoking Status:  Social History     Tobacco Use   Smoking Status Former Smoker   • Packs/day: 3.00   • Years: 35.00   • Pack years: 105.00   • Types: Cigarettes   • Quit date: 1/15/2006   • Years since quittin.8   Smokeless Tobacco Never Used       Alcohol Consumption:  Social History     Substance and Sexual Activity   Alcohol Use No       Depression Screen:   PHQ-2/PHQ-9 Depression Screening 2020   Little interest or pleasure in doing things 0   Feeling down, depressed, or hopeless 0   Trouble falling or staying asleep, or sleeping too much -   Feeling tired or having little energy -   Poor appetite or overeating -   Feeling bad about yourself - or that you are a failure or have let yourself or your family down -   Trouble concentrating on things, such as reading the newspaper or watching television -    Moving or speaking so slowly that other people could have noticed. Or the opposite - being so fidgety or restless that you have been moving around a lot more than usual -   Thoughts that you would be better off dead, or of hurting yourself in some way -   Total Score 0       Fall Risk Screen:  [place] Fall Risk Assessment was completed, and patient is at HIGH risk for falls. Assessment completed on:11/20/2020    Health Habits and Functional and Cognitive Screening:  Functional & Cognitive Status 11/20/2020   Do you have difficulty preparing food and eating? No   Do you have difficulty bathing yourself, getting dressed or grooming yourself? No   Do you have difficulty using the toilet? No   Do you have difficulty moving around from place to place? No   Do you have trouble with steps or getting out of a bed or a chair? No   Current Diet Well Balanced Diet   Dental Exam Up to date   Eye Exam Up to date        Eye Exam Comment due next month    Do you need help using the phone?  No   Are you deaf or do you have serious difficulty hearing?  No   Do you need help with transportation? No   Do you need help shopping? No   Do you need help preparing meals?  No   Do you need help with housework?  No   Do you need help with laundry? No   Do you need help taking your medications? No   Do you need help managing money? No   Do you ever drive or ride in a car without wearing a seat belt? No         Does the patient have evidence of cognitive impairment? No    Asprin use counseling:Taking ASA appropriately as indicated    Age-appropriate Screening Schedule:  Refer to the list below for future screening recommendations based on patient's age, sex and/or medical conditions. Orders for these recommended tests are listed in the plan section. The patient has been provided with a written plan.    Health Maintenance   Topic Date Due   • TDAP/TD VACCINES (1 - Tdap) 09/13/1973   • URINE MICROALBUMIN  10/21/2020   • DIABETIC EYE EXAM   01/08/2021 (Originally 10/7/2020)   • INFLUENZA VACCINE  11/20/2021 (Originally 8/1/2020)   • ZOSTER VACCINE (1 of 2) 11/20/2021 (Originally 9/13/2004)   • DIABETIC FOOT EXAM  01/21/2021   • HEMOGLOBIN A1C  05/02/2021   • LIPID PANEL  11/02/2021   • COLONOSCOPY  06/18/2022          The following portions of the patient's history were reviewed and updated as appropriate: allergies, current medications, past family history, past medical history, past social history, past surgical history and problem list.    Outpatient Medications Prior to Visit   Medication Sig Dispense Refill   • Accu-Chek Softclix Lancets lancets Check blood sugars twice a day or as needed 200 each 3   • acetaminophen (TYLENOL) 500 MG tablet Take 1,000 mg by mouth Every 6 (Six) Hours As Needed for Mild Pain .     • aspirin 81 MG EC tablet Take 81 mg by mouth Daily.     • bisoprolol (ZEBeta) 10 MG tablet TAKE ONE TABLET BY MOUTH ONCE DAILY 90 tablet 0   • clopidogrel (PLAVIX) 75 MG tablet TAKE ONE TABLET BY MOUTH ONCE DAILY 90 tablet 0   • cyclobenzaprine (FLEXERIL) 10 MG tablet TAKE ONE TABLET BY MOUTH 3 TIMES A DAY AS NEEDED FOR MUSCLE SPASMS 30 tablet 2   • Entresto 49-51 MG tablet TAKE ONE TABLET BY MOUTH EVERY 12 HOURS 180 tablet 0   • eplerenone (INSPRA) 25 MG tablet TAKE ONE TABLET BY MOUTH EVERY DAY 90 tablet 3   • glucose blood (Accu-Chek Jodie Plus) test strip Check sugars twice a day or as needed 200 each 3   • Januvia 100 MG tablet TAKE ONE TABLET BY MOUTH EVERY DAY 90 tablet 1   • nitroglycerin (NITROSTAT) 0.4 MG SL tablet Place 1 tablet under the tongue Every 5 (Five) Minutes As Needed for Chest Pain. 25 tablet 5   • rosuvastatin (CRESTOR) 10 MG tablet TAKE ONE TABLET BY MOUTH ONCE DAILY 90 tablet 0   • zolpidem (AMBIEN) 10 MG tablet Take 1 tablet by mouth At Night As Needed for Sleep. 30 tablet 2   • pantoprazole (Protonix) 40 MG EC tablet Take 1 tablet by mouth Daily. 90 tablet 1     No facility-administered medications prior to  "visit.        Patient Active Problem List   Diagnosis   • Aortic dissection following procedure (CMS/HCC)   • Multiple vessel coronary artery disease   • Abnormal liver function tests   • Gastroesophageal reflux disease   • Hyperlipidemia   • Hyperglycemia   • Sleep apnea   • Type II diabetes mellitus, uncontrolled (CMS/HCC)   • Coronary artery disease involving native coronary artery of native heart   • ALT (SGPT) level raised   • Elevated AST (SGOT)   • Tinnitus   • Dyslipidemia   • Hypertension   • Chronic low back pain   • History of bleeding peptic ulcer   • LUCIAN on CPAP   • Essential hypertension   • Coronary artery disease involving native coronary artery of native heart without angina pectoris   • Ischemic cardiomyopathy   • RBBB   • Screen for colon cancer       Advanced Care Planning:  ACP discussion was held with the patient during this visit. discussed with patient, has paperwork to fill    Review of Systems   Constitutional: Negative.    HENT: Positive for hearing loss (little). Negative for congestion.    Respiratory: Negative for shortness of breath.    Cardiovascular: Negative.  Negative for chest pain.   Gastrointestinal: Negative.  Negative for blood in stool.   Genitourinary: Negative.    Musculoskeletal: Positive for arthralgias, back pain and joint swelling.   Neurological: Negative.  Negative for dizziness, syncope and headaches (better now).   Psychiatric/Behavioral: Negative.        Compared to one year ago, the patient feels his physical health is the same. Except pain with injury  Compared to one year ago, the patient feels his mental health is the same.    Reviewed chart for potential of high risk medication in the elderly: yes  Reviewed chart for potential of harmful drug interactions in the elderly:yes    Objective         Vitals:    11/20/20 1402   BP: 152/82   Pulse: 78   Resp: 18   Temp: 98.2 °F (36.8 °C)   Weight: 111 kg (245 lb)   Height: 167.6 cm (66\")   PainSc:   8       Body mass " index is 39.54 kg/m².  Discussed the patient's BMI with him. The BMI is above average; BMI management plan is completed.    Physical Exam  Vitals signs and nursing note reviewed.   Constitutional:       General: He is not in acute distress.     Appearance: Normal appearance. He is well-developed. He is not ill-appearing.   HENT:      Head: Normocephalic and atraumatic.      Right Ear: Hearing, tympanic membrane, ear canal and external ear normal.      Left Ear: Hearing, tympanic membrane, ear canal and external ear normal.      Nose: Nose normal. No congestion or rhinorrhea.      Mouth/Throat:      Mouth: Mucous membranes are moist.      Pharynx: No oropharyngeal exudate or posterior oropharyngeal erythema.   Eyes:      General: Lids are normal.      Conjunctiva/sclera: Conjunctivae normal.      Pupils: Pupils are equal, round, and reactive to light.   Neck:      Musculoskeletal: Normal range of motion and neck supple.      Thyroid: No thyromegaly.   Cardiovascular:      Rate and Rhythm: Normal rate and regular rhythm.      Heart sounds: Normal heart sounds. No murmur. No friction rub.   Pulmonary:      Effort: Pulmonary effort is normal. No respiratory distress.      Breath sounds: Normal breath sounds. No wheezing or rales.   Abdominal:      General: Bowel sounds are normal. There is no distension.      Palpations: Abdomen is soft. There is no mass.      Tenderness: There is no abdominal tenderness. There is no guarding or rebound.   Musculoskeletal:      Left knee: He exhibits decreased range of motion and abnormal patellar mobility. Tenderness found. Patellar tendon tenderness noted.      Lumbar back: He exhibits decreased range of motion and tenderness.      Right lower leg: No edema.      Left lower leg: No edema.   Skin:     General: Skin is warm and dry.   Neurological:      General: No focal deficit present.      Mental Status: He is alert.   Psychiatric:         Mood and Affect: Mood normal.          Speech: Speech normal.         Behavior: Behavior normal.         Lab Results   Component Value Date     (H) 11/02/2020    CHLPL 127 11/02/2020    TRIG 144 11/02/2020    HDL 36 (L) 11/02/2020    LDL 66 11/02/2020    VLDL 25 11/02/2020    HGBA1C 8.2 (H) 11/02/2020        Assessment/Plan   Medicare Risks and Personalized Health Plan  CMS Preventative Services Quick Reference  Cardiovascular risk  Fall Risk  Hearing Problem  Immunizations Discussed/Encouraged (specific immunizations; patient refuses any vaccines )    The above risks/problems have been discussed with the patient.  Pertinent information has been shared with the patient in the After Visit Summary.  Follow up plans and orders are seen below in the Assessment/Plan Section.    Diagnoses and all orders for this visit:    1. Medicare annual wellness visit, subsequent (Primary)    2. Well adult exam    3. Closed nondisplaced fracture of left patella with routine healing, unspecified fracture morphology, subsequent encounter  -     HYDROcodone-acetaminophen (NORCO) 5-325 MG per tablet; Take 0.5-1 tablets by mouth Every 6 (Six) Hours As Needed for Moderate Pain .  Dispense: 15 tablet; Refill: 0    4. Closed fracture of coccyx with routine healing, subsequent encounter  -     HYDROcodone-acetaminophen (NORCO) 5-325 MG per tablet; Take 0.5-1 tablets by mouth Every 6 (Six) Hours As Needed for Moderate Pain .  Dispense: 15 tablet; Refill: 0    5. Fall, subsequent encounter  -     HYDROcodone-acetaminophen (NORCO) 5-325 MG per tablet; Take 0.5-1 tablets by mouth Every 6 (Six) Hours As Needed for Moderate Pain .  Dispense: 15 tablet; Refill: 0    6. Uncontrolled type 2 diabetes mellitus with hyperglycemia (CMS/[facility])    7. Pure hypercholesterolemia    Other orders  -     pantoprazole (Protonix) 40 MG EC tablet; Take 1 tablet by mouth Daily.  Dispense: 90 tablet; Refill: 1      Follow Up:  Return in about 3 months (around 2/20/2021).     Here for well examination.   Continue routine health maintenance including routine dentistry, eye exam, safety, seatbelt use, exercise and proper nutrition.    Fracture of left patella.  Continue follow-up with orthopedic.  Pain medication as noted.  No evidence of misuse or diversion of prescription medication.  Loi reviewed and appropriate.    Fracture of coccyx.  Continue pain medication as noted.  Follow-up with orthopedics.    Recent fall.  Increase fall risk.    Diabetes mellitus type 2.  A1c is increased to 8.2.  He has been eating more carbs including peanut butter and jelly sandwiches.  Recommend that he cut these out and limit this.  Limit other carbs and sweets as well.  Recheck in 3 months.  Continue current medications.    Hyperlipidemia.  Cholesterol levels are stable.    An After Visit Summary and PPPS were given to the patient.         Cheo Weiss MD

## 2021-01-19 NOTE — PROGRESS NOTES
Subjective:     Encounter Date:01/20/2021    Primary Care Physician: Cheo Weiss MD      Patient ID: Kai Garcia is a 65 y.o. male.    Chief Complaint:No chief complaint on file.    Problem List:  1. Dissecting aortic aneurysm:  a. 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 Helton, stage I in June 2006 and stage II in March 2007.   b. Followed chronically with annual CTs by Dr. Helton. Stable by CT 2019  2. Coronary artery disease:  a. Cardiac cath prior to “elephant trunk” procedure in June 2006 and the patient underwent CABG at that time consisting of SVG to the OM and SVG to the PDA.   b. 02/08/2007 - inferoposterior MI, cardiac cath showed 100% left circumflex with severe OM-1 stenosis and 100% vein graft to the left circumflex. RCA was diffuse, 90% ostial stenosis at the SVG to the RCA. LAD had only 40% stenosis.   c. Cardiac cath in March 2007 due to recurrent angina - 2.5 Microdriver stent to the distal native RCA.   d. 4/27/18 cardiac catheter occluded vein grafts ×2.  Large posterior basal aneurysm with ejection fraction of 30-35%.  Severe stenosis and distal circumflex which supplies aneurysm.  Non-flow-limiting RCA disease.  e. MRI 8/18 LVEF 28%  f. S/P Bi- Ventricular ICD- Marcandi 8/9/18, EMILIE Fox MD  3. Dyslipidemia.   4. Hypertension.   5. Diabetes  6. Chronic low back pain/multiple lumbar surgeries, currently disabled:  a. L4-L5 discectomy in 2015.   7. History of bleeding peptic ulcer disease.   8. Severe obstructive sleep apnea.   9. Appendectomy      Allergies   Allergen Reactions   • Ativan [Lorazepam] Other (See Comments)     agitation   • Zetia [Ezetimibe] Other (See Comments)     increased LFTs   • Zocor [Simvastatin] Myalgia   • Jardiance [Empagliflozin] Unknown - Low Severity     Blood in urine and burning sensation   • Isosorbide Other (See Comments)     headaches   • Metformin  And Related Diarrhea         Current Outpatient Medications:   •  Accu-Chek Softclix Lancets lancets, Check blood sugars twice a day or as needed, Disp: 200 each, Rfl: 3  •  acetaminophen (TYLENOL) 500 MG tablet, Take 1,000 mg by mouth Every 6 (Six) Hours As Needed for Mild Pain ., Disp: , Rfl:   •  aspirin 81 MG EC tablet, Take 81 mg by mouth Daily., Disp: , Rfl:   •  bisoprolol (ZEBeta) 10 MG tablet, TAKE ONE TABLET BY MOUTH ONCE DAILY, Disp: 90 tablet, Rfl: 0  •  clopidogrel (PLAVIX) 75 MG tablet, TAKE ONE TABLET BY MOUTH ONCE DAILY, Disp: 90 tablet, Rfl: 0  •  cyclobenzaprine (FLEXERIL) 10 MG tablet, TAKE ONE TABLET BY MOUTH 3 TIMES A DAY AS NEEDED FOR MUSCLE SPASMS, Disp: 30 tablet, Rfl: 2  •  Entresto 49-51 MG tablet, TAKE ONE TABLET BY MOUTH EVERY 12 HOURS, Disp: 180 tablet, Rfl: 0  •  eplerenone (INSPRA) 25 MG tablet, TAKE ONE TABLET BY MOUTH EVERY DAY, Disp: 90 tablet, Rfl: 3  •  glucose blood (Accu-Chek Jodie Plus) test strip, Check sugars twice a day or as needed, Disp: 200 each, Rfl: 3  •  HYDROcodone-acetaminophen (NORCO) 5-325 MG per tablet, Take 0.5-1 tablets by mouth Every 6 (Six) Hours As Needed for Moderate Pain ., Disp: 15 tablet, Rfl: 0  •  Januvia 100 MG tablet, TAKE ONE TABLET BY MOUTH EVERY DAY, Disp: 90 tablet, Rfl: 1  •  nitroglycerin (NITROSTAT) 0.4 MG SL tablet, Place 1 tablet under the tongue Every 5 (Five) Minutes As Needed for Chest Pain., Disp: 25 tablet, Rfl: 5  •  pantoprazole (Protonix) 40 MG EC tablet, Take 1 tablet by mouth Daily., Disp: 90 tablet, Rfl: 1  •  rosuvastatin (CRESTOR) 10 MG tablet, TAKE ONE TABLET BY MOUTH ONCE DAILY, Disp: 90 tablet, Rfl: 0  •  zolpidem (AMBIEN) 10 MG tablet, Take 1 tablet by mouth At Night As Needed for Sleep., Disp: 30 tablet, Rfl: 2        History of Present Illness    Patient returns today for 6-month follow-up of ischemic or myopathy coronary disease and aortic dissection follow-up.  Since her last visit he denies any cardiovascular complaints.  " No change in dyspnea chest pain orthopnea PND or any cardiovascular symptoms he had a recent fall, due to a slip, not dizziness, which he injured his back, and is currently seeing orthopedic surgery.  He apparently has need for MRI, but has concerns about his pacemaker.  He has been severely limited to the pain from this over the last month.    The following portions of the patient's history were reviewed and updated as appropriate: allergies, current medications, past family history, past medical history, past social history, past surgical history and problem list.      Social History     Tobacco Use   • Smoking status: Former Smoker     Packs/day: 3.00     Years: 35.00     Pack years: 105.00     Types: Cigarettes     Quit date: 1/15/2006     Years since quitting: 15.0   • Smokeless tobacco: Never Used   Substance Use Topics   • Alcohol use: No   • Drug use: No         Review of Systems   Constitution: Positive for chills and diaphoresis.   HENT: Positive for hearing loss and tinnitus.    Cardiovascular: Positive for chest pain and leg swelling. Negative for dyspnea on exertion, palpitations and syncope.   Respiratory: Negative.  Negative for shortness of breath.    Endocrine: Positive for cold intolerance and heat intolerance.   Hematologic/Lymphatic: Negative for bleeding problem. Does not bruise/bleed easily.   Skin: Negative for rash.   Musculoskeletal: Positive for back pain. Negative for muscle weakness and myalgias.   Gastrointestinal: Negative for heartburn, nausea and vomiting.   Neurological: Negative for dizziness, light-headedness, loss of balance and numbness.          Objective:   /78 (BP Location: Left arm)   Pulse 79   Ht 167.6 cm (66\")   Wt 112 kg (246 lb)   SpO2 98%   BMI 39.71 kg/m²         Vitals signs reviewed.   Constitutional:       Appearance: Well-developed and not in distress.   Neck:      Thyroid: No thyromegaly.      Vascular: No carotid bruit or JVD.   Pulmonary:      Breath " sounds: Normal breath sounds.   Cardiovascular:      Regular rhythm.      No gallop. No S3 and S4 gallop.   Abdominal:      General: Bowel sounds are normal.      Palpations: Abdomen is soft. There is no abdominal mass.      Tenderness: There is no abdominal tenderness.   Musculoskeletal:         General: No deformity.      Extremities: No clubbing present.  Skin:     General: Skin is warm and dry.      Findings: No rash.   Neurological:      Mental Status: Alert and oriented to person, place, and time.         Procedures  Device check:  Normal biventricular ICD function.  No therapies delivered.  No ventricular arrhythmias.  Normal thresholds and impedances.  99% biventricular paced, but is not pacemaker dependent.        Assessment:   Assessment/Plan      Diagnoses and all orders for this visit:    1. Aortic dissection following procedure, sequela (CMS/HCC) (Primary)    2. Multiple vessel coronary artery disease    3. Pure hypercholesterolemia    4. Essential hypertension    5. Ischemic cardiomyopathy      1.  Ischemic cardiomyopathy last LVEF 35% with inferolateral aneurysm.  No current heart failure  2.  Normal functioning BiV ICD.  Next 3.  Dyslipidemia on high-dose statin  4.  Hypertension well-controlled  5.  History aortic dissection, chronic stable  6.  Preop lumbar spine surgery    Recommendations  1.  Patient is stable from cardiovascular standpoint make no change in current medical therapy  2.  Patient to be a low risk of upcoming surgical procedures.  May hold Plavix for 5 days prior to any spinal procedure.  3.  Patient does have an MRI compatible device, and can arrange lumbar spine MRI if necessary through [facility].  4.  Revisit 6 months or as needed symptom change    Uri Willis MD           Dictated utilizing Dragon dictation

## 2021-01-20 ENCOUNTER — OFFICE VISIT (OUTPATIENT)
Dept: CARDIOLOGY | Facility: CLINIC | Age: 66
End: 2021-01-20

## 2021-01-20 VITALS
SYSTOLIC BLOOD PRESSURE: 116 MMHG | HEIGHT: 66 IN | OXYGEN SATURATION: 98 % | DIASTOLIC BLOOD PRESSURE: 78 MMHG | BODY MASS INDEX: 39.53 KG/M2 | WEIGHT: 246 LBS | HEART RATE: 79 BPM

## 2021-01-20 DIAGNOSIS — I71.00: Primary | ICD-10-CM

## 2021-01-20 DIAGNOSIS — I10 ESSENTIAL HYPERTENSION: ICD-10-CM

## 2021-01-20 DIAGNOSIS — I25.10 MULTIPLE VESSEL CORONARY ARTERY DISEASE: ICD-10-CM

## 2021-01-20 DIAGNOSIS — I25.5 ISCHEMIC CARDIOMYOPATHY: ICD-10-CM

## 2021-01-20 DIAGNOSIS — E78.00 PURE HYPERCHOLESTEROLEMIA: ICD-10-CM

## 2021-01-20 DIAGNOSIS — T81.718S: Primary | ICD-10-CM

## 2021-01-20 PROCEDURE — 93284 PRGRMG EVAL IMPLANTABLE DFB: CPT | Performed by: INTERNAL MEDICINE

## 2021-01-20 PROCEDURE — 99214 OFFICE O/P EST MOD 30 MIN: CPT | Performed by: INTERNAL MEDICINE

## 2021-02-01 DIAGNOSIS — M54.50 LUMBAR PAIN: ICD-10-CM

## 2021-02-01 RX ORDER — CYCLOBENZAPRINE HCL 10 MG
TABLET ORAL
Qty: 30 TABLET | Refills: 2 | Status: SHIPPED | OUTPATIENT
Start: 2021-02-01 | End: 2021-05-06

## 2021-02-04 ENCOUNTER — TELEPHONE (OUTPATIENT)
Dept: FAMILY MEDICINE CLINIC | Facility: CLINIC | Age: 66
End: 2021-02-04

## 2021-02-04 NOTE — TELEPHONE ENCOUNTER
Patient wife called and stated that the patient needs a new order for cpap supply's     Please call and advise at [phone]

## 2021-02-21 ENCOUNTER — APPOINTMENT (OUTPATIENT)
Dept: PREADMISSION TESTING | Facility: HOSPITAL | Age: 66
End: 2021-02-21

## 2021-02-21 PROCEDURE — C9803 HOPD COVID-19 SPEC COLLECT: HCPCS

## 2021-02-21 PROCEDURE — U0004 COV-19 TEST NON-CDC HGH THRU: HCPCS

## 2021-02-22 ENCOUNTER — OFFICE VISIT (OUTPATIENT)
Dept: FAMILY MEDICINE CLINIC | Facility: CLINIC | Age: 66
End: 2021-02-22

## 2021-02-22 VITALS
WEIGHT: 246 LBS | DIASTOLIC BLOOD PRESSURE: 76 MMHG | BODY MASS INDEX: 39.53 KG/M2 | HEART RATE: 74 BPM | HEIGHT: 66 IN | TEMPERATURE: 98.2 F | RESPIRATION RATE: 18 BRPM | SYSTOLIC BLOOD PRESSURE: 122 MMHG

## 2021-02-22 DIAGNOSIS — I25.10 CORONARY ARTERY DISEASE INVOLVING NATIVE CORONARY ARTERY OF NATIVE HEART WITHOUT ANGINA PECTORIS: ICD-10-CM

## 2021-02-22 DIAGNOSIS — E11.65 UNCONTROLLED TYPE 2 DIABETES MELLITUS WITH HYPERGLYCEMIA (HCC): Primary | ICD-10-CM

## 2021-02-22 DIAGNOSIS — M54.50 LUMBAR PAIN: ICD-10-CM

## 2021-02-22 DIAGNOSIS — E78.00 PURE HYPERCHOLESTEROLEMIA: ICD-10-CM

## 2021-02-22 LAB
ALBUMIN SERPL-MCNC: 4.5 G/DL (ref 3.5–5.2)
ALBUMIN/GLOB SERPL: 1.5 G/DL
ALP SERPL-CCNC: 40 U/L (ref 39–117)
ALT SERPL-CCNC: 45 U/L (ref 1–41)
AST SERPL-CCNC: 52 U/L (ref 1–40)
BILIRUB SERPL-MCNC: 0.6 MG/DL (ref 0–1.2)
BUN SERPL-MCNC: 18 MG/DL (ref 8–23)
BUN/CREAT SERPL: 16.2 (ref 7–25)
CALCIUM SERPL-MCNC: 9.5 MG/DL (ref 8.6–10.5)
CHLORIDE SERPL-SCNC: 102 MMOL/L (ref 98–107)
CHOLEST SERPL-MCNC: 130 MG/DL (ref 0–200)
CHOLEST/HDLC SERPL: 4.06 {RATIO}
CO2 SERPL-SCNC: 26.9 MMOL/L (ref 22–29)
CREAT SERPL-MCNC: 1.11 MG/DL (ref 0.76–1.27)
GLOBULIN SER CALC-MCNC: 3.1 GM/DL
GLUCOSE SERPL-MCNC: 190 MG/DL (ref 65–99)
HBA1C MFR BLD: 9.3 % (ref 4.8–5.6)
HDLC SERPL-MCNC: 32 MG/DL (ref 40–60)
LDLC SERPL CALC-MCNC: 67 MG/DL (ref 0–100)
POTASSIUM SERPL-SCNC: 4.5 MMOL/L (ref 3.5–5.2)
PROT SERPL-MCNC: 7.6 G/DL (ref 6–8.5)
SARS-COV-2 RNA RESP QL NAA+PROBE: NOT DETECTED
SODIUM SERPL-SCNC: 137 MMOL/L (ref 136–145)
TRIGL SERPL-MCNC: 184 MG/DL (ref 0–150)
VLDLC SERPL CALC-MCNC: 31 MG/DL (ref 5–40)

## 2021-02-22 PROCEDURE — 99214 OFFICE O/P EST MOD 30 MIN: CPT | Performed by: FAMILY MEDICINE

## 2021-02-22 NOTE — PROGRESS NOTES
Assessment/Plan       Diagnoses and all orders for this visit:    1. Uncontrolled type 2 diabetes mellitus with hyperglycemia (CMS/[facility]) (Primary)  -     Lipid Panel With / Chol / HDL Ratio  -     Hemoglobin A1c    2. Pure hypercholesterolemia  -     Comprehensive Metabolic Panel  -     Lipid Panel With / Chol / HDL Ratio    3. Coronary artery disease involving native coronary artery of native heart without angina pectoris    4. Lumbar pain           Follow up: Return in about 3 months (around 5/22/2021).     DISCUSSION  Diabetes mellitus type 2 uncontrolled.  Due for blood work.  Check A1c, CMP and lipid panel.  Continue Januvia 100 mg daily.    Hyperlipidemia.  Check CMP and lipid panel.    Coronary disease.  Continue follow-up with cardiology.  Stable.  No active chest pain.    Lumbar back pain.  Getting epidural injection tomorrow.          MEDICATIONS PRESCRIBED  Requested Prescriptions      No prescriptions requested or ordered in this encounter          -------------------------------------------    Subjective     Chief Complaint   Patient presents with   • Diabetes     3 month f/u          Diabetes  He presents for his follow-up diabetic visit. He has type 2 diabetes mellitus. His disease course has been stable. There are no hypoglycemic associated symptoms. Pertinent negatives for diabetes include no chest pain and no fatigue. There are no hypoglycemic complications. Diabetic complications include heart disease. Risk factors for coronary artery disease include diabetes mellitus, hypertension and dyslipidemia. Current diabetic treatment includes oral agent (monotherapy) (Januvia). He is compliant with treatment all of the time. His weight is stable. He is following a generally healthy diet. He never participates in exercise. He monitors blood glucose at home 1-2 x per day (140-150 most days, ave 142). Home blood sugar record trend: has been in the 200s now in last 2 months.  An ACE inhibitor/angiotensin II  "receptor blocker is being taken (on entresto). Eye exam is current.   Hypertension  This is a chronic problem. The problem is unchanged. The problem is controlled. Pertinent negatives include no chest pain, malaise/fatigue, peripheral edema or shortness of breath. There are no associated agents to hypertension. Current antihypertension treatment includes angiotensin blockers. The current treatment provides moderate improvement. Hypertensive end-organ damage includes CAD/MI.   Coronary Artery Disease  Presents for follow-up visit. Pertinent negatives include no chest pain, chest pressure, leg swelling, shortness of breath or weight gain. (Sees Dr Willis  ) Risk factors include hyperlipidemia. The symptoms have been stable.   Hyperlipidemia  This is a chronic problem. The current episode started more than 1 year ago. The problem is controlled. Recent lipid tests were reviewed and are normal. Pertinent negatives include no chest pain, myalgias (muscle spasms in left leg, lower) or shortness of breath. Current antihyperlipidemic treatment includes statins. The current treatment provides moderate improvement of lipids. There are no compliance problems.  Risk factors for coronary artery disease include diabetes mellitus, dyslipidemia and hypertension.       Back pain   Lower back pain and shoots down the left leg  Left leg feels weak and \" not there\"  Near fall from it  Uses a cane now    Dr Hernandez to have an epidural tomorrow    Has been off the plavix for th shot              Social History     Tobacco Use   Smoking Status Former Smoker   • Packs/day: 3.00   • Years: 35.00   • Pack years: 105.00   • Types: Cigarettes   • Quit date: 1/15/2006   • Years since quitting: 15.1   Smokeless Tobacco Never Used          Past Medical History,Medications, Allergies, and social history was reviewed.          Review of Systems   Constitutional: Negative.  Negative for fatigue, malaise/fatigue and unexpected weight gain.   HENT: " "Negative.    Respiratory: Negative.  Negative for shortness of breath.    Cardiovascular: Negative.  Negative for chest pain and leg swelling.   Gastrointestinal: Negative.    Musculoskeletal: Positive for back pain. Negative for myalgias (muscle spasms in left leg, lower).   Neurological: Negative.    Psychiatric/Behavioral: Negative.        Objective     Vitals:    02/22/21 0927   BP: 122/76   Pulse: 74   Resp: 18   Temp: 98.2 °F (36.8 °C)   Weight: 112 kg (246 lb)   Height: 167.6 cm (66\")          Physical Exam  Vitals signs and nursing note reviewed.   Constitutional:       General: He is not in acute distress.     Appearance: Normal appearance. He is well-developed. He is obese. He is not ill-appearing.   HENT:      Head: Normocephalic and atraumatic.      Right Ear: Hearing, tympanic membrane, ear canal and external ear normal.      Left Ear: Hearing, tympanic membrane, ear canal and external ear normal.      Nose: Nose normal. No congestion or rhinorrhea.      Mouth/Throat:      Mouth: Mucous membranes are moist.      Pharynx: No oropharyngeal exudate or posterior oropharyngeal erythema.   Eyes:      General: Lids are normal.      Conjunctiva/sclera: Conjunctivae normal.      Pupils: Pupils are equal, round, and reactive to light.   Neck:      Musculoskeletal: Normal range of motion and neck supple.      Thyroid: No thyromegaly.   Cardiovascular:      Rate and Rhythm: Normal rate and regular rhythm.      Heart sounds: Normal heart sounds. No murmur. No friction rub.   Pulmonary:      Effort: Pulmonary effort is normal. No respiratory distress.      Breath sounds: Normal breath sounds. No wheezing or rales.   Abdominal:      General: Bowel sounds are normal. There is no distension.      Palpations: Abdomen is soft. There is no mass.      Tenderness: There is no abdominal tenderness. There is no guarding or rebound.   Musculoskeletal:      Right lower leg: No edema.      Left lower leg: No edema.   Skin:     " General: Skin is warm and dry.   Neurological:      General: No focal deficit present.      Mental Status: He is alert.   Psychiatric:         Mood and Affect: Mood normal.         Speech: Speech normal.         Behavior: Behavior normal.                 Cheo Weiss MD

## 2021-03-05 DIAGNOSIS — E11.65 UNCONTROLLED TYPE 2 DIABETES MELLITUS WITH HYPERGLYCEMIA, WITHOUT LONG-TERM CURRENT USE OF INSULIN (HCC): ICD-10-CM

## 2021-03-05 RX ORDER — BLOOD SUGAR DIAGNOSTIC
STRIP MISCELLANEOUS
Qty: 200 EACH | Refills: 3 | Status: SHIPPED | OUTPATIENT
Start: 2021-03-05 | End: 2021-04-19

## 2021-03-17 ENCOUNTER — TELEPHONE (OUTPATIENT)
Dept: CARDIOLOGY | Facility: CLINIC | Age: 66
End: 2021-03-17

## 2021-03-17 NOTE — TELEPHONE ENCOUNTER
Wife called to request pre op risk assessment for back surgery with dr. Faulkner  On 4/21/21, and ok to be off Plavix for 7 days.

## 2021-03-28 ENCOUNTER — APPOINTMENT (OUTPATIENT)
Dept: GENERAL RADIOLOGY | Facility: HOSPITAL | Age: 66
End: 2021-03-28

## 2021-03-28 ENCOUNTER — HOSPITAL ENCOUNTER (INPATIENT)
Facility: HOSPITAL | Age: 66
LOS: 5 days | Discharge: HOME | End: 2021-04-02
Attending: EMERGENCY MEDICINE | Admitting: INTERNAL MEDICINE

## 2021-03-28 ENCOUNTER — APPOINTMENT (OUTPATIENT)
Dept: CT IMAGING | Facility: HOSPITAL | Age: 66
End: 2021-03-28

## 2021-03-28 DIAGNOSIS — N30.00 ACUTE CYSTITIS WITHOUT HEMATURIA: ICD-10-CM

## 2021-03-28 DIAGNOSIS — I95.9 HYPOTENSION, UNSPECIFIED HYPOTENSION TYPE: ICD-10-CM

## 2021-03-28 DIAGNOSIS — N17.9 SEPSIS WITH ACUTE RENAL FAILURE AND SEPTIC SHOCK, DUE TO UNSPECIFIED ORGANISM, UNSPECIFIED ACUTE RENAL FAILURE TYPE (HCC): Primary | ICD-10-CM

## 2021-03-28 DIAGNOSIS — R65.21 SEPSIS WITH ACUTE RENAL FAILURE AND SEPTIC SHOCK, DUE TO UNSPECIFIED ORGANISM, UNSPECIFIED ACUTE RENAL FAILURE TYPE (HCC): Primary | ICD-10-CM

## 2021-03-28 DIAGNOSIS — A41.9 SEPSIS WITH ACUTE RENAL FAILURE AND SEPTIC SHOCK, DUE TO UNSPECIFIED ORGANISM, UNSPECIFIED ACUTE RENAL FAILURE TYPE (HCC): Primary | ICD-10-CM

## 2021-03-28 DIAGNOSIS — M54.50 LUMBAR PAIN: ICD-10-CM

## 2021-03-28 PROBLEM — D69.6 THROMBOCYTOPENIA (HCC): Status: ACTIVE | Noted: 2021-03-28

## 2021-03-28 PROBLEM — E87.1 HYPONATREMIA: Status: ACTIVE | Noted: 2021-03-28

## 2021-03-28 PROBLEM — I25.5 ISCHEMIC CARDIOMYOPATHY: Chronic | Status: ACTIVE | Noted: 2018-06-05

## 2021-03-28 LAB
ALBUMIN SERPL-MCNC: 3.9 G/DL (ref 3.5–5.2)
ALBUMIN/GLOB SERPL: 1.1 G/DL
ALP SERPL-CCNC: 45 U/L (ref 39–117)
ALT SERPL W P-5'-P-CCNC: 27 U/L (ref 1–41)
ANION GAP SERPL CALCULATED.3IONS-SCNC: 13 MMOL/L (ref 5–15)
AST SERPL-CCNC: 34 U/L (ref 1–40)
BACTERIA UR QL AUTO: ABNORMAL /HPF
BASOPHILS # BLD MANUAL: 0 10*3/MM3 (ref 0–0.2)
BASOPHILS NFR BLD AUTO: 0 % (ref 0–1.5)
BILIRUB SERPL-MCNC: 0.7 MG/DL (ref 0–1.2)
BILIRUB UR QL STRIP: ABNORMAL
BUN SERPL-MCNC: 39 MG/DL (ref 8–23)
BUN/CREAT SERPL: 16.3 (ref 7–25)
CALCIUM SPEC-SCNC: 8.5 MG/DL (ref 8.6–10.5)
CHLORIDE SERPL-SCNC: 94 MMOL/L (ref 98–107)
CLARITY UR: ABNORMAL
CO2 SERPL-SCNC: 24 MMOL/L (ref 22–29)
COLOR UR: ABNORMAL
CREAT SERPL-MCNC: 2.39 MG/DL (ref 0.76–1.27)
D-LACTATE SERPL-SCNC: 1.9 MMOL/L (ref 0.5–2)
DEPRECATED RDW RBC AUTO: 43 FL (ref 37–54)
EOSINOPHIL # BLD MANUAL: 0 10*3/MM3 (ref 0–0.4)
EOSINOPHIL NFR BLD MANUAL: 0 % (ref 0.3–6.2)
ERYTHROCYTE [DISTWIDTH] IN BLOOD BY AUTOMATED COUNT: 12.6 % (ref 12.3–15.4)
FLUAV RNA RESP QL NAA+PROBE: NOT DETECTED
FLUBV RNA RESP QL NAA+PROBE: NOT DETECTED
GFR SERPL CREATININE-BSD FRML MDRD: 27 ML/MIN/1.73
GLOBULIN UR ELPH-MCNC: 3.6 GM/DL
GLUCOSE BLDC GLUCOMTR-MCNC: 150 MG/DL (ref 70–130)
GLUCOSE SERPL-MCNC: 155 MG/DL (ref 65–99)
GLUCOSE UR STRIP-MCNC: NEGATIVE MG/DL
HCT VFR BLD AUTO: 36.7 % (ref 37.5–51)
HGB BLD-MCNC: 12.5 G/DL (ref 13–17.7)
HGB UR QL STRIP.AUTO: ABNORMAL
HOLD SPECIMEN: NORMAL
HYALINE CASTS UR QL AUTO: ABNORMAL /LPF
KETONES UR QL STRIP: NEGATIVE
LEUKOCYTE ESTERASE UR QL STRIP.AUTO: ABNORMAL
LIPASE SERPL-CCNC: 20 U/L (ref 13–60)
LYMPHOCYTES # BLD MANUAL: 1.84 10*3/MM3 (ref 0.7–3.1)
LYMPHOCYTES NFR BLD MANUAL: 16 % (ref 19.6–45.3)
LYMPHOCYTES NFR BLD MANUAL: 6 % (ref 5–12)
MCH RBC QN AUTO: 31.9 PG (ref 26.6–33)
MCHC RBC AUTO-ENTMCNC: 34.1 G/DL (ref 31.5–35.7)
MCV RBC AUTO: 93.6 FL (ref 79–97)
MONOCYTES # BLD AUTO: 0.69 10*3/MM3 (ref 0.1–0.9)
MUCOUS THREADS URNS QL MICRO: ABNORMAL /HPF
NEUTROPHILS # BLD AUTO: 8.99 10*3/MM3 (ref 1.7–7)
NEUTROPHILS NFR BLD MANUAL: 70 % (ref 42.7–76)
NEUTS BAND NFR BLD MANUAL: 8 % (ref 0–5)
NITRITE UR QL STRIP: POSITIVE
NT-PROBNP SERPL-MCNC: 2934 PG/ML (ref 0–900)
PH UR STRIP.AUTO: <=5 [PH] (ref 5–8)
PLAT MORPH BLD: NORMAL
PLATELET # BLD AUTO: 57 10*3/MM3 (ref 140–450)
PMV BLD AUTO: 11.1 FL (ref 6–12)
POTASSIUM SERPL-SCNC: 3.7 MMOL/L (ref 3.5–5.2)
PROT SERPL-MCNC: 7.5 G/DL (ref 6–8.5)
PROT UR QL STRIP: ABNORMAL
QT INTERVAL: 412 MS
QTC INTERVAL: 498 MS
RBC # BLD AUTO: 3.92 10*6/MM3 (ref 4.14–5.8)
RBC # UR: ABNORMAL /HPF
RBC MORPH BLD: NORMAL
REF LAB TEST METHOD: ABNORMAL
SARS-COV-2 RNA RESP QL NAA+PROBE: NOT DETECTED
SODIUM SERPL-SCNC: 131 MMOL/L (ref 136–145)
SP GR UR STRIP: 1.02 (ref 1–1.03)
SPERM URNS QL MICRO: ABNORMAL /HPF
SQUAMOUS #/AREA URNS HPF: ABNORMAL /HPF
TROPONIN T SERPL-MCNC: <0.01 NG/ML (ref 0–0.03)
TROPONIN T SERPL-MCNC: <0.01 NG/ML (ref 0–0.03)
UROBILINOGEN UR QL STRIP: ABNORMAL
WBC # BLD AUTO: 11.53 10*3/MM3 (ref 3.4–10.8)
WBC MORPH BLD: NORMAL
WBC UR QL AUTO: ABNORMAL /HPF
WHOLE BLOOD HOLD SPECIMEN: NORMAL
WHOLE BLOOD HOLD SPECIMEN: NORMAL

## 2021-03-28 PROCEDURE — 83690 ASSAY OF LIPASE: CPT

## 2021-03-28 PROCEDURE — 83880 ASSAY OF NATRIURETIC PEPTIDE: CPT

## 2021-03-28 PROCEDURE — 87077 CULTURE AEROBIC IDENTIFY: CPT | Performed by: EMERGENCY MEDICINE

## 2021-03-28 PROCEDURE — 81001 URINALYSIS AUTO W/SCOPE: CPT | Performed by: EMERGENCY MEDICINE

## 2021-03-28 PROCEDURE — 80053 COMPREHEN METABOLIC PANEL: CPT

## 2021-03-28 PROCEDURE — 71045 X-RAY EXAM CHEST 1 VIEW: CPT

## 2021-03-28 PROCEDURE — 94660 CPAP INITIATION&MGMT: CPT

## 2021-03-28 PROCEDURE — 99284 EMERGENCY DEPT VISIT MOD MDM: CPT

## 2021-03-28 PROCEDURE — 87040 BLOOD CULTURE FOR BACTERIA: CPT | Performed by: EMERGENCY MEDICINE

## 2021-03-28 PROCEDURE — 25010000002 CEFTRIAXONE PER 250 MG: Performed by: EMERGENCY MEDICINE

## 2021-03-28 PROCEDURE — 84484 ASSAY OF TROPONIN QUANT: CPT | Performed by: EMERGENCY MEDICINE

## 2021-03-28 PROCEDURE — 74176 CT ABD & PELVIS W/O CONTRAST: CPT

## 2021-03-28 PROCEDURE — 87636 SARSCOV2 & INF A&B AMP PRB: CPT | Performed by: EMERGENCY MEDICINE

## 2021-03-28 PROCEDURE — 85007 BL SMEAR W/DIFF WBC COUNT: CPT

## 2021-03-28 PROCEDURE — 99291 CRITICAL CARE FIRST HOUR: CPT | Performed by: INTERNAL MEDICINE

## 2021-03-28 PROCEDURE — 87086 URINE CULTURE/COLONY COUNT: CPT | Performed by: EMERGENCY MEDICINE

## 2021-03-28 PROCEDURE — 25010000002 HEPARIN (PORCINE) PER 1000 UNITS: Performed by: INTERNAL MEDICINE

## 2021-03-28 PROCEDURE — 84484 ASSAY OF TROPONIN QUANT: CPT

## 2021-03-28 PROCEDURE — 83605 ASSAY OF LACTIC ACID: CPT | Performed by: EMERGENCY MEDICINE

## 2021-03-28 PROCEDURE — 82962 GLUCOSE BLOOD TEST: CPT

## 2021-03-28 PROCEDURE — 85025 COMPLETE CBC W/AUTO DIFF WBC: CPT

## 2021-03-28 PROCEDURE — 93005 ELECTROCARDIOGRAM TRACING: CPT | Performed by: EMERGENCY MEDICINE

## 2021-03-28 PROCEDURE — 87186 SC STD MICRODIL/AGAR DIL: CPT | Performed by: EMERGENCY MEDICINE

## 2021-03-28 RX ORDER — PANTOPRAZOLE SODIUM 40 MG/1
40 TABLET, DELAYED RELEASE ORAL DAILY
Status: DISCONTINUED | OUTPATIENT
Start: 2021-03-29 | End: 2021-04-02 | Stop reason: HOSPADM

## 2021-03-28 RX ORDER — NOREPINEPHRINE BIT/0.9 % NACL 8 MG/250ML
.02-.3 INFUSION BOTTLE (ML) INTRAVENOUS
Status: DISCONTINUED | OUTPATIENT
Start: 2021-03-28 | End: 2021-03-30

## 2021-03-28 RX ORDER — ASPIRIN 81 MG/1
81 TABLET ORAL DAILY
Status: DISCONTINUED | OUTPATIENT
Start: 2021-03-29 | End: 2021-04-02 | Stop reason: HOSPADM

## 2021-03-28 RX ORDER — NICOTINE POLACRILEX 4 MG
15 LOZENGE BUCCAL
Status: DISCONTINUED | OUTPATIENT
Start: 2021-03-28 | End: 2021-03-29

## 2021-03-28 RX ORDER — SODIUM CHLORIDE 0.9 % (FLUSH) 0.9 %
10 SYRINGE (ML) INJECTION EVERY 12 HOURS SCHEDULED
Status: DISCONTINUED | OUTPATIENT
Start: 2021-03-28 | End: 2021-04-02 | Stop reason: HOSPADM

## 2021-03-28 RX ORDER — ROSUVASTATIN CALCIUM 10 MG/1
10 TABLET, COATED ORAL DAILY
Status: DISCONTINUED | OUTPATIENT
Start: 2021-03-29 | End: 2021-04-02 | Stop reason: HOSPADM

## 2021-03-28 RX ORDER — CLOPIDOGREL BISULFATE 75 MG/1
75 TABLET ORAL DAILY
Status: DISCONTINUED | OUTPATIENT
Start: 2021-03-29 | End: 2021-04-02 | Stop reason: HOSPADM

## 2021-03-28 RX ORDER — HEPARIN SODIUM 5000 [USP'U]/ML
5000 INJECTION, SOLUTION INTRAVENOUS; SUBCUTANEOUS EVERY 8 HOURS SCHEDULED
Status: DISCONTINUED | OUTPATIENT
Start: 2021-03-28 | End: 2021-03-29

## 2021-03-28 RX ORDER — SODIUM CHLORIDE 0.9 % (FLUSH) 0.9 %
10 SYRINGE (ML) INJECTION AS NEEDED
Status: DISCONTINUED | OUTPATIENT
Start: 2021-03-28 | End: 2021-04-02 | Stop reason: HOSPADM

## 2021-03-28 RX ORDER — DEXTROSE MONOHYDRATE 25 G/50ML
25 INJECTION, SOLUTION INTRAVENOUS
Status: DISCONTINUED | OUTPATIENT
Start: 2021-03-28 | End: 2021-03-29

## 2021-03-28 RX ORDER — SODIUM CHLORIDE 0.9 % (FLUSH) 0.9 %
10 SYRINGE (ML) INJECTION AS NEEDED
Status: DISCONTINUED | OUTPATIENT
Start: 2021-03-28 | End: 2021-03-29

## 2021-03-28 RX ORDER — ZOLPIDEM TARTRATE 5 MG/1
5 TABLET ORAL NIGHTLY PRN
Status: DISCONTINUED | OUTPATIENT
Start: 2021-03-28 | End: 2021-04-02 | Stop reason: HOSPADM

## 2021-03-28 RX ADMIN — SODIUM CHLORIDE, PRESERVATIVE FREE 10 ML: 5 INJECTION INTRAVENOUS at 21:29

## 2021-03-28 RX ADMIN — CEFTRIAXONE SODIUM 2 G: 2 INJECTION, POWDER, FOR SOLUTION INTRAMUSCULAR; INTRAVENOUS at 19:09

## 2021-03-28 RX ADMIN — ZOLPIDEM TARTRATE 5 MG: 5 TABLET ORAL at 22:24

## 2021-03-28 RX ADMIN — SODIUM CHLORIDE 1983 ML: 9 INJECTION, SOLUTION INTRAVENOUS at 17:50

## 2021-03-28 RX ADMIN — HEPARIN SODIUM 5000 UNITS: 5000 INJECTION INTRAVENOUS; SUBCUTANEOUS at 21:29

## 2021-03-29 ENCOUNTER — APPOINTMENT (OUTPATIENT)
Dept: CARDIOLOGY | Facility: HOSPITAL | Age: 66
End: 2021-03-29

## 2021-03-29 LAB
ANION GAP SERPL CALCULATED.3IONS-SCNC: 13 MMOL/L (ref 5–15)
BASOPHILS # BLD AUTO: 0.01 10*3/MM3 (ref 0–0.2)
BASOPHILS NFR BLD AUTO: 0.1 % (ref 0–1.5)
BUN SERPL-MCNC: 32 MG/DL (ref 8–23)
BUN/CREAT SERPL: 19.3 (ref 7–25)
CALCIUM SPEC-SCNC: 8.2 MG/DL (ref 8.6–10.5)
CHLORIDE SERPL-SCNC: 98 MMOL/L (ref 98–107)
CO2 SERPL-SCNC: 20 MMOL/L (ref 22–29)
CREAT SERPL-MCNC: 1.66 MG/DL (ref 0.76–1.27)
DEPRECATED RDW RBC AUTO: 46.9 FL (ref 37–54)
EOSINOPHIL # BLD AUTO: 0.02 10*3/MM3 (ref 0–0.4)
EOSINOPHIL NFR BLD AUTO: 0.3 % (ref 0.3–6.2)
ERYTHROCYTE [DISTWIDTH] IN BLOOD BY AUTOMATED COUNT: 12.6 % (ref 12.3–15.4)
GFR SERPL CREATININE-BSD FRML MDRD: 42 ML/MIN/1.73
GLUCOSE BLDC GLUCOMTR-MCNC: 160 MG/DL (ref 70–130)
GLUCOSE BLDC GLUCOMTR-MCNC: 172 MG/DL (ref 70–130)
GLUCOSE BLDC GLUCOMTR-MCNC: 176 MG/DL (ref 70–130)
GLUCOSE BLDC GLUCOMTR-MCNC: 211 MG/DL (ref 70–130)
GLUCOSE SERPL-MCNC: 138 MG/DL (ref 65–99)
HCT VFR BLD AUTO: 38.5 % (ref 37.5–51)
HGB BLD-MCNC: 12.1 G/DL (ref 13–17.7)
IMM GRANULOCYTES # BLD AUTO: 0.34 10*3/MM3 (ref 0–0.05)
IMM GRANULOCYTES NFR BLD AUTO: 4.3 % (ref 0–0.5)
LYMPHOCYTES # BLD AUTO: 0.73 10*3/MM3 (ref 0.7–3.1)
LYMPHOCYTES NFR BLD AUTO: 9.3 % (ref 19.6–45.3)
MAGNESIUM SERPL-MCNC: 1.4 MG/DL (ref 1.6–2.4)
MCH RBC QN AUTO: 31.7 PG (ref 26.6–33)
MCHC RBC AUTO-ENTMCNC: 31.4 G/DL (ref 31.5–35.7)
MCV RBC AUTO: 100.8 FL (ref 79–97)
MONOCYTES # BLD AUTO: 0.64 10*3/MM3 (ref 0.1–0.9)
MONOCYTES NFR BLD AUTO: 8.1 % (ref 5–12)
NEUTROPHILS NFR BLD AUTO: 6.13 10*3/MM3 (ref 1.7–7)
NEUTROPHILS NFR BLD AUTO: 77.9 % (ref 42.7–76)
NRBC BLD AUTO-RTO: 0 /100 WBC (ref 0–0.2)
PHOSPHATE SERPL-MCNC: 2.3 MG/DL (ref 2.5–4.5)
PLAT MORPH BLD: NORMAL
PLATELET # BLD AUTO: 30 10*3/MM3 (ref 140–450)
PMV BLD AUTO: 12 FL (ref 6–12)
POTASSIUM SERPL-SCNC: 3.7 MMOL/L (ref 3.5–5.2)
PROCALCITONIN SERPL-MCNC: 4.42 NG/ML (ref 0–0.25)
RBC # BLD AUTO: 3.82 10*6/MM3 (ref 4.14–5.8)
RBC MORPH BLD: NORMAL
SODIUM SERPL-SCNC: 131 MMOL/L (ref 136–145)
WBC # BLD AUTO: 7.87 10*3/MM3 (ref 3.4–10.8)
WBC MORPH BLD: NORMAL

## 2021-03-29 PROCEDURE — 93306 TTE W/DOPPLER COMPLETE: CPT | Performed by: INTERNAL MEDICINE

## 2021-03-29 PROCEDURE — 80048 BASIC METABOLIC PNL TOTAL CA: CPT | Performed by: INTERNAL MEDICINE

## 2021-03-29 PROCEDURE — 84145 PROCALCITONIN (PCT): CPT | Performed by: INTERNAL MEDICINE

## 2021-03-29 PROCEDURE — 93306 TTE W/DOPPLER COMPLETE: CPT

## 2021-03-29 PROCEDURE — 82962 GLUCOSE BLOOD TEST: CPT

## 2021-03-29 PROCEDURE — 94660 CPAP INITIATION&MGMT: CPT

## 2021-03-29 PROCEDURE — 94799 UNLISTED PULMONARY SVC/PX: CPT

## 2021-03-29 PROCEDURE — 25010000002 MAGNESIUM SULFATE 2 GM/50ML SOLUTION: Performed by: INTERNAL MEDICINE

## 2021-03-29 PROCEDURE — 85025 COMPLETE CBC W/AUTO DIFF WBC: CPT | Performed by: INTERNAL MEDICINE

## 2021-03-29 PROCEDURE — 25010000002 MAGNESIUM SULFATE 2 GM/50ML SOLUTION: Performed by: NURSE PRACTITIONER

## 2021-03-29 PROCEDURE — 25010000002 HEPARIN (PORCINE) PER 1000 UNITS: Performed by: INTERNAL MEDICINE

## 2021-03-29 PROCEDURE — 25010000002 LEVOFLOXACIN PER 250 MG: Performed by: INTERNAL MEDICINE

## 2021-03-29 PROCEDURE — 83735 ASSAY OF MAGNESIUM: CPT | Performed by: INTERNAL MEDICINE

## 2021-03-29 PROCEDURE — 86022 PLATELET ANTIBODIES: CPT | Performed by: INTERNAL MEDICINE

## 2021-03-29 PROCEDURE — 84100 ASSAY OF PHOSPHORUS: CPT | Performed by: INTERNAL MEDICINE

## 2021-03-29 PROCEDURE — 99291 CRITICAL CARE FIRST HOUR: CPT | Performed by: INTERNAL MEDICINE

## 2021-03-29 PROCEDURE — 63710000001 INSULIN LISPRO (HUMAN) PER 5 UNITS: Performed by: INTERNAL MEDICINE

## 2021-03-29 PROCEDURE — 85007 BL SMEAR W/DIFF WBC COUNT: CPT | Performed by: INTERNAL MEDICINE

## 2021-03-29 RX ORDER — SODIUM CHLORIDE, SODIUM LACTATE, POTASSIUM CHLORIDE, CALCIUM CHLORIDE 600; 310; 30; 20 MG/100ML; MG/100ML; MG/100ML; MG/100ML
100 INJECTION, SOLUTION INTRAVENOUS CONTINUOUS
Status: DISCONTINUED | OUTPATIENT
Start: 2021-03-29 | End: 2021-03-30

## 2021-03-29 RX ORDER — ACETAMINOPHEN 325 MG/1
650 TABLET ORAL EVERY 6 HOURS PRN
Status: DISCONTINUED | OUTPATIENT
Start: 2021-03-29 | End: 2021-04-02 | Stop reason: HOSPADM

## 2021-03-29 RX ORDER — LEVOFLOXACIN 5 MG/ML
750 INJECTION, SOLUTION INTRAVENOUS EVERY 24 HOURS
Status: DISCONTINUED | OUTPATIENT
Start: 2021-03-29 | End: 2021-04-01

## 2021-03-29 RX ORDER — MAGNESIUM SULFATE HEPTAHYDRATE 40 MG/ML
4 INJECTION, SOLUTION INTRAVENOUS AS NEEDED
Status: DISCONTINUED | OUTPATIENT
Start: 2021-03-29 | End: 2021-04-02 | Stop reason: HOSPADM

## 2021-03-29 RX ORDER — ATROPA BELLADONNA AND OPIUM 16.2; 6 MG/1; MG/1
60 SUPPOSITORY RECTAL EVERY 8 HOURS PRN
Status: DISCONTINUED | OUTPATIENT
Start: 2021-03-29 | End: 2021-04-02 | Stop reason: HOSPADM

## 2021-03-29 RX ORDER — ACETAMINOPHEN 650 MG/1
650 SUPPOSITORY RECTAL EVERY 4 HOURS PRN
Status: DISCONTINUED | OUTPATIENT
Start: 2021-03-29 | End: 2021-03-29

## 2021-03-29 RX ORDER — MAGNESIUM SULFATE HEPTAHYDRATE 40 MG/ML
2 INJECTION, SOLUTION INTRAVENOUS ONCE
Status: COMPLETED | OUTPATIENT
Start: 2021-03-29 | End: 2021-03-29

## 2021-03-29 RX ORDER — PHENAZOPYRIDINE HYDROCHLORIDE 100 MG/1
100 TABLET, FILM COATED ORAL ONCE
Status: COMPLETED | OUTPATIENT
Start: 2021-03-29 | End: 2021-03-29

## 2021-03-29 RX ORDER — MAGNESIUM SULFATE HEPTAHYDRATE 40 MG/ML
6 INJECTION, SOLUTION INTRAVENOUS AS NEEDED
Status: DISCONTINUED | OUTPATIENT
Start: 2021-03-29 | End: 2021-04-02 | Stop reason: HOSPADM

## 2021-03-29 RX ADMIN — MAGNESIUM SULFATE HEPTAHYDRATE 2 G: 40 INJECTION, SOLUTION INTRAVENOUS at 10:00

## 2021-03-29 RX ADMIN — ACETAMINOPHEN 650 MG: 325 TABLET, FILM COATED ORAL at 18:25

## 2021-03-29 RX ADMIN — LEVOFLOXACIN 750 MG: 750 INJECTION, SOLUTION INTRAVENOUS at 16:01

## 2021-03-29 RX ADMIN — PHENAZOPYRIDINE HYDROCHLORIDE 100 MG: 100 TABLET ORAL at 02:23

## 2021-03-29 RX ADMIN — SODIUM CHLORIDE, POTASSIUM CHLORIDE, SODIUM LACTATE AND CALCIUM CHLORIDE 100 ML/HR: 600; 310; 30; 20 INJECTION, SOLUTION INTRAVENOUS at 16:01

## 2021-03-29 RX ADMIN — ZOLPIDEM TARTRATE 5 MG: 5 TABLET ORAL at 22:25

## 2021-03-29 RX ADMIN — INSULIN LISPRO 3 UNITS: 100 INJECTION, SOLUTION INTRAVENOUS; SUBCUTANEOUS at 17:16

## 2021-03-29 RX ADMIN — INSULIN LISPRO 2 UNITS: 100 INJECTION, SOLUTION INTRAVENOUS; SUBCUTANEOUS at 08:00

## 2021-03-29 RX ADMIN — ROSUVASTATIN CALCIUM 10 MG: 10 TABLET, COATED ORAL at 08:00

## 2021-03-29 RX ADMIN — MAGNESIUM SULFATE HEPTAHYDRATE 2 G: 40 INJECTION, SOLUTION INTRAVENOUS at 05:49

## 2021-03-29 RX ADMIN — PANTOPRAZOLE SODIUM 40 MG: 40 TABLET, DELAYED RELEASE ORAL at 08:00

## 2021-03-29 RX ADMIN — ACETAMINOPHEN 650 MG: 325 TABLET, FILM COATED ORAL at 12:15

## 2021-03-29 RX ADMIN — HEPARIN SODIUM 5000 UNITS: 5000 INJECTION INTRAVENOUS; SUBCUTANEOUS at 05:49

## 2021-03-29 RX ADMIN — ATROPA BELLADONNA AND OPIUM 60 MG: 16.2; 6 SUPPOSITORY RECTAL at 00:45

## 2021-03-29 RX ADMIN — INSULIN LISPRO 2 UNITS: 100 INJECTION, SOLUTION INTRAVENOUS; SUBCUTANEOUS at 11:44

## 2021-03-29 RX ADMIN — ATROPA BELLADONNA AND OPIUM 60 MG: 16.2; 6 SUPPOSITORY RECTAL at 08:01

## 2021-03-29 RX ADMIN — SODIUM CHLORIDE, PRESERVATIVE FREE 10 ML: 5 INJECTION INTRAVENOUS at 20:44

## 2021-03-29 RX ADMIN — SODIUM CHLORIDE, PRESERVATIVE FREE 10 ML: 5 INJECTION INTRAVENOUS at 10:12

## 2021-03-30 DIAGNOSIS — E11.65 UNCONTROLLED TYPE 2 DIABETES MELLITUS WITH HYPERGLYCEMIA, WITHOUT LONG-TERM CURRENT USE OF INSULIN (HCC): ICD-10-CM

## 2021-03-30 LAB
ANION GAP SERPL CALCULATED.3IONS-SCNC: 15 MMOL/L (ref 5–15)
BACTERIA SPEC AEROBE CULT: ABNORMAL
BASOPHILS # BLD AUTO: 0.02 10*3/MM3 (ref 0–0.2)
BASOPHILS NFR BLD AUTO: 0.4 % (ref 0–1.5)
BUN SERPL-MCNC: 23 MG/DL (ref 8–23)
BUN/CREAT SERPL: 20.2 (ref 7–25)
CALCIUM SPEC-SCNC: 8.5 MG/DL (ref 8.6–10.5)
CHLORIDE SERPL-SCNC: 97 MMOL/L (ref 98–107)
CO2 SERPL-SCNC: 18 MMOL/L (ref 22–29)
CREAT SERPL-MCNC: 1.14 MG/DL (ref 0.76–1.27)
DEPRECATED RDW RBC AUTO: 48.3 FL (ref 37–54)
EOSINOPHIL # BLD AUTO: 0.02 10*3/MM3 (ref 0–0.4)
EOSINOPHIL NFR BLD AUTO: 0.4 % (ref 0.3–6.2)
ERYTHROCYTE [DISTWIDTH] IN BLOOD BY AUTOMATED COUNT: 12.9 % (ref 12.3–15.4)
GFR SERPL CREATININE-BSD FRML MDRD: 64 ML/MIN/1.73
GLUCOSE BLDC GLUCOMTR-MCNC: 173 MG/DL (ref 70–130)
GLUCOSE BLDC GLUCOMTR-MCNC: 187 MG/DL (ref 70–130)
GLUCOSE BLDC GLUCOMTR-MCNC: 222 MG/DL (ref 70–130)
GLUCOSE BLDC GLUCOMTR-MCNC: 260 MG/DL (ref 70–130)
GLUCOSE SERPL-MCNC: 193 MG/DL (ref 65–99)
HCT VFR BLD AUTO: 43.2 % (ref 37.5–51)
HGB BLD-MCNC: 13.5 G/DL (ref 13–17.7)
IMM GRANULOCYTES # BLD AUTO: 0.02 10*3/MM3 (ref 0–0.05)
IMM GRANULOCYTES NFR BLD AUTO: 0.4 % (ref 0–0.5)
LYMPHOCYTES # BLD AUTO: 0.45 10*3/MM3 (ref 0.7–3.1)
LYMPHOCYTES NFR BLD AUTO: 9.7 % (ref 19.6–45.3)
MAGNESIUM SERPL-MCNC: 2.6 MG/DL (ref 1.6–2.4)
MCH RBC QN AUTO: 31.5 PG (ref 26.6–33)
MCHC RBC AUTO-ENTMCNC: 31.3 G/DL (ref 31.5–35.7)
MCV RBC AUTO: 100.9 FL (ref 79–97)
MONOCYTES # BLD AUTO: 0.61 10*3/MM3 (ref 0.1–0.9)
MONOCYTES NFR BLD AUTO: 13.1 % (ref 5–12)
NEUTROPHILS NFR BLD AUTO: 3.52 10*3/MM3 (ref 1.7–7)
NEUTROPHILS NFR BLD AUTO: 76 % (ref 42.7–76)
NRBC BLD AUTO-RTO: 0 /100 WBC (ref 0–0.2)
PHOSPHATE SERPL-MCNC: 2.2 MG/DL (ref 2.5–4.5)
PLATELET # BLD AUTO: 44 10*3/MM3 (ref 140–450)
PMV BLD AUTO: 11.8 FL (ref 6–12)
POTASSIUM SERPL-SCNC: 3.9 MMOL/L (ref 3.5–5.2)
PROCALCITONIN SERPL-MCNC: 2.26 NG/ML (ref 0–0.25)
RBC # BLD AUTO: 4.28 10*6/MM3 (ref 4.14–5.8)
SODIUM SERPL-SCNC: 130 MMOL/L (ref 136–145)
WBC # BLD AUTO: 4.64 10*3/MM3 (ref 3.4–10.8)

## 2021-03-30 PROCEDURE — 84100 ASSAY OF PHOSPHORUS: CPT | Performed by: INTERNAL MEDICINE

## 2021-03-30 PROCEDURE — 25010000002 SULFUR HEXAFLUORIDE MICROSPH 60.7-25 MG RECONSTITUTED SUSPENSION: Performed by: INTERNAL MEDICINE

## 2021-03-30 PROCEDURE — 80048 BASIC METABOLIC PNL TOTAL CA: CPT | Performed by: INTERNAL MEDICINE

## 2021-03-30 PROCEDURE — 85025 COMPLETE CBC W/AUTO DIFF WBC: CPT | Performed by: INTERNAL MEDICINE

## 2021-03-30 PROCEDURE — 99232 SBSQ HOSP IP/OBS MODERATE 35: CPT | Performed by: INTERNAL MEDICINE

## 2021-03-30 PROCEDURE — 25010000002 LEVOFLOXACIN PER 250 MG: Performed by: INTERNAL MEDICINE

## 2021-03-30 PROCEDURE — 84145 PROCALCITONIN (PCT): CPT | Performed by: INTERNAL MEDICINE

## 2021-03-30 PROCEDURE — 82962 GLUCOSE BLOOD TEST: CPT

## 2021-03-30 PROCEDURE — 83735 ASSAY OF MAGNESIUM: CPT | Performed by: INTERNAL MEDICINE

## 2021-03-30 PROCEDURE — 63710000001 INSULIN LISPRO (HUMAN) PER 5 UNITS: Performed by: INTERNAL MEDICINE

## 2021-03-30 RX ORDER — PHENAZOPYRIDINE HYDROCHLORIDE 100 MG/1
100 TABLET, FILM COATED ORAL 3 TIMES DAILY PRN
Status: DISCONTINUED | OUTPATIENT
Start: 2021-03-30 | End: 2021-04-02 | Stop reason: HOSPADM

## 2021-03-30 RX ADMIN — SULFUR HEXAFLUORIDE 3 ML: KIT at 09:31

## 2021-03-30 RX ADMIN — ROSUVASTATIN CALCIUM 10 MG: 10 TABLET, COATED ORAL at 08:27

## 2021-03-30 RX ADMIN — PHENAZOPYRIDINE HYDROCHLORIDE 100 MG: 100 TABLET ORAL at 22:04

## 2021-03-30 RX ADMIN — INSULIN LISPRO 2 UNITS: 100 INJECTION, SOLUTION INTRAVENOUS; SUBCUTANEOUS at 08:27

## 2021-03-30 RX ADMIN — INSULIN LISPRO 4 UNITS: 100 INJECTION, SOLUTION INTRAVENOUS; SUBCUTANEOUS at 16:55

## 2021-03-30 RX ADMIN — INSULIN LISPRO 3 UNITS: 100 INJECTION, SOLUTION INTRAVENOUS; SUBCUTANEOUS at 12:29

## 2021-03-30 RX ADMIN — SODIUM CHLORIDE, PRESERVATIVE FREE 10 ML: 5 INJECTION INTRAVENOUS at 20:31

## 2021-03-30 RX ADMIN — ACETAMINOPHEN 650 MG: 325 TABLET, FILM COATED ORAL at 08:27

## 2021-03-30 RX ADMIN — ZOLPIDEM TARTRATE 5 MG: 5 TABLET ORAL at 22:04

## 2021-03-30 RX ADMIN — ASPIRIN 81 MG: 81 TABLET, COATED ORAL at 09:32

## 2021-03-30 RX ADMIN — ACETAMINOPHEN 650 MG: 325 TABLET, FILM COATED ORAL at 00:05

## 2021-03-30 RX ADMIN — LEVOFLOXACIN 750 MG: 750 INJECTION, SOLUTION INTRAVENOUS at 16:37

## 2021-03-30 RX ADMIN — PHENAZOPYRIDINE HYDROCHLORIDE 100 MG: 100 TABLET ORAL at 16:37

## 2021-03-30 RX ADMIN — SODIUM CHLORIDE, PRESERVATIVE FREE 10 ML: 5 INJECTION INTRAVENOUS at 08:28

## 2021-03-30 RX ADMIN — PANTOPRAZOLE SODIUM 40 MG: 40 TABLET, DELAYED RELEASE ORAL at 08:27

## 2021-03-31 LAB
ALBUMIN SERPL-MCNC: 3 G/DL (ref 3.5–5.2)
ALBUMIN/GLOB SERPL: 0.8 G/DL
ALP SERPL-CCNC: 46 U/L (ref 39–117)
ALT SERPL W P-5'-P-CCNC: 32 U/L (ref 1–41)
ANION GAP SERPL CALCULATED.3IONS-SCNC: 10 MMOL/L (ref 5–15)
AST SERPL-CCNC: 45 U/L (ref 1–40)
BASOPHILS # BLD AUTO: 0.02 10*3/MM3 (ref 0–0.2)
BASOPHILS NFR BLD AUTO: 0.3 % (ref 0–1.5)
BH CV ECHO MEAS - AO MAX PG (FULL): 4.8 MMHG
BH CV ECHO MEAS - AO MAX PG: 9 MMHG
BH CV ECHO MEAS - AO MEAN PG (FULL): 3 MMHG
BH CV ECHO MEAS - AO MEAN PG: 5 MMHG
BH CV ECHO MEAS - AO ROOT AREA (BSA CORRECTED): 1.7
BH CV ECHO MEAS - AO ROOT AREA: 11.3 CM^2
BH CV ECHO MEAS - AO ROOT DIAM: 3.8 CM
BH CV ECHO MEAS - AO V2 MAX: 150 CM/SEC
BH CV ECHO MEAS - AO V2 MEAN: 100 CM/SEC
BH CV ECHO MEAS - AO V2 VTI: 25.8 CM
BH CV ECHO MEAS - ASC AORTA: 3.2 CM
BH CV ECHO MEAS - AVA(I,A): 3.6 CM^2
BH CV ECHO MEAS - AVA(I,D): 3.6 CM^2
BH CV ECHO MEAS - AVA(V,A): 2.8 CM^2
BH CV ECHO MEAS - AVA(V,D): 2.8 CM^2
BH CV ECHO MEAS - BSA(HAYCOCK): 2.4 M^2
BH CV ECHO MEAS - BSA: 2.2 M^2
BH CV ECHO MEAS - BZI_BMI: 39.2 KILOGRAMS/M^2
BH CV ECHO MEAS - BZI_METRIC_HEIGHT: 170.2 CM
BH CV ECHO MEAS - BZI_METRIC_WEIGHT: 113.4 KG
BH CV ECHO MEAS - EDV(CUBED): 122 ML
BH CV ECHO MEAS - EDV(MOD-SP2): 81 ML
BH CV ECHO MEAS - EDV(MOD-SP4): 80 ML
BH CV ECHO MEAS - EDV(TEICH): 116.1 ML
BH CV ECHO MEAS - EF(CUBED): 64.9 %
BH CV ECHO MEAS - EF(MOD-SP2): 53.1 %
BH CV ECHO MEAS - EF(MOD-SP4): 65 %
BH CV ECHO MEAS - EF(TEICH): 56.2 %
BH CV ECHO MEAS - ESV(CUBED): 42.9 ML
BH CV ECHO MEAS - ESV(MOD-SP2): 38 ML
BH CV ECHO MEAS - ESV(MOD-SP4): 28 ML
BH CV ECHO MEAS - ESV(TEICH): 50.9 ML
BH CV ECHO MEAS - FS: 29.4 %
BH CV ECHO MEAS - IVS/LVPW: 0.9
BH CV ECHO MEAS - IVSD: 0.76 CM
BH CV ECHO MEAS - LA DIMENSION: 3.9 CM
BH CV ECHO MEAS - LA/AO: 1
BH CV ECHO MEAS - LAT PEAK E' VEL: 11.8 CM/SEC
BH CV ECHO MEAS - LATERAL E/E' RATIO: 8.4
BH CV ECHO MEAS - LV DIASTOLIC VOL/BSA (35-75): 36 ML/M^2
BH CV ECHO MEAS - LV MASS(C)D: 135.2 GRAMS
BH CV ECHO MEAS - LV MASS(C)DI: 60.8 GRAMS/M^2
BH CV ECHO MEAS - LV MAX PG: 4.2 MMHG
BH CV ECHO MEAS - LV MEAN PG: 2 MMHG
BH CV ECHO MEAS - LV SYSTOLIC VOL/BSA (12-30): 12.6 ML/M^2
BH CV ECHO MEAS - LV V1 MAX: 102 CM/SEC
BH CV ECHO MEAS - LV V1 MEAN: 67.7 CM/SEC
BH CV ECHO MEAS - LV V1 VTI: 22.4 CM
BH CV ECHO MEAS - LVIDD: 5 CM
BH CV ECHO MEAS - LVIDS: 3.5 CM
BH CV ECHO MEAS - LVLD AP2: 7.3 CM
BH CV ECHO MEAS - LVLD AP4: 6.5 CM
BH CV ECHO MEAS - LVLS AP2: 5.9 CM
BH CV ECHO MEAS - LVLS AP4: 5.4 CM
BH CV ECHO MEAS - LVOT AREA (M): 4.2 CM^2
BH CV ECHO MEAS - LVOT AREA: 4.2 CM^2
BH CV ECHO MEAS - LVOT DIAM: 2.3 CM
BH CV ECHO MEAS - LVPWD: 0.85 CM
BH CV ECHO MEAS - MED PEAK E' VEL: 9.4 CM/SEC
BH CV ECHO MEAS - MEDIAL E/E' RATIO: 10.6
BH CV ECHO MEAS - MV A MAX VEL: 79 CM/SEC
BH CV ECHO MEAS - MV DEC TIME: 0.2 SEC
BH CV ECHO MEAS - MV E MAX VEL: 99.3 CM/SEC
BH CV ECHO MEAS - MV E/A: 1.3
BH CV ECHO MEAS - SI(AO): 131.6 ML/M^2
BH CV ECHO MEAS - SI(CUBED): 35.6 ML/M^2
BH CV ECHO MEAS - SI(LVOT): 41.9 ML/M^2
BH CV ECHO MEAS - SI(MOD-SP2): 19.3 ML/M^2
BH CV ECHO MEAS - SI(MOD-SP4): 23.4 ML/M^2
BH CV ECHO MEAS - SI(TEICH): 29.3 ML/M^2
BH CV ECHO MEAS - SV(AO): 292.6 ML
BH CV ECHO MEAS - SV(CUBED): 79.1 ML
BH CV ECHO MEAS - SV(LVOT): 93.1 ML
BH CV ECHO MEAS - SV(MOD-SP2): 43 ML
BH CV ECHO MEAS - SV(MOD-SP4): 52 ML
BH CV ECHO MEAS - SV(TEICH): 65.2 ML
BH CV ECHO MEASUREMENTS AVERAGE E/E' RATIO: 9.37
BILIRUB SERPL-MCNC: 0.5 MG/DL (ref 0–1.2)
BUN SERPL-MCNC: 19 MG/DL (ref 8–23)
BUN/CREAT SERPL: 18.3 (ref 7–25)
CALCIUM SPEC-SCNC: 8.9 MG/DL (ref 8.6–10.5)
CHLORIDE SERPL-SCNC: 100 MMOL/L (ref 98–107)
CO2 SERPL-SCNC: 22 MMOL/L (ref 22–29)
CREAT SERPL-MCNC: 1.04 MG/DL (ref 0.76–1.27)
DEPRECATED RDW RBC AUTO: 44.9 FL (ref 37–54)
EOSINOPHIL # BLD AUTO: 0.08 10*3/MM3 (ref 0–0.4)
EOSINOPHIL NFR BLD AUTO: 1.4 % (ref 0.3–6.2)
ERYTHROCYTE [DISTWIDTH] IN BLOOD BY AUTOMATED COUNT: 13 % (ref 12.3–15.4)
GFR SERPL CREATININE-BSD FRML MDRD: 71 ML/MIN/1.73
GLOBULIN UR ELPH-MCNC: 4 GM/DL
GLUCOSE BLDC GLUCOMTR-MCNC: 176 MG/DL (ref 70–130)
GLUCOSE BLDC GLUCOMTR-MCNC: 185 MG/DL (ref 70–130)
GLUCOSE BLDC GLUCOMTR-MCNC: 187 MG/DL (ref 70–130)
GLUCOSE BLDC GLUCOMTR-MCNC: 209 MG/DL (ref 70–130)
GLUCOSE SERPL-MCNC: 179 MG/DL (ref 65–99)
HCT VFR BLD AUTO: 36.4 % (ref 37.5–51)
HGB BLD-MCNC: 12.2 G/DL (ref 13–17.7)
IMM GRANULOCYTES # BLD AUTO: 0.04 10*3/MM3 (ref 0–0.05)
IMM GRANULOCYTES NFR BLD AUTO: 0.7 % (ref 0–0.5)
LV EF 2D ECHO EST: 40 %
LYMPHOCYTES # BLD AUTO: 0.88 10*3/MM3 (ref 0.7–3.1)
LYMPHOCYTES NFR BLD AUTO: 15 % (ref 19.6–45.3)
MAGNESIUM SERPL-MCNC: 1.4 MG/DL (ref 1.6–2.4)
MCH RBC QN AUTO: 31.8 PG (ref 26.6–33)
MCHC RBC AUTO-ENTMCNC: 33.5 G/DL (ref 31.5–35.7)
MCV RBC AUTO: 94.8 FL (ref 79–97)
MONOCYTES # BLD AUTO: 0.9 10*3/MM3 (ref 0.1–0.9)
MONOCYTES NFR BLD AUTO: 15.4 % (ref 5–12)
NEUTROPHILS NFR BLD AUTO: 3.93 10*3/MM3 (ref 1.7–7)
NEUTROPHILS NFR BLD AUTO: 67.2 % (ref 42.7–76)
NRBC BLD AUTO-RTO: 0 /100 WBC (ref 0–0.2)
PF4 HEPARIN CMPLX IGG SERPL IA: 0.07 OD (ref 0–0.4)
PHOSPHATE SERPL-MCNC: 2.6 MG/DL (ref 2.5–4.5)
PLATELET # BLD AUTO: 52 10*3/MM3 (ref 140–450)
PMV BLD AUTO: 12 FL (ref 6–12)
POTASSIUM SERPL-SCNC: 3.8 MMOL/L (ref 3.5–5.2)
PROCALCITONIN SERPL-MCNC: 1.64 NG/ML (ref 0–0.25)
PROT SERPL-MCNC: 7 G/DL (ref 6–8.5)
RBC # BLD AUTO: 3.84 10*6/MM3 (ref 4.14–5.8)
SODIUM SERPL-SCNC: 132 MMOL/L (ref 136–145)
WBC # BLD AUTO: 5.85 10*3/MM3 (ref 3.4–10.8)

## 2021-03-31 PROCEDURE — 80053 COMPREHEN METABOLIC PANEL: CPT | Performed by: INTERNAL MEDICINE

## 2021-03-31 PROCEDURE — 25010000003 MAGNESIUM SULFATE 4 GM/100ML SOLUTION: Performed by: INTERNAL MEDICINE

## 2021-03-31 PROCEDURE — 84145 PROCALCITONIN (PCT): CPT | Performed by: INTERNAL MEDICINE

## 2021-03-31 PROCEDURE — 99232 SBSQ HOSP IP/OBS MODERATE 35: CPT | Performed by: INTERNAL MEDICINE

## 2021-03-31 PROCEDURE — 25010000002 LEVOFLOXACIN PER 250 MG: Performed by: INTERNAL MEDICINE

## 2021-03-31 PROCEDURE — 63710000001 INSULIN LISPRO (HUMAN) PER 5 UNITS: Performed by: INTERNAL MEDICINE

## 2021-03-31 PROCEDURE — 84100 ASSAY OF PHOSPHORUS: CPT | Performed by: INTERNAL MEDICINE

## 2021-03-31 PROCEDURE — 85025 COMPLETE CBC W/AUTO DIFF WBC: CPT | Performed by: INTERNAL MEDICINE

## 2021-03-31 PROCEDURE — 82962 GLUCOSE BLOOD TEST: CPT

## 2021-03-31 PROCEDURE — 83735 ASSAY OF MAGNESIUM: CPT | Performed by: INTERNAL MEDICINE

## 2021-03-31 PROCEDURE — 25010000002 MAGNESIUM SULFATE 2 GM/50ML SOLUTION: Performed by: INTERNAL MEDICINE

## 2021-03-31 RX ADMIN — ASPIRIN 81 MG: 81 TABLET, COATED ORAL at 08:28

## 2021-03-31 RX ADMIN — INSULIN LISPRO 3 UNITS: 100 INJECTION, SOLUTION INTRAVENOUS; SUBCUTANEOUS at 16:45

## 2021-03-31 RX ADMIN — INSULIN LISPRO 2 UNITS: 100 INJECTION, SOLUTION INTRAVENOUS; SUBCUTANEOUS at 12:20

## 2021-03-31 RX ADMIN — PANTOPRAZOLE SODIUM 40 MG: 40 TABLET, DELAYED RELEASE ORAL at 08:28

## 2021-03-31 RX ADMIN — SODIUM CHLORIDE, PRESERVATIVE FREE 10 ML: 5 INJECTION INTRAVENOUS at 20:22

## 2021-03-31 RX ADMIN — MAGNESIUM SULFATE HEPTAHYDRATE 4 G: 40 INJECTION, SOLUTION INTRAVENOUS at 06:34

## 2021-03-31 RX ADMIN — CLOPIDOGREL BISULFATE 75 MG: 75 TABLET ORAL at 08:28

## 2021-03-31 RX ADMIN — MAGNESIUM SULFATE HEPTAHYDRATE 2 G: 40 INJECTION, SOLUTION INTRAVENOUS at 10:39

## 2021-03-31 RX ADMIN — ROSUVASTATIN CALCIUM 10 MG: 10 TABLET, COATED ORAL at 08:28

## 2021-03-31 RX ADMIN — LEVOFLOXACIN 750 MG: 750 INJECTION, SOLUTION INTRAVENOUS at 16:42

## 2021-03-31 RX ADMIN — SODIUM CHLORIDE, PRESERVATIVE FREE 10 ML: 5 INJECTION INTRAVENOUS at 08:28

## 2021-03-31 RX ADMIN — INSULIN LISPRO 2 UNITS: 100 INJECTION, SOLUTION INTRAVENOUS; SUBCUTANEOUS at 08:27

## 2021-03-31 RX ADMIN — ACETAMINOPHEN 650 MG: 325 TABLET, FILM COATED ORAL at 08:32

## 2021-04-01 DIAGNOSIS — E11.65 UNCONTROLLED TYPE 2 DIABETES MELLITUS WITH HYPERGLYCEMIA, WITHOUT LONG-TERM CURRENT USE OF INSULIN (HCC): ICD-10-CM

## 2021-04-01 LAB
ANION GAP SERPL CALCULATED.3IONS-SCNC: 10 MMOL/L (ref 5–15)
BASOPHILS # BLD AUTO: 0.03 10*3/MM3 (ref 0–0.2)
BASOPHILS NFR BLD AUTO: 0.6 % (ref 0–1.5)
BUN SERPL-MCNC: 18 MG/DL (ref 8–23)
BUN/CREAT SERPL: 16.7 (ref 7–25)
CALCIUM SPEC-SCNC: 8.8 MG/DL (ref 8.6–10.5)
CHLORIDE SERPL-SCNC: 97 MMOL/L (ref 98–107)
CO2 SERPL-SCNC: 26 MMOL/L (ref 22–29)
CREAT SERPL-MCNC: 1.08 MG/DL (ref 0.76–1.27)
DEPRECATED RDW RBC AUTO: 45.5 FL (ref 37–54)
EOSINOPHIL # BLD AUTO: 0.05 10*3/MM3 (ref 0–0.4)
EOSINOPHIL NFR BLD AUTO: 1 % (ref 0.3–6.2)
ERYTHROCYTE [DISTWIDTH] IN BLOOD BY AUTOMATED COUNT: 13.2 % (ref 12.3–15.4)
GFR SERPL CREATININE-BSD FRML MDRD: 68 ML/MIN/1.73
GLUCOSE BLDC GLUCOMTR-MCNC: 193 MG/DL (ref 70–130)
GLUCOSE BLDC GLUCOMTR-MCNC: 193 MG/DL (ref 70–130)
GLUCOSE BLDC GLUCOMTR-MCNC: 200 MG/DL (ref 70–130)
GLUCOSE BLDC GLUCOMTR-MCNC: 222 MG/DL (ref 70–130)
GLUCOSE SERPL-MCNC: 184 MG/DL (ref 65–99)
HCT VFR BLD AUTO: 33.8 % (ref 37.5–51)
HGB BLD-MCNC: 11.2 G/DL (ref 13–17.7)
IMM GRANULOCYTES # BLD AUTO: 0.04 10*3/MM3 (ref 0–0.05)
IMM GRANULOCYTES NFR BLD AUTO: 0.8 % (ref 0–0.5)
LYMPHOCYTES # BLD AUTO: 0.84 10*3/MM3 (ref 0.7–3.1)
LYMPHOCYTES NFR BLD AUTO: 17.1 % (ref 19.6–45.3)
MAGNESIUM SERPL-MCNC: 1.6 MG/DL (ref 1.6–2.4)
MCH RBC QN AUTO: 31.1 PG (ref 26.6–33)
MCHC RBC AUTO-ENTMCNC: 33.1 G/DL (ref 31.5–35.7)
MCV RBC AUTO: 93.9 FL (ref 79–97)
MONOCYTES # BLD AUTO: 0.65 10*3/MM3 (ref 0.1–0.9)
MONOCYTES NFR BLD AUTO: 13.3 % (ref 5–12)
NEUTROPHILS NFR BLD AUTO: 3.29 10*3/MM3 (ref 1.7–7)
NEUTROPHILS NFR BLD AUTO: 67.2 % (ref 42.7–76)
NRBC BLD AUTO-RTO: 0 /100 WBC (ref 0–0.2)
PLAT MORPH BLD: NORMAL
PLATELET # BLD AUTO: 66 10*3/MM3 (ref 140–450)
PMV BLD AUTO: 11.7 FL (ref 6–12)
POTASSIUM SERPL-SCNC: 3.6 MMOL/L (ref 3.5–5.2)
POTASSIUM SERPL-SCNC: 4.5 MMOL/L (ref 3.5–5.2)
PROCALCITONIN SERPL-MCNC: 1.04 NG/ML (ref 0–0.25)
RBC # BLD AUTO: 3.6 10*6/MM3 (ref 4.14–5.8)
RBC MORPH BLD: NORMAL
SODIUM SERPL-SCNC: 133 MMOL/L (ref 136–145)
WBC # BLD AUTO: 4.9 10*3/MM3 (ref 3.4–10.8)
WBC MORPH BLD: NORMAL

## 2021-04-01 PROCEDURE — 82962 GLUCOSE BLOOD TEST: CPT

## 2021-04-01 PROCEDURE — 83735 ASSAY OF MAGNESIUM: CPT | Performed by: INTERNAL MEDICINE

## 2021-04-01 PROCEDURE — 25010000003 MAGNESIUM SULFATE 4 GM/100ML SOLUTION: Performed by: INTERNAL MEDICINE

## 2021-04-01 PROCEDURE — 63710000001 INSULIN LISPRO (HUMAN) PER 5 UNITS: Performed by: INTERNAL MEDICINE

## 2021-04-01 PROCEDURE — 84132 ASSAY OF SERUM POTASSIUM: CPT | Performed by: INTERNAL MEDICINE

## 2021-04-01 PROCEDURE — 99233 SBSQ HOSP IP/OBS HIGH 50: CPT | Performed by: INTERNAL MEDICINE

## 2021-04-01 PROCEDURE — 85007 BL SMEAR W/DIFF WBC COUNT: CPT | Performed by: INTERNAL MEDICINE

## 2021-04-01 PROCEDURE — 25010000002 CEFTRIAXONE PER 250 MG: Performed by: INTERNAL MEDICINE

## 2021-04-01 PROCEDURE — 63710000001 INSULIN DETEMIR PER 5 UNITS: Performed by: INTERNAL MEDICINE

## 2021-04-01 PROCEDURE — 85025 COMPLETE CBC W/AUTO DIFF WBC: CPT | Performed by: INTERNAL MEDICINE

## 2021-04-01 PROCEDURE — 84145 PROCALCITONIN (PCT): CPT | Performed by: INTERNAL MEDICINE

## 2021-04-01 PROCEDURE — 80048 BASIC METABOLIC PNL TOTAL CA: CPT | Performed by: INTERNAL MEDICINE

## 2021-04-01 RX ORDER — POTASSIUM CHLORIDE 7.45 MG/ML
10 INJECTION INTRAVENOUS
Status: DISCONTINUED | OUTPATIENT
Start: 2021-04-01 | End: 2021-04-02 | Stop reason: HOSPADM

## 2021-04-01 RX ORDER — BISOPROLOL FUMARATE 10 MG/1
TABLET, FILM COATED ORAL
Qty: 90 TABLET | Refills: 0 | Status: SHIPPED | OUTPATIENT
Start: 2021-04-01 | End: 2021-06-28

## 2021-04-01 RX ORDER — LANCETS
EACH MISCELLANEOUS
Qty: 200 EACH | Refills: 3 | Status: SHIPPED | OUTPATIENT
Start: 2021-04-01 | End: 2021-04-19

## 2021-04-01 RX ORDER — CLOPIDOGREL BISULFATE 75 MG/1
TABLET ORAL
Qty: 90 TABLET | Refills: 0 | Status: SHIPPED | OUTPATIENT
Start: 2021-04-01 | End: 2021-06-28

## 2021-04-01 RX ORDER — SITAGLIPTIN 100 MG/1
TABLET, FILM COATED ORAL
Qty: 90 TABLET | Refills: 0 | Status: SHIPPED | OUTPATIENT
Start: 2021-04-01 | End: 2021-07-06 | Stop reason: SDUPTHER

## 2021-04-01 RX ORDER — POTASSIUM CHLORIDE 750 MG/1
40 CAPSULE, EXTENDED RELEASE ORAL AS NEEDED
Status: DISCONTINUED | OUTPATIENT
Start: 2021-04-01 | End: 2021-04-02 | Stop reason: HOSPADM

## 2021-04-01 RX ORDER — BISOPROLOL FUMARATE 5 MG/1
5 TABLET, FILM COATED ORAL
Status: DISCONTINUED | OUTPATIENT
Start: 2021-04-01 | End: 2021-04-02 | Stop reason: HOSPADM

## 2021-04-01 RX ORDER — POTASSIUM CHLORIDE 1.5 G/1.77G
40 POWDER, FOR SOLUTION ORAL AS NEEDED
Status: DISCONTINUED | OUTPATIENT
Start: 2021-04-01 | End: 2021-04-02 | Stop reason: HOSPADM

## 2021-04-01 RX ADMIN — PANTOPRAZOLE SODIUM 40 MG: 40 TABLET, DELAYED RELEASE ORAL at 08:00

## 2021-04-01 RX ADMIN — ACETAMINOPHEN 650 MG: 325 TABLET, FILM COATED ORAL at 08:00

## 2021-04-01 RX ADMIN — SODIUM CHLORIDE, PRESERVATIVE FREE 10 ML: 5 INJECTION INTRAVENOUS at 08:00

## 2021-04-01 RX ADMIN — POTASSIUM CHLORIDE 40 MEQ: 750 CAPSULE, EXTENDED RELEASE ORAL at 12:30

## 2021-04-01 RX ADMIN — INSULIN DETEMIR 4 UNITS: 100 INJECTION, SOLUTION SUBCUTANEOUS at 13:29

## 2021-04-01 RX ADMIN — SODIUM CHLORIDE 2 G: 900 INJECTION INTRAVENOUS at 12:31

## 2021-04-01 RX ADMIN — INSULIN LISPRO 2 UNITS: 100 INJECTION, SOLUTION INTRAVENOUS; SUBCUTANEOUS at 21:17

## 2021-04-01 RX ADMIN — ASPIRIN 81 MG: 81 TABLET, COATED ORAL at 08:00

## 2021-04-01 RX ADMIN — ROSUVASTATIN CALCIUM 10 MG: 10 TABLET, COATED ORAL at 08:00

## 2021-04-01 RX ADMIN — INSULIN LISPRO 2 UNITS: 100 INJECTION, SOLUTION INTRAVENOUS; SUBCUTANEOUS at 08:00

## 2021-04-01 RX ADMIN — BISOPROLOL FUMARATE 5 MG: 5 TABLET, FILM COATED ORAL at 21:17

## 2021-04-01 RX ADMIN — POTASSIUM CHLORIDE 40 MEQ: 750 CAPSULE, EXTENDED RELEASE ORAL at 17:02

## 2021-04-01 RX ADMIN — SACUBITRIL AND VALSARTAN 1 TABLET: 49; 51 TABLET, FILM COATED ORAL at 21:17

## 2021-04-01 RX ADMIN — MAGNESIUM SULFATE HEPTAHYDRATE 4 G: 40 INJECTION, SOLUTION INTRAVENOUS at 13:36

## 2021-04-01 RX ADMIN — CLOPIDOGREL BISULFATE 75 MG: 75 TABLET ORAL at 08:00

## 2021-04-01 RX ADMIN — SODIUM CHLORIDE, PRESERVATIVE FREE 10 ML: 5 INJECTION INTRAVENOUS at 21:18

## 2021-04-01 RX ADMIN — INSULIN LISPRO 3 UNITS: 100 INJECTION, SOLUTION INTRAVENOUS; SUBCUTANEOUS at 17:01

## 2021-04-01 NOTE — PROGRESS NOTES
[facility] Medicine Services  PROGRESS NOTE    Patient Name: Kai Garcia  : 1955  MRN: [id number]    Date of Admission: 3/28/2021  Primary Care Physician: Cheo Weiss MD    Subjective   Subjective     CC:  urosepsis    HPI:  Feeling better. No dyspnea. Fatigued but overall improved. No abd pain, no n/v/d    ROS:  Gen- No fevers, chills  CV- No chest pain, palpitations  Resp- No cough, dyspnea  GI- No N/V/D, abd pain    Objective   Objective     Vital Signs:   Temp:  [97.8 °F (36.6 °C)-99.7 °F (37.6 °C)] 97.8 °F (36.6 °C)  Heart Rate:  [] 95  Resp:  [14-20] 16  BP: (143-157)/(83-93) 143/90        Physical Exam:  Constitutional:Alert, oriented x 3, nontoxic appearing  Psych:Normal/appropriate affect  HEENT:Ncat, oroph clear  Neck: neck supple, full range of motion  Neuro: Face symmetric, speech clear, equal , moves all extremities  Cardiac: Rrr; No pretibial pitting edema  Resp: Ctab, normal effort  GI: abd soft, nontender  Skin: No extremity rash  Musculoskeletal/extremities: no cyanosis extremities; no significant ankle edema      Results Reviewed:  Results from last 7 days   Lab Units 21  0654 21  04521  0622   WBC 10*3/mm3 4.90 5.85 4.64   HEMOGLOBIN g/dL 11.2* 12.2* 13.5   HEMATOCRIT % 33.8* 36.4* 43.2   PLATELETS 10*3/mm3 66* 52* 44*   PROCALCITONIN ng/mL 1.04* 1.64* 2.26*     Results from last 7 days   Lab Units 21  0654 21  0457 21  0622 21  1908 21  1646   SODIUM mmol/L 133* 132* 130*  --  131*   POTASSIUM mmol/L 3.6 3.8 3.9  --  3.7   CHLORIDE mmol/L 97* 100 97*  --  94*   CO2 mmol/L 26.0 22.0 18.0*  --  24.0   BUN mg/dL 18 19 23  --  39*   CREATININE mg/dL 1.08 1.04 1.14  --  2.39*   GLUCOSE mg/dL 184* 179* 193*  --  155*   CALCIUM mg/dL 8.8 8.9 8.5*  --  8.5*   ALT (SGPT) U/L  --  32  --   --  27   AST (SGOT) U/L  --  45*  --   --  34   TROPONIN T ng/mL  --   --   --  <0.010 <0.010   PROBNP pg/mL  --    --   --   --  2,934.0*     Estimated Creatinine Clearance: 79.3 mL/min (by C-G formula based on SCr of 1.08 mg/dL).    Microbiology Results Abnormal     Procedure Component Value - Date/Time    Blood Culture - Blood, Arm, Right [[id number]] Collected: 03/28/21 1850    Lab Status: Preliminary result Specimen: Blood from Arm, Right Updated: 03/31/21 1930     Blood Culture No growth at 3 days    Blood Culture - Blood, Hand, Right [[id number]] Collected: 03/28/21 1900    Lab Status: Preliminary result Specimen: Blood from Hand, Right Updated: 03/31/21 1930     Blood Culture No growth at 3 days    Urine Culture - Urine, Urine, Clean Catch [[id number]]  (Abnormal)  (Susceptibility) Collected: 03/28/21 1802    Lab Status: Final result Specimen: Urine, Clean Catch Updated: 03/30/21 0955     Urine Culture >100,000 CFU/mL Escherichia coli    Susceptibility      Escherichia coli      BISHNU      Ampicillin Susceptible      Ampicillin + Sulbactam Susceptible      Cefazolin Susceptible      Cefepime Susceptible      Ceftazidime Susceptible      Ceftriaxone Susceptible      Gentamicin Susceptible      Levofloxacin Susceptible      Nitrofurantoin Susceptible      Piperacillin + Tazobactam Susceptible      Tetracycline Susceptible      Trimethoprim + Sulfamethoxazole Susceptible               Linear View                   COVID PRE-OP / PRE-PROCEDURE SCREENING ORDER (NO ISOLATION) - Swab, Nasopharynx [[id number]]  (Normal) Collected: 03/28/21 1859    Lab Status: Final result Specimen: Swab from Nasopharynx Updated: 03/28/21 1933    Narrative:      The following orders were created for panel order COVID PRE-OP / PRE-PROCEDURE SCREENING ORDER (NO ISOLATION) - Swab, Nasopharynx.  Procedure                               Abnormality         Status                     ---------                               -----------         ------                     COVID-19 and FLU A/B PCR...[[id number]]  Normal              Final result                  Please view results for these tests on the individual orders.    COVID-19 and FLU A/B PCR - Swab, Nasopharynx [[id number]]  (Normal) Collected: 03/28/21 1859    Lab Status: Final result Specimen: Swab from Nasopharynx Updated: 03/28/21 1933     COVID19 Not Detected     Influenza A PCR Not Detected     Influenza B PCR Not Detected    Narrative:      Fact sheet for providers: https://www.fda.gov/media/130652/download    Fact sheet for patients: https://www.fda.gov/media/536848/download    Test performed by PCR.          Imaging Results (Last 24 Hours)     ** No results found for the last 24 hours. **          Results for orders placed during the hospital encounter of 03/28/21    Adult Transthoracic Echo Complete W/ Cont if Necessary Per Protocol    Interpretation Summary  · Estimated left ventricular EF = 40%  · Posterior basal aneurysm noted  · The cardiac valves are anatomically and functionally normal.      I have reviewed the medications:  Scheduled Meds:aspirin, 81 mg, Oral, Daily  clopidogrel, 75 mg, Oral, Daily  insulin lispro, 0-7 Units, Subcutaneous, TID AC  levoFLOXacin, 750 mg, Intravenous, Q24H  pantoprazole, 40 mg, Oral, Daily  rosuvastatin, 10 mg, Oral, Daily  sodium chloride, 10 mL, Intravenous, Q12H      Continuous Infusions:   PRN Meds:.•  acetaminophen  •  influenza vaccine  •  magnesium sulfate  •  magnesium sulfate  •  opium-belladonna  •  phenazopyridine  •  pneumococcal polysaccharide 23-valent  •  potassium chloride **OR** potassium chloride **OR** potassium chloride  •  sodium chloride  •  zolpidem    Assessment/Plan   Assessment & Plan     Active Hospital Problems    Diagnosis  POA   • **Sepsis [A41.9, R65.21, N17.9]  Yes   • MORGAN (acute kidney injury) (CMS/HCC) [N17.9]  Yes   • Thrombocytopenia (CMS/HCC) [D69.6]  Yes   • Hypertension [I10]  Yes   • Type II diabetes mellitus, uncontrolled (CMS/HCC) [E11.65]  Yes      Resolved Hospital Problems   No resolved problems to display.        Brief  Hospital Course to date:  Kai Garcia is a 65 y.o. male w/ hx CAD, chronic SHF (ef ~40%), candelario, chronic back pain (on prescribed lortab), dm2, previous AAA repair. Patient was admitted to [facility] ICU on 3/28/21 w/ septic shock due to uti. Ct a/p was unremarkable except enlarged prostate. Was volume resuscitated aggressively and initiated on empiric antibiotics. Patient clinically improved. Urine culture grew pan-susceptible e.coli. patient was tranfserred out of icu to telemetry on 4/1/21.    *Septic Shock due to E.Coli UTI, poa    -admitted to icu initially  *Hx CAD  *Hx chronic SHF   -3/29/21: echo 40%, valves ok (same as previous 2019 echo)  *Hx AAA repair  *DM2   -titrate insulins prn  *Chronic Thrombocytopenia (typical range 100,000-120,000)   -hit ab negative   -slightly improved from admission, now up to 66,000    Plan:  -day #4/10 abx. Change levaquin to rocephin. Could consider change to omnicif to complete course of antibiotics at time of discharge (as long as blood cultures remain negative & clinically stable)  -replace electrolytes  -ambulate    Am labs; cbc,bmp,mag    DVT Prophylaxis:  Asa & plavix      Disposition: I expect the patient to be discharged tbd, possibly home 24-48 hrs if continues to improve & blood cultures negative    CODE STATUS:   Code Status and Medical Interventions:   Ordered at: 03/28/21 2034     Level Of Support Discussed With:    Patient     Code Status:    CPR     Medical Interventions (Level of Support Prior to Arrest):    Full       Carlos Rogers MD  04/01/21

## 2021-04-01 NOTE — PLAN OF CARE
Goal Outcome Evaluation:  Plan of Care Reviewed With: patient  Progress: improving  Outcome Summary: Pt unable to sleep well throughout the night, c/o the bed being too uncomfortable due to chronic back pain. Home CPAP used. VSS, Tmax 99.7, adequate UOP. Ordered to telemetry, awaiting room assignment.

## 2021-04-01 NOTE — PLAN OF CARE
Goal Outcome Evaluation:  Plan of Care Reviewed With: patient  Progress: improving  Outcome Summary: Pt VSS and on RA. Pt alert and oriented, up independently. Pt on BipPAP @ night. Pt's Mag 1.6 and K 3.6 this am and replaced, redraw in AM. New IV placed in L forearm, old IV removed. Pt with shower this AM. Will continue to monitor.

## 2021-04-02 ENCOUNTER — READMISSION MANAGEMENT (OUTPATIENT)
Dept: CALL CENTER | Facility: HOSPITAL | Age: 66
End: 2021-04-02

## 2021-04-02 VITALS
BODY MASS INDEX: 37.87 KG/M2 | DIASTOLIC BLOOD PRESSURE: 68 MMHG | WEIGHT: 241.3 LBS | RESPIRATION RATE: 16 BRPM | HEART RATE: 79 BPM | TEMPERATURE: 97 F | OXYGEN SATURATION: 100 % | HEIGHT: 67 IN | SYSTOLIC BLOOD PRESSURE: 110 MMHG

## 2021-04-02 LAB
ANION GAP SERPL CALCULATED.3IONS-SCNC: 9 MMOL/L (ref 5–15)
BACTERIA SPEC AEROBE CULT: NORMAL
BACTERIA SPEC AEROBE CULT: NORMAL
BUN SERPL-MCNC: 16 MG/DL (ref 8–23)
BUN/CREAT SERPL: 16.7 (ref 7–25)
CALCIUM SPEC-SCNC: 8.6 MG/DL (ref 8.6–10.5)
CHLORIDE SERPL-SCNC: 101 MMOL/L (ref 98–107)
CO2 SERPL-SCNC: 25 MMOL/L (ref 22–29)
CREAT SERPL-MCNC: 0.96 MG/DL (ref 0.76–1.27)
DEPRECATED RDW RBC AUTO: 46.3 FL (ref 37–54)
ERYTHROCYTE [DISTWIDTH] IN BLOOD BY AUTOMATED COUNT: 13 % (ref 12.3–15.4)
GFR SERPL CREATININE-BSD FRML MDRD: 78 ML/MIN/1.73
GLUCOSE BLDC GLUCOMTR-MCNC: 186 MG/DL (ref 70–130)
GLUCOSE BLDC GLUCOMTR-MCNC: 249 MG/DL (ref 70–130)
GLUCOSE SERPL-MCNC: 182 MG/DL (ref 65–99)
HCT VFR BLD AUTO: 35.4 % (ref 37.5–51)
HGB BLD-MCNC: 11.5 G/DL (ref 13–17.7)
MAGNESIUM SERPL-MCNC: 2.1 MG/DL (ref 1.6–2.4)
MCH RBC QN AUTO: 31.4 PG (ref 26.6–33)
MCHC RBC AUTO-ENTMCNC: 32.5 G/DL (ref 31.5–35.7)
MCV RBC AUTO: 96.7 FL (ref 79–97)
PLATELET # BLD AUTO: 85 10*3/MM3 (ref 140–450)
PMV BLD AUTO: 11.5 FL (ref 6–12)
POTASSIUM SERPL-SCNC: 4.5 MMOL/L (ref 3.5–5.2)
RBC # BLD AUTO: 3.66 10*6/MM3 (ref 4.14–5.8)
SODIUM SERPL-SCNC: 135 MMOL/L (ref 136–145)
WBC # BLD AUTO: 5.66 10*3/MM3 (ref 3.4–10.8)

## 2021-04-02 PROCEDURE — 80048 BASIC METABOLIC PNL TOTAL CA: CPT | Performed by: INTERNAL MEDICINE

## 2021-04-02 PROCEDURE — 25010000002 CEFTRIAXONE PER 250 MG: Performed by: INTERNAL MEDICINE

## 2021-04-02 PROCEDURE — 85027 COMPLETE CBC AUTOMATED: CPT | Performed by: INTERNAL MEDICINE

## 2021-04-02 PROCEDURE — 63710000001 INSULIN LISPRO (HUMAN) PER 5 UNITS: Performed by: INTERNAL MEDICINE

## 2021-04-02 PROCEDURE — 82962 GLUCOSE BLOOD TEST: CPT

## 2021-04-02 PROCEDURE — 63710000001 INSULIN DETEMIR PER 5 UNITS: Performed by: INTERNAL MEDICINE

## 2021-04-02 PROCEDURE — 83735 ASSAY OF MAGNESIUM: CPT | Performed by: INTERNAL MEDICINE

## 2021-04-02 PROCEDURE — 99239 HOSP IP/OBS DSCHRG MGMT >30: CPT | Performed by: INTERNAL MEDICINE

## 2021-04-02 RX ORDER — HYDROCORTISONE ACETATE 25 MG/1
25 SUPPOSITORY RECTAL 2 TIMES DAILY
Status: DISCONTINUED | OUTPATIENT
Start: 2021-04-02 | End: 2021-04-02 | Stop reason: HOSPADM

## 2021-04-02 RX ORDER — HYDROCORTISONE ACETATE 25 MG/1
25 SUPPOSITORY RECTAL 2 TIMES DAILY
Qty: 10 SUPPOSITORY | Refills: 0 | Status: SHIPPED | OUTPATIENT
Start: 2021-04-02 | End: 2021-04-07

## 2021-04-02 RX ORDER — CEFDINIR 300 MG/1
300 CAPSULE ORAL 2 TIMES DAILY
Qty: 10 CAPSULE | Refills: 0 | Status: SHIPPED | OUTPATIENT
Start: 2021-04-03 | End: 2021-04-08

## 2021-04-02 RX ADMIN — INSULIN LISPRO 3 UNITS: 100 INJECTION, SOLUTION INTRAVENOUS; SUBCUTANEOUS at 12:51

## 2021-04-02 RX ADMIN — CLOPIDOGREL BISULFATE 75 MG: 75 TABLET ORAL at 08:29

## 2021-04-02 RX ADMIN — SODIUM CHLORIDE, PRESERVATIVE FREE 10 ML: 5 INJECTION INTRAVENOUS at 08:31

## 2021-04-02 RX ADMIN — PANTOPRAZOLE SODIUM 40 MG: 40 TABLET, DELAYED RELEASE ORAL at 08:29

## 2021-04-02 RX ADMIN — BISOPROLOL FUMARATE 5 MG: 5 TABLET, FILM COATED ORAL at 08:30

## 2021-04-02 RX ADMIN — LIDOCAINE HYDROCHLORIDE 1 G: 10 INJECTION, SOLUTION EPIDURAL; INFILTRATION; INTRACAUDAL; PERINEURAL at 10:39

## 2021-04-02 RX ADMIN — INSULIN DETEMIR 4 UNITS: 100 INJECTION, SOLUTION SUBCUTANEOUS at 08:31

## 2021-04-02 RX ADMIN — ROSUVASTATIN CALCIUM 10 MG: 10 TABLET, COATED ORAL at 08:29

## 2021-04-02 RX ADMIN — ASPIRIN 81 MG: 81 TABLET, COATED ORAL at 08:30

## 2021-04-02 RX ADMIN — SACUBITRIL AND VALSARTAN 1 TABLET: 49; 51 TABLET, FILM COATED ORAL at 08:30

## 2021-04-02 RX ADMIN — INSULIN LISPRO 2 UNITS: 100 INJECTION, SOLUTION INTRAVENOUS; SUBCUTANEOUS at 08:31

## 2021-04-02 NOTE — PLAN OF CARE
Goal Outcome Evaluation:  Plan of Care Reviewed With: patient  Progress: improving  Outcome Summary: Slept most of the night. No acute events overnight. No c/o pain or any discomfort. VSS. Will cont with POC

## 2021-04-02 NOTE — OUTREACH NOTE
Prep Survey      Responses   [facility] facility patient discharged from?  [facility]   Is LACE score < 7 ?  No   Emergency Room discharge w/ pulse ox?  No   Eligibility  [facility]   Date of Admission  03/28/21   Date of Discharge  04/02/21   Discharge Disposition  Home or Self Care   Discharge diagnosis  Sepsis with acute renal failure and septic shock  uti   Does the patient have one of the following disease processes/diagnoses(primary or secondary)?  Sepsis   Does the patient have Home health ordered?  No   Is there a DME ordered?  No   Prep survey completed?  Yes          Hayley Hallman RN

## 2021-04-02 NOTE — PROGRESS NOTES
"                  Clinical Nutrition     Nutrition Assessment  Reason for Visit:   LOS      Patient Name: Kai Garcia  YOB: 1955  MRN: [id number]  Date of Encounter: 04/02/21 10:20 EDT  Admission date: 3/28/2021      Nutrition Assessment   Assessment     Applicable diagnosis/conditions/procedures this adm:  Septic Shock due to E.Coli UTI  Type II diabetes mellitus, uncontrolled (CMS/HCC)  MORGAN (acute kidney injury) (CMS/[facility])    Applicable PMH/PSxH:   Hypertension  chronic SHF  AAA repair   Thrombocytopenia (CMS/[facility])      PMH: He  has a past medical history of Back pain, Chest pain, Chronic low back pain, Coronary artery disease, Diabetes mellitus (CMS/HCC), Dissecting aortic aneurysm (CMS/[facility]), Dyslipidemia, Elevated liver function tests, History of bleeding peptic ulcer, History of transfusion (2006), Hyperlipidemia, Hypertension, Left leg pain, Lumbar pain, MI (myocardial infarction) (CMS/[facility]) (2007), Morbid obesity (CMS/[facility]), LUCIAN on CPAP, Severe obstructive sleep apnea, Shortness of breath, and Wears glasses.   PSxH: He  has a past surgical history that includes Lumbar fusion; Lumbar discectomy; Coronary artery bypass graft (2006); Coronary angioplasty with stent (2007); Arteriogram Aortic (2006); Cardiac catheterization (N/A, 4/27/2018); Appendectomy; Cardiac catheterization (04/2018); Cardiac electrophysiology procedure (N/A, 8/9/2018); Colonoscopy; and Colonoscopy (N/A, 6/18/2019).         Reported/Observed/Food/Nutrition Related History:     RD spoke to pt via phone. A1C from February discussed. Pt refused any education and states \"I'm good on that\".        Anthropometrics     Height: 170.2 cm (67\")  Last filed wt: Weight: 109 kg (241 lb 4.8 oz) (04/02/21 0500)  Weight Method: Bed scale    BMI: BMI (Calculated): 37.8  Obese Class II: 35-39.9kg/m2    Ideal Body Weight (IBW) (kg): 68.1      Labs reviewed     Results from last 7 days   Lab Units 04/02/21  0816 04/01/21 2033 04/01/21  1609 " 04/01/21  1106 04/01/21  0714 03/31/21 2009   GLUCOSE mg/dL 186* 193* 200* 222* 193* 185*     Lab Results   Lab Value Date/Time    HGBA1C 9.30 (H) 02/22/2021 0955    HGBA1C 8.2 (H) 11/02/2020 1120    HGBA1C 8.10 (H) 08/08/2018 1155    HGBA1C 7.70 (H) 04/27/2018 0720       Medications reviewed       Current Nutrition Prescription     PO: Diet Regular; Cardiac, Consistent Carbohydrate    Nutrition supplements:       Dietary Nutrition Supplements Boost Glucose Control TID      Intake (average): 75% X past 6 meals            Nutrition Diagnosis     4/2  Problem Altered nutrition related laboratory values   Etiology ?lifestyle and dietary choices   Signs/Symptoms HgbA1C 9.3%       Nutrition Intervention     1.  Follow treatment progress, Care plan reviewed  2. ONS decreased to once daily  3. Offered education-pt refused      Goal:   General: Nutrition support treatment  PO: Maintain intake      Monitoring/Evaluation:   Per protocol, PO intake, Supplement intake      Will Continue to follow per protocol      Sara Sommers RDN, LD, CNSC  Time Spent: 20min

## 2021-04-02 NOTE — PROGRESS NOTES
Continued Stay Note   [place]     Patient Name: Kai Garcia  MRN: [id number]  Today's Date: 4/2/2021    Admit Date: 3/28/2021    Discharge Plan     Row Name 04/02/21 1105       Plan    Plan  Home at discharge    Patient/Family in Agreement with Plan  yes    Plan Comments  Goal remains for patient to return home upon discharge with his spouse. He remains independent of ADL's with no new needs for DME and no identified discharge needs at this time. CM will continue to follow - mauro [phone]    Final Discharge Disposition Code  01 - home or self-care        Discharge Codes    No documentation.       Expected Discharge Date and Time     Expected Discharge Date Expected Discharge Time    Apr 3, 2021             Mauro Aguilera RN

## 2021-04-02 NOTE — DISCHARGE SUMMARY
[facility] Medicine Services  DISCHARGE SUMMARY    Patient Name: Kai Garcia  : 1955  MRN: [id number]    Date of Admission: 3/28/2021  5:21 PM  Date of Discharge:  2021  Primary Care Physician: Cheo Weiss MD    Consults     No orders found from 2021 to 3/29/2021.          Hospital Course     Presenting Problem:   Sepsis with acute renal failure and septic shock, due to unspecified organism, unspecified acute renal failure type (CMS/[facility]) [A41.9, R65.21, N17.9]    Active Hospital Problems    Diagnosis  POA   • **Sepsis [A41.9, R65.21, N17.9]  Yes   • MORGAN (acute kidney injury) (CMS/[facility]) [N17.9]  Yes   • Thrombocytopenia (CMS/[facility]) [D69.6]  Yes   • Hypertension [I10]  Yes   • Type II diabetes mellitus, uncontrolled (CMS/[facility]) [E11.65]  Yes      Resolved Hospital Problems   No resolved problems to display.      ------------------final diagnoses-----------------  *Septic Shock due to E.Coli UTI, poa               -admitted to icu initially  *Hx CAD  *Hx chronic SHF              -3/29/21: echo 40%, valves ok (same as previous  echo)  *Hx AAA repair  *DM2              -titrate insulins prn  *Chronic Thrombocytopenia (typical range 100,000-120,000)              -hit antibody negative              -improved up to 85,000 day or discharge  *Hemorrhoids   -anusol suppositories  ------------------------------------------------------    Hospital Course:  Kai Garcia is a 65 y.o. male w/ hx CAD, chronic SHF (ef ~40%), candelario, chronic back pain (on prescribed lortab), dm2, previous AAA repair. Patient was admitted to [facility] ICU on 3/28/21 w/ septic shock due to uti. Ct a/p was unremarkable except enlarged prostate. Was volume resuscitated aggressively and initiated on empiric antibiotics. Patient clinically improved. Urine culture grew pan-susceptible e.coli. patient was tranfserred out of icu to telemetry on 21. Patient remained afebrile, blood cultures are negative thus  far w/ normal wbc normal and benign examination. Plan to discharge home on omnicef 300mg bid x 5 more days to complete therapy. Recommend f/u w/ pcp 1 weeks. If develops recurrent uti's/urologic issues then urology consult recommended. Has some hemorrhoids w/ mild bleeding will prescribe some anusol suppositories.      Discharge Follow Up Recommendations for outpatient labs/diagnostics:    PCP in 1 week (post hospitalization follow up)      Day of Discharge     HPI:   Feels well. No pain. No n/v/d. No fever. Wants to go home.     Review of Systems  Blood tinged stool c/w known hemorrhoids. No pain    Vital Signs:   Temp:  [97 °F (36.1 °C)-97.9 °F (36.6 °C)] 97 °F (36.1 °C)  Heart Rate:  [79-90] 79  Resp:  [16] 16  BP: (124-164)/() 127/77     Physical Exam:  Constitutional:Alert, oriented x 3, nontoxic appearing  Psych:Normal/appropriate affect  HEENT:Ncat, oroph clear  Neck: neck supple, full range of motion  Neuro: Face symmetric, speech clear, equal , moves all extremities  Cardiac: Rrr; No pretibial pitting edema  Resp: Ctab, normal effort  GI: abd soft, nontender  Skin: No extremity rash  Musculoskeletal/extremities: no cyanosis extremities; no significant ankle edema      Pertinent  and/or Most Recent Results     LAB RESULTS:      Lab 04/02/21  0703 04/01/21  0654 03/31/21  0457 03/30/21  0622 03/29/21  0350 03/28/21  1646   WBC 5.66 4.90 5.85 4.64 7.87 11.53*   HEMOGLOBIN 11.5* 11.2* 12.2* 13.5 12.1* 12.5*   HEMATOCRIT 35.4* 33.8* 36.4* 43.2 38.5 36.7*   PLATELETS 85* 66* 52* 44* 30* 57*   NEUTROS ABS  --  3.29 3.93 3.52 6.13 8.99*   IMMATURE GRANS (ABS)  --  0.04 0.04 0.02 0.34*  --    LYMPHS ABS  --  0.84 0.88 0.45* 0.73  --    MONOS ABS  --  0.65 0.90 0.61 0.64  --    EOS ABS  --  0.05 0.08 0.02 0.02 0.00   MCV 96.7 93.9 94.8 100.9* 100.8* 93.6   PROCALCITONIN  --  1.04* 1.64* 2.26* 4.42*  --    LACTATE  --   --   --   --   --  1.9         Lab 04/02/21  0703 04/01/21  2107 04/01/21  0654  03/31/21  0457 03/30/21  0622 03/29/21  0350   SODIUM 135*  --  133* 132* 130* 131*   POTASSIUM 4.5 4.5 3.6 3.8 3.9 3.7   CHLORIDE 101  --  97* 100 97* 98   CO2 25.0  --  26.0 22.0 18.0* 20.0*   ANION GAP 9.0  --  10.0 10.0 15.0 13.0   BUN 16  --  18 19 23 32*   CREATININE 0.96  --  1.08 1.04 1.14 1.66*   GLUCOSE 182*  --  184* 179* 193* 138*   CALCIUM 8.6  --  8.8 8.9 8.5* 8.2*   MAGNESIUM 2.1  --  1.6 1.4* 2.6* 1.4*   PHOSPHORUS  --   --   --  2.6 2.2* 2.3*         Lab 03/31/21  0457 03/28/21  1646   TOTAL PROTEIN 7.0 7.5   ALBUMIN 3.00* 3.90   GLOBULIN 4.0 3.6   ALT (SGPT) 32 27   AST (SGOT) 45* 34   BILIRUBIN 0.5 0.7   ALK PHOS 46 45   LIPASE  --  20         Lab 03/28/21  1908 03/28/21  1646   PROBNP  --  2,934.0*   TROPONIN T <0.010 <0.010                 Brief Urine Lab Results  (Last result in the past 365 days)      Color   Clarity   Blood   Leuk Est   Nitrite   Protein   CREAT   Urine HCG        03/28/21 1802 Orange Cloudy Small (1+) Moderate (2+) Positive 100 mg/dL (2+)             Microbiology Results (last 10 days)     Procedure Component Value - Date/Time    Blood Culture - Blood, Hand, Right [[id number]] Collected: 03/28/21 1900    Lab Status: Preliminary result Specimen: Blood from Hand, Right Updated: 04/01/21 1930     Blood Culture No growth at 4 days    COVID PRE-OP / PRE-PROCEDURE SCREENING ORDER (NO ISOLATION) - Swab, Nasopharynx [[id number]]  (Normal) Collected: 03/28/21 1859    Lab Status: Final result Specimen: Swab from Nasopharynx Updated: 03/28/21 1933    Narrative:      The following orders were created for panel order COVID PRE-OP / PRE-PROCEDURE SCREENING ORDER (NO ISOLATION) - Swab, Nasopharynx.  Procedure                               Abnormality         Status                     ---------                               -----------         ------                     COVID-19 and FLU A/B PCR...[[id number]]  Normal              Final result                 Please view results for these  tests on the individual orders.    COVID-19 and FLU A/B PCR - Swab, Nasopharynx [[id number]]  (Normal) Collected: 03/28/21 1859    Lab Status: Final result Specimen: Swab from Nasopharynx Updated: 03/28/21 1933     COVID19 Not Detected     Influenza A PCR Not Detected     Influenza B PCR Not Detected    Narrative:      Fact sheet for providers: https://www.fda.gov/media/158469/download    Fact sheet for patients: https://www.fda.gov/media/382014/download    Test performed by PCR.    Blood Culture - Blood, Arm, Right [[id number]] Collected: 03/28/21 1850    Lab Status: Preliminary result Specimen: Blood from Arm, Right Updated: 04/01/21 1930     Blood Culture No growth at 4 days    Urine Culture - Urine, Urine, Clean Catch [[id number]]  (Abnormal)  (Susceptibility) Collected: 03/28/21 1802    Lab Status: Final result Specimen: Urine, Clean Catch Updated: 03/30/21 0955     Urine Culture >100,000 CFU/mL Escherichia coli    Susceptibility      Escherichia coli      BISHNU      Ampicillin Susceptible      Ampicillin + Sulbactam Susceptible      Cefazolin Susceptible      Cefepime Susceptible      Ceftazidime Susceptible      Ceftriaxone Susceptible      Gentamicin Susceptible      Levofloxacin Susceptible      Nitrofurantoin Susceptible      Piperacillin + Tazobactam Susceptible      Tetracycline Susceptible      Trimethoprim + Sulfamethoxazole Susceptible               Linear View                         Adult Transthoracic Echo Complete W/ Cont if Necessary Per Protocol    Result Date: 3/31/2021  · Estimated left ventricular EF = 40% · Posterior basal aneurysm noted · The cardiac valves are anatomically and functionally normal.      CT Abdomen Pelvis Without Contrast    Result Date: 3/28/2021  CT Abdomen Pelvis WO INDICATION: Acute emergency room presentation with UTI, back and Flank pain, kidney stone suspected; Sepsis, unspecified organism; Severe sepsis with septic shock; Acute kidney failure, unspecified; Acute cystitis  without hematuria; Hypotension, unspecified TECHNIQUE: CT of the abdomen and pelvis without IV contrast. Coronal and sagittal reconstructions were obtained.  Oral contrast not given.  Radiation dose reduction techniques included automated exposure control or exposure modulation based on body size. Count of known CT and cardiac nuc med studies performed in previous 12 months: 0. COMPARISON: No relevant comparison or correlation studies available at time of dictation. FINDINGS:  There is limited assessment of the solid viscera and the bowel wall without the use of any IV contrast.    Grossly, no worrisome contour abnormalities of solid viscera or gross low-attenuation lesion seen. There is subtle fluid in the pelvis and diverticulosis of the sigmoid colon. However no focal stranding seen to suggest the presence of diverticulitis. This could be related to cystitis. Subtle irregularity of the morphology of the liver could represent underlying parenchymal disease. No ascites or hepatosplenomegaly. No free air, free fluid or focal inflammatory change is present. Prostate enlargement 6.1 x 5.7 x 5.1 cm. Lung bases are clear. Benign granulomatous disease seen in the right lung base. Osseous structures are intact. Degenerative changes and disc disease seen throughout the visualized thoracolumbar spine.     1. Minimal stranding/fluid in the pelvis but no focal changes to suggest diverticulitis. 2. Slight nodular morphology of the liver may represent parenchymal dysfunction, correlate with LFTs. 3. Mild prostate enlargement.  Signer Name: Valerie Sewell MD  Signed: 3/28/2021 7:53 PM  Workstation Name: [facility]  [facility]    XR Chest 1 View    Result Date: 3/29/2021   EXAMINATION: XR CHEST 1 VIEW - 03/28/2021  INDICATION: Chest pain.  COMPARISON: Chest x-ray 08/10/2018  FINDINGS: Stable appearance of the cardiac silhouette status post median sternotomy and CABG without overt edema. No pneumothorax  or pleural effusion. Degenerative changes of the spine.        No acute cardiopulmonary process.  DICTATED:   03/28/2021 EDITED/ls :   03/28/2021  This report was finalized on 3/29/2021 3:16 PM by Dr. Oh Fuentes.                Results for orders placed during the hospital encounter of 03/28/21    Adult Transthoracic Echo Complete W/ Cont if Necessary Per Protocol    Interpretation Summary  · Estimated left ventricular EF = 40%  · Posterior basal aneurysm noted  · The cardiac valves are anatomically and functionally normal.      Plan for Follow-up of Pending Labs/Results: pcp  Pending Labs     Order Current Status    Blood Culture - Blood, Arm, Right Preliminary result    Blood Culture - Blood, Hand, Right Preliminary result        Discharge Details        Discharge Medications      New Medications      Instructions Start Date   cefdinir 300 MG capsule  Commonly known as: OMNICEF   300 mg, Oral, 2 Times Daily   Start Date: April 3, 2021     hydrocortisone 25 MG suppository  Commonly known as: ANUSOL-HC   25 mg, Rectal, 2 Times Daily         Changes to Medications      Instructions Start Date   rosuvastatin 10 MG tablet  Commonly known as: CRESTOR  What changed:   how much to take  how to take this  when to take this   TAKE ONE TABLET BY MOUTH ONCE DAILY         Continue These Medications      Instructions Start Date   Accu-Chek Jodie Plus test strip  Generic drug: glucose blood   CHECK SUGARS TWICE A DAY OR AS NEEDED      Accu-Chek Softclix Lancets lancets   CHECK BLOOD SUGARS TWICE A DAY OR AS NEEDED      acetaminophen 500 MG tablet  Commonly known as: TYLENOL   1,000 mg, Oral, Every 6 Hours PRN      aspirin 81 MG EC tablet   81 mg, Oral, Daily      bisoprolol 10 MG tablet  Commonly known as: ZEBeta   TAKE ONE TABLET BY MOUTH ONCE DAILY      clopidogrel 75 MG tablet  Commonly known as: PLAVIX   TAKE ONE TABLET BY MOUTH ONCE DAILY      cyclobenzaprine 10 MG tablet  Commonly known as: FLEXERIL   TAKE ONE TABLET BY  MOUTH 3 TIMES A DAY AS NEEDED FOR MUSCLE SPASMS      Entresto 49-51 MG tablet  Generic drug: sacubitril-valsartan   TAKE ONE TABLET BY MOUTH EVERY 12 HOURS      eplerenone 25 MG tablet  Commonly known as: INSPRA   TAKE ONE TABLET BY MOUTH EVERY DAY      HYDROcodone-acetaminophen 5-325 MG per tablet  Commonly known as: NORCO   0.5-1 tablets, Oral, Every 6 Hours PRN      Januvia 100 MG tablet  Generic drug: SITagliptin   TAKE ONE TABLET BY MOUTH EVERY DAY      nitroglycerin 0.4 MG SL tablet  Commonly known as: Nitrostat   0.4 mg, Sublingual, Every 5 Minutes PRN      pantoprazole 40 MG EC tablet  Commonly known as: Protonix   40 mg, Oral, Daily      zolpidem 10 MG tablet  Commonly known as: AMBIEN   10 mg, Oral, Nightly PRN             Allergies   Allergen Reactions   • Ativan [Lorazepam] Other (See Comments)     agitation   • Zetia [Ezetimibe] Other (See Comments)     increased LFTs   • Zocor [Simvastatin] Myalgia   • Jardiance [Empagliflozin] Unknown - Low Severity     Blood in urine and burning sensation   • Isosorbide Other (See Comments)     headaches   • Metformin And Related Diarrhea         Discharge Disposition:  Home or Self Care    Diet:  Hospital:  Diet Order   Procedures   • Diet Regular; Cardiac, Consistent Carbohydrate       Activity:         CODE STATUS:    Code Status and Medical Interventions:   Ordered at: 03/28/21 2034     Level Of Support Discussed With:    Patient     Code Status:    CPR     Medical Interventions (Level of Support Prior to Arrest):    Full       Future Appointments   Date Time Provider Department Center   4/19/2021 12:00 PM [facility]   4/19/2021  1:00 PM PAT 1 [facility]   5/24/2021  9:30 AM Cheo Weiss MD [facility]       Additional Instructions for the Follow-ups that You Need to Schedule     Discharge Follow-up with PCP   As directed       Currently Documented PCP:    Cheo Weiss MD    PCP Phone Number:    [phone]     Follow  Up Details: 1 week                     Carlos Rogers MD  04/02/21      Time Spent on Discharge:  I spent  35 minutes on this discharge activity which included: face-to-face encounter with the patient, reviewing the data in the system, coordination of the care with the nursing staff as well as consultants, documentation, and entering orders.

## 2021-04-05 ENCOUNTER — TRANSITIONAL CARE MANAGEMENT TELEPHONE ENCOUNTER (OUTPATIENT)
Dept: CALL CENTER | Facility: HOSPITAL | Age: 66
End: 2021-04-05

## 2021-04-05 NOTE — OUTREACH NOTE
Call Center TCM Note      Responses   [facility] patient discharged from?  [place]   Does the patient have one of the following disease processes/diagnoses(primary or secondary)?  Sepsis   TCM attempt successful?  Yes   Discharge diagnosis  Sepsis with acute renal failure and septic shock  uti   Meds reviewed with patient/caregiver?  Yes   Is the patient having any side effects they believe may be caused by any medication additions or changes?  No   Does the patient have all medications related to this admission filled (includes all antibiotics, inhalers, nebulizers,steroids,etc.)  Yes   Is the patient taking all medications as directed (includes completed medication regime)?  Yes   Does the patient have a primary care provider?   Yes   Comments regarding PCP  TCM FWP with PCP Cheo Weiss MD is 04/08/2021.   Does the patient have an appointment with their PCP within 7 days of discharge?  Yes   Has the patient kept scheduled appointments due by today?  N/A   Has home health visited the patient within 72 hours of discharge?  N/A   Psychosocial issues?  No   Did the patient receive a copy of their discharge instructions?  Yes   Nursing interventions  Reviewed instructions with patient   What is the patient's perception of their health status since discharge?  Improving   Nursing interventions  Nurse provided patient education   Is the patient/caregiver able to teach back Sepsis?  S - Shivering,fever or very cold, E - Extreme pain or generalized discomfort (worst ever,especially abdomen), P - Pale or discolored skin, S - Sleepy, difficult to arouse,confused, I -   I feel like I might die-a feeling of hopelessness, S - Short of breath   Nursing interventions  Nurse provided reassurance to patient   Is patient/caregiver able to teach back steps to recovery at home?  Set small, achievable goals for return to baseline health, Record milestones and struggles in a journal, Exercise as tolerated, Make a list of  questions for PCP appoinment, Learn about sepsis(sepsis.org), Rest and regain strength, Eat a balanced diet, Talk about feelings with family/friends   Is the patient/caregiver able to teach back signs and symptoms of worsening condition:  Fever, Altered mental status(confusion/coma), Edema, Hyperthermia, Rapid heart rate (>90), High blood glucose without diabetes, Shortness of breath/rapid respiratory rate   If the patient is a current smoker, are they able to teach back resources for cessation?  Not a smoker   Is the patient/caregiver able to teach back the hierarchy of who to call/visit for symptoms/problems? PCP, Specialist, Home health nurse, Urgent Care, ED, 911  Yes   TCM call completed?  Yes   Wrap up additional comments  Pt is better than before hospital stay, continuing with Cefdinir at home, also AZO for burning which continues. Pt dx'd with severe UTI and septic shock. Pt states he still has mild symptoms of chills vs feeling hot. Pt is monitoring temp and also BP which was very low in hospital. Staying well hydrated. Pt understands to return to ED w/o hesitation of any worsening symptoms. TCM FWP with PCP Cheo Weiss MD is 04/08/2021.          Hayley Golden MA    4/5/2021, 15:14 EDT

## 2021-04-08 ENCOUNTER — OFFICE VISIT (OUTPATIENT)
Dept: FAMILY MEDICINE CLINIC | Facility: CLINIC | Age: 66
End: 2021-04-08

## 2021-04-08 VITALS
SYSTOLIC BLOOD PRESSURE: 120 MMHG | OXYGEN SATURATION: 98 % | DIASTOLIC BLOOD PRESSURE: 72 MMHG | RESPIRATION RATE: 28 BRPM | HEIGHT: 67 IN | WEIGHT: 241.8 LBS | HEART RATE: 84 BPM | BODY MASS INDEX: 37.95 KG/M2 | TEMPERATURE: 97.8 F

## 2021-04-08 DIAGNOSIS — R30.0 DYSURIA: ICD-10-CM

## 2021-04-08 DIAGNOSIS — N40.0 PROSTATE ENLARGEMENT: ICD-10-CM

## 2021-04-08 DIAGNOSIS — R65.21 SEPTIC SHOCK (HCC): ICD-10-CM

## 2021-04-08 DIAGNOSIS — N30.00 ACUTE CYSTITIS WITHOUT HEMATURIA: Primary | ICD-10-CM

## 2021-04-08 DIAGNOSIS — A41.9 SEPTIC SHOCK (HCC): ICD-10-CM

## 2021-04-08 DIAGNOSIS — N17.9 AKI (ACUTE KIDNEY INJURY) (HCC): ICD-10-CM

## 2021-04-08 DIAGNOSIS — R97.20 ABNORMAL PSA: ICD-10-CM

## 2021-04-08 LAB
BILIRUB BLD-MCNC: NEGATIVE MG/DL
CLARITY, POC: CLEAR
COLOR UR: YELLOW
GLUCOSE UR STRIP-MCNC: NEGATIVE MG/DL
KETONES UR QL: NEGATIVE
LEUKOCYTE EST, POC: NEGATIVE
NITRITE UR-MCNC: NEGATIVE MG/ML
PH UR: 6 [PH] (ref 5–8)
PROT UR STRIP-MCNC: NEGATIVE MG/DL
RBC # UR STRIP: NEGATIVE /UL
SP GR UR: 1.02 (ref 1–1.03)
UROBILINOGEN UR QL: NORMAL

## 2021-04-08 PROCEDURE — 99496 TRANSJ CARE MGMT HIGH F2F 7D: CPT | Performed by: NURSE PRACTITIONER

## 2021-04-08 PROCEDURE — 81003 URINALYSIS AUTO W/O SCOPE: CPT | Performed by: NURSE PRACTITIONER

## 2021-04-08 RX ORDER — CEFDINIR 300 MG/1
300 CAPSULE ORAL 2 TIMES DAILY
Qty: 14 CAPSULE | Refills: 0 | Status: SHIPPED | OUTPATIENT
Start: 2021-04-08 | End: 2021-04-19

## 2021-04-08 RX ORDER — TAMSULOSIN HYDROCHLORIDE 0.4 MG/1
1 CAPSULE ORAL DAILY
Qty: 30 CAPSULE | Refills: 0 | Status: SHIPPED | OUTPATIENT
Start: 2021-04-08 | End: 2021-04-19

## 2021-04-08 NOTE — PROGRESS NOTES
"Chief Complaint  [facility] discharge / septic UTI (pt state's he is better but still having dysuria ) and needs blood bank clearance (pt needs clearance to bank his own blood for upcoming surgery )    Subjective          Kai Av Garcia presents to [facility]  History of Present Illness    Objective   Vital Signs:   /72   Pulse 84   Temp 97.8 °F (36.6 °C)   Resp 28   Ht 170.2 cm (67.01\")   Wt 110 kg (241 lb 12.8 oz)   SpO2 98%   BMI 37.86 kg/m²     Physical Exam   Result Review :                 Assessment and Plan    Diagnoses and all orders for this visit:    1. Acute cystitis without hematuria (Primary)  -     tamsulosin (FLOMAX) 0.4 MG capsule 24 hr capsule; Take 1 capsule by mouth Daily.  Dispense: 30 capsule; Refill: 0  -     cefdinir (OMNICEF) 300 MG capsule; Take 1 capsule by mouth 2 (Two) Times a Day.  Dispense: 14 capsule; Refill: 0    2. Prostate enlargement    3. MORGAN (acute kidney injury) (CMS/[facility])        Follow Up   No follow-ups on file.  Patient was given instructions and counseling regarding his condition or for health maintenance advice. Please see specific information pulled into the AVS if appropriate.       "

## 2021-04-09 ENCOUNTER — READMISSION MANAGEMENT (OUTPATIENT)
Dept: CALL CENTER | Facility: HOSPITAL | Age: 66
End: 2021-04-09

## 2021-04-09 NOTE — OUTREACH NOTE
Sepsis Week 2 Survey      Responses   [facility] patient discharged from?  [place]   Does the patient have one of the following disease processes/diagnoses(primary or secondary)?  Sepsis   Week 2 attempt successful?  Yes   Call start time  1246   Call end time  1251   Discharge diagnosis  Sepsis with acute renal failure and septic shock  uti   Is patient permission given to speak with other caregiver?  Yes   List who call center can speak with  JOSY DILLARD   Person spoke with today (if not patient) and relationship  JOSY DILLARD- SPOUSE   Meds reviewed with patient/caregiver?  Yes   Is the patient having any side effects they believe may be caused by any medication additions or changes?  No   Does the patient have all medications related to this admission filled (includes all antibiotics, inhalers, nebulizers,steroids,etc.)  Yes   Is the patient taking all medications as directed (includes completed medication regime)?  Yes   Does the patient have a primary care provider?   Yes   Comments regarding PCP  SAW PCP YESTERDAY   Does the patient have an appointment with their PCP within 7 days of discharge?  Yes   Has the patient kept scheduled appointments due by today?  Yes   Has home health visited the patient within 72 hours of discharge?  N/A   Psychosocial issues?  No   Did the patient receive a copy of their discharge instructions?  Yes   Nursing interventions  Reviewed instructions with patient   What is the patient's perception of their health status since discharge?  Improving   Nursing interventions  Nurse provided patient education   Is the patient/caregiver able to teach back Sepsis?  S - Shivering,fever or very cold, E - Extreme pain or generalized discomfort (worst ever,especially abdomen), P - Pale or discolored skin, S - Sleepy, difficult to arouse,confused, I -   I feel like I might die-a feeling of hopelessness, S - Short of breath   Nursing interventions  Nurse provided reassurance to patient    Is patient/caregiver able to teach back steps to recovery at home?  Set small, achievable goals for return to baseline health, Rest and regain strength, Eat a balanced diet   Is the patient/caregiver able to teach back signs and symptoms of worsening condition:  Fever, Hyperthermia, Shortness of breath/rapid respiratory rate, Altered mental status(confusion/coma), Rapid heart rate (>90), High blood glucose without diabetes, Edema   If the patient is a current smoker, are they able to teach back resources for cessation?  Not a smoker   Is the patient/caregiver able to teach back the hierarchy of who to call/visit for symptoms/problems? PCP, Specialist, Home health nurse, Urgent Care, ED, 911  Yes   Additional teach back comments  WIFE STATES UTI IS NOT RESOLVED, SO PCP STARTED HIM ON 7 MORE DAYS OF ANTIBIOTICS. WIFE ALSO STATES HE HAS ANOTHER APPOINTMENT WITH PCP IN MAY AND IF STILL NO IMPROVEMENT HE WILL BE REFERRED TO A UROLOGIST. WIFE STATES PATIENT IS DOINT OK, BUT JUS HAS LOW ENERGY AND FEELS WEAK AT TIMES.   Week 2 call completed?  Yes          Sangita Dejesus LPN

## 2021-04-09 NOTE — PROGRESS NOTES
Transitional Care Follow Up Visit  Subjective     Kai Garcia is a 65 y.o. male who presents for a transitional care management visit.    Within 48 business hours after discharge our office contacted him via telephone to coordinate his care and needs.      I reviewed and discussed the details of that call along with the discharge summary, hospital problems, inpatient lab results, inpatient diagnostic studies, and consultation reports with Kai.     Current outpatient and discharge medications have been reconciled for the patient.  Reviewed by: CONNOR Stewart      Date of TCM Phone Call 4/2/2021   [facility]   Date of Admission 3/28/2021   Date of Discharge 4/2/2021   Discharge Disposition Home or Self Care     Risk for Readmission (LACE) Score: 10 (4/2/2021  6:01 AM)      History of Present Illness   Course During Hospital Stay:  Patient was admitted to [facility] ICU on 3/28/21 w/ septic shock due to UTI, acute renal injury, uncontrolled DM (A1c 9.3%), thrombocytopenia, low magnesium 1.3. CT of abd/pelvis was unremarkable except enlarged prostate. Was volume resuscitated aggressively and initiated on empiric antibiotics. Patient clinically improved. Urine culture grew pan-susceptible e.coli. Treated with IV and oral antibiotics. BNP elevated greater than 2934. On 3/29 ECHO done LVEF 40%.   Discharged on 4/2/21. To follow up with Urology if symptoms return. Has seen Dr Boyer 6 months ago for elevated PSA greater than 4.     Pt wants to donate blood prior to back surgery he has scheduled in 2 weeks.      The following portions of the patient's history were reviewed and updated as appropriate: allergies, current medications, past family history, past medical history, past social history, past surgical history and problem list.    Review of Systems   Constitutional: Negative.    HENT: Negative.    Eyes: Negative.    Respiratory: Negative.    Cardiovascular: Negative.    Gastrointestinal: Negative.     Endocrine: Negative.    Genitourinary: Negative.    Musculoskeletal: Negative.    Skin: Negative.    Allergic/Immunologic: Negative.    Neurological: Negative.    Hematological: Negative.    Psychiatric/Behavioral: Negative.        Objective   Physical Exam  Vitals and nursing note reviewed.   Constitutional:       General: He is not in acute distress.     Appearance: Normal appearance. He is well-developed. He is not ill-appearing or toxic-appearing.   HENT:      Head: Normocephalic.      Right Ear: External ear normal.      Left Ear: External ear normal.      Nose: Nose normal.   Eyes:      General: Lids are normal.   Cardiovascular:      Rate and Rhythm: Normal rate and regular rhythm.      Heart sounds: Normal heart sounds, S1 normal and S2 normal.   Pulmonary:      Effort: Pulmonary effort is normal.      Breath sounds: Normal breath sounds.   Abdominal:      General: Bowel sounds are normal.      Palpations: Abdomen is soft.      Tenderness: There is no abdominal tenderness. There is no right CVA tenderness or left CVA tenderness.   Musculoskeletal:      Cervical back: Neck supple.   Lymphadenopathy:      Cervical: No cervical adenopathy.   Skin:     General: Skin is warm and dry.   Neurological:      Mental Status: He is alert and oriented to person, place, and time.   Psychiatric:         Speech: Speech normal.         Behavior: Behavior normal. Agitated: irritable.         Thought Content: Thought content normal.         Judgment: Judgment normal.         Assessment/Plan   Diagnoses and all orders for this visit:    1. Acute cystitis without hematuria (Primary)  -     tamsulosin (FLOMAX) 0.4 MG capsule 24 hr capsule; Take 1 capsule by mouth Daily.  Dispense: 30 capsule; Refill: 0  -     cefdinir (OMNICEF) 300 MG capsule; Take 1 capsule by mouth 2 (Two) Times a Day.  Dispense: 14 capsule; Refill: 0  -     POCT urinalysis dipstick, automated  -     Ambulatory Referral to Urology    2. Septic shock  (CMS/[facility])    3. MORGAN (acute kidney injury) (CMS/[facility])    4. Prostate enlargement  -     Ambulatory Referral to Urology    5. Dysuria  -     Ambulatory Referral to Urology    6. Abnormal PSA  -     Ambulatory Referral to Urology      Patient was given instructions and counseling regarding his condition or for health maintenance advice. Please see specific information pulled into the AVS if appropriate.   UA negative- pt informed (Specimen discarded so no urine culture was sent)  Will treat pt with 7 days of antibiotics and start pt on Flomax to help with urine flow  Will place referral to urology for follow up since continues to have burning with urination and enlarged prostate. Due for PSA.   Drink plenty of fluids and rest.  Do not advise pt to donate his own blood prior to surgery as his Hgb and Hct in the past week were not normal yet. Pt agrees.

## 2021-04-16 ENCOUNTER — READMISSION MANAGEMENT (OUTPATIENT)
Dept: CALL CENTER | Facility: HOSPITAL | Age: 66
End: 2021-04-16

## 2021-04-16 NOTE — OUTREACH NOTE
Sepsis Week 3 Survey      Responses   [facility] patient discharged from?  [place]   Does the patient have one of the following disease processes/diagnoses(primary or secondary)?  Sepsis   Week 3 attempt successful?  Yes   Call start time  1547   Call end time  1556   Meds reviewed with patient/caregiver?  Yes   Is the patient having any side effects they believe may be caused by any medication additions or changes?  No   Does the patient have all medications related to this admission filled (includes all antibiotics, inhalers, nebulizers,steroids,etc.)  Yes   Is the patient taking all medications as directed (includes completed medication regime)?  Yes   Does the patient have a primary care provider?   Yes   Does the patient have an appointment with their PCP within 7 days of discharge?  Yes   Has the patient kept scheduled appointments due by today?  Yes   Psychosocial issues?  No   What is the patient's perception of their health status since discharge?  Improving   Nursing interventions  Nurse provided patient education   Is the patient/caregiver able to teach back Sepsis?  S - Shivering,fever or very cold, E - Extreme pain or generalized discomfort (worst ever,especially abdomen), S - Short of breath, S - Sleepy, difficult to arouse,confused   Nursing interventions  Nurse provided reassurance to patient   Is patient/caregiver able to teach back steps to recovery at home?  Rest and regain strength, Eat a balanced diet   Is the patient/caregiver able to teach back signs and symptoms of worsening condition:  Fever, Edema   If the patient is a current smoker, are they able to teach back resources for cessation?  Not a smoker   Is the patient/caregiver able to teach back the hierarchy of who to call/visit for symptoms/problems? PCP, Specialist, Home health nurse, Urgent Care, ED, 911  Yes   Week 3 call completed?  Yes   Wrap up additional comments  Pt states he is better. Pt states he is getting dizzy when  BG drops. Pt understands s/s sepsis, and when to go to ED.          Leonora Vega RN

## 2021-04-19 ENCOUNTER — APPOINTMENT (OUTPATIENT)
Dept: PREADMISSION TESTING | Facility: HOSPITAL | Age: 66
End: 2021-04-19

## 2021-04-19 ENCOUNTER — PRE-ADMISSION TESTING (OUTPATIENT)
Dept: PREADMISSION TESTING | Facility: HOSPITAL | Age: 66
End: 2021-04-19

## 2021-04-19 VITALS — BODY MASS INDEX: 38.17 KG/M2 | WEIGHT: 243.2 LBS | HEIGHT: 67 IN

## 2021-04-19 LAB
ABO GROUP BLD: NORMAL
BLD GP AB SCN SERPL QL: NEGATIVE
DEPRECATED RDW RBC AUTO: 45.2 FL (ref 37–54)
ERYTHROCYTE [DISTWIDTH] IN BLOOD BY AUTOMATED COUNT: 12.6 % (ref 12.3–15.4)
HBA1C MFR BLD: 9.2 % (ref 4.8–5.6)
HCT VFR BLD AUTO: 37 % (ref 37.5–51)
HGB BLD-MCNC: 12 G/DL (ref 13–17.7)
MCH RBC QN AUTO: 31.7 PG (ref 26.6–33)
MCHC RBC AUTO-ENTMCNC: 32.4 G/DL (ref 31.5–35.7)
MCV RBC AUTO: 97.6 FL (ref 79–97)
PLATELET # BLD AUTO: 86 10*3/MM3 (ref 140–450)
PMV BLD AUTO: 11.2 FL (ref 6–12)
POTASSIUM SERPL-SCNC: 4.4 MMOL/L (ref 3.5–5.2)
RBC # BLD AUTO: 3.79 10*6/MM3 (ref 4.14–5.8)
RH BLD: POSITIVE
SARS-COV-2 RNA PNL SPEC NAA+PROBE: NOT DETECTED
WBC # BLD AUTO: 5.04 10*3/MM3 (ref 3.4–10.8)

## 2021-04-19 PROCEDURE — 85027 COMPLETE CBC AUTOMATED: CPT

## 2021-04-19 PROCEDURE — 86900 BLOOD TYPING SEROLOGIC ABO: CPT

## 2021-04-19 PROCEDURE — U0004 COV-19 TEST NON-CDC HGH THRU: HCPCS

## 2021-04-19 PROCEDURE — 36415 COLL VENOUS BLD VENIPUNCTURE: CPT

## 2021-04-19 PROCEDURE — 86850 RBC ANTIBODY SCREEN: CPT

## 2021-04-19 PROCEDURE — C9803 HOPD COVID-19 SPEC COLLECT: HCPCS

## 2021-04-19 PROCEDURE — 83036 HEMOGLOBIN GLYCOSYLATED A1C: CPT

## 2021-04-19 PROCEDURE — 86901 BLOOD TYPING SEROLOGIC RH(D): CPT

## 2021-04-19 PROCEDURE — 84132 ASSAY OF SERUM POTASSIUM: CPT

## 2021-04-19 NOTE — PAT
Patient cleared by Dr Willis and to hold Plavix for 5 days.  ICD checked on 4/2/21  Battery life 7.5 years.    Patient instructed to drink 20 ounces (or until full) of Gatorade and it needs to be completed 1 hour before given arrival time for procedure (NO RED Gatorade)    Patient verbalized understanding.    Patient to apply Chlorhexadine wipes  to surgical area (as instructed) the night before procedure and the AM of procedure. Wipes provided.    EKG 3/28/21    Patient instructed to bring CPAP mask and tubing to the hospital for overnight stay.  Explained that it is not necessary to bring their CPAP machine to the hospital instead a CPAP machine will be provided for use by the hospital. If patient knows their CPAP settings, those settings will be implemented.  If not, the CPAP machine will be utilized on the auto setting using their mask and tubing.    Patient verbalized understanding.

## 2021-04-20 RX ORDER — FAMOTIDINE 10 MG/ML
20 INJECTION, SOLUTION INTRAVENOUS ONCE
Status: CANCELLED | OUTPATIENT
Start: 2021-04-20 | End: 2021-04-20

## 2021-04-21 ENCOUNTER — TELEPHONE (OUTPATIENT)
Dept: FAMILY MEDICINE CLINIC | Facility: CLINIC | Age: 66
End: 2021-04-21

## 2021-04-21 NOTE — TELEPHONE ENCOUNTER
Lumber infusion cancelled due to recent hospitalization, low platelets and  high A1C. Stated he needed to follow up with Dr. Weiss so he can get back on track and be cleared at a later date. Stated he informed patient last night. Aware dae is out of the office. If any question can call back.      Dr. Fer Faulkner   [phone]

## 2021-04-23 ENCOUNTER — READMISSION MANAGEMENT (OUTPATIENT)
Dept: CALL CENTER | Facility: HOSPITAL | Age: 66
End: 2021-04-23

## 2021-04-23 NOTE — OUTREACH NOTE
Sepsis Week 4 Survey      Responses   [facility] patient discharged from?  [place]   Does the patient have one of the following disease processes/diagnoses(primary or secondary)?  Sepsis   Week 4 attempt successful?  Yes   Call start time  1611   Call end time  1621   Discharge diagnosis  Sepsis with acute renal failure and septic shock  uti   Meds reviewed with patient/caregiver?  Yes   Is the patient taking all medications as directed (includes completed medication regime)?  Yes   Has the patient kept scheduled appointments due by today?  Yes   Comments  Pt surgery on hold. Pt waiting to get in to PCP   What is the patient's perception of their health status since discharge?  Same   Nursing interventions  Nurse provided patient education   Is patient/caregiver able to teach back steps to recovery at home?  Set small, achievable goals for return to baseline health, Eat a balanced diet   Is the patient/caregiver able to teach back signs and symptoms of worsening condition:  Fever, Rapid heart rate (>90)   Is the patient/caregiver able to teach back the hierarchy of who to call/visit for symptoms/problems? PCP, Specialist, Home health nurse, Urgent Care, ED, 911  Yes   Additional teach back comments  Enc him to keep appts with all providers and push po fluids, rest and watch for s/s of infection   Week 4 call completed?  Yes   Would the patient like one additional call?  No   Graduated  Yes   Is the patient interested in additional calls from an ambulatory ?  NOTE:  applies to high risk patients requiring additional follow-up.  No   Did the patient feel the follow up calls were helpful during their recovery period?  Yes   Was the number of calls appropriate?  Yes          Sangeeta Willoughby RN

## 2021-05-06 ENCOUNTER — TELEPHONE (OUTPATIENT)
Dept: FAMILY MEDICINE CLINIC | Facility: CLINIC | Age: 66
End: 2021-05-06

## 2021-05-06 DIAGNOSIS — M54.50 LUMBAR PAIN: ICD-10-CM

## 2021-05-06 DIAGNOSIS — N30.00 ACUTE CYSTITIS WITHOUT HEMATURIA: ICD-10-CM

## 2021-05-06 RX ORDER — CYCLOBENZAPRINE HCL 10 MG
TABLET ORAL
Qty: 30 TABLET | Refills: 0 | Status: SHIPPED | OUTPATIENT
Start: 2021-05-06 | End: 2021-08-24 | Stop reason: SDUPTHER

## 2021-05-06 RX ORDER — TAMSULOSIN HYDROCHLORIDE 0.4 MG/1
CAPSULE ORAL
Qty: 30 CAPSULE | Refills: 0 | OUTPATIENT
Start: 2021-05-06

## 2021-05-06 NOTE — TELEPHONE ENCOUNTER
Please call patient.  We received a refill request for tamsulosin but it looks like that has been discontinued.  Please confirm if he is still taking that.

## 2021-05-07 DIAGNOSIS — N30.00 ACUTE CYSTITIS WITHOUT HEMATURIA: ICD-10-CM

## 2021-05-07 RX ORDER — TAMSULOSIN HYDROCHLORIDE 0.4 MG/1
CAPSULE ORAL
Qty: 30 CAPSULE | Refills: 0 | OUTPATIENT
Start: 2021-05-07

## 2021-05-08 DIAGNOSIS — N30.00 ACUTE CYSTITIS WITHOUT HEMATURIA: ICD-10-CM

## 2021-05-09 PROCEDURE — 93295 DEV INTERROG REMOTE 1/2/MLT: CPT | Performed by: INTERNAL MEDICINE

## 2021-05-09 PROCEDURE — 93296 REM INTERROG EVL PM/IDS: CPT | Performed by: INTERNAL MEDICINE

## 2021-05-10 RX ORDER — TAMSULOSIN HYDROCHLORIDE 0.4 MG/1
CAPSULE ORAL
Qty: 30 CAPSULE | Refills: 0 | OUTPATIENT
Start: 2021-05-10

## 2021-05-24 ENCOUNTER — OFFICE VISIT (OUTPATIENT)
Dept: FAMILY MEDICINE CLINIC | Facility: CLINIC | Age: 66
End: 2021-05-24

## 2021-05-24 VITALS
TEMPERATURE: 98.2 F | SYSTOLIC BLOOD PRESSURE: 138 MMHG | HEART RATE: 76 BPM | HEIGHT: 67 IN | RESPIRATION RATE: 18 BRPM | BODY MASS INDEX: 37.51 KG/M2 | WEIGHT: 239 LBS | DIASTOLIC BLOOD PRESSURE: 70 MMHG

## 2021-05-24 DIAGNOSIS — D69.6 THROMBOCYTOPENIA (HCC): Primary | ICD-10-CM

## 2021-05-24 DIAGNOSIS — E78.00 PURE HYPERCHOLESTEROLEMIA: ICD-10-CM

## 2021-05-24 DIAGNOSIS — F51.01 PRIMARY INSOMNIA: ICD-10-CM

## 2021-05-24 LAB
ALBUMIN SERPL-MCNC: 4.6 G/DL (ref 3.5–5.2)
ALBUMIN/GLOB SERPL: 1.5 G/DL
ALP SERPL-CCNC: 39 U/L (ref 39–117)
ALT SERPL-CCNC: 35 U/L (ref 1–41)
AST SERPL-CCNC: 33 U/L (ref 1–40)
BASOPHILS # BLD AUTO: 0.04 10*3/MM3 (ref 0–0.2)
BASOPHILS NFR BLD AUTO: 0.9 % (ref 0–1.5)
BILIRUB SERPL-MCNC: 0.6 MG/DL (ref 0–1.2)
BUN SERPL-MCNC: 15 MG/DL (ref 8–23)
BUN/CREAT SERPL: 15.3 (ref 7–25)
CALCIUM SERPL-MCNC: 9.8 MG/DL (ref 8.6–10.5)
CHLORIDE SERPL-SCNC: 100 MMOL/L (ref 98–107)
CHOLEST SERPL-MCNC: 121 MG/DL (ref 0–200)
CHOLEST/HDLC SERPL: 3.18 {RATIO}
CO2 SERPL-SCNC: 27.9 MMOL/L (ref 22–29)
CREAT SERPL-MCNC: 0.98 MG/DL (ref 0.76–1.27)
EOSINOPHIL # BLD AUTO: 0.12 10*3/MM3 (ref 0–0.4)
EOSINOPHIL NFR BLD AUTO: 2.6 % (ref 0.3–6.2)
ERYTHROCYTE [DISTWIDTH] IN BLOOD BY AUTOMATED COUNT: 12.7 % (ref 12.3–15.4)
GLOBULIN SER CALC-MCNC: 3 GM/DL
GLUCOSE SERPL-MCNC: 237 MG/DL (ref 65–99)
HCT VFR BLD AUTO: 38.8 % (ref 37.5–51)
HDLC SERPL-MCNC: 38 MG/DL (ref 40–60)
HGB BLD-MCNC: 12.9 G/DL (ref 13–17.7)
IMM GRANULOCYTES # BLD AUTO: 0.01 10*3/MM3 (ref 0–0.05)
IMM GRANULOCYTES NFR BLD AUTO: 0.2 % (ref 0–0.5)
LDLC SERPL CALC-MCNC: 57 MG/DL (ref 0–100)
LYMPHOCYTES # BLD AUTO: 1.39 10*3/MM3 (ref 0.7–3.1)
LYMPHOCYTES NFR BLD AUTO: 29.8 % (ref 19.6–45.3)
MCH RBC QN AUTO: 31.1 PG (ref 26.6–33)
MCHC RBC AUTO-ENTMCNC: 33.2 G/DL (ref 31.5–35.7)
MCV RBC AUTO: 93.5 FL (ref 79–97)
MONOCYTES # BLD AUTO: 0.42 10*3/MM3 (ref 0.1–0.9)
MONOCYTES NFR BLD AUTO: 9 % (ref 5–12)
NEUTROPHILS # BLD AUTO: 2.69 10*3/MM3 (ref 1.7–7)
NEUTROPHILS NFR BLD AUTO: 57.5 % (ref 42.7–76)
NRBC BLD AUTO-RTO: 0 /100 WBC (ref 0–0.2)
PLATELET # BLD AUTO: 86 10*3/MM3 (ref 140–450)
POTASSIUM SERPL-SCNC: 4.7 MMOL/L (ref 3.5–5.2)
PROT SERPL-MCNC: 7.6 G/DL (ref 6–8.5)
RBC # BLD AUTO: 4.15 10*6/MM3 (ref 4.14–5.8)
SODIUM SERPL-SCNC: 141 MMOL/L (ref 136–145)
TRIGL SERPL-MCNC: 150 MG/DL (ref 0–150)
VLDLC SERPL CALC-MCNC: 26 MG/DL (ref 5–40)
WBC # BLD AUTO: 4.67 10*3/MM3 (ref 3.4–10.8)

## 2021-05-24 PROCEDURE — 99214 OFFICE O/P EST MOD 30 MIN: CPT | Performed by: FAMILY MEDICINE

## 2021-05-24 RX ORDER — TRAZODONE HYDROCHLORIDE 100 MG/1
TABLET ORAL
Qty: 30 TABLET | Refills: 1 | Status: SHIPPED | OUTPATIENT
Start: 2021-05-24 | End: 2021-07-19

## 2021-05-24 RX ORDER — ALFUZOSIN HYDROCHLORIDE 10 MG/1
TABLET, EXTENDED RELEASE ORAL
COMMUNITY
Start: 2021-05-04 | End: 2021-10-12

## 2021-05-24 NOTE — PROGRESS NOTES
Assessment/Plan       Diagnoses and all orders for this visit:    1. Thrombocytopenia (CMS/HCC) (Primary)  -     CBC & Differential    2. Primary insomnia  -     traZODone (DESYREL) 100 MG tablet; 1/2 po at hs and increase to one po qhs if needed.  Dispense: 30 tablet; Refill: 1    3. Pure hypercholesterolemia  -     Comprehensive Metabolic Panel  -     Lipid Panel With / Chol / HDL Ratio           Follow up: Return if symptoms worsen or fail to improve.     DISCUSSION  Thrombocytopenia.  Has had chronic low levels for years but recently dropped significantly during hospitalization.  Recheck today.  If not improving, refer to hematologist.    Insomnia.  Ambien not helpful.  Will try trazodone 1 mg one half at bedtime and increase to 1 whole tablet if not improving.    Hyperlipidemia.  Due for blood work.  Check CMP and lipid panel.  Recently had A1c done so that is not due yet.          MEDICATIONS PRESCRIBED  Requested Prescriptions     Signed Prescriptions Disp Refills   • traZODone (DESYREL) 100 MG tablet 30 tablet 1     Si/2 po at hs and increase to one po qhs if needed.               -------------------------------------------    Subjective     Chief Complaint   Patient presents with   • Diabetes     3 month f/u    • blood work     fasting    • Insomnia     talk about switching to traizodone          History of Present Illness    Back pain  Increased pain   Not sleeping    Pain in the legs    Muscle relaxers helps some    Had been admitted for sepsis  Platelets decreased to 30 in the hospital      Insomnia  Decreased sleep due to pain   Rec switch to trazodone  Ambien no help ( was up all night)    Low platelets  No bruising  Has been 110-120 range before  Then decreased to 30 in the hospital  Last check was 86 but surg cancelled          Social History     Tobacco Use   Smoking Status Former Smoker   • Packs/day: 3.00   • Years: 35.00   • Pack years: 105.00   • Types: Cigarettes   • Quit date: 1/15/2006  "  • Years since quitting: 15.3   Smokeless Tobacco Never Used          Past Medical History,Medications, Allergies, and social history was reviewed.          Review of Systems   Constitutional: Negative.    HENT: Negative.    Respiratory: Negative.    Cardiovascular: Negative.    Gastrointestinal: Negative.    Musculoskeletal: Positive for back pain.   Neurological: Negative.    Hematological: Does not bruise/bleed easily.   Psychiatric/Behavioral: Negative.        Objective     Vitals:    05/24/21 0954   BP: 138/70   Pulse: 76   Resp: 18   Temp: 98.2 °F (36.8 °C)   Weight: 108 kg (239 lb)   Height: 170.2 cm (67\")          Physical Exam  Vitals and nursing note reviewed.   Constitutional:       Appearance: He is well-developed.   HENT:      Head: Normocephalic and atraumatic.      Right Ear: External ear normal.      Left Ear: External ear normal.   Eyes:      Pupils: Pupils are equal, round, and reactive to light.   Cardiovascular:      Rate and Rhythm: Normal rate and regular rhythm.      Heart sounds: Normal heart sounds.   Pulmonary:      Effort: Pulmonary effort is normal. No respiratory distress.      Breath sounds: Normal breath sounds. No wheezing or rales.   Abdominal:      General: There is no distension.      Tenderness: There is abdominal tenderness ( Left lower quadrant, chronic).   Skin:     General: Skin is warm and dry.   Neurological:      Mental Status: He is alert and oriented to person, place, and time.   Psychiatric:         Behavior: Behavior normal.       Antalgic gait              Cheo Weiss MD    "

## 2021-05-25 DIAGNOSIS — D69.6 THROMBOCYTOPENIA (HCC): Primary | ICD-10-CM

## 2021-06-15 ENCOUNTER — LAB (OUTPATIENT)
Dept: LAB | Facility: HOSPITAL | Age: 66
End: 2021-06-15

## 2021-06-15 ENCOUNTER — CONSULT (OUTPATIENT)
Dept: ONCOLOGY | Facility: CLINIC | Age: 66
End: 2021-06-15

## 2021-06-15 VITALS
OXYGEN SATURATION: 95 % | HEART RATE: 81 BPM | HEIGHT: 67 IN | TEMPERATURE: 96.9 F | RESPIRATION RATE: 20 BRPM | BODY MASS INDEX: 38.45 KG/M2 | SYSTOLIC BLOOD PRESSURE: 141 MMHG | WEIGHT: 245 LBS | DIASTOLIC BLOOD PRESSURE: 75 MMHG

## 2021-06-15 DIAGNOSIS — D69.6 THROMBOCYTOPENIA (HCC): ICD-10-CM

## 2021-06-15 DIAGNOSIS — D69.6 THROMBOCYTOPENIA (HCC): Primary | ICD-10-CM

## 2021-06-15 LAB
ALBUMIN SERPL-MCNC: 4.4 G/DL (ref 3.5–5.2)
ALBUMIN/GLOB SERPL: 1.2 G/DL
ALP SERPL-CCNC: 42 U/L (ref 39–117)
ALT SERPL W P-5'-P-CCNC: 32 U/L (ref 1–41)
ANION GAP SERPL CALCULATED.3IONS-SCNC: 9 MMOL/L (ref 5–15)
AST SERPL-CCNC: 35 U/L (ref 1–40)
BASOPHILS # BLD AUTO: 0.03 10*3/MM3 (ref 0–0.2)
BASOPHILS NFR BLD AUTO: 0.6 % (ref 0–1.5)
BILIRUB SERPL-MCNC: 0.3 MG/DL (ref 0–1.2)
BUN SERPL-MCNC: 15 MG/DL (ref 8–23)
BUN/CREAT SERPL: 13.6 (ref 7–25)
CALCIUM SPEC-SCNC: 10.4 MG/DL (ref 8.6–10.5)
CHLORIDE SERPL-SCNC: 100 MMOL/L (ref 98–107)
CO2 SERPL-SCNC: 27 MMOL/L (ref 22–29)
CREAT SERPL-MCNC: 1.1 MG/DL (ref 0.76–1.27)
DEPRECATED RDW RBC AUTO: 42.2 FL (ref 37–54)
EOSINOPHIL # BLD AUTO: 0.08 10*3/MM3 (ref 0–0.4)
EOSINOPHIL NFR BLD AUTO: 1.6 % (ref 0.3–6.2)
ERYTHROCYTE [DISTWIDTH] IN BLOOD BY AUTOMATED COUNT: 12.3 % (ref 12.3–15.4)
FERRITIN SERPL-MCNC: 197.6 NG/ML (ref 30–400)
FOLATE SERPL-MCNC: 14.4 NG/ML (ref 4.78–24.2)
GFR SERPL CREATININE-BSD FRML MDRD: 67 ML/MIN/1.73
GLOBULIN UR ELPH-MCNC: 3.6 GM/DL
GLUCOSE SERPL-MCNC: 276 MG/DL (ref 65–99)
HAPTOGLOB SERPL-MCNC: 20 MG/DL (ref 30–200)
HCT VFR BLD AUTO: 38.9 % (ref 37.5–51)
HGB BLD-MCNC: 13.3 G/DL (ref 13–17.7)
IMM GRANULOCYTES # BLD AUTO: 0.01 10*3/MM3 (ref 0–0.05)
IMM GRANULOCYTES NFR BLD AUTO: 0.2 % (ref 0–0.5)
IRON 24H UR-MRATE: 82 MCG/DL (ref 59–158)
IRON SATN MFR SERPL: 20 % (ref 20–50)
LDH SERPL-CCNC: 284 U/L (ref 135–225)
LYMPHOCYTES # BLD AUTO: 1.49 10*3/MM3 (ref 0.7–3.1)
LYMPHOCYTES NFR BLD AUTO: 29.7 % (ref 19.6–45.3)
MCH RBC QN AUTO: 32 PG (ref 26.6–33)
MCHC RBC AUTO-ENTMCNC: 34.2 G/DL (ref 31.5–35.7)
MCV RBC AUTO: 93.5 FL (ref 79–97)
MONOCYTES # BLD AUTO: 0.41 10*3/MM3 (ref 0.1–0.9)
MONOCYTES NFR BLD AUTO: 8.2 % (ref 5–12)
NEUTROPHILS NFR BLD AUTO: 2.99 10*3/MM3 (ref 1.7–7)
NEUTROPHILS NFR BLD AUTO: 59.7 % (ref 42.7–76)
NRBC BLD AUTO-RTO: 0 /100 WBC (ref 0–0.2)
PLATELET # BLD AUTO: 97 10*3/MM3 (ref 140–450)
PMV BLD AUTO: 11.4 FL (ref 6–12)
POTASSIUM SERPL-SCNC: 4.3 MMOL/L (ref 3.5–5.2)
PROT SERPL-MCNC: 8 G/DL (ref 6–8.5)
RBC # BLD AUTO: 4.16 10*6/MM3 (ref 4.14–5.8)
SODIUM SERPL-SCNC: 136 MMOL/L (ref 136–145)
TIBC SERPL-MCNC: 407 MCG/DL (ref 298–536)
TRANSFERRIN SERPL-MCNC: 273 MG/DL (ref 200–360)
VIT B12 BLD-MCNC: 614 PG/ML (ref 211–946)
WBC # BLD AUTO: 5.01 10*3/MM3 (ref 3.4–10.8)

## 2021-06-15 PROCEDURE — 85025 COMPLETE CBC W/AUTO DIFF WBC: CPT

## 2021-06-15 PROCEDURE — 99204 OFFICE O/P NEW MOD 45 MIN: CPT | Performed by: INTERNAL MEDICINE

## 2021-06-15 PROCEDURE — 82607 VITAMIN B-12: CPT

## 2021-06-15 PROCEDURE — 83615 LACTATE (LD) (LDH) ENZYME: CPT

## 2021-06-15 PROCEDURE — 83540 ASSAY OF IRON: CPT

## 2021-06-15 PROCEDURE — 36415 COLL VENOUS BLD VENIPUNCTURE: CPT

## 2021-06-15 PROCEDURE — 80053 COMPREHEN METABOLIC PANEL: CPT

## 2021-06-15 PROCEDURE — 82728 ASSAY OF FERRITIN: CPT

## 2021-06-15 PROCEDURE — 84466 ASSAY OF TRANSFERRIN: CPT

## 2021-06-15 PROCEDURE — 85060 BLOOD SMEAR INTERPRETATION: CPT

## 2021-06-15 PROCEDURE — 82746 ASSAY OF FOLIC ACID SERUM: CPT

## 2021-06-15 PROCEDURE — 83010 ASSAY OF HAPTOGLOBIN QUANT: CPT

## 2021-06-15 NOTE — PROGRESS NOTES
New Patient Office Visit      Date: 06/15/2021     Patient Name: Kai Garcia  MRN: [id number]  : 1955  Referring Physician: Cheo Weiss    Chief Complaint: Stop was care for thrombocytopenia    History of Present Illness: Kai Garcia is a pleasant 65 y.o. male past medical history of thrombocytopenia, chronic back pain secondary to multiple fractures, CAD, hypertension, hyperlipidemia, type 2 diabetes, CHF who presents today for evaluation of thrombocytopenia. The patient is accompanied by their wife who contributes to the history of their care.  Patient has been followed by their primary care physician with noted thrombocytopenia over the past several years.  Baseline platelet count range between 80-130K.  Previously had a septic episode in March of this year from a UTI.  At that time his platelet count dropped to as low as 22K but has since recovered to around 80K.  He denies any bleeding episodes but does note chronic bruising likely secondary to his dual antiplatelet use.  He states he was scheduled for back surgery in April of this year however this was delayed secondary to his thrombocytopenia.  He has significant pain and discomfort related to multiple spinal fractures.  He recently had a CT scan of his abdomen/pelvis in 2021 which did not show any evidence of cirrhosis or splenomegaly he otherwise has no other major complaints today and is compliant with all his other home medications    Oncology History:    Oncology/Hematology History    No history exists.       Subjective      Review of Systems:     Constitutional: Negative for fevers, chills, or weight loss  Eyes: Negative for blurred vision or discharge         Ear/Nose/Throat: Negative for difficulty swallowing, sore throat, LAD                                                       Respiratory: Negative for cough, SOA, wheezing                                                                                         Cardiovascular: Negative for chest pain or palpitations                                                                  Gastrointestinal: Negative for nausea, vomiting or diarrhea                                                                     Genitourinary: Negative for dysuria or hematuria                                                                                           Musculoskeletal: Negative for any joint pains or muscle aches                                                                        Neurologic: Negative for any weakness, headaches, dizziness                                                                         Hematologic: Negative for any easy bleeding or bruising                                                                                   Psychiatric: Negative for anxiety or depression                             Past Medical History:   Past Medical History:   Diagnosis Date   • Back pain    • Cataract     needs surgery    • Chest pain     Atypical chest pain, noncardiac.  Suspect either musculoskeletal versus gastrointestinal   • Chronic low back pain     /multiple lumbar surgeries, currently disabled: L4-L5 discectomy in 2015.    • Coronary artery disease    • Diabetes mellitus (CMS/Formerly [facility])     type 2, diagnosed withing past 6months, checks fsbg qam, last a1c 8.4 7-19-18   • Dissecting aortic aneurysm (CMS/Formerly [facility])    • Dyslipidemia    • Elevated liver function tests    • GERD (gastroesophageal reflux disease)    • History of bleeding peptic ulcer    • History of sepsis 03/2021    UTI that caused sepsis    • History of transfusion 2006    no reaction    • Hyperlipidemia    • Hypertension    • Left leg pain    • Lumbar pain    • MI (myocardial infarction) (CMS/Formerly [facility]) 2007   • Morbid obesity (CMS/Formerly [facility])    • LUCIAN on CPAP     setting 12   • Severe obstructive sleep apnea    • Shortness of breath    • Temporary low platelet count (CMS/Formerly [facility]) 03/2021    due to sepsis    • Wears glasses         Past Surgical History:   Past Surgical History:   Procedure Laterality Date   • APPENDECTOMY     • ARTERIOGRAM AORTIC  2006    aortic disection with elephant trunk procedure   • CARDIAC CATHETERIZATION N/A 4/27/2018    Procedure: Left Heart Cath;  Surgeon: Uri Willis MD;  Location:  [facility];  Service: Cardiovascular   • CARDIAC CATHETERIZATION  04/2018   • CARDIAC ELECTROPHYSIOLOGY PROCEDURE N/A 8/9/2018    Procedure: Biv ICD Implant w/limited echo on arrival;  Surgeon: Jose Fox DO;  Location:  [facility];  Service: Cardiology   • COLONOSCOPY     • COLONOSCOPY N/A 6/18/2019    Procedure: COLONOSCOPY;  Surgeon: Meir Salguero MD;  Location:  [facility];  Service: Gastroenterology   • CORONARY ANGIOPLASTY WITH STENT PLACEMENT  2007    x 2   • CORONARY ARTERY BYPASS GRAFT  2006    2 grafts    • LUMBAR DISCECTOMY     • LUMBAR FUSION         Family History:   Family History   Problem Relation Age of Onset   • Coronary artery disease Other    • Rheum arthritis Other    • Heart disease Mother    • Rheum arthritis Mother    • Heart disease Father    • Rheum arthritis Father        Social History:   Social History     Socioeconomic History   • Marital status:      Spouse name: Not on file   • Number of children: Not on file   • Years of education: Not on file   • Highest education level: Not on file   Tobacco Use   • Smoking status: Former Smoker     Packs/day: 3.00     Years: 35.00     Pack years: 105.00     Types: Cigarettes     Quit date: 1/15/2006     Years since quitting: 15.4   • Smokeless tobacco: Never Used   Vaping Use   • Vaping Use: Never used   Substance and Sexual Activity   • Alcohol use: No   • Drug use: No   • Sexual activity: Defer       Medications:     Current Outpatient Medications:   •  acetaminophen (TYLENOL) 500 MG tablet, Take 1,000 mg by mouth Every 6 (Six) Hours As Needed for Mild Pain ., Disp: , Rfl:   •  alfuzosin  (UROXATRAL) 10 MG 24 hr tablet, , Disp: , Rfl:   •  aspirin 81 MG EC tablet, Take 81 mg by mouth Daily., Disp: , Rfl:   •  bisoprolol (ZEBeta) 10 MG tablet, TAKE ONE TABLET BY MOUTH ONCE DAILY (Patient taking differently: Take 10 mg by mouth Daily.), Disp: 90 tablet, Rfl: 0  •  clopidogrel (PLAVIX) 75 MG tablet, TAKE ONE TABLET BY MOUTH ONCE DAILY (Patient taking differently: Take 75 mg by mouth Daily. stopped for surgery), Disp: 90 tablet, Rfl: 0  •  cyclobenzaprine (FLEXERIL) 10 MG tablet, TAKE ONE TABLET BY MOUTH 3 TIMES A DAY AS NEEDED FOR MUSCLE SPASMS, Disp: 30 tablet, Rfl: 0  •  Entresto 49-51 MG tablet, TAKE ONE TABLET BY MOUTH EVERY 12 HOURS (Patient taking differently: Take 1 tablet by mouth 2 (Two) Times a Day.), Disp: 180 tablet, Rfl: 0  •  eplerenone (INSPRA) 25 MG tablet, TAKE ONE TABLET BY MOUTH EVERY DAY (Patient taking differently: Take 25 mg by mouth Every Morning.), Disp: 90 tablet, Rfl: 3  •  Januvia 100 MG tablet, TAKE ONE TABLET BY MOUTH EVERY DAY (Patient taking differently: Take 100 mg by mouth Every Morning.), Disp: 90 tablet, Rfl: 0  •  nitroglycerin (NITROSTAT) 0.4 MG SL tablet, Place 1 tablet under the tongue Every 5 (Five) Minutes As Needed for Chest Pain., Disp: 25 tablet, Rfl: 5  •  pantoprazole (Protonix) 40 MG EC tablet, Take 1 tablet by mouth Daily. (Patient taking differently: Take 40 mg by mouth Every Night.), Disp: 90 tablet, Rfl: 1  •  rosuvastatin (CRESTOR) 10 MG tablet, TAKE ONE TABLET BY MOUTH ONCE DAILY (Patient taking differently: Take 10 mg by mouth Every Night.), Disp: 90 tablet, Rfl: 0  •  traZODone (DESYREL) 100 MG tablet, 1/2 po at hs and increase to one po qhs if needed., Disp: 30 tablet, Rfl: 1    Allergies:   Allergies   Allergen Reactions   • Ativan [Lorazepam] Mental Status Change     agitation   • Zetia [Ezetimibe] Other (See Comments)     increased LFTs   • Zocor [Simvastatin] Myalgia   • Isosorbide Other (See Comments)     headaches   • Jardiance  "[Empagliflozin] Other (See Comments)     Blood in urine and burning sensation   • Metformin And Related Diarrhea       Objective     Physical Exam:  Vital Signs:   Vitals:    06/15/21 1510   BP: 141/75   Pulse: 81   Resp: 20   Temp: 96.9 °F (36.1 °C)   SpO2: 95%   Weight: 111 kg (245 lb)   Height: 170.2 cm (67\")   PainSc: 0-No pain     Pain Score    06/15/21 1510   PainSc: 0-No pain     ECOG Performance Status: 2 - Symptomatic, <50% confined to bed    Constitutional: NAD, ECOG 2  Eyes: PERRLA, scleral anicteric  ENT: No LAD, no thyromegaly  Respiratory: CTAB, no wheezing, rales, rhonchi  Cardiovascular: RRR, no murmurs, pulses 2+ bilaterally  Abdomen: soft, NT/ND, no HSM  Musculoskeletal: strength 5/5 bilaterally, no c/c/e  Neurologic: A&O x 3, CN II-XII intact grossly  Psych: mood and affect congruent, no SI or HI    Results Review:   No visits with results within 2 Week(s) from this visit.   Latest known visit with results is:   Office Visit on 05/24/2021   Component Date Value Ref Range Status   • WBC 05/24/2021 4.67  3.40 - 10.80 10*3/mm3 Final   • RBC 05/24/2021 4.15  4.14 - 5.80 10*6/mm3 Final   • Hemoglobin 05/24/2021 12.9* 13.0 - 17.7 g/dL Final   • Hematocrit 05/24/2021 38.8  37.5 - 51.0 % Final   • MCV 05/24/2021 93.5  79.0 - 97.0 fL Final   • MCH 05/24/2021 31.1  26.6 - 33.0 pg Final   • MCHC 05/24/2021 33.2  31.5 - 35.7 g/dL Final   • RDW 05/24/2021 12.7  12.3 - 15.4 % Final   • Platelets 05/24/2021 86* 140 - 450 10*3/mm3 Final   • Neutrophil Rel % 05/24/2021 57.5  42.7 - 76.0 % Final   • Lymphocyte Rel % 05/24/2021 29.8  19.6 - 45.3 % Final   • Monocyte Rel % 05/24/2021 9.0  5.0 - 12.0 % Final   • Eosinophil Rel % 05/24/2021 2.6  0.3 - 6.2 % Final   • Basophil Rel % 05/24/2021 0.9  0.0 - 1.5 % Final   • Neutrophils Absolute 05/24/2021 2.69  1.70 - 7.00 10*3/mm3 Final   • Lymphocytes Absolute 05/24/2021 1.39  0.70 - 3.10 10*3/mm3 Final   • Monocytes Absolute 05/24/2021 0.42  0.10 - 0.90 10*3/mm3 Final "   • Eosinophils Absolute 05/24/2021 0.12  0.00 - 0.40 10*3/mm3 Final   • Basophils Absolute 05/24/2021 0.04  0.00 - 0.20 10*3/mm3 Final   • Immature Granulocyte Rel % 05/24/2021 0.2  0.0 - 0.5 % Final   • Immature Grans Absolute 05/24/2021 0.01  0.00 - 0.05 10*3/mm3 Final   • nRBC 05/24/2021 0.0  0.0 - 0.2 /100 WBC Final   • Glucose 05/24/2021 237* 65 - 99 mg/dL Final   • BUN 05/24/2021 15  8 - 23 mg/dL Final   • Creatinine 05/24/2021 0.98  0.76 - 1.27 mg/dL Final   • eGFR Non  Am 05/24/2021 77  >60 mL/min/1.73 Final    Comment: GFR Normal >60  Chronic Kidney Disease <60  Kidney Failure <15     • eGFR  Am 05/24/2021 93  >60 mL/min/1.73 Final   • BUN/Creatinine Ratio 05/24/2021 15.3  7.0 - 25.0 Final   • Sodium 05/24/2021 141  136 - 145 mmol/L Final   • Potassium 05/24/2021 4.7  3.5 - 5.2 mmol/L Final   • Chloride 05/24/2021 100  98 - 107 mmol/L Final   • Total CO2 05/24/2021 27.9  22.0 - 29.0 mmol/L Final   • Calcium 05/24/2021 9.8  8.6 - 10.5 mg/dL Final   • Total Protein 05/24/2021 7.6  6.0 - 8.5 g/dL Final   • Albumin 05/24/2021 4.60  3.50 - 5.20 g/dL Final   • Globulin 05/24/2021 3.0  gm/dL Final   • A/G Ratio 05/24/2021 1.5  g/dL Final   • Total Bilirubin 05/24/2021 0.6  0.0 - 1.2 mg/dL Final   • Alkaline Phosphatase 05/24/2021 39  39 - 117 U/L Final   • AST (SGOT) 05/24/2021 33  1 - 40 U/L Final   • ALT (SGPT) 05/24/2021 35  1 - 41 U/L Final   • Total Cholesterol 05/24/2021 121  0 - 200 mg/dL Final    Comment: Cholesterol Reference Ranges  (U.S. Department of Health and Human Services ATP III  Classifications)  Desirable          <200 mg/dL  Borderline High    200-239 mg/dL  High Risk          >240 mg/dL  Triglyceride Reference Ranges  (U.S. Department of Health and Human Services ATP III  Classifications)  Normal           <150 mg/dL  Borderline High  150-199 mg/dL  High             200-499 mg/dL  Very High        >500 mg/dL  HDL Reference Ranges  (U.S. Department of Health and Human Services  ATP III  Classifcations)  Low     <40 mg/dl (major risk factor for CHD)  High    >60 mg/dl ('negative' risk factor for CHD)  LDL Reference Ranges  (U.S. Department of Health and Human Services ATP III  Classifcations)  Optimal          <100 mg/dL  Near Optimal     100-129 mg/dL  Borderline High  130-159 mg/dL  High             160-189 mg/dL  Very High        >189 mg/dL     • Triglycerides 05/24/2021 150  0 - 150 mg/dL Final   • HDL Cholesterol 05/24/2021 38* 40 - 60 mg/dL Final   • VLDL Cholesterol Laurent 05/24/2021 26  5 - 40 mg/dL Final   • LDL Chol Calc (NIH) 05/24/2021 57  0 - 100 mg/dL Final   • Chol/HDL Ratio 05/24/2021 3.18   Final       No results found.    Assessment / Plan      Assessment/Plan:   1. Thrombocytopenia (CMS/HCC) (Primary)  -Unclear etiology at this time.  Concern for medication induced vs possible ITP vs vitamin/mineral deficiency vs recent sepsis/DIC  -We will check labs as below to rule out a secondary cause  -Patient with recent CT scan of the abdomen/pelvis which showed no splenomegaly, but did note some slight nodular morphology  -Should patient not have a secondary source, will consider pulse dose dexamethasone for presumed ITP  -     CBC & Differential; Future  -     Comprehensive Metabolic Panel; Future  -     Ferritin; Future  -     Iron Profile; Future  -     Vitamin B12; Future  -     Folate; Future  -     Lactate Dehydrogenase; Future  -     Haptoglobin; Future  -     Peripheral Blood Smear; Future    Follow Up:   Follow-up in 2-3 weeks     Hernan Kaiser MD  Hematology and Oncology     Please note that portions of this note may have been completed with a voice recognition program. Efforts were made to edit the dictations, but occasionally words are mistranscribed.

## 2021-06-16 LAB
CYTOLOGIST CVX/VAG CYTO: NORMAL
PATH INTERP BLD-IMP: NORMAL

## 2021-06-28 RX ORDER — PANTOPRAZOLE SODIUM 40 MG/1
TABLET, DELAYED RELEASE ORAL
Qty: 90 TABLET | Refills: 1 | Status: SHIPPED | OUTPATIENT
Start: 2021-06-28 | End: 2021-12-21

## 2021-06-28 RX ORDER — EPLERENONE 25 MG/1
TABLET, FILM COATED ORAL
Qty: 90 TABLET | Refills: 0 | Status: SHIPPED | OUTPATIENT
Start: 2021-06-28 | End: 2021-07-21 | Stop reason: SDUPTHER

## 2021-06-28 RX ORDER — BISOPROLOL FUMARATE 10 MG/1
TABLET, FILM COATED ORAL
Qty: 90 TABLET | Refills: 0 | Status: SHIPPED | OUTPATIENT
Start: 2021-06-28 | End: 2021-07-21 | Stop reason: SDUPTHER

## 2021-06-28 RX ORDER — CLOPIDOGREL BISULFATE 75 MG/1
TABLET ORAL
Qty: 90 TABLET | Refills: 0 | Status: SHIPPED | OUTPATIENT
Start: 2021-06-28 | End: 2021-07-21 | Stop reason: SDUPTHER

## 2021-07-06 ENCOUNTER — LAB (OUTPATIENT)
Dept: LAB | Facility: HOSPITAL | Age: 66
End: 2021-07-06

## 2021-07-06 ENCOUNTER — OFFICE VISIT (OUTPATIENT)
Dept: ONCOLOGY | Facility: CLINIC | Age: 66
End: 2021-07-06

## 2021-07-06 ENCOUNTER — TELEPHONE (OUTPATIENT)
Dept: FAMILY MEDICINE CLINIC | Facility: CLINIC | Age: 66
End: 2021-07-06

## 2021-07-06 ENCOUNTER — TELEPHONE (OUTPATIENT)
Dept: ONCOLOGY | Facility: CLINIC | Age: 66
End: 2021-07-06

## 2021-07-06 VITALS
SYSTOLIC BLOOD PRESSURE: 111 MMHG | OXYGEN SATURATION: 95 % | HEIGHT: 67 IN | HEART RATE: 87 BPM | BODY MASS INDEX: 38.83 KG/M2 | RESPIRATION RATE: 20 BRPM | TEMPERATURE: 97.3 F | DIASTOLIC BLOOD PRESSURE: 66 MMHG | WEIGHT: 247.4 LBS

## 2021-07-06 DIAGNOSIS — D69.6 THROMBOCYTOPENIA (HCC): Primary | ICD-10-CM

## 2021-07-06 DIAGNOSIS — E11.65 UNCONTROLLED TYPE 2 DIABETES MELLITUS WITH HYPERGLYCEMIA, WITHOUT LONG-TERM CURRENT USE OF INSULIN (HCC): ICD-10-CM

## 2021-07-06 DIAGNOSIS — D69.6 THROMBOCYTOPENIA (HCC): ICD-10-CM

## 2021-07-06 LAB — DAT POLY-SP REAG RBC QL: NEGATIVE

## 2021-07-06 PROCEDURE — 99214 OFFICE O/P EST MOD 30 MIN: CPT | Performed by: INTERNAL MEDICINE

## 2021-07-06 PROCEDURE — 36415 COLL VENOUS BLD VENIPUNCTURE: CPT

## 2021-07-06 PROCEDURE — 86880 COOMBS TEST DIRECT: CPT

## 2021-07-06 NOTE — PROGRESS NOTES
Follow Up Office Visit      Date: 2021     Patient Name: Kai Garcia  MRN: [id number]  : 1955  Referring Physician: Cheo Weiss     Chief Complaint:  Follow-up for thrombocytopenia     History of Present Illness: Kia Garcia is a pleasant 65 y.o. male past medical history of thrombocytopenia, chronic back pain secondary to multiple fractures, CAD, hypertension, hyperlipidemia, type 2 diabetes, CHF who presents today for evaluation of thrombocytopenia. The patient is accompanied by their wife who contributes to the history of their care.  Patient has been followed by their primary care physician with noted thrombocytopenia over the past several years.  Baseline platelet count range between 80-130K.  Previously had a septic episode in March of this year from a UTI.  At that time his platelet count dropped to as low as 22K but has since recovered to around 80K.  He denies any bleeding episodes but does note chronic bruising likely secondary to his dual antiplatelet use.  He states he was scheduled for back surgery in April of this year however this was delayed secondary to his thrombocytopenia.  He has significant pain and discomfort related to multiple spinal fractures.  He recently had a CT scan of his abdomen/pelvis in 2021 which did not show any evidence of cirrhosis or splenomegaly he otherwise has no other major complaints today and is compliant with all his other home medications    Interval History:  Presents clinic for follow-up.  Overall stable at this time.  Continues to have significant back discomfort.  Remains on aspirin and Plavix.  Denies any easy bleeding or bruising episodes    Oncology History:    Oncology/Hematology History    No history exists.       Subjective      Review of Systems:   Constitutional: Negative for fevers, chills, or weight loss  Eyes: Negative for blurred vision or discharge         Ear/Nose/Throat: Negative for difficulty swallowing, sore  throat, LAD                                                       Respiratory: Negative for cough, SOA, wheezing                                                                                        Cardiovascular: Negative for chest pain or palpitations                                                                  Gastrointestinal: Negative for nausea, vomiting or diarrhea                                                                     Genitourinary: Negative for dysuria or hematuria                                                                                           Musculoskeletal: Negative for any joint pains or muscle aches                                                                        Neurologic: Negative for any weakness, headaches, dizziness                                                                         Hematologic: Negative for any easy bleeding or bruising                                                                                   Psychiatric: Negative for anxiety or depression                          Past Medical History/Past Surgical History/ Family History/ Social History: Reviewed by me and unchanged from my previous documentation done on June 2021.     Medications:     Current Outpatient Medications:   •  acetaminophen (TYLENOL) 500 MG tablet, Take 1,000 mg by mouth Every 6 (Six) Hours As Needed for Mild Pain ., Disp: , Rfl:   •  alfuzosin (UROXATRAL) 10 MG 24 hr tablet, , Disp: , Rfl:   •  aspirin 81 MG EC tablet, Take 81 mg by mouth Daily., Disp: , Rfl:   •  bisoprolol (ZEBeta) 10 MG tablet, TAKE ONE TABLET BY MOUTH ONCE DAILY, Disp: 90 tablet, Rfl: 0  •  clopidogrel (PLAVIX) 75 MG tablet, TAKE ONE TABLET BY MOUTH ONCE DAILY, Disp: 90 tablet, Rfl: 0  •  cyclobenzaprine (FLEXERIL) 10 MG tablet, TAKE ONE TABLET BY MOUTH 3 TIMES A DAY AS NEEDED FOR MUSCLE SPASMS, Disp: 30 tablet, Rfl: 0  •  Entresto 49-51 MG tablet, TAKE ONE TABLET BY MOUTH EVERY 12 HOURS (Patient  "taking differently: Take 1 tablet by mouth 2 (Two) Times a Day.), Disp: 180 tablet, Rfl: 0  •  eplerenone (INSPRA) 25 MG tablet, TAKE ONE TABLET BY MOUTH EVERY DAY, Disp: 90 tablet, Rfl: 0  •  Januvia 100 MG tablet, TAKE ONE TABLET BY MOUTH EVERY DAY (Patient taking differently: Take 100 mg by mouth Every Morning.), Disp: 90 tablet, Rfl: 0  •  nitroglycerin (NITROSTAT) 0.4 MG SL tablet, Place 1 tablet under the tongue Every 5 (Five) Minutes As Needed for Chest Pain., Disp: 25 tablet, Rfl: 5  •  pantoprazole (PROTONIX) 40 MG EC tablet, TAKE ONE TABLET BY MOUTH EVERY DAY, Disp: 90 tablet, Rfl: 1  •  rosuvastatin (CRESTOR) 10 MG tablet, TAKE ONE TABLET BY MOUTH ONCE DAILY (Patient taking differently: Take 10 mg by mouth Every Night.), Disp: 90 tablet, Rfl: 0  •  traZODone (DESYREL) 100 MG tablet, 1/2 po at hs and increase to one po qhs if needed., Disp: 30 tablet, Rfl: 1    Allergies:   Allergies   Allergen Reactions   • Ativan [Lorazepam] Mental Status Change     agitation   • Zetia [Ezetimibe] Other (See Comments)     increased LFTs   • Zocor [Simvastatin] Myalgia   • Isosorbide Other (See Comments)     headaches   • Jardiance [Empagliflozin] Other (See Comments)     Blood in urine and burning sensation   • Metformin And Related Diarrhea       Objective     Physical Exam:  Vital Signs:   Vitals:    07/06/21 1338   BP: 111/66   Pulse: 87   Resp: 20   Temp: 97.3 °F (36.3 °C)   TempSrc: Temporal   SpO2: 95%   Weight: 112 kg (247 lb 6.4 oz)   Height: 170.2 cm (67\")   PainSc:   6   PainLoc: Generalized     Pain Score    07/06/21 1338   PainSc:   6   PainLoc: Generalized     ECOG Performance Status: 1 - Symptomatic but completely ambulatory    Constitutional: NAD, ECOG 1  Eyes: PERRLA, scleral anicteric  ENT: No LAD, no thyromegaly  Respiratory: CTAB, no wheezing, rales, rhonchi  Cardiovascular: RRR, no murmurs, pulses 2+ bilaterally  Abdomen: soft, NT/ND, no HSM  Musculoskeletal: strength 5/5 bilaterally, no " c/c/e  Neurologic: A&O x 3, CN II-XII intact grossly    Results Review:   No visits with results within 2 Week(s) from this visit.   Latest known visit with results is:   Lab on 06/15/2021   Component Date Value Ref Range Status   • Glucose 06/15/2021 276* 65 - 99 mg/dL Final   • BUN 06/15/2021 15  8 - 23 mg/dL Final   • Creatinine 06/15/2021 1.10  0.76 - 1.27 mg/dL Final   • Sodium 06/15/2021 136  136 - 145 mmol/L Final   • Potassium 06/15/2021 4.3  3.5 - 5.2 mmol/L Final    Specimen hemolyzed.  Results may be affected.   • Chloride 06/15/2021 100  98 - 107 mmol/L Final   • CO2 06/15/2021 27.0  22.0 - 29.0 mmol/L Final   • Calcium 06/15/2021 10.4  8.6 - 10.5 mg/dL Final   • Total Protein 06/15/2021 8.0  6.0 - 8.5 g/dL Final   • Albumin 06/15/2021 4.40  3.50 - 5.20 g/dL Final   • ALT (SGPT) 06/15/2021 32  1 - 41 U/L Final    Specimen hemolyzed.  Results may be affected.   • AST (SGOT) 06/15/2021 35  1 - 40 U/L Final   • Alkaline Phosphatase 06/15/2021 42  39 - 117 U/L Final   • Total Bilirubin 06/15/2021 0.3  0.0 - 1.2 mg/dL Final   • eGFR Non  Amer 06/15/2021 67  >60 mL/min/1.73 Final   • Globulin 06/15/2021 3.6  gm/dL Final   • A/G Ratio 06/15/2021 1.2  g/dL Final   • BUN/Creatinine Ratio 06/15/2021 13.6  7.0 - 25.0 Final   • Anion Gap 06/15/2021 9.0  5.0 - 15.0 mmol/L Final   • Ferritin 06/15/2021 197.60  30.00 - 400.00 ng/mL Final   • Iron 06/15/2021 82  59 - 158 mcg/dL Final   • Iron Saturation 06/15/2021 20  20 - 50 % Final   • Transferrin 06/15/2021 273  200 - 360 mg/dL Final   • TIBC 06/15/2021 407  298 - 536 mcg/dL Final   • Vitamin B-12 06/15/2021 614  211 - 946 pg/mL Final   • Folate 06/15/2021 14.40  4.78 - 24.20 ng/mL Final   • LDH 06/15/2021 284* 135 - 225 U/L Final   • Haptoglobin 06/15/2021 20* 30 - 200 mg/dL Final    Specimen hemolyzed. Results may be affected.   • Performed by: 06/15/2021 Dr. Fer Rae M.D.   Final   • Pathologist Interpretation 06/15/2021 Mild thrombocytopenia;  population of small platelets which may not be reflected in the platelet count;no dysplastic features are seen .    Final   • WBC 06/15/2021 5.01  3.40 - 10.80 10*3/mm3 Final   • RBC 06/15/2021 4.16  4.14 - 5.80 10*6/mm3 Final   • Hemoglobin 06/15/2021 13.3  13.0 - 17.7 g/dL Final   • Hematocrit 06/15/2021 38.9  37.5 - 51.0 % Final   • MCV 06/15/2021 93.5  79.0 - 97.0 fL Final   • MCH 06/15/2021 32.0  26.6 - 33.0 pg Final   • MCHC 06/15/2021 34.2  31.5 - 35.7 g/dL Final   • RDW 06/15/2021 12.3  12.3 - 15.4 % Final   • RDW-SD 06/15/2021 42.2  37.0 - 54.0 fl Final   • MPV 06/15/2021 11.4  6.0 - 12.0 fL Final   • Platelets 06/15/2021 97* 140 - 450 10*3/mm3 Final   • Neutrophil % 06/15/2021 59.7  42.7 - 76.0 % Final   • Lymphocyte % 06/15/2021 29.7  19.6 - 45.3 % Final   • Monocyte % 06/15/2021 8.2  5.0 - 12.0 % Final   • Eosinophil % 06/15/2021 1.6  0.3 - 6.2 % Final   • Basophil % 06/15/2021 0.6  0.0 - 1.5 % Final   • Immature Grans % 06/15/2021 0.2  0.0 - 0.5 % Final   • Neutrophils, Absolute 06/15/2021 2.99  1.70 - 7.00 10*3/mm3 Final   • Lymphocytes, Absolute 06/15/2021 1.49  0.70 - 3.10 10*3/mm3 Final   • Monocytes, Absolute 06/15/2021 0.41  0.10 - 0.90 10*3/mm3 Final   • Eosinophils, Absolute 06/15/2021 0.08  0.00 - 0.40 10*3/mm3 Final   • Basophils, Absolute 06/15/2021 0.03  0.00 - 0.20 10*3/mm3 Final   • Immature Grans, Absolute 06/15/2021 0.01  0.00 - 0.05 10*3/mm3 Final   • nRBC 06/15/2021 0.0  0.0 - 0.2 /100 WBC Final       No results found.    Assessment / Plan      Assessment/Plan:   1. Thrombocytopenia (CMS/HCC) (Primary)  -Unclear etiology at this time. Likely medication induced vs autoimmune vs liver disease  -Repeat platelet count 97K in June 2021  -HIV within normal limits  -Vitamin B12 and folate within normal limits  -Iron studies within normal limits  -LDH mildly elevated, haptoglobin 20  -Patient with recent CT scan of the abdomen/pelvis which showed no splenomegaly, but did note some slight  nodular morphology  -We will plan to check Lary test today to rule out autoimmune process  -Patient's platelet count likely secondary to underlying liver disease vs him being on Plavix and aspirin  -Patient would like to have back surgery for his chronic back pain.  I believe that he would be okay for this as his platelet count is currently high enough to achieve significant hemostasis and he would be okay for platelet transfusion should this be necessary.      Follow Up:   Follow-up in 3 months     Hernan Kaiser MD  Hematology and Oncology     Please note that portions of this note may have been completed with a voice recognition program. Efforts were made to edit the dictations, but occasionally words are mistranscribed.

## 2021-07-06 NOTE — TELEPHONE ENCOUNTER
Caller: Georgia Garcia    Relationship: Emergency Contact    Best call back number: [phone]    Medication needed:   Requested Prescriptions     Pending Prescriptions Disp Refills   • SITagliptin (Januvia) 100 MG tablet 90 tablet 0     Sig: Take 1 tablet by mouth Daily.       When do you need the refill by: 7/10/21    What additional details did the patient provide when requesting the medication:     Does the patient have less than a 3 day supply:  [] Yes  [x] No    What is the patient's preferred pharmacy: [facility] - [address] [phone] [facility] [phone]

## 2021-07-06 NOTE — TELEPHONE ENCOUNTER
Advised patient Dr. Kaiser's note has been faxed over to the number provided [phone] Attn: Zulma. Patient verbalized understanding.

## 2021-07-06 NOTE — TELEPHONE ENCOUNTER
Caller: Kai Garcia    Relationship: Self    Best call back number: [phone]    What is the best time to reach you: ANYTIME    Who are you requesting to speak with (clinical staff, provider,  specific staff member): DR. CASTELLANOS/NURSE    What was the call regarding: PATIENT WOULD LIKE DR. CASTELLANOS TO FAX SURGICAL CLEARANCE TO DR. HELLER. CLEARANCE SHOULD INDICATE THAT PLATELET COUNT IS OK FOR SURGERY.     FAX#: [phone] ATTN: JERED.      Do you require a callback: PLEASE CALL TO ADVISE WHEN DONE.     THANKS

## 2021-07-08 ENCOUNTER — TELEPHONE (OUTPATIENT)
Dept: CARDIOLOGY | Facility: CLINIC | Age: 66
End: 2021-07-08

## 2021-07-08 NOTE — TELEPHONE ENCOUNTER
Wife called to request pre op risk assessment for back surgery with Dr. Faulkner, which was  Cancelled back in March due to patient being septic.  Are pre op risk assessments the same?  Patient has upcoming appt on 7/21

## 2021-07-19 DIAGNOSIS — F51.01 PRIMARY INSOMNIA: ICD-10-CM

## 2021-07-19 RX ORDER — TRAZODONE HYDROCHLORIDE 100 MG/1
TABLET ORAL
Qty: 30 TABLET | Refills: 5 | Status: SHIPPED | OUTPATIENT
Start: 2021-07-19 | End: 2022-09-15 | Stop reason: SDUPTHER

## 2021-07-21 ENCOUNTER — OFFICE VISIT (OUTPATIENT)
Dept: CARDIOLOGY | Facility: CLINIC | Age: 66
End: 2021-07-21

## 2021-07-21 VITALS
DIASTOLIC BLOOD PRESSURE: 70 MMHG | HEIGHT: 67 IN | BODY MASS INDEX: 37.98 KG/M2 | SYSTOLIC BLOOD PRESSURE: 116 MMHG | HEART RATE: 76 BPM | WEIGHT: 242 LBS | OXYGEN SATURATION: 93 %

## 2021-07-21 DIAGNOSIS — I25.5 ISCHEMIC CARDIOMYOPATHY: Primary | Chronic | ICD-10-CM

## 2021-07-21 DIAGNOSIS — I25.10 CORONARY ARTERY DISEASE INVOLVING NATIVE CORONARY ARTERY OF NATIVE HEART WITHOUT ANGINA PECTORIS: ICD-10-CM

## 2021-07-21 DIAGNOSIS — E78.00 PURE HYPERCHOLESTEROLEMIA: ICD-10-CM

## 2021-07-21 DIAGNOSIS — I10 ESSENTIAL HYPERTENSION: ICD-10-CM

## 2021-07-21 DIAGNOSIS — I25.118 CORONARY ARTERY DISEASE INVOLVING NATIVE CORONARY ARTERY OF NATIVE HEART WITH OTHER FORM OF ANGINA PECTORIS (HCC): ICD-10-CM

## 2021-07-21 PROCEDURE — 99214 OFFICE O/P EST MOD 30 MIN: CPT | Performed by: INTERNAL MEDICINE

## 2021-07-21 PROCEDURE — 93284 PRGRMG EVAL IMPLANTABLE DFB: CPT | Performed by: INTERNAL MEDICINE

## 2021-07-21 RX ORDER — NITROGLYCERIN 0.4 MG/1
0.4 TABLET SUBLINGUAL
Qty: 25 TABLET | Refills: 3 | Status: SHIPPED | OUTPATIENT
Start: 2021-07-21 | End: 2022-09-02

## 2021-07-21 RX ORDER — SACUBITRIL AND VALSARTAN 49; 51 MG/1; MG/1
1 TABLET, FILM COATED ORAL EVERY 12 HOURS
Qty: 180 TABLET | Refills: 3 | Status: SHIPPED | OUTPATIENT
Start: 2021-07-21 | End: 2022-01-26 | Stop reason: SDUPTHER

## 2021-07-21 RX ORDER — ROSUVASTATIN CALCIUM 10 MG/1
10 TABLET, COATED ORAL DAILY
Qty: 90 TABLET | Refills: 3 | Status: SHIPPED | OUTPATIENT
Start: 2021-07-21 | End: 2022-01-26 | Stop reason: SDUPTHER

## 2021-07-21 RX ORDER — EPLERENONE 25 MG/1
25 TABLET, FILM COATED ORAL DAILY
Qty: 90 TABLET | Refills: 3 | Status: SHIPPED | OUTPATIENT
Start: 2021-07-21 | End: 2022-01-26 | Stop reason: SDUPTHER

## 2021-07-21 RX ORDER — CLOPIDOGREL BISULFATE 75 MG/1
75 TABLET ORAL DAILY
Qty: 90 TABLET | Refills: 3 | Status: ON HOLD | OUTPATIENT
Start: 2021-07-21 | End: 2021-10-29 | Stop reason: SDUPTHER

## 2021-07-21 RX ORDER — BISOPROLOL FUMARATE 10 MG/1
10 TABLET, FILM COATED ORAL DAILY
Qty: 90 TABLET | Refills: 3 | Status: SHIPPED | OUTPATIENT
Start: 2021-07-21 | End: 2022-01-26 | Stop reason: SDUPTHER

## 2021-07-21 NOTE — PROGRESS NOTES
Subjective:     Encounter Date:07/21/2021    Primary Care Physician: Cheo Weiss MD      Patient ID: Kai Garcia is a 65 y.o. male.    Chief Complaint:Aortic Dissection    Problem List:  1. Dissecting aortic aneurysm:  a. 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 Helton, stage I in June 2006 and stage II in March 2007.   b. Followed chronically with annual CTs by Dr. Helton. Stable by CT 2019  2. Coronary artery disease:  a. Cardiac cath prior to “elephant trunk” procedure in June 2006 and the patient underwent CABG at that time consisting of SVG to the OM and SVG to the PDA.   b. 02/08/2007 - inferoposterior MI, cardiac cath showed 100% left circumflex with severe OM-1 stenosis and 100% vein graft to the left circumflex. RCA was diffuse, 90% ostial stenosis at the SVG to the RCA. LAD had only 40% stenosis.   c. Cardiac cath in March 2007 due to recurrent angina - 2.5 Microdriver stent to the distal native RCA.   d. 4/27/18 cardiac catheter occluded vein grafts ×2.  Large posterior basal aneurysm with ejection fraction of 30-35%.  Severe stenosis and distal circumflex which supplies aneurysm.  Non-flow-limiting RCA disease.  e. MRI 8/18 LVEF 28%  f. S/P Bi- Ventricular ICD- [facility] 8/9/18, EMILIE Fox MD  3. Dyslipidemia.   4. Hypertension.   5. Diabetes  6. Chronic low back pain/multiple lumbar surgeries, currently disabled:  a. L4-L5 discectomy in 2015.   7. History of bleeding peptic ulcer disease.   8. Severe obstructive sleep apnea.   9. Appendectomy      Allergies   Allergen Reactions   • Ativan [Lorazepam] Mental Status Change     agitation   • Zetia [Ezetimibe] Other (See Comments)     increased LFTs   • Zocor [Simvastatin] Myalgia   • Isosorbide Other (See Comments)     headaches   • Jardiance [Empagliflozin] Other (See Comments)     Blood in urine and burning sensation   • Metformin And Related  Diarrhea         Current Outpatient Medications:   •  acetaminophen (TYLENOL) 500 MG tablet, Take 1,000 mg by mouth Every 6 (Six) Hours As Needed for Mild Pain ., Disp: , Rfl:   •  alfuzosin (UROXATRAL) 10 MG 24 hr tablet, , Disp: , Rfl:   •  aspirin 81 MG EC tablet, Take 81 mg by mouth Daily., Disp: , Rfl:   •  bisoprolol (ZEBeta) 10 MG tablet, TAKE ONE TABLET BY MOUTH ONCE DAILY, Disp: 90 tablet, Rfl: 0  •  clopidogrel (PLAVIX) 75 MG tablet, TAKE ONE TABLET BY MOUTH ONCE DAILY, Disp: 90 tablet, Rfl: 0  •  cyclobenzaprine (FLEXERIL) 10 MG tablet, TAKE ONE TABLET BY MOUTH 3 TIMES A DAY AS NEEDED FOR MUSCLE SPASMS, Disp: 30 tablet, Rfl: 0  •  Entresto 49-51 MG tablet, TAKE ONE TABLET BY MOUTH EVERY 12 HOURS (Patient taking differently: Take 1 tablet by mouth 2 (Two) Times a Day.), Disp: 180 tablet, Rfl: 0  •  eplerenone (INSPRA) 25 MG tablet, TAKE ONE TABLET BY MOUTH EVERY DAY, Disp: 90 tablet, Rfl: 0  •  nitroglycerin (NITROSTAT) 0.4 MG SL tablet, Place 1 tablet under the tongue Every 5 (Five) Minutes As Needed for Chest Pain., Disp: 25 tablet, Rfl: 5  •  pantoprazole (PROTONIX) 40 MG EC tablet, TAKE ONE TABLET BY MOUTH EVERY DAY, Disp: 90 tablet, Rfl: 1  •  rosuvastatin (CRESTOR) 10 MG tablet, TAKE ONE TABLET BY MOUTH ONCE DAILY (Patient taking differently: Take 10 mg by mouth Every Night.), Disp: 90 tablet, Rfl: 0  •  SITagliptin (Januvia) 100 MG tablet, Take 1 tablet by mouth Daily., Disp: 90 tablet, Rfl: 1  •  traZODone (DESYREL) 100 MG tablet, TAKE 1/2 TABLET BY MOUTH EVERY NIGHT AT BEDTIME AND INCREASE TO 1 TABLET IF NEEDED, Disp: 30 tablet, Rfl: 5        History of Present Illness    Patient returns today for 6-month follow-up of ischemic cardiomyopathy and cardiovascular risk factors and device check.  Since last visit, he had a 1 week hospital stay at [facility] for sepsis of unclear etiology, presumed urinary.  He recovered from this, with his persistent thrombocytopenia for which she is seeing hematology.   "This is subsequently resolved.  Since that time he notes his blood sugars been out of control.  He has not been able to been active due to a severe back injury he occurred while falling while feeding his chickens he is scheduled for upcoming lumbar spinal fusion by Dr. Theodore in 1 month's time.  Denies any orthopnea PND presyncope or syncope    The following portions of the patient's history were reviewed and updated as appropriate: allergies, current medications, past family history, past medical history, past social history, past surgical history and problem list.      Social History     Tobacco Use   • Smoking status: Former Smoker     Packs/day: 3.00     Years: 35.00     Pack years: 105.00     Types: Cigarettes     Quit date: 1/15/2006     Years since quitting: 15.5   • Smokeless tobacco: Never Used   Vaping Use   • Vaping Use: Never used   Substance Use Topics   • Alcohol use: No   • Drug use: No         ROS       Objective:   /70   Pulse 76   Ht 170.2 cm (67\")   Wt 110 kg (242 lb)   SpO2 93%   BMI 37.90 kg/m²         Vitals reviewed.   Constitutional:       Appearance: Well-developed and not in distress.   Neck:      Thyroid: No thyromegaly.      Vascular: No carotid bruit or JVD.   Pulmonary:      Breath sounds: Normal breath sounds.   Cardiovascular:      Regular rhythm.      No gallop. No S3 and S4 gallop.   Abdominal:      General: Bowel sounds are normal.      Palpations: Abdomen is soft. There is no abdominal mass.      Tenderness: There is no abdominal tenderness.   Musculoskeletal:         General: No deformity.      Extremities: No clubbing present.Skin:     General: Skin is warm and dry.      Findings: No rash.   Neurological:      Mental Status: Alert and oriented to person, place, and time.         Procedures  Device check:  Normal functioning ICD.  1 episode of VT, no therapy needed.  Less than 30 seconds of mode switching.  99% biventricular paced.  Less than 1% atrially paced.  Normal " thresholds impedances        Assessment:   Assessment/Plan      Diagnoses and all orders for this visit:    1. Ischemic cardiomyopathy EF 40% (2019) (Primary)    2. Coronary artery disease involving native coronary artery of native heart with other form of angina pectoris (CMS/HCC)    3. Pure hypercholesterolemia    4. Essential hypertension      1.  Ischemic cardiomyopathy.  No current heart failure.  2.  Coronary disease stable without angina  3.  Hypertension well-controlled on Entresto and beta-blocker  4.  Normal functioning ICD.  One episode of nonsustained VT.  5.  Chronic aortic dissection status post surgical repair.  Stable  6.  Preoperative lumbar spine surgery  7.  Diabetes out-of-control    Recommendations:  1.  Continue current medical therapy  2.  Patient is at low cardiovascular risk of upcoming lumbar spinal surgery.  May proceed as planned.  3.  May hold Plavix for 5 to 7 days preoperative redo spinal surgeries  4.  Revisit 6 months apparent symptom change           Dictated utilizing Dragon dictation

## 2021-07-26 ENCOUNTER — PATIENT OUTREACH (OUTPATIENT)
Dept: CASE MANAGEMENT | Facility: OTHER | Age: 66
End: 2021-07-26

## 2021-07-26 NOTE — OUTREACH NOTE
"Ambulatory Case Management Note    Patient Outreach    Pt identified through reporting that A1C > 9.  Contacted pt regarding any case management needs, diabetic management support or diabetic management education. Explained role of Ambulatory Nurse Case Management.  Pt states he has been dealing with back issue, been on steroids and is having back surgery in August by Dr. Hernandez at [facility].  States he is monitoring his bs at home and is staying betw. 250-275.  Reports he is compliant with his medicines, has had educational class lucian regarding his diet and is currently \"on a diet and cutting out the carbs.\"  He is unable to exercise at present due to his back.  States \"there is not much more I can do right now.\"  Respectfully declined enrollment, however wife did make a point of writing down RN-ACM name and number. Wished him well with his surgery, and encouraged them to call RN-ACM for any future needs.      Fannie Whelan RN  Ambulatory Case Management    7/26/2021, 10:47 EDT    "

## 2021-08-08 PROCEDURE — 93295 DEV INTERROG REMOTE 1/2/MLT: CPT | Performed by: INTERNAL MEDICINE

## 2021-08-08 PROCEDURE — 93296 REM INTERROG EVL PM/IDS: CPT | Performed by: INTERNAL MEDICINE

## 2021-08-12 ENCOUNTER — APPOINTMENT (OUTPATIENT)
Dept: PREADMISSION TESTING | Facility: HOSPITAL | Age: 66
End: 2021-08-12

## 2021-08-24 ENCOUNTER — OFFICE VISIT (OUTPATIENT)
Dept: FAMILY MEDICINE CLINIC | Facility: CLINIC | Age: 66
End: 2021-08-24

## 2021-08-24 VITALS
TEMPERATURE: 98.4 F | HEIGHT: 67 IN | DIASTOLIC BLOOD PRESSURE: 76 MMHG | WEIGHT: 246 LBS | RESPIRATION RATE: 18 BRPM | SYSTOLIC BLOOD PRESSURE: 126 MMHG | HEART RATE: 72 BPM | BODY MASS INDEX: 38.61 KG/M2

## 2021-08-24 DIAGNOSIS — D69.6 THROMBOCYTOPENIA (HCC): ICD-10-CM

## 2021-08-24 DIAGNOSIS — M54.50 LUMBAR PAIN: ICD-10-CM

## 2021-08-24 DIAGNOSIS — E11.65 UNCONTROLLED TYPE 2 DIABETES MELLITUS WITH HYPERGLYCEMIA, WITHOUT LONG-TERM CURRENT USE OF INSULIN (HCC): Primary | ICD-10-CM

## 2021-08-24 DIAGNOSIS — E78.00 PURE HYPERCHOLESTEROLEMIA: ICD-10-CM

## 2021-08-24 DIAGNOSIS — I25.10 CORONARY ARTERY DISEASE INVOLVING NATIVE CORONARY ARTERY OF NATIVE HEART WITHOUT ANGINA PECTORIS: ICD-10-CM

## 2021-08-24 PROCEDURE — 99214 OFFICE O/P EST MOD 30 MIN: CPT | Performed by: FAMILY MEDICINE

## 2021-08-24 RX ORDER — CYCLOBENZAPRINE HCL 10 MG
10 TABLET ORAL 3 TIMES DAILY PRN
Qty: 90 TABLET | Refills: 1 | Status: SHIPPED | OUTPATIENT
Start: 2021-08-24 | End: 2021-10-21

## 2021-08-24 RX ORDER — HYDROCODONE BITARTRATE AND ACETAMINOPHEN 5; 325 MG/1; MG/1
0.5 TABLET ORAL EVERY 8 HOURS PRN
COMMUNITY
Start: 2021-08-20 | End: 2021-10-29 | Stop reason: HOSPADM

## 2021-08-24 NOTE — PROGRESS NOTES
Assessment/Plan       Diagnoses and all orders for this visit:    1. Uncontrolled type 2 diabetes mellitus with hyperglycemia, without long-term current use of insulin (CMS/[facility]) (Primary)  -     Comprehensive Metabolic Panel  -     Lipid Panel With / Chol / HDL Ratio  -     Hemoglobin A1c    2. Pure hypercholesterolemia  -     Comprehensive Metabolic Panel  -     Lipid Panel With / Chol / HDL Ratio    3. Thrombocytopenia (CMS/[facility])  -     CBC & Differential    4. Coronary artery disease involving native coronary artery of native heart without angina pectoris    5. Lumbar pain  -     cyclobenzaprine (FLEXERIL) 10 MG tablet; Take 1 tablet by mouth 3 (Three) Times a Day As Needed for Muscle Spasms.  Dispense: 90 tablet; Refill: 1           Follow up: Return in about 3 months (around 11/24/2021).     DISCUSSION  Diabetes mellitus type 2. Uncontrolled. Check CMP, lipid panel and A1c. Further plan once labs back.    Hyperlipidemia. Check CMP and lipid panel.    Thrombocytopenia. Follow-up with oncology as scheduled. Recheck CBC.    Coronary disease. Has seen cardiology. Stable. No active chest pain.    Lumbar back pain and need for surgery. Refilled Flexeril. Surgery is on hold due to Covid.          MEDICATIONS PRESCRIBED  Requested Prescriptions     Signed Prescriptions Disp Refills   • cyclobenzaprine (FLEXERIL) 10 MG tablet 90 tablet 1     Sig: Take 1 tablet by mouth 3 (Three) Times a Day As Needed for Muscle Spasms.            -------------------------------------------    Subjective     Chief Complaint   Patient presents with   • Diabetes     3 month f/u         Diabetes  He presents for his follow-up diabetic visit. His disease course has been stable. There are no hypoglycemic associated symptoms. Pertinent negatives for hypoglycemia include no dizziness. Associated symptoms include fatigue. Pertinent negatives for diabetes include no chest pain and no weight loss. There are no hypoglycemic complications. Diabetic  "complications include heart disease. His weight is stable. He is following a generally healthy (eating less) diet. Exercise: loses weight if able to be active. There is no change (200 - low) in his home blood glucose trend. An ACE inhibitor/angiotensin II receptor blocker is being taken.   Hyperlipidemia  This is a chronic problem. The current episode started more than 1 year ago. The problem is controlled. Recent lipid tests were reviewed and are normal. Associated symptoms include myalgias (back pain) and shortness of breath (with heat). Pertinent negatives include no chest pain. Current antihyperlipidemic treatment includes statins.   Coronary Artery Disease  Presents for follow-up visit. Symptoms include shortness of breath (with heat). Pertinent negatives include no chest pain, dizziness or weight gain. Risk factors include hyperlipidemia. The symptoms have been stable.     Low platelet  No bruising   No bleeding  Saw hematology        Social History     Tobacco Use   Smoking Status Former Smoker   • Packs/day: 3.00   • Years: 35.00   • Pack years: 105.00   • Types: Cigarettes   • Quit date: 6/15/2006   • Years since quitting: 15.2   Smokeless Tobacco Never Used          Past Medical History,Medications, Allergies, and social history was reviewed.          Review of Systems   Constitutional: Positive for fatigue. Negative for unexpected weight gain and unexpected weight loss.   HENT: Negative.    Respiratory: Positive for shortness of breath (with heat). Negative for cough.    Cardiovascular: Negative.  Negative for chest pain.   Gastrointestinal: Negative.    Musculoskeletal: Positive for myalgias (back pain).   Neurological: Negative for dizziness.   Psychiatric/Behavioral: Positive for sleep disturbance.       Objective     Vitals:    08/24/21 0821   BP: 126/76   Pulse: 72   Resp: 18   Temp: 98.4 °F (36.9 °C)   Weight: 112 kg (246 lb)   Height: 170.2 cm (67\")          Physical Exam  Vitals and nursing note " reviewed.   Constitutional:       Appearance: He is well-developed. He is obese.   HENT:      Head: Normocephalic and atraumatic.      Right Ear: External ear normal.      Left Ear: External ear normal.   Eyes:      Pupils: Pupils are equal, round, and reactive to light.   Cardiovascular:      Rate and Rhythm: Normal rate and regular rhythm.      Heart sounds: Normal heart sounds.   Pulmonary:      Effort: Pulmonary effort is normal. No respiratory distress.      Breath sounds: Normal breath sounds. No wheezing or rales.   Abdominal:      General: There is no distension.      Tenderness: There is abdominal tenderness (LLQ , chronic).   Skin:     General: Skin is warm and dry.   Neurological:      Mental Status: He is alert.   Psychiatric:         Behavior: Behavior normal.       Antalgic gait. Uses a cane. Difficult getting up out of the chair due to significant back pain.              Cheo Weiss MD

## 2021-08-25 LAB
ALBUMIN SERPL-MCNC: 4.2 G/DL (ref 3.5–5.2)
ALBUMIN/GLOB SERPL: 1.4 G/DL
ALP SERPL-CCNC: 40 U/L (ref 39–117)
ALT SERPL-CCNC: 37 U/L (ref 1–41)
AST SERPL-CCNC: 38 U/L (ref 1–40)
BASOPHILS # BLD AUTO: 0.02 10*3/MM3 (ref 0–0.2)
BASOPHILS NFR BLD AUTO: 0.4 % (ref 0–1.5)
BILIRUB SERPL-MCNC: 0.3 MG/DL (ref 0–1.2)
BUN SERPL-MCNC: 13 MG/DL (ref 8–23)
BUN/CREAT SERPL: 12 (ref 7–25)
CALCIUM SERPL-MCNC: 9.7 MG/DL (ref 8.6–10.5)
CHLORIDE SERPL-SCNC: 98 MMOL/L (ref 98–107)
CHOLEST SERPL-MCNC: 113 MG/DL (ref 0–200)
CHOLEST/HDLC SERPL: 3.32 {RATIO}
CO2 SERPL-SCNC: 26.2 MMOL/L (ref 22–29)
CREAT SERPL-MCNC: 1.08 MG/DL (ref 0.76–1.27)
EOSINOPHIL # BLD AUTO: 0.08 10*3/MM3 (ref 0–0.4)
EOSINOPHIL NFR BLD AUTO: 1.8 % (ref 0.3–6.2)
ERYTHROCYTE [DISTWIDTH] IN BLOOD BY AUTOMATED COUNT: 12.6 % (ref 12.3–15.4)
GLOBULIN SER CALC-MCNC: 3.1 GM/DL
GLUCOSE SERPL-MCNC: 221 MG/DL (ref 65–99)
HBA1C MFR BLD: 11.2 % (ref 4.8–5.6)
HCT VFR BLD AUTO: 40.1 % (ref 37.5–51)
HDLC SERPL-MCNC: 34 MG/DL (ref 40–60)
HGB BLD-MCNC: 13.1 G/DL (ref 13–17.7)
IMM GRANULOCYTES # BLD AUTO: 0.02 10*3/MM3 (ref 0–0.05)
IMM GRANULOCYTES NFR BLD AUTO: 0.4 % (ref 0–0.5)
LDLC SERPL CALC-MCNC: 49 MG/DL (ref 0–100)
LYMPHOCYTES # BLD AUTO: 1.29 10*3/MM3 (ref 0.7–3.1)
LYMPHOCYTES NFR BLD AUTO: 28.2 % (ref 19.6–45.3)
MCH RBC QN AUTO: 31.2 PG (ref 26.6–33)
MCHC RBC AUTO-ENTMCNC: 32.7 G/DL (ref 31.5–35.7)
MCV RBC AUTO: 95.5 FL (ref 79–97)
MONOCYTES # BLD AUTO: 0.41 10*3/MM3 (ref 0.1–0.9)
MONOCYTES NFR BLD AUTO: 9 % (ref 5–12)
NEUTROPHILS # BLD AUTO: 2.75 10*3/MM3 (ref 1.7–7)
NEUTROPHILS NFR BLD AUTO: 60.2 % (ref 42.7–76)
PLATELET # BLD AUTO: 77 10*3/MM3 (ref 140–450)
POTASSIUM SERPL-SCNC: 4.4 MMOL/L (ref 3.5–5.2)
PROT SERPL-MCNC: 7.3 G/DL (ref 6–8.5)
RBC # BLD AUTO: 4.2 10*6/MM3 (ref 4.14–5.8)
SODIUM SERPL-SCNC: 135 MMOL/L (ref 136–145)
TRIGL SERPL-MCNC: 177 MG/DL (ref 0–150)
VLDLC SERPL CALC-MCNC: 30 MG/DL (ref 5–40)
WBC # BLD AUTO: 4.57 10*3/MM3 (ref 3.4–10.8)

## 2021-08-27 ENCOUNTER — APPOINTMENT (OUTPATIENT)
Dept: PREADMISSION TESTING | Facility: HOSPITAL | Age: 66
End: 2021-08-27

## 2021-08-27 ENCOUNTER — OFFICE VISIT (OUTPATIENT)
Dept: FAMILY MEDICINE CLINIC | Facility: CLINIC | Age: 66
End: 2021-08-27

## 2021-08-27 VITALS
DIASTOLIC BLOOD PRESSURE: 70 MMHG | RESPIRATION RATE: 14 BRPM | OXYGEN SATURATION: 92 % | HEART RATE: 78 BPM | WEIGHT: 241.8 LBS | TEMPERATURE: 97.7 F | SYSTOLIC BLOOD PRESSURE: 110 MMHG | BODY MASS INDEX: 37.95 KG/M2 | HEIGHT: 67 IN

## 2021-08-27 DIAGNOSIS — Z79.4 TYPE 2 DIABETES MELLITUS WITH HYPERGLYCEMIA, WITH LONG-TERM CURRENT USE OF INSULIN (HCC): Primary | ICD-10-CM

## 2021-08-27 DIAGNOSIS — E11.65 TYPE 2 DIABETES MELLITUS WITH HYPERGLYCEMIA, WITH LONG-TERM CURRENT USE OF INSULIN (HCC): Primary | ICD-10-CM

## 2021-08-27 PROCEDURE — 99213 OFFICE O/P EST LOW 20 MIN: CPT | Performed by: FAMILY MEDICINE

## 2021-08-27 RX ORDER — PEN NEEDLE, DIABETIC 31 G X1/4"
NEEDLE, DISPOSABLE MISCELLANEOUS
Qty: 50 EACH | Refills: 2 | Status: SHIPPED | OUTPATIENT
Start: 2021-08-27 | End: 2022-02-21

## 2021-08-27 RX ORDER — INSULIN GLARGINE 100 [IU]/ML
INJECTION, SOLUTION SUBCUTANEOUS
Qty: 1 PEN | Refills: 0 | COMMUNITY
Start: 2021-08-27 | End: 2021-10-26

## 2021-08-27 NOTE — PROGRESS NOTES
Assessment/Plan       Diagnoses and all orders for this visit:    1. Type 2 diabetes mellitus with hyperglycemia, with long-term current use of insulin (CMS/[facility]) (Primary)  -     Insulin Pen Needle (Ultracare Pen Needles) 32G X 5 MM misc; Use daily with insulin  Dispense: 50 each; Refill: 2    Other orders  -     Insulin Glargine (BASAGLAR KWIKPEN) 100 UNIT/ML injection pen; 10 units sq daily  Dispense: 1 pen; Refill: 0           Follow up: Return for follow up depends on review of labs and testing, Next scheduled follow up.     DISCUSSION  Diabetes mellitus type 2 with hyperglycemia.  A1c is now greater than 11.  Recommend start insulin.  Samples of Basaglar was given.  Start 10 units daily and call in 10 to 14 days with blood sugar readings so we can adjust further.  Continue all other diabetes medications.  Continue efforts with weight loss and improve diet.          MEDICATIONS PRESCRIBED  Requested Prescriptions     Signed Prescriptions Disp Refills   • Insulin Pen Needle (Ultracare Pen Needles) 32G X 5 MM misc 50 each 2     Sig: Use daily with insulin   • Insulin Glargine (BASAGLAR KWIKPEN) 100 UNIT/ML injection pen 1 pen 0     Sig: 10 units sq daily                -------------------------------------------    Subjective     Chief Complaint   Patient presents with   • Diabetes     follow up          History of Present Illness    Here for follow-up of diabetes.  Had A1c done which was greater than 11.  Blood sugars he reports at home have been around 200 or higher.  Denies anything higher than 300.  Has been trying to eat better.  Has not lost any weight.  See previous office visit.        Social History     Tobacco Use   Smoking Status Former Smoker   • Packs/day: 3.00   • Years: 35.00   • Pack years: 105.00   • Types: Cigarettes   • Quit date: 6/15/2006   • Years since quitting: 15.2   Smokeless Tobacco Never Used          Past Medical History,Medications, Allergies, and social history was  "reviewed.          Review of Systems   Constitutional: Negative.    HENT: Negative.    Respiratory: Negative.    Cardiovascular: Negative.    Musculoskeletal: Positive for back pain.       Objective     Vitals:    08/27/21 1456   BP: 110/70   Pulse: 78   Resp: 14   Temp: 97.7 °F (36.5 °C)   SpO2: 92%   Weight: 110 kg (241 lb 12.8 oz)   Height: 170.2 cm (67.01\")          Physical Exam  Vitals and nursing note reviewed.   Constitutional:       Appearance: He is well-developed.   HENT:      Head: Normocephalic and atraumatic.      Right Ear: External ear normal.      Left Ear: External ear normal.   Eyes:      Pupils: Pupils are equal, round, and reactive to light.   Cardiovascular:      Rate and Rhythm: Normal rate and regular rhythm.   Pulmonary:      Effort: Pulmonary effort is normal.      Breath sounds: Normal breath sounds.   Skin:     General: Skin is warm and dry.   Neurological:      Mental Status: He is alert.       Antalgic gait              Cheo Weiss MD    "

## 2021-09-09 ENCOUNTER — TELEPHONE (OUTPATIENT)
Dept: FAMILY MEDICINE CLINIC | Facility: CLINIC | Age: 66
End: 2021-09-09

## 2021-09-09 NOTE — TELEPHONE ENCOUNTER
PATIENT REPORTS THAT HIS GLUCOSE LEVELS ARE RUNNING 200 OR BELOW IN THE MORNING AND AROUND 225-250 AT NIGHT. PLEASE RETURN CALL TO LET HIM KNOW IF ANYTHING NEEDS TO BE CHANGED WITH HIS INSULIN. PLEASE RETURN CALL TO PATIENT [phone]     PHARMACY VERIFIED   [facility] - [facility] [address] [place] [address] [phone]  - [phone] FX

## 2021-09-16 ENCOUNTER — TELEPHONE (OUTPATIENT)
Dept: FAMILY MEDICINE CLINIC | Facility: CLINIC | Age: 66
End: 2021-09-16

## 2021-09-16 NOTE — TELEPHONE ENCOUNTER
Caller: Georgia Garcai    Relationship to patient: Emergency Contact    Best call back number: [phone]    What is the call regarding:  PATIENT'S WIFE STATES THAT HIS GLUCOSE NUMBERS ARE DECREASING.  THEY ARE   180-200 BOTH MORNING AND EVENING.  HEALSO STATES THAT HE NEEDS MORE SAMPLES OF THE INSULIN THAT DR GONSALEZ GAVE HIM TO TRY.

## 2021-09-17 NOTE — TELEPHONE ENCOUNTER
1.  Go ahead and increase to 20 units daily.  Call in 2 weeks with readings.    2.  See if we have any samples of Basaglar to give to him.

## 2021-10-08 ENCOUNTER — TELEPHONE (OUTPATIENT)
Dept: FAMILY MEDICINE CLINIC | Facility: CLINIC | Age: 66
End: 2021-10-08

## 2021-10-08 NOTE — TELEPHONE ENCOUNTER
We have been giving him samples.  Please see if we have samples of Basaglar that he can  on Monday.

## 2021-10-12 ENCOUNTER — OFFICE VISIT (OUTPATIENT)
Dept: ONCOLOGY | Facility: CLINIC | Age: 66
End: 2021-10-12

## 2021-10-12 ENCOUNTER — LAB (OUTPATIENT)
Dept: LAB | Facility: HOSPITAL | Age: 66
End: 2021-10-12

## 2021-10-12 VITALS
BODY MASS INDEX: 37.79 KG/M2 | HEIGHT: 67 IN | OXYGEN SATURATION: 93 % | RESPIRATION RATE: 20 BRPM | DIASTOLIC BLOOD PRESSURE: 64 MMHG | SYSTOLIC BLOOD PRESSURE: 114 MMHG | TEMPERATURE: 96.9 F | HEART RATE: 83 BPM | WEIGHT: 240.8 LBS

## 2021-10-12 DIAGNOSIS — D69.6 THROMBOCYTOPENIA (HCC): ICD-10-CM

## 2021-10-12 DIAGNOSIS — D69.6 THROMBOCYTOPENIA (HCC): Primary | ICD-10-CM

## 2021-10-12 LAB
ERYTHROCYTE [DISTWIDTH] IN BLOOD BY AUTOMATED COUNT: 13.3 % (ref 12.3–15.4)
HCT VFR BLD AUTO: 41.2 % (ref 37.5–51)
HGB BLD-MCNC: 13.7 G/DL (ref 13–17.7)
LYMPHOCYTES # BLD AUTO: 2.5 10*3/MM3 (ref 0.7–3.1)
LYMPHOCYTES NFR BLD AUTO: 36.1 % (ref 19.6–45.3)
MCH RBC QN AUTO: 31.2 PG (ref 26.6–33)
MCHC RBC AUTO-ENTMCNC: 33.2 G/DL (ref 31.5–35.7)
MCV RBC AUTO: 94 FL (ref 79–97)
MONOCYTES # BLD AUTO: 0.6 10*3/MM3 (ref 0.1–0.9)
MONOCYTES NFR BLD AUTO: 8.9 % (ref 5–12)
NEUTROPHILS NFR BLD AUTO: 3.8 10*3/MM3 (ref 1.7–7)
NEUTROPHILS NFR BLD AUTO: 55 % (ref 42.7–76)
PLATELET # BLD AUTO: 114 10*3/MM3 (ref 140–450)
PMV BLD AUTO: 8 FL (ref 6–12)
RBC # BLD AUTO: 4.38 10*6/MM3 (ref 4.14–5.8)
WBC # BLD AUTO: 6.9 10*3/MM3 (ref 3.4–10.8)

## 2021-10-12 PROCEDURE — 99214 OFFICE O/P EST MOD 30 MIN: CPT | Performed by: INTERNAL MEDICINE

## 2021-10-12 PROCEDURE — 36415 COLL VENOUS BLD VENIPUNCTURE: CPT

## 2021-10-12 PROCEDURE — 85025 COMPLETE CBC W/AUTO DIFF WBC: CPT

## 2021-10-12 NOTE — PROGRESS NOTES
Follow Up Office Visit      Date: 10/12/2021     Patient Name: Kai Garcia  MRN: [id number]  : 1955  Referring Physician: Cheo Weiss     Chief Complaint:  Follow-up for thrombocytopenia     History of Present Illness: Kai Garcia is a pleasant 65 y.o. male past medical history of thrombocytopenia, chronic back pain secondary to multiple fractures, CAD, hypertension, hyperlipidemia, type 2 diabetes, CHF who presents today for evaluation of thrombocytopenia. The patient is accompanied by their wife who contributes to the history of their care.  Patient has been followed by their primary care physician with noted thrombocytopenia over the past several years.  Baseline platelet count range between 80-130K.  Previously had a septic episode in March of this year from a UTI.  At that time his platelet count dropped to as low as 22K but has since recovered to around 80K.  He denies any bleeding episodes but does note chronic bruising likely secondary to his dual antiplatelet use.  He states he was scheduled for back surgery in April of this year however this was delayed secondary to his thrombocytopenia.  He has significant pain and discomfort related to multiple spinal fractures.  He recently had a CT scan of his abdomen/pelvis in 2021 which did not show any evidence of cirrhosis or splenomegaly he otherwise has no other major complaints today and is compliant with all his other home medications     Interval History:  Presents clinic for follow-up.  Overall stable at this time.  Continues to have significant back discomfort.  Scheduled for surgery at the end of the month. Remains on aspirin and Plavix.  Denies any easy bleeding or bruising episodes    Oncology History:    Oncology/Hematology History    No history exists.       Subjective      Review of Systems:   Constitutional: Negative for fevers, chills, or weight loss  Eyes: Negative for blurred vision or discharge         Ear/Nose/Throat:  Negative for difficulty swallowing, sore throat, LAD                                                       Respiratory: Negative for cough, SOA, wheezing                                                                                        Cardiovascular: Negative for chest pain or palpitations                                                                  Gastrointestinal: Negative for nausea, vomiting or diarrhea                                                                     Genitourinary: Negative for dysuria or hematuria                                                                                           Musculoskeletal: Negative for any joint pains or muscle aches                                                                        Neurologic: Negative for any weakness, headaches, dizziness                                                                         Hematologic: Negative for any easy bleeding or bruising                                                                                   Psychiatric: Negative for anxiety or depression                          Past Medical History/Past Surgical History/ Family History/ Social History: Reviewed by me and unchanged from my previous documentation done on July 2021.     Medications:     Current Outpatient Medications:   •  acetaminophen (TYLENOL) 500 MG tablet, Take 1,000 mg by mouth Every 6 (Six) Hours As Needed for Mild Pain ., Disp: , Rfl:   •  aspirin 81 MG EC tablet, Take 81 mg by mouth Daily., Disp: , Rfl:   •  bisoprolol (ZEBeta) 10 MG tablet, Take 1 tablet by mouth Daily., Disp: 90 tablet, Rfl: 3  •  clopidogrel (PLAVIX) 75 MG tablet, Take 1 tablet by mouth Daily., Disp: 90 tablet, Rfl: 3  •  cyclobenzaprine (FLEXERIL) 10 MG tablet, Take 1 tablet by mouth 3 (Three) Times a Day As Needed for Muscle Spasms., Disp: 90 tablet, Rfl: 1  •  eplerenone (INSPRA) 25 MG tablet, Take 1 tablet by mouth Daily., Disp: 90 tablet, Rfl: 3  •   "HYDROcodone-acetaminophen (NORCO) 5-325 MG per tablet, , Disp: , Rfl:   •  Insulin Glargine (BASAGLAR KWIKPEN) 100 UNIT/ML injection pen, 10 units sq daily, Disp: 1 pen, Rfl: 0  •  Insulin Pen Needle (Ultracare Pen Needles) 32G X 5 MM misc, Use daily with insulin, Disp: 50 each, Rfl: 2  •  nitroglycerin (Nitrostat) 0.4 MG SL tablet, Place 1 tablet under the tongue Every 5 (Five) Minutes As Needed for Chest Pain., Disp: 25 tablet, Rfl: 3  •  pantoprazole (PROTONIX) 40 MG EC tablet, TAKE ONE TABLET BY MOUTH EVERY DAY, Disp: 90 tablet, Rfl: 1  •  rosuvastatin (CRESTOR) 10 MG tablet, Take 1 tablet by mouth Daily., Disp: 90 tablet, Rfl: 3  •  sacubitril-valsartan (Entresto) 49-51 MG tablet, Take 1 tablet by mouth Every 12 (Twelve) Hours., Disp: 180 tablet, Rfl: 3  •  SITagliptin (Januvia) 100 MG tablet, Take 1 tablet by mouth Daily., Disp: 90 tablet, Rfl: 1  •  traZODone (DESYREL) 100 MG tablet, TAKE 1/2 TABLET BY MOUTH EVERY NIGHT AT BEDTIME AND INCREASE TO 1 TABLET IF NEEDED, Disp: 30 tablet, Rfl: 5    Allergies:   Allergies   Allergen Reactions   • Ativan [Lorazepam] Mental Status Change     agitation   • Zetia [Ezetimibe] Other (See Comments)     increased LFTs   • Zocor [Simvastatin] Myalgia   • Isosorbide Other (See Comments)     headaches   • Jardiance [Empagliflozin] Other (See Comments)     Blood in urine and burning sensation   • Metformin And Related Diarrhea       Objective     Physical Exam:  Vital Signs:   Vitals:    10/12/21 1301   BP: 114/64   Pulse: 83   Resp: 20   Temp: 96.9 °F (36.1 °C)   TempSrc: Temporal   SpO2: 93%   Weight: 109 kg (240 lb 12.8 oz)   Height: 170.2 cm (67\")   PainSc:   9   PainLoc: Back     Pain Score    10/12/21 1301   PainSc:   9   PainLoc: Back     ECOG Performance Status: 1 - Symptomatic but completely ambulatory    Constitutional: NAD, ECOG 1  Eyes: PERRLA, scleral anicteric  ENT: No LAD, no thyromegaly  Respiratory: CTAB, no wheezing, rales, rhonchi  Cardiovascular: RRR, no " murmurs, pulses 2+ bilaterally  Abdomen: soft, NT/ND, no HSM  Musculoskeletal: strength 5/5 bilaterally, no c/c/e  Neurologic: A&O x 3, CN II-XII intact grossly    Results Review:   No visits with results within 2 Week(s) from this visit.   Latest known visit with results is:   Office Visit on 08/24/2021   Component Date Value Ref Range Status   • Glucose 08/24/2021 221* 65 - 99 mg/dL Final   • BUN 08/24/2021 13  8 - 23 mg/dL Final   • Creatinine 08/24/2021 1.08  0.76 - 1.27 mg/dL Final   • eGFR Non  Am 08/24/2021 68  >60 mL/min/1.73 Final    Comment: GFR Normal >60  Chronic Kidney Disease <60  Kidney Failure <15     • eGFR  Am 08/24/2021 83  >60 mL/min/1.73 Final   • BUN/Creatinine Ratio 08/24/2021 12.0  7.0 - 25.0 Final   • Sodium 08/24/2021 135* 136 - 145 mmol/L Final   • Potassium 08/24/2021 4.4  3.5 - 5.2 mmol/L Final   • Chloride 08/24/2021 98  98 - 107 mmol/L Final   • Total CO2 08/24/2021 26.2  22.0 - 29.0 mmol/L Final   • Calcium 08/24/2021 9.7  8.6 - 10.5 mg/dL Final   • Total Protein 08/24/2021 7.3  6.0 - 8.5 g/dL Final   • Albumin 08/24/2021 4.20  3.50 - 5.20 g/dL Final   • Globulin 08/24/2021 3.1  gm/dL Final   • A/G Ratio 08/24/2021 1.4  g/dL Final   • Total Bilirubin 08/24/2021 0.3  0.0 - 1.2 mg/dL Final   • Alkaline Phosphatase 08/24/2021 40  39 - 117 U/L Final   • AST (SGOT) 08/24/2021 38  1 - 40 U/L Final   • ALT (SGPT) 08/24/2021 37  1 - 41 U/L Final   • Total Cholesterol 08/24/2021 113  0 - 200 mg/dL Final    Comment: Cholesterol Reference Ranges  (U.S. Department of Health and Human Services ATP III  Classifications)  Desirable          <200 mg/dL  Borderline High    200-239 mg/dL  High Risk          >240 mg/dL  Triglyceride Reference Ranges  (U.S. Department of Health and Human Services ATP III  Classifications)  Normal           <150 mg/dL  Borderline High  150-199 mg/dL  High             200-499 mg/dL  Very High        >500 mg/dL  HDL Reference Ranges  (U.S. Department of Health  and Human Services ATP III  Classifcations)  Low     <40 mg/dl (major risk factor for CHD)  High    >60 mg/dl ('negative' risk factor for CHD)  LDL Reference Ranges  (U.S. Department of Health and Human Services ATP III  Classifcations)  Optimal          <100 mg/dL  Near Optimal     100-129 mg/dL  Borderline High  130-159 mg/dL  High             160-189 mg/dL  Very High        >189 mg/dL     • Triglycerides 08/24/2021 177* 0 - 150 mg/dL Final   • HDL Cholesterol 08/24/2021 34* 40 - 60 mg/dL Final   • VLDL Cholesterol Laurent 08/24/2021 30  5 - 40 mg/dL Final   • LDL Chol Calc ([facility]) 08/24/2021 49  0 - 100 mg/dL Final   • Chol/HDL Ratio 08/24/2021 3.32   Final   • Hemoglobin A1C 08/24/2021 11.20* 4.80 - 5.60 % Final    Comment: Hemoglobin A1C Ranges:  Increased Risk for Diabetes  5.7% to 6.4%  Diabetes                     >= 6.5%  Diabetic Goal                < 7.0%     • WBC 08/24/2021 4.57  3.40 - 10.80 10*3/mm3 Final   • RBC 08/24/2021 4.20  4.14 - 5.80 10*6/mm3 Final   • Hemoglobin 08/24/2021 13.1  13.0 - 17.7 g/dL Final   • Hematocrit 08/24/2021 40.1  37.5 - 51.0 % Final   • MCV 08/24/2021 95.5  79.0 - 97.0 fL Final   • MCH 08/24/2021 31.2  26.6 - 33.0 pg Final   • MCHC 08/24/2021 32.7  31.5 - 35.7 g/dL Final   • RDW 08/24/2021 12.6  12.3 - 15.4 % Final   • Platelets 08/24/2021 77* 140 - 450 10*3/mm3 Final   • Neutrophil Rel % 08/24/2021 60.2  42.7 - 76.0 % Final   • Lymphocyte Rel % 08/24/2021 28.2  19.6 - 45.3 % Final   • Monocyte Rel % 08/24/2021 9.0  5.0 - 12.0 % Final   • Eosinophil Rel % 08/24/2021 1.8  0.3 - 6.2 % Final   • Basophil Rel % 08/24/2021 0.4  0.0 - 1.5 % Final   • Neutrophils Absolute 08/24/2021 2.75  1.70 - 7.00 10*3/mm3 Final   • Lymphocytes Absolute 08/24/2021 1.29  0.70 - 3.10 10*3/mm3 Final   • Monocytes Absolute 08/24/2021 0.41  0.10 - 0.90 10*3/mm3 Final   • Eosinophils Absolute 08/24/2021 0.08  0.00 - 0.40 10*3/mm3 Final   • Basophils Absolute 08/24/2021 0.02  0.00 - 0.20 10*3/mm3 Final   •  Immature Granulocyte Rel % 08/24/2021 0.4  0.0 - 0.5 % Final   • Immature Grans Absolute 08/24/2021 0.02  0.00 - 0.05 10*3/mm3 Final       No results found.    Assessment / Plan      Assessment/Plan:   1. Thrombocytopenia (CMS/HCC) (Primary)  -Unclear etiology at this time. Likely medication induced vs autoimmune vs liver disease  -Repeat platelet count 97K in June 2021, repeat in August 2021 77K  -HIV within normal limits  -Vitamin B12 and folate within normal limits  -Iron studies within normal limits  -LDH mildly elevated, haptoglobin 20  -Lary test negative  -Patient with recent CT scan of the abdomen/pelvis which showed no splenomegaly, but did note some slight nodular morphology  -Patient's platelet count likely secondary to underlying liver disease vs him being on Plavix and aspirin  -Patient would like to have back surgery for his chronic back pain.  I believe that he would be okay for this as his platelet count is currently high enough to achieve significant hemostasis and he would be okay for platelet transfusion should this be necessary.  Plan to hold aspirin/Plavix for least 1 week prior to surgical intervention         Follow Up:   Follow-up in 6 months     Hernan Kaiser MD  Hematology and Oncology     Please note that portions of this note may have been completed with a voice recognition program. Efforts were made to edit the dictations, but occasionally words are mistranscribed.

## 2021-10-21 DIAGNOSIS — M54.50 LUMBAR PAIN: ICD-10-CM

## 2021-10-21 RX ORDER — CYCLOBENZAPRINE HCL 10 MG
TABLET ORAL
Qty: 90 TABLET | Refills: 1 | Status: SHIPPED | OUTPATIENT
Start: 2021-10-21 | End: 2021-12-06

## 2021-10-22 ENCOUNTER — TELEPHONE (OUTPATIENT)
Dept: FAMILY MEDICINE CLINIC | Facility: CLINIC | Age: 66
End: 2021-10-22

## 2021-10-22 DIAGNOSIS — Z79.4 TYPE 2 DIABETES MELLITUS WITH HYPERGLYCEMIA, WITH LONG-TERM CURRENT USE OF INSULIN (HCC): Primary | ICD-10-CM

## 2021-10-22 DIAGNOSIS — E11.65 TYPE 2 DIABETES MELLITUS WITH HYPERGLYCEMIA, WITH LONG-TERM CURRENT USE OF INSULIN (HCC): Primary | ICD-10-CM

## 2021-10-22 NOTE — TELEPHONE ENCOUNTER
PATIENTS WIFE CALLED TO PROVIDE DR. GONSALEZ WITH PATIENTS AVERAGE BLOOD SUGAR READINGS OVER 2 WEEK TIME FRAME 10/8/21 - 10/22/21    AVERAGE IN THE MORNINGS 170  AVERAGE AT NIGHT 190    PLEASE CALL PATIENTS WIFE WITH ANY QUESTIONS AND/OR CONCERNS [phone]

## 2021-10-24 NOTE — TELEPHONE ENCOUNTER
Sounds like sugars are getting a little better.  Go ahead and increase to 30 units daily.  Call in 3 to 4 weeks with readings.

## 2021-10-25 ENCOUNTER — TELEPHONE (OUTPATIENT)
Dept: FAMILY MEDICINE CLINIC | Facility: CLINIC | Age: 66
End: 2021-10-25

## 2021-10-25 ENCOUNTER — PRE-ADMISSION TESTING (OUTPATIENT)
Dept: PREADMISSION TESTING | Facility: HOSPITAL | Age: 66
End: 2021-10-25

## 2021-10-25 VITALS — BODY MASS INDEX: 37.37 KG/M2 | WEIGHT: 238.1 LBS | HEIGHT: 67 IN

## 2021-10-25 LAB
HBA1C MFR BLD: 9.2 % (ref 4.8–5.6)
POTASSIUM SERPL-SCNC: 4.4 MMOL/L (ref 3.5–5.2)
QT INTERVAL: 416 MS
QTC INTERVAL: 477 MS
SARS-COV-2 RNA PNL SPEC NAA+PROBE: NOT DETECTED

## 2021-10-25 PROCEDURE — 93005 ELECTROCARDIOGRAM TRACING: CPT

## 2021-10-25 PROCEDURE — 93010 ELECTROCARDIOGRAM REPORT: CPT | Performed by: INTERNAL MEDICINE

## 2021-10-25 PROCEDURE — 86901 BLOOD TYPING SEROLOGIC RH(D): CPT

## 2021-10-25 PROCEDURE — 83036 HEMOGLOBIN GLYCOSYLATED A1C: CPT

## 2021-10-25 PROCEDURE — 84132 ASSAY OF SERUM POTASSIUM: CPT

## 2021-10-25 PROCEDURE — 86850 RBC ANTIBODY SCREEN: CPT

## 2021-10-25 PROCEDURE — 86900 BLOOD TYPING SEROLOGIC ABO: CPT

## 2021-10-25 PROCEDURE — C9803 HOPD COVID-19 SPEC COLLECT: HCPCS

## 2021-10-25 PROCEDURE — U0004 COV-19 TEST NON-CDC HGH THRU: HCPCS

## 2021-10-25 PROCEDURE — 36415 COLL VENOUS BLD VENIPUNCTURE: CPT

## 2021-10-25 NOTE — TELEPHONE ENCOUNTER
Please call.  Did they send him any information on what he should and should not take on the day?  Sometimes it depends on the timing of the surgery.  If the surgery is in the morning, recommend that he hold his morning dose of medication.  If it is in the afternoon, okay to take the medication except take one half dose of the insulin.

## 2021-10-25 NOTE — TELEPHONE ENCOUNTER
Pt called marcy was busy at this time, if she can give a call back the patients wife had questions.

## 2021-10-25 NOTE — TELEPHONE ENCOUNTER
Caller: Kai Garcia    Relationship: Self    Best call back number: [phone]     What is the best time to reach you: ANYTIME     Who are you requesting to speak with (clinical staff, provider,  specific staff member): CLINICAL STAFF     What was the call regarding: PATIENT IS CALLING TO SEE IF HE IS TO TAKE HIS DIABETES MEDICATION ON THE DAY OF HIS SURGERY. PATIENT IS HAVING SURGERY ON 10/27/2021    Do you require a callback: YES

## 2021-10-26 ENCOUNTER — ANESTHESIA EVENT (OUTPATIENT)
Dept: PERIOP | Facility: HOSPITAL | Age: 66
End: 2021-10-26

## 2021-10-26 LAB
ABO GROUP BLD: NORMAL
BLD GP AB SCN SERPL QL: NEGATIVE
RH BLD: POSITIVE

## 2021-10-26 RX ORDER — INSULIN GLARGINE 100 [IU]/ML
30 INJECTION, SOLUTION SUBCUTANEOUS DAILY
Qty: 24 ML | Refills: 1 | Status: SHIPPED | OUTPATIENT
Start: 2021-10-26 | End: 2021-12-06

## 2021-10-26 NOTE — TELEPHONE ENCOUNTER
Informed pt and wife. Will need a prescription called into [facility]. Also out of the samples that where given.

## 2021-10-26 NOTE — TELEPHONE ENCOUNTER
Please call.  When I placed the order for the Basaglar insulin, it came up that the insulin prefers one called Lantus.  I sent that in and is the same instructions at 30 units daily.

## 2021-10-26 NOTE — TELEPHONE ENCOUNTER
Spoke with patient and wife they had told him everything but his diabetic morning med. Stated he has to be there at 5 am. Informed him to hold morning med. Also wanted to let dae know with the blood work he had done yesterday his a1c is going down. So he thinks the meds are working good for him.

## 2021-10-27 ENCOUNTER — ANESTHESIA (OUTPATIENT)
Dept: PERIOP | Facility: HOSPITAL | Age: 66
End: 2021-10-27

## 2021-10-27 ENCOUNTER — HOSPITAL ENCOUNTER (INPATIENT)
Facility: HOSPITAL | Age: 66
LOS: 2 days | Discharge: HOME | End: 2021-10-29
Attending: ORTHOPAEDIC SURGERY | Admitting: ORTHOPAEDIC SURGERY

## 2021-10-27 ENCOUNTER — APPOINTMENT (OUTPATIENT)
Dept: GENERAL RADIOLOGY | Facility: HOSPITAL | Age: 66
End: 2021-10-27

## 2021-10-27 DIAGNOSIS — Z98.1 S/P LUMBAR FUSION: Primary | ICD-10-CM

## 2021-10-27 PROBLEM — M54.9 BACK PAIN: Status: ACTIVE | Noted: 2021-10-27

## 2021-10-27 PROBLEM — I50.9 CHF (CONGESTIVE HEART FAILURE): Status: ACTIVE | Noted: 2021-10-27

## 2021-10-27 LAB
ABO GROUP BLD: NORMAL
BLD GP AB SCN SERPL QL: NEGATIVE
GLUCOSE BLDC GLUCOMTR-MCNC: 184 MG/DL (ref 70–130)
GLUCOSE BLDC GLUCOMTR-MCNC: 228 MG/DL (ref 70–130)
GLUCOSE BLDC GLUCOMTR-MCNC: 241 MG/DL (ref 70–130)
HCT VFR BLD AUTO: 28.1 % (ref 37.5–51)
HGB BLD-MCNC: 10 G/DL (ref 13–17.7)
RH BLD: POSITIVE
T&S EXPIRATION DATE: NORMAL

## 2021-10-27 PROCEDURE — C1889 IMPLANT/INSERT DEVICE, NOC: HCPCS | Performed by: ORTHOPAEDIC SURGERY

## 2021-10-27 PROCEDURE — 94799 UNLISTED PULMONARY SVC/PX: CPT

## 2021-10-27 PROCEDURE — C1713 ANCHOR/SCREW BN/BN,TIS/BN: HCPCS | Performed by: ORTHOPAEDIC SURGERY

## 2021-10-27 PROCEDURE — 25010000002 DEXAMETHASONE PER 1 MG: Performed by: NURSE ANESTHETIST, CERTIFIED REGISTERED

## 2021-10-27 PROCEDURE — P9041 ALBUMIN (HUMAN),5%, 50ML: HCPCS

## 2021-10-27 PROCEDURE — 63710000001 INSULIN LISPRO (HUMAN) PER 5 UNITS: Performed by: NURSE ANESTHETIST, CERTIFIED REGISTERED

## 2021-10-27 PROCEDURE — 25010000002 ALBUMIN HUMAN 5% PER 50 ML

## 2021-10-27 PROCEDURE — 25010000002 VANCOMYCIN 1 G RECONSTITUTED SOLUTION: Performed by: ORTHOPAEDIC SURGERY

## 2021-10-27 PROCEDURE — 94660 CPAP INITIATION&MGMT: CPT

## 2021-10-27 PROCEDURE — 25010000002 PHENYLEPHRINE 10 MG/ML SOLUTION 1 ML VIAL: Performed by: NURSE ANESTHETIST, CERTIFIED REGISTERED

## 2021-10-27 PROCEDURE — 25010000002 HYDROMORPHONE PER 4 MG: Performed by: NURSE ANESTHETIST, CERTIFIED REGISTERED

## 2021-10-27 PROCEDURE — 85018 HEMOGLOBIN: CPT | Performed by: NURSE PRACTITIONER

## 2021-10-27 PROCEDURE — 25010000002 PROPOFOL 10 MG/ML EMULSION: Performed by: NURSE ANESTHETIST, CERTIFIED REGISTERED

## 2021-10-27 PROCEDURE — 0 LIDOCAINE 1 % SOLUTION: Performed by: NURSE ANESTHETIST, CERTIFIED REGISTERED

## 2021-10-27 PROCEDURE — 86901 BLOOD TYPING SEROLOGIC RH(D): CPT | Performed by: ORTHOPAEDIC SURGERY

## 2021-10-27 PROCEDURE — P9041 ALBUMIN (HUMAN),5%, 50ML: HCPCS | Performed by: ANESTHESIOLOGY

## 2021-10-27 PROCEDURE — 85014 HEMATOCRIT: CPT | Performed by: NURSE PRACTITIONER

## 2021-10-27 PROCEDURE — 82962 GLUCOSE BLOOD TEST: CPT

## 2021-10-27 PROCEDURE — 25010000002 FENTANYL CITRATE (PF) 50 MCG/ML SOLUTION: Performed by: NURSE ANESTHETIST, CERTIFIED REGISTERED

## 2021-10-27 PROCEDURE — 25010000002 NEOSTIGMINE 10 MG/10ML SOLUTION: Performed by: NURSE ANESTHETIST, CERTIFIED REGISTERED

## 2021-10-27 PROCEDURE — 0 CEFAZOLIN IN DEXTROSE 2-4 GM/100ML-% SOLUTION: Performed by: ORTHOPAEDIC SURGERY

## 2021-10-27 PROCEDURE — 76000 FLUOROSCOPY <1 HR PHYS/QHP: CPT

## 2021-10-27 PROCEDURE — 63710000001 INSULIN DETEMIR PER 5 UNITS: Performed by: NURSE PRACTITIONER

## 2021-10-27 PROCEDURE — 25010000002 ONDANSETRON PER 1 MG

## 2021-10-27 PROCEDURE — 86900 BLOOD TYPING SEROLOGIC ABO: CPT | Performed by: ORTHOPAEDIC SURGERY

## 2021-10-27 PROCEDURE — 0SG00AJ FUSION OF LUMBAR VERTEBRAL JOINT WITH INTERBODY FUSION DEVICE, POSTERIOR APPROACH, ANTERIOR COLUMN, OPEN APPROACH: ICD-10-PCS | Performed by: ORTHOPAEDIC SURGERY

## 2021-10-27 PROCEDURE — 0SG30AJ FUSION OF LUMBOSACRAL JOINT WITH INTERBODY FUSION DEVICE, POSTERIOR APPROACH, ANTERIOR COLUMN, OPEN APPROACH: ICD-10-PCS | Performed by: ORTHOPAEDIC SURGERY

## 2021-10-27 PROCEDURE — 25010000002 ONDANSETRON PER 1 MG: Performed by: NURSE ANESTHETIST, CERTIFIED REGISTERED

## 2021-10-27 PROCEDURE — 86850 RBC ANTIBODY SCREEN: CPT | Performed by: ORTHOPAEDIC SURGERY

## 2021-10-27 PROCEDURE — 25010000002 ALBUMIN HUMAN 5% PER 50 ML: Performed by: ANESTHESIOLOGY

## 2021-10-27 PROCEDURE — 25010000002 SODIUM CHLORIDE 0.9 % WITH KCL 20 MEQ 20-0.9 MEQ/L-% SOLUTION: Performed by: ORTHOPAEDIC SURGERY

## 2021-10-27 PROCEDURE — 0SG0071 FUSION OF LUMBAR VERTEBRAL JOINT WITH AUTOLOGOUS TISSUE SUBSTITUTE, POSTERIOR APPROACH, POSTERIOR COLUMN, OPEN APPROACH: ICD-10-PCS | Performed by: ORTHOPAEDIC SURGERY

## 2021-10-27 DEVICE — ALLOGRFT BONE VIVIGEN CELLUAR MATRX FORMABLE 10CC: Type: IMPLANTABLE DEVICE | Site: SPINE LUMBAR | Status: FUNCTIONAL

## 2021-10-27 DEVICE — SCRW VIPER INNR ST: Type: IMPLANTABLE DEVICE | Site: SPINE LUMBAR | Status: FUNCTIONAL

## 2021-10-27 DEVICE — IMPLANTABLE DEVICE: Type: IMPLANTABLE DEVICE | Site: SPINE LUMBAR | Status: FUNCTIONAL

## 2021-10-27 DEVICE — FLOSEAL HEMOSTATIC MATRIX, 10ML
Type: IMPLANTABLE DEVICE | Site: SPINE LUMBAR | Status: FUNCTIONAL
Brand: FLOSEAL HEMOSTATIC MATRIX

## 2021-10-27 DEVICE — ROD PRELRD SPINE EXPEDIUM W/LINE 65MM: Type: IMPLANTABLE DEVICE | Site: SPINE LUMBAR | Status: FUNCTIONAL

## 2021-10-27 DEVICE — SCRW CORT VIPER PLS5.5 TI FIX 6X45MM: Type: IMPLANTABLE DEVICE | Site: SPINE LUMBAR | Status: FUNCTIONAL

## 2021-10-27 DEVICE — HEMOST ABS SURGIFOAM SZ100 8X12 10MM: Type: IMPLANTABLE DEVICE | Site: SPINE LUMBAR | Status: FUNCTIONAL

## 2021-10-27 DEVICE — SCRW CORT VIPER PLS5.5 TI FIX 7X40MM: Type: IMPLANTABLE DEVICE | Site: SPINE LUMBAR | Status: FUNCTIONAL

## 2021-10-27 RX ORDER — SODIUM CHLORIDE 0.9 % (FLUSH) 0.9 %
10 SYRINGE (ML) INJECTION AS NEEDED
Status: DISCONTINUED | OUTPATIENT
Start: 2021-10-27 | End: 2021-10-27 | Stop reason: HOSPADM

## 2021-10-27 RX ORDER — VANCOMYCIN HYDROCHLORIDE 1 G/20ML
INJECTION, POWDER, LYOPHILIZED, FOR SOLUTION INTRAVENOUS AS NEEDED
Status: DISCONTINUED | OUTPATIENT
Start: 2021-10-27 | End: 2021-10-27 | Stop reason: HOSPADM

## 2021-10-27 RX ORDER — ALBUMIN (HUMAN) 12.5 G/50ML
12.5 SOLUTION INTRAVENOUS ONCE
Status: DISCONTINUED | OUTPATIENT
Start: 2021-10-27 | End: 2021-10-27

## 2021-10-27 RX ORDER — FENTANYL CITRATE 50 UG/ML
INJECTION, SOLUTION INTRAMUSCULAR; INTRAVENOUS AS NEEDED
Status: DISCONTINUED | OUTPATIENT
Start: 2021-10-27 | End: 2021-10-27 | Stop reason: SURG

## 2021-10-27 RX ORDER — CEFAZOLIN SODIUM 2 G/100ML
2 INJECTION, SOLUTION INTRAVENOUS ONCE
Status: COMPLETED | OUTPATIENT
Start: 2021-10-27 | End: 2021-10-27

## 2021-10-27 RX ORDER — HYDROMORPHONE HYDROCHLORIDE 1 MG/ML
0.5 INJECTION, SOLUTION INTRAMUSCULAR; INTRAVENOUS; SUBCUTANEOUS
Status: DISCONTINUED | OUTPATIENT
Start: 2021-10-27 | End: 2021-10-27 | Stop reason: HOSPADM

## 2021-10-27 RX ORDER — PROMETHAZINE HYDROCHLORIDE 25 MG/1
25 TABLET ORAL ONCE AS NEEDED
Status: DISCONTINUED | OUTPATIENT
Start: 2021-10-27 | End: 2021-10-27 | Stop reason: HOSPADM

## 2021-10-27 RX ORDER — CYCLOBENZAPRINE HCL 10 MG
10 TABLET ORAL 3 TIMES DAILY PRN
Status: DISCONTINUED | OUTPATIENT
Start: 2021-10-27 | End: 2021-10-29 | Stop reason: HOSPADM

## 2021-10-27 RX ORDER — TRAZODONE HYDROCHLORIDE 100 MG/1
100 TABLET ORAL NIGHTLY PRN
Status: DISCONTINUED | OUTPATIENT
Start: 2021-10-27 | End: 2021-10-29 | Stop reason: HOSPADM

## 2021-10-27 RX ORDER — NALOXONE HCL 0.4 MG/ML
0.4 VIAL (ML) INJECTION
Status: DISCONTINUED | OUTPATIENT
Start: 2021-10-27 | End: 2021-10-29 | Stop reason: HOSPADM

## 2021-10-27 RX ORDER — ONDANSETRON 2 MG/ML
INJECTION INTRAMUSCULAR; INTRAVENOUS
Status: COMPLETED
Start: 2021-10-27 | End: 2021-10-27

## 2021-10-27 RX ORDER — PROMETHAZINE HYDROCHLORIDE 25 MG/1
25 SUPPOSITORY RECTAL ONCE AS NEEDED
Status: DISCONTINUED | OUTPATIENT
Start: 2021-10-27 | End: 2021-10-27 | Stop reason: HOSPADM

## 2021-10-27 RX ORDER — FAMOTIDINE 20 MG/1
20 TABLET, FILM COATED ORAL EVERY 12 HOURS SCHEDULED
Status: DISCONTINUED | OUTPATIENT
Start: 2021-10-27 | End: 2021-10-29

## 2021-10-27 RX ORDER — OXYCODONE HCL 10 MG/1
10 TABLET, FILM COATED, EXTENDED RELEASE ORAL ONCE
Status: COMPLETED | OUTPATIENT
Start: 2021-10-27 | End: 2021-10-27

## 2021-10-27 RX ORDER — MORPHINE SULFATE 2 MG/ML
1 INJECTION, SOLUTION INTRAMUSCULAR; INTRAVENOUS EVERY 4 HOURS PRN
Status: DISCONTINUED | OUTPATIENT
Start: 2021-10-27 | End: 2021-10-29 | Stop reason: HOSPADM

## 2021-10-27 RX ORDER — BUPIVACAINE HYDROCHLORIDE AND EPINEPHRINE 2.5; 5 MG/ML; UG/ML
INJECTION, SOLUTION INFILTRATION; PERINEURAL AS NEEDED
Status: DISCONTINUED | OUTPATIENT
Start: 2021-10-27 | End: 2021-10-27 | Stop reason: HOSPADM

## 2021-10-27 RX ORDER — MIDAZOLAM HYDROCHLORIDE 1 MG/ML
1 INJECTION INTRAMUSCULAR; INTRAVENOUS
Status: DISCONTINUED | OUTPATIENT
Start: 2021-10-27 | End: 2021-10-27 | Stop reason: HOSPADM

## 2021-10-27 RX ORDER — ACETAMINOPHEN 325 MG/1
650 TABLET ORAL EVERY 4 HOURS PRN
Status: DISCONTINUED | OUTPATIENT
Start: 2021-10-27 | End: 2021-10-29 | Stop reason: HOSPADM

## 2021-10-27 RX ORDER — OXYCODONE AND ACETAMINOPHEN 10; 325 MG/1; MG/1
1 TABLET ORAL EVERY 4 HOURS PRN
Status: DISCONTINUED | OUTPATIENT
Start: 2021-10-27 | End: 2021-10-29 | Stop reason: HOSPADM

## 2021-10-27 RX ORDER — ALBUMIN, HUMAN INJ 5% 5 %
SOLUTION INTRAVENOUS
Status: COMPLETED
Start: 2021-10-27 | End: 2021-10-27

## 2021-10-27 RX ORDER — FAMOTIDINE 20 MG/1
20 TABLET, FILM COATED ORAL ONCE
Status: COMPLETED | OUTPATIENT
Start: 2021-10-27 | End: 2021-10-27

## 2021-10-27 RX ORDER — SODIUM CHLORIDE 0.9 % (FLUSH) 0.9 %
3 SYRINGE (ML) INJECTION EVERY 12 HOURS SCHEDULED
Status: DISCONTINUED | OUTPATIENT
Start: 2021-10-27 | End: 2021-10-29 | Stop reason: HOSPADM

## 2021-10-27 RX ORDER — MAGNESIUM HYDROXIDE 1200 MG/15ML
LIQUID ORAL AS NEEDED
Status: DISCONTINUED | OUTPATIENT
Start: 2021-10-27 | End: 2021-10-27 | Stop reason: HOSPADM

## 2021-10-27 RX ORDER — SODIUM CHLORIDE 0.9 % (FLUSH) 0.9 %
3-10 SYRINGE (ML) INJECTION AS NEEDED
Status: DISCONTINUED | OUTPATIENT
Start: 2021-10-27 | End: 2021-10-29 | Stop reason: HOSPADM

## 2021-10-27 RX ORDER — ONDANSETRON 2 MG/ML
4 INJECTION INTRAMUSCULAR; INTRAVENOUS EVERY 6 HOURS PRN
Status: DISCONTINUED | OUTPATIENT
Start: 2021-10-27 | End: 2021-10-27 | Stop reason: SDUPTHER

## 2021-10-27 RX ORDER — MIDAZOLAM HYDROCHLORIDE 1 MG/ML
0.5 INJECTION INTRAMUSCULAR; INTRAVENOUS
Status: DISCONTINUED | OUTPATIENT
Start: 2021-10-27 | End: 2021-10-27 | Stop reason: HOSPADM

## 2021-10-27 RX ORDER — DEXTROSE MONOHYDRATE 25 G/50ML
25 INJECTION, SOLUTION INTRAVENOUS
Status: DISCONTINUED | OUTPATIENT
Start: 2021-10-27 | End: 2021-10-29 | Stop reason: HOSPADM

## 2021-10-27 RX ORDER — CYCLOBENZAPRINE HCL 10 MG
10 TABLET ORAL 3 TIMES DAILY PRN
Status: DISCONTINUED | OUTPATIENT
Start: 2021-10-27 | End: 2021-10-27 | Stop reason: SDUPTHER

## 2021-10-27 RX ORDER — BISOPROLOL FUMARATE 10 MG/1
10 TABLET, FILM COATED ORAL DAILY
Status: DISCONTINUED | OUTPATIENT
Start: 2021-10-28 | End: 2021-10-29 | Stop reason: HOSPADM

## 2021-10-27 RX ORDER — NICOTINE POLACRILEX 4 MG
15 LOZENGE BUCCAL
Status: DISCONTINUED | OUTPATIENT
Start: 2021-10-27 | End: 2021-10-29 | Stop reason: HOSPADM

## 2021-10-27 RX ORDER — SODIUM CHLORIDE, SODIUM LACTATE, POTASSIUM CHLORIDE, CALCIUM CHLORIDE 600; 310; 30; 20 MG/100ML; MG/100ML; MG/100ML; MG/100ML
9 INJECTION, SOLUTION INTRAVENOUS CONTINUOUS
Status: DISCONTINUED | OUTPATIENT
Start: 2021-10-27 | End: 2021-10-28

## 2021-10-27 RX ORDER — SODIUM CHLORIDE AND POTASSIUM CHLORIDE 150; 900 MG/100ML; MG/100ML
100 INJECTION, SOLUTION INTRAVENOUS CONTINUOUS
Status: DISCONTINUED | OUTPATIENT
Start: 2021-10-27 | End: 2021-10-28

## 2021-10-27 RX ORDER — PROPOFOL 10 MG/ML
VIAL (ML) INTRAVENOUS AS NEEDED
Status: DISCONTINUED | OUTPATIENT
Start: 2021-10-27 | End: 2021-10-27 | Stop reason: SURG

## 2021-10-27 RX ORDER — SODIUM CHLORIDE 0.9 % (FLUSH) 0.9 %
10 SYRINGE (ML) INJECTION EVERY 12 HOURS SCHEDULED
Status: DISCONTINUED | OUTPATIENT
Start: 2021-10-27 | End: 2021-10-27 | Stop reason: HOSPADM

## 2021-10-27 RX ORDER — ALBUMIN, HUMAN INJ 5% 5 %
250 SOLUTION INTRAVENOUS ONCE
Status: COMPLETED | OUTPATIENT
Start: 2021-10-27 | End: 2021-10-27

## 2021-10-27 RX ORDER — LIDOCAINE HYDROCHLORIDE 10 MG/ML
INJECTION, SOLUTION INFILTRATION; PERINEURAL AS NEEDED
Status: DISCONTINUED | OUTPATIENT
Start: 2021-10-27 | End: 2021-10-27 | Stop reason: SURG

## 2021-10-27 RX ORDER — OXYCODONE AND ACETAMINOPHEN 7.5; 325 MG/1; MG/1
1 TABLET ORAL EVERY 4 HOURS PRN
Status: DISCONTINUED | OUTPATIENT
Start: 2021-10-27 | End: 2021-10-29 | Stop reason: HOSPADM

## 2021-10-27 RX ORDER — ONDANSETRON 2 MG/ML
INJECTION INTRAMUSCULAR; INTRAVENOUS AS NEEDED
Status: DISCONTINUED | OUTPATIENT
Start: 2021-10-27 | End: 2021-10-27 | Stop reason: SURG

## 2021-10-27 RX ORDER — LABETALOL HYDROCHLORIDE 5 MG/ML
10 INJECTION, SOLUTION INTRAVENOUS EVERY 4 HOURS PRN
Status: DISCONTINUED | OUTPATIENT
Start: 2021-10-27 | End: 2021-10-29 | Stop reason: HOSPADM

## 2021-10-27 RX ORDER — PHENYLEPHRINE HYDROCHLORIDE 10 MG/ML
INJECTION INTRAVENOUS
Status: DISPENSED
Start: 2021-10-27 | End: 2021-10-28

## 2021-10-27 RX ORDER — FAMOTIDINE 10 MG/ML
20 INJECTION, SOLUTION INTRAVENOUS EVERY 12 HOURS SCHEDULED
Status: DISCONTINUED | OUTPATIENT
Start: 2021-10-27 | End: 2021-10-29

## 2021-10-27 RX ORDER — CEFAZOLIN SODIUM 2 G/100ML
2 INJECTION, SOLUTION INTRAVENOUS EVERY 8 HOURS
Status: COMPLETED | OUTPATIENT
Start: 2021-10-27 | End: 2021-10-28

## 2021-10-27 RX ORDER — MEPERIDINE HYDROCHLORIDE 25 MG/ML
12.5 INJECTION INTRAMUSCULAR; INTRAVENOUS; SUBCUTANEOUS
Status: DISCONTINUED | OUTPATIENT
Start: 2021-10-27 | End: 2021-10-27 | Stop reason: HOSPADM

## 2021-10-27 RX ORDER — ACETAMINOPHEN 500 MG
1000 TABLET ORAL ONCE
Status: COMPLETED | OUTPATIENT
Start: 2021-10-27 | End: 2021-10-27

## 2021-10-27 RX ORDER — PANTOPRAZOLE SODIUM 40 MG/1
40 TABLET, DELAYED RELEASE ORAL DAILY
Status: DISCONTINUED | OUTPATIENT
Start: 2021-10-28 | End: 2021-10-29

## 2021-10-27 RX ORDER — ROSUVASTATIN CALCIUM 10 MG/1
10 TABLET, COATED ORAL NIGHTLY
Status: DISCONTINUED | OUTPATIENT
Start: 2021-10-27 | End: 2021-10-29 | Stop reason: HOSPADM

## 2021-10-27 RX ORDER — FENTANYL CITRATE 50 UG/ML
50 INJECTION, SOLUTION INTRAMUSCULAR; INTRAVENOUS
Status: DISCONTINUED | OUTPATIENT
Start: 2021-10-27 | End: 2021-10-27 | Stop reason: HOSPADM

## 2021-10-27 RX ORDER — GLYCOPYRROLATE 0.2 MG/ML
INJECTION INTRAMUSCULAR; INTRAVENOUS AS NEEDED
Status: DISCONTINUED | OUTPATIENT
Start: 2021-10-27 | End: 2021-10-27 | Stop reason: SURG

## 2021-10-27 RX ORDER — LIDOCAINE HYDROCHLORIDE 10 MG/ML
0.5 INJECTION, SOLUTION EPIDURAL; INFILTRATION; INTRACAUDAL; PERINEURAL ONCE AS NEEDED
Status: COMPLETED | OUTPATIENT
Start: 2021-10-27 | End: 2021-10-27

## 2021-10-27 RX ORDER — DEXAMETHASONE SODIUM PHOSPHATE 4 MG/ML
INJECTION, SOLUTION INTRA-ARTICULAR; INTRALESIONAL; INTRAMUSCULAR; INTRAVENOUS; SOFT TISSUE AS NEEDED
Status: DISCONTINUED | OUTPATIENT
Start: 2021-10-27 | End: 2021-10-27 | Stop reason: SURG

## 2021-10-27 RX ORDER — NEOSTIGMINE METHYLSULFATE 1 MG/ML
INJECTION, SOLUTION INTRAVENOUS AS NEEDED
Status: DISCONTINUED | OUTPATIENT
Start: 2021-10-27 | End: 2021-10-27 | Stop reason: SURG

## 2021-10-27 RX ORDER — ROCURONIUM BROMIDE 10 MG/ML
INJECTION, SOLUTION INTRAVENOUS AS NEEDED
Status: DISCONTINUED | OUTPATIENT
Start: 2021-10-27 | End: 2021-10-27 | Stop reason: SURG

## 2021-10-27 RX ORDER — PREGABALIN 75 MG/1
75 CAPSULE ORAL ONCE
Status: COMPLETED | OUTPATIENT
Start: 2021-10-27 | End: 2021-10-27

## 2021-10-27 RX ORDER — ONDANSETRON 2 MG/ML
4 INJECTION INTRAMUSCULAR; INTRAVENOUS EVERY 6 HOURS PRN
Status: DISCONTINUED | OUTPATIENT
Start: 2021-10-27 | End: 2021-10-29 | Stop reason: HOSPADM

## 2021-10-27 RX ADMIN — FAMOTIDINE 20 MG: 20 TABLET, FILM COATED ORAL at 20:27

## 2021-10-27 RX ADMIN — MUPIROCIN 1 APPLICATION: 20 OINTMENT TOPICAL at 06:55

## 2021-10-27 RX ADMIN — FENTANYL CITRATE 100 MCG: 50 INJECTION, SOLUTION INTRAMUSCULAR; INTRAVENOUS at 07:47

## 2021-10-27 RX ADMIN — CEFAZOLIN SODIUM 2 G: 2 INJECTION, SOLUTION INTRAVENOUS at 07:52

## 2021-10-27 RX ADMIN — SACUBITRIL AND VALSARTAN 1 TABLET: 49; 51 TABLET, FILM COATED ORAL at 20:28

## 2021-10-27 RX ADMIN — INSULIN LISPRO 4 UNITS: 100 INJECTION, SOLUTION INTRAVENOUS; SUBCUTANEOUS at 13:46

## 2021-10-27 RX ADMIN — ONDANSETRON 4 MG: 2 INJECTION INTRAMUSCULAR; INTRAVENOUS at 13:34

## 2021-10-27 RX ADMIN — ACETAMINOPHEN 1000 MG: 500 TABLET ORAL at 06:55

## 2021-10-27 RX ADMIN — ONDANSETRON 4 MG: 2 INJECTION INTRAMUSCULAR; INTRAVENOUS at 12:49

## 2021-10-27 RX ADMIN — LIDOCAINE HYDROCHLORIDE 0.5 ML: 10 INJECTION, SOLUTION EPIDURAL; INFILTRATION; INTRACAUDAL; PERINEURAL at 06:40

## 2021-10-27 RX ADMIN — HYDROMORPHONE HYDROCHLORIDE 0.5 MG: 1 INJECTION, SOLUTION INTRAMUSCULAR; INTRAVENOUS; SUBCUTANEOUS at 14:26

## 2021-10-27 RX ADMIN — HYDROMORPHONE HYDROCHLORIDE 0.5 MG: 1 INJECTION, SOLUTION INTRAMUSCULAR; INTRAVENOUS; SUBCUTANEOUS at 13:38

## 2021-10-27 RX ADMIN — SODIUM CHLORIDE, POTASSIUM CHLORIDE, SODIUM LACTATE AND CALCIUM CHLORIDE: 600; 310; 30; 20 INJECTION, SOLUTION INTRAVENOUS at 11:00

## 2021-10-27 RX ADMIN — NEOSTIGMINE METHYLSULFATE 4 MG: 0.5 INJECTION INTRAVENOUS at 12:49

## 2021-10-27 RX ADMIN — SODIUM CHLORIDE, POTASSIUM CHLORIDE, SODIUM LACTATE AND CALCIUM CHLORIDE: 600; 310; 30; 20 INJECTION, SOLUTION INTRAVENOUS at 09:57

## 2021-10-27 RX ADMIN — LIDOCAINE HYDROCHLORIDE 50 MG: 10 INJECTION, SOLUTION INFILTRATION; PERINEURAL at 07:47

## 2021-10-27 RX ADMIN — OXYCODONE HYDROCHLORIDE 10 MG: 10 TABLET, FILM COATED, EXTENDED RELEASE ORAL at 06:55

## 2021-10-27 RX ADMIN — DEXAMETHASONE SODIUM PHOSPHATE 8 MG: 4 INJECTION, SOLUTION INTRA-ARTICULAR; INTRALESIONAL; INTRAMUSCULAR; INTRAVENOUS; SOFT TISSUE at 07:50

## 2021-10-27 RX ADMIN — CEFAZOLIN SODIUM 2 G: 2 INJECTION, SOLUTION INTRAVENOUS at 20:27

## 2021-10-27 RX ADMIN — INSULIN DETEMIR 30 UNITS: 100 INJECTION, SOLUTION SUBCUTANEOUS at 20:30

## 2021-10-27 RX ADMIN — ROSUVASTATIN CALCIUM 10 MG: 10 TABLET, COATED ORAL at 20:27

## 2021-10-27 RX ADMIN — FAMOTIDINE 20 MG: 20 TABLET, FILM COATED ORAL at 06:55

## 2021-10-27 RX ADMIN — PHENYLEPHRINE HYDROCHLORIDE 0.3 MCG/KG/MIN: 10 INJECTION INTRAVENOUS at 08:55

## 2021-10-27 RX ADMIN — ROCURONIUM BROMIDE 50 MG: 10 INJECTION, SOLUTION INTRAVENOUS at 07:47

## 2021-10-27 RX ADMIN — POTASSIUM CHLORIDE AND SODIUM CHLORIDE 100 ML/HR: 900; 150 INJECTION, SOLUTION INTRAVENOUS at 17:03

## 2021-10-27 RX ADMIN — ALBUMIN HUMAN 250 ML: 0.05 INJECTION, SOLUTION INTRAVENOUS at 15:47

## 2021-10-27 RX ADMIN — PREGABALIN 75 MG: 75 CAPSULE ORAL at 06:55

## 2021-10-27 RX ADMIN — ROCURONIUM BROMIDE 20 MG: 10 INJECTION, SOLUTION INTRAVENOUS at 11:04

## 2021-10-27 RX ADMIN — ALBUMIN HUMAN 12.5 G: 0.05 INJECTION, SOLUTION INTRAVENOUS at 15:43

## 2021-10-27 RX ADMIN — GLYCOPYRROLATE 0.4 MG: 0.2 INJECTION INTRAMUSCULAR; INTRAVENOUS at 12:49

## 2021-10-27 RX ADMIN — CEFAZOLIN SODIUM 2 G: 2 INJECTION, SOLUTION INTRAVENOUS at 11:37

## 2021-10-27 RX ADMIN — SODIUM CHLORIDE, POTASSIUM CHLORIDE, SODIUM LACTATE AND CALCIUM CHLORIDE 9 ML/HR: 600; 310; 30; 20 INJECTION, SOLUTION INTRAVENOUS at 06:40

## 2021-10-27 RX ADMIN — PROPOFOL 150 MG: 10 INJECTION, EMULSION INTRAVENOUS at 07:47

## 2021-10-27 NOTE — ANESTHESIA POSTPROCEDURE EVALUATION
Patient: Kai Garcia    Procedure Summary     Date: 10/27/21 Room / Location:  [facility] [address] [place] [address] [facility]    Anesthesia Start: 0742 Anesthesia Stop: 1307    Procedure: LUMBAR FUSION (N/A Spine Lumbar) Diagnosis:     Surgeons: Fer Faulkner MD Provider: Bladimir Sewell MD    Anesthesia Type: general ASA Status: 3          Anesthesia Type: general    Vitals  No vitals data found for the desired time range.          Post Anesthesia Care and Evaluation    Patient location during evaluation: PACU  Patient participation: complete - patient participated  Level of consciousness: awake and alert  Pain score: 0  Pain management: adequate  Airway patency: patent  Anesthetic complications: No anesthetic complications  PONV Status: none  Cardiovascular status: hemodynamically stable and acceptable  Respiratory status: nonlabored ventilation, acceptable and nasal cannula  Hydration status: acceptable

## 2021-10-27 NOTE — ANESTHESIA PROCEDURE NOTES
Airway  Urgency: elective    Date/Time: 10/27/2021 7:51 AM  Airway not difficult    General Information and Staff    Patient location during procedure: OR  CRNA: Jenny Beckman CRNA    Indications and Patient Condition  Indications for airway management: airway protection    Preoxygenated: yes  MILS not maintained throughout  Mask difficulty assessment: 1 - vent by mask    Final Airway Details  Final airway type: endotracheal airway      Successful airway: ETT  Cuffed: yes   Successful intubation technique: direct laryngoscopy  Endotracheal tube insertion site: oral  Blade: Dodie  Blade size: 3  ETT size (mm): 7.5  Cormack-Lehane Classification: grade I - full view of glottis  Placement verified by: chest auscultation and capnometry   Cuff volume (mL): 10  Measured from: lips  ETT/EBT  to lips (cm): 20  Number of attempts at approach: 1  Assessment: lips, teeth, and gum same as pre-op and atraumatic intubation    Additional Comments  Negative epigastric sounds, Breath sound equal bilaterally with symmetric chest rise and fall

## 2021-10-27 NOTE — ANESTHESIA PREPROCEDURE EVALUATION
Anesthesia Evaluation                  Airway   Mallampati: III  TM distance: >3 FB  Neck ROM: full  Possible difficult intubation and Large neck circumference  Dental - normal exam     Pulmonary - normal exam   (+) sleep apnea on CPAP,   Cardiovascular - normal exam    (+) pacemaker ICD, hypertension, CAD, CABG, CHF , hyperlipidemia,     ROS comment:   Echo 3/21: EF 40%, no significant valve disease    Low CV risk per cardiology (plavix held 7 days, ok per cardiology)    Neuro/Psych  GI/Hepatic/Renal/Endo    (+) morbid obesity, GERD,  renal disease, diabetes mellitus,     Musculoskeletal     Abdominal  - normal exam    Bowel sounds: normal.   Substance History      OB/GYN          Other          Other Comment: Thrombocytopenia PLT 114K                Anesthesia Plan    ASA 3     general   (A line, rosenberg/glidescope available, magnet)  intravenous induction     Anesthetic plan, all risks, benefits, and alternatives have been provided, discussed and informed consent has been obtained with: patient.    Plan discussed with CRNA.

## 2021-10-28 PROBLEM — D62 ACUTE BLOOD LOSS ANEMIA: Status: ACTIVE | Noted: 2021-10-28

## 2021-10-28 LAB
ANION GAP SERPL CALCULATED.3IONS-SCNC: 8 MMOL/L (ref 5–15)
BUN SERPL-MCNC: 17 MG/DL (ref 8–23)
BUN/CREAT SERPL: 16.2 (ref 7–25)
CALCIUM SPEC-SCNC: 8.1 MG/DL (ref 8.6–10.5)
CHLORIDE SERPL-SCNC: 103 MMOL/L (ref 98–107)
CO2 SERPL-SCNC: 24 MMOL/L (ref 22–29)
CREAT SERPL-MCNC: 1.05 MG/DL (ref 0.76–1.27)
DEPRECATED RDW RBC AUTO: 44.8 FL (ref 37–54)
ERYTHROCYTE [DISTWIDTH] IN BLOOD BY AUTOMATED COUNT: 12.8 % (ref 12.3–15.4)
GFR SERPL CREATININE-BSD FRML MDRD: 70 ML/MIN/1.73
GLUCOSE BLDC GLUCOMTR-MCNC: 192 MG/DL (ref 70–130)
GLUCOSE BLDC GLUCOMTR-MCNC: 205 MG/DL (ref 70–130)
GLUCOSE BLDC GLUCOMTR-MCNC: 249 MG/DL (ref 70–130)
GLUCOSE BLDC GLUCOMTR-MCNC: 259 MG/DL (ref 70–130)
GLUCOSE SERPL-MCNC: 187 MG/DL (ref 65–99)
HCT VFR BLD AUTO: 26.7 % (ref 37.5–51)
HGB BLD-MCNC: 9 G/DL (ref 13–17.7)
MCH RBC QN AUTO: 32.4 PG (ref 26.6–33)
MCHC RBC AUTO-ENTMCNC: 33.7 G/DL (ref 31.5–35.7)
MCV RBC AUTO: 96 FL (ref 79–97)
PLATELET # BLD AUTO: 53 10*3/MM3 (ref 140–450)
PMV BLD AUTO: 11.5 FL (ref 6–12)
POTASSIUM SERPL-SCNC: 4.3 MMOL/L (ref 3.5–5.2)
RBC # BLD AUTO: 2.78 10*6/MM3 (ref 4.14–5.8)
SODIUM SERPL-SCNC: 135 MMOL/L (ref 136–145)
WBC # BLD AUTO: 8.25 10*3/MM3 (ref 3.4–10.8)

## 2021-10-28 PROCEDURE — 97161 PT EVAL LOW COMPLEX 20 MIN: CPT

## 2021-10-28 PROCEDURE — 82962 GLUCOSE BLOOD TEST: CPT

## 2021-10-28 PROCEDURE — 97116 GAIT TRAINING THERAPY: CPT

## 2021-10-28 PROCEDURE — G0108 DIAB MANAGE TRN  PER INDIV: HCPCS

## 2021-10-28 PROCEDURE — 94799 UNLISTED PULMONARY SVC/PX: CPT

## 2021-10-28 PROCEDURE — 85027 COMPLETE CBC AUTOMATED: CPT | Performed by: NURSE PRACTITIONER

## 2021-10-28 PROCEDURE — 97165 OT EVAL LOW COMPLEX 30 MIN: CPT | Performed by: OCCUPATIONAL THERAPIST

## 2021-10-28 PROCEDURE — 25010000002 SODIUM CHLORIDE 0.9 % WITH KCL 20 MEQ 20-0.9 MEQ/L-% SOLUTION: Performed by: ORTHOPAEDIC SURGERY

## 2021-10-28 PROCEDURE — 63710000001 INSULIN LISPRO (HUMAN) PER 5 UNITS: Performed by: NURSE PRACTITIONER

## 2021-10-28 PROCEDURE — 80048 BASIC METABOLIC PNL TOTAL CA: CPT | Performed by: NURSE PRACTITIONER

## 2021-10-28 PROCEDURE — 0 CEFAZOLIN IN DEXTROSE 2-4 GM/100ML-% SOLUTION: Performed by: ORTHOPAEDIC SURGERY

## 2021-10-28 PROCEDURE — 97535 SELF CARE MNGMENT TRAINING: CPT | Performed by: OCCUPATIONAL THERAPIST

## 2021-10-28 PROCEDURE — 63710000001 INSULIN DETEMIR PER 5 UNITS: Performed by: NURSE PRACTITIONER

## 2021-10-28 RX ORDER — DIPHENHYDRAMINE HCL 25 MG
25 CAPSULE ORAL EVERY 6 HOURS PRN
Status: DISCONTINUED | OUTPATIENT
Start: 2021-10-28 | End: 2021-10-29 | Stop reason: HOSPADM

## 2021-10-28 RX ADMIN — PANTOPRAZOLE SODIUM 40 MG: 40 TABLET, DELAYED RELEASE ORAL at 08:32

## 2021-10-28 RX ADMIN — SODIUM CHLORIDE 500 ML: 9 INJECTION, SOLUTION INTRAVENOUS at 07:39

## 2021-10-28 RX ADMIN — INSULIN DETEMIR 30 UNITS: 100 INJECTION, SOLUTION SUBCUTANEOUS at 20:58

## 2021-10-28 RX ADMIN — OXYCODONE HYDROCHLORIDE AND ACETAMINOPHEN 1 TABLET: 10; 325 TABLET ORAL at 11:48

## 2021-10-28 RX ADMIN — INSULIN LISPRO 6 UNITS: 100 INJECTION, SOLUTION INTRAVENOUS; SUBCUTANEOUS at 11:51

## 2021-10-28 RX ADMIN — INSULIN LISPRO 4 UNITS: 100 INJECTION, SOLUTION INTRAVENOUS; SUBCUTANEOUS at 17:48

## 2021-10-28 RX ADMIN — FAMOTIDINE 20 MG: 20 TABLET, FILM COATED ORAL at 20:58

## 2021-10-28 RX ADMIN — FAMOTIDINE 20 MG: 20 TABLET, FILM COATED ORAL at 08:32

## 2021-10-28 RX ADMIN — INSULIN LISPRO 5 UNITS: 100 INJECTION, SOLUTION INTRAVENOUS; SUBCUTANEOUS at 11:49

## 2021-10-28 RX ADMIN — INSULIN LISPRO 5 UNITS: 100 INJECTION, SOLUTION INTRAVENOUS; SUBCUTANEOUS at 08:31

## 2021-10-28 RX ADMIN — CEFAZOLIN SODIUM 2 G: 2 INJECTION, SOLUTION INTRAVENOUS at 03:47

## 2021-10-28 RX ADMIN — POTASSIUM CHLORIDE AND SODIUM CHLORIDE 100 ML/HR: 900; 150 INJECTION, SOLUTION INTRAVENOUS at 10:03

## 2021-10-28 RX ADMIN — INSULIN LISPRO 5 UNITS: 100 INJECTION, SOLUTION INTRAVENOUS; SUBCUTANEOUS at 17:49

## 2021-10-28 RX ADMIN — OXYCODONE HYDROCHLORIDE AND ACETAMINOPHEN 1 TABLET: 10; 325 TABLET ORAL at 20:58

## 2021-10-28 RX ADMIN — ROSUVASTATIN CALCIUM 10 MG: 10 TABLET, COATED ORAL at 20:58

## 2021-10-28 RX ADMIN — OXYCODONE HYDROCHLORIDE AND ACETAMINOPHEN 1 TABLET: 10; 325 TABLET ORAL at 03:41

## 2021-10-28 RX ADMIN — INSULIN LISPRO 2 UNITS: 100 INJECTION, SOLUTION INTRAVENOUS; SUBCUTANEOUS at 08:31

## 2021-10-29 ENCOUNTER — READMISSION MANAGEMENT (OUTPATIENT)
Dept: CALL CENTER | Facility: HOSPITAL | Age: 66
End: 2021-10-29

## 2021-10-29 ENCOUNTER — HOME HEALTH ADMISSION (OUTPATIENT)
Dept: HOME HEALTH SERVICES | Facility: HOME HEALTHCARE | Age: 66
End: 2021-10-29

## 2021-10-29 VITALS
WEIGHT: 238 LBS | OXYGEN SATURATION: 93 % | BODY MASS INDEX: 37.35 KG/M2 | DIASTOLIC BLOOD PRESSURE: 80 MMHG | RESPIRATION RATE: 20 BRPM | SYSTOLIC BLOOD PRESSURE: 123 MMHG | HEART RATE: 107 BPM | HEIGHT: 67 IN | TEMPERATURE: 98.2 F

## 2021-10-29 PROBLEM — N99.89 POSTOPERATIVE URINARY RETENTION: Status: ACTIVE | Noted: 2021-10-29

## 2021-10-29 PROBLEM — R33.8 POSTOPERATIVE URINARY RETENTION: Status: ACTIVE | Noted: 2021-10-29

## 2021-10-29 LAB
ANION GAP SERPL CALCULATED.3IONS-SCNC: 9 MMOL/L (ref 5–15)
BUN SERPL-MCNC: 14 MG/DL (ref 8–23)
BUN/CREAT SERPL: 16.7 (ref 7–25)
CALCIUM SPEC-SCNC: 8.7 MG/DL (ref 8.6–10.5)
CHLORIDE SERPL-SCNC: 100 MMOL/L (ref 98–107)
CO2 SERPL-SCNC: 26 MMOL/L (ref 22–29)
CREAT SERPL-MCNC: 0.84 MG/DL (ref 0.76–1.27)
DEPRECATED RDW RBC AUTO: 45.2 FL (ref 37–54)
ERYTHROCYTE [DISTWIDTH] IN BLOOD BY AUTOMATED COUNT: 13.1 % (ref 12.3–15.4)
GFR SERPL CREATININE-BSD FRML MDRD: 91 ML/MIN/1.73
GLUCOSE BLDC GLUCOMTR-MCNC: 192 MG/DL (ref 70–130)
GLUCOSE BLDC GLUCOMTR-MCNC: 197 MG/DL (ref 70–130)
GLUCOSE SERPL-MCNC: 188 MG/DL (ref 65–99)
HCT VFR BLD AUTO: 26.5 % (ref 37.5–51)
HGB BLD-MCNC: 8.9 G/DL (ref 13–17.7)
MCH RBC QN AUTO: 31.8 PG (ref 26.6–33)
MCHC RBC AUTO-ENTMCNC: 33.6 G/DL (ref 31.5–35.7)
MCV RBC AUTO: 94.6 FL (ref 79–97)
PLATELET # BLD AUTO: 56 10*3/MM3 (ref 140–450)
PMV BLD AUTO: 11.3 FL (ref 6–12)
POTASSIUM SERPL-SCNC: 4.1 MMOL/L (ref 3.5–5.2)
RBC # BLD AUTO: 2.8 10*6/MM3 (ref 4.14–5.8)
SODIUM SERPL-SCNC: 135 MMOL/L (ref 136–145)
WBC # BLD AUTO: 7.93 10*3/MM3 (ref 3.4–10.8)

## 2021-10-29 PROCEDURE — P9612 CATHETERIZE FOR URINE SPEC: HCPCS

## 2021-10-29 PROCEDURE — 82962 GLUCOSE BLOOD TEST: CPT

## 2021-10-29 PROCEDURE — 80048 BASIC METABOLIC PNL TOTAL CA: CPT

## 2021-10-29 PROCEDURE — 97530 THERAPEUTIC ACTIVITIES: CPT

## 2021-10-29 PROCEDURE — 63710000001 INSULIN LISPRO (HUMAN) PER 5 UNITS: Performed by: NURSE PRACTITIONER

## 2021-10-29 PROCEDURE — 97116 GAIT TRAINING THERAPY: CPT

## 2021-10-29 PROCEDURE — 85027 COMPLETE CBC AUTOMATED: CPT

## 2021-10-29 PROCEDURE — 97110 THERAPEUTIC EXERCISES: CPT

## 2021-10-29 RX ORDER — TAMSULOSIN HYDROCHLORIDE 0.4 MG/1
0.4 CAPSULE ORAL DAILY
Qty: 7 CAPSULE | Refills: 0 | Status: SHIPPED | OUTPATIENT
Start: 2021-10-29 | End: 2021-12-06

## 2021-10-29 RX ORDER — POLYETHYLENE GLYCOL 3350 17 G/17G
17 POWDER, FOR SOLUTION ORAL DAILY
Qty: 238 G | Refills: 0 | Status: SHIPPED | OUTPATIENT
Start: 2021-10-29 | End: 2021-11-13

## 2021-10-29 RX ORDER — ASPIRIN 81 MG/1
81 TABLET ORAL DAILY
Start: 2021-10-23

## 2021-10-29 RX ORDER — OXYCODONE AND ACETAMINOPHEN 10; 325 MG/1; MG/1
1 TABLET ORAL EVERY 4 HOURS PRN
Qty: 50 TABLET | Refills: 0 | Status: SHIPPED | OUTPATIENT
Start: 2021-10-29 | End: 2022-03-08

## 2021-10-29 RX ORDER — CLOPIDOGREL BISULFATE 75 MG/1
75 TABLET ORAL DAILY
Qty: 90 TABLET | Refills: 3 | Status: SHIPPED | OUTPATIENT
Start: 2021-11-04 | End: 2022-01-26 | Stop reason: SDUPTHER

## 2021-10-29 RX ORDER — TAMSULOSIN HYDROCHLORIDE 0.4 MG/1
0.4 CAPSULE ORAL DAILY
Status: DISCONTINUED | OUTPATIENT
Start: 2021-10-29 | End: 2021-10-29 | Stop reason: HOSPADM

## 2021-10-29 RX ADMIN — INSULIN LISPRO 2 UNITS: 100 INJECTION, SOLUTION INTRAVENOUS; SUBCUTANEOUS at 08:02

## 2021-10-29 RX ADMIN — BISOPROLOL FUMARATE 10 MG: 10 TABLET ORAL at 11:28

## 2021-10-29 RX ADMIN — OXYCODONE HYDROCHLORIDE AND ACETAMINOPHEN 1 TABLET: 10; 325 TABLET ORAL at 08:02

## 2021-10-29 RX ADMIN — SACUBITRIL AND VALSARTAN 1 TABLET: 49; 51 TABLET, FILM COATED ORAL at 11:28

## 2021-10-29 RX ADMIN — INSULIN LISPRO 2 UNITS: 100 INJECTION, SOLUTION INTRAVENOUS; SUBCUTANEOUS at 12:41

## 2021-10-29 RX ADMIN — OXYCODONE HYDROCHLORIDE AND ACETAMINOPHEN 1 TABLET: 10; 325 TABLET ORAL at 14:55

## 2021-10-29 RX ADMIN — TAMSULOSIN HYDROCHLORIDE 0.4 MG: 0.4 CAPSULE ORAL at 11:28

## 2021-10-29 RX ADMIN — INSULIN LISPRO 5 UNITS: 100 INJECTION, SOLUTION INTRAVENOUS; SUBCUTANEOUS at 08:02

## 2021-10-29 RX ADMIN — INSULIN LISPRO 5 UNITS: 100 INJECTION, SOLUTION INTRAVENOUS; SUBCUTANEOUS at 12:41

## 2021-10-29 NOTE — OUTREACH NOTE
Prep Survey      Responses   [employer] facility patient discharged from? [facility]   Is LACE score < 7 ? No   Emergency Room discharge w/ pulse ox? No   Eligibility [facility]   Date of Admission 10/27/21   Date of Discharge 10/29/21   Discharge Disposition Home-Health Care Svc   Discharge diagnosis Lumbar fusion L4-5 with fusion cage and bone graft Acute blood loss anemia, Hc CHF, T2DM   Does the patient have one of the following disease processes/diagnoses(primary or secondary)? General Surgery   Does the patient have Home health ordered? Yes   What is the Home health agency?  [facility]   Is there a DME ordered? Yes   What DME was ordered? rolling walker from [employer]   Prep survey completed? Yes          Kristine Weiss RN

## 2021-11-01 ENCOUNTER — HOME CARE VISIT (OUTPATIENT)
Dept: HOME HEALTH SERVICES | Facility: HOME HEALTHCARE | Age: 66
End: 2021-11-01

## 2021-11-01 ENCOUNTER — TRANSITIONAL CARE MANAGEMENT TELEPHONE ENCOUNTER (OUTPATIENT)
Dept: CALL CENTER | Facility: HOSPITAL | Age: 66
End: 2021-11-01

## 2021-11-01 VITALS
SYSTOLIC BLOOD PRESSURE: 96 MMHG | TEMPERATURE: 97 F | HEART RATE: 88 BPM | OXYGEN SATURATION: 97 % | RESPIRATION RATE: 16 BRPM | DIASTOLIC BLOOD PRESSURE: 60 MMHG

## 2021-11-01 PROCEDURE — G0151 HHCP-SERV OF PT,EA 15 MIN: HCPCS

## 2021-11-01 NOTE — DISCHARGE SUMMARY
Patient Name: Kai Garcia  MRN: [id number]  : 1955  DOS:10/29/2021    Attending: No att. providers found    Primary Care Provider: Cheo Weiss MD    Date of Admission:.10/27/2021  5:12 AM    Date of Discharge: 10/29/2021    Discharge Diagnosis:   S/P lumbar fusion    Type II diabetes mellitus, uncontrolled (HCC)    Coronary artery disease involving native coronary artery of native heart    Dyslipidemia    LUCIAN on CPAP    Essential hypertension    Thrombocytopenia (HCC)    Back pain    CHF (congestive heart failure) (HCC)    Acute blood loss anemia, asymptomatic    Postoperative urinary retention      Hospital Course    At Admit:    Patient is a pleasant 66 y.o. male presented for scheduled surgery by Dr. Faulkner.  He underwent lumbar fusion under general anesthesia.  He tolerated surgery well and was admitted for further medical management.  His back has been painful for about a year since he had a fall into his chicken coop.  He denies use of assistive device for ambulation or recent falls.  Prior to surgery he had numbness bilateral lower extremity.  He denies saddle anesthesia.     PROCEDURES PERFORMED:   1.  Segmental instrumentation L4-L5-S1 with DePuy transpedicular fixation.   2.  L4-L5 and L5-S1 transforaminal lumbar interbody fusion with placement of interbody fusion cage and bone graft.  3.  Posterolateral fusion L4-L5 and L5-S1 with bone graft.   4.  Harvesting of bone graft locally from spinous processes and lamina.      When seen in PACU he is doing well.  He has moderate low back pain.  He denies numbness in his bilateral lower extremities.  He denies nausea, shortness of breath or chest pain.  No history of DVT or PE.     He has history of hypertension, hyperlipidemia, CHF, CAD.  He is followed by Dr. Willis, cardiology, and had preop cardiac clearance.    After admit:    Patient was provided pain medications as needed for pain control.      Adjustments were made to pain medications to  optimize postop pain management.   Risks and benefits of opiate medications discussed with patient.  Loi report in chart was reviewed prior to discharge.    The was seen by PT and has progressed well over his stay.    Patient used an IS for atelectasis prophylaxis and mechanicals for DVT prophylaxis.  Home medications were resumed as appropriate, and labs were monitored and he was noted to have a drop in platelets on postop day 1 that has remained stable postop day 2.  No evidence of bleeding.    He did have postop urinary retention that has since improved.  Was given short course of tamsulosin.    With the progress he has made, Mr. Garcia is ready for DC home today.    Discussed with patient regarding plan and he shows understanding and agreement.       Pain medication at discharge per Dr. Fer Faulkner    I have advised him to resume aspirin 81 mg the day after discharge.  He will have a CBC evaluated in 4 days and provided that platelet count remains stable and/or improved he will resume Plavix.  Patient and wife expressed understanding.      Procedures Performed  PREOPERATIVE DIAGNOSES:   1.  L4-L5 and L5-S1 lumbar spinal stenosis.  2.  Degenerative spondylolisthesis L4-L5.  3.  Postlaminectomy syndrome L5-S1.     POSTOPERATIVE DIAGNOSES:   1.  L4-L5 and L5-S1 lumbar spinal stenosis.  2.  Degenerative spondylolisthesis L4-L5.  3.  Postlaminectomy syndrome L5-S1.     PROCEDURES PERFORMED:   1.  Segmental instrumentation L4-L5-S1 with DePuy transpedicular fixation.   2.  L4-L5 and L5-S1 transforaminal lumbar interbody fusion with placement of interbody fusion cage and bone graft.  3.  Posterolateral fusion L4-L5 and L5-S1 with bone graft.   4.  Harvesting of bone graft locally from spinous processes and lamina.      SURGEON: Fer Faulkner MD      Pertinent Test Results:    I reviewed the patient's new clinical results.   Results from last 7 days   Lab Units 10/29/21  0526 10/28/21  0702 10/27/21  1814   WBC  "10*3/mm3 7.93 8.25  --    HEMOGLOBIN g/dL 8.9* 9.0* 10.0*   HEMATOCRIT % 26.5* 26.7* 28.1*   PLATELETS 10*3/mm3 56* 53*  --      Results from last 7 days   Lab Units 10/29/21  0526 10/28/21  0702 10/25/21  1347   SODIUM mmol/L 135* 135*  --    POTASSIUM mmol/L 4.1 4.3 4.4   CHLORIDE mmol/L 100 103  --    CO2 mmol/L 26.0 24.0  --    BUN mg/dL 14 17  --    CREATININE mg/dL 0.84 1.05  --    CALCIUM mg/dL 8.7 8.1*  --    GLUCOSE mg/dL 188* 187*  --      I reviewed the patient's new imaging including images and reports.      Physical therapy: Progress: improving  Outcome Summary: Pt ambulated increased distance in denis with RWx, had improved independence in t/f and bed mobility. Pt expected to d/c home with 24/7 assist, home health PT.      Discharge Assessment:       Visit Vitals  /80   Pulse 107   Temp 98.2 °F (36.8 °C) (Oral)   Resp 20   Ht 170.2 cm (67\")   Wt 108 kg (238 lb)   SpO2 93%   BMI 37.28 kg/m²     No data recorded.      General Appearance:    Alert, cooperative, in no acute distress   Lungs:     Clear to auscultation,respirations regular, even and unlabored    Heart:    Regular rhythm and normal rate, normal S1 and S2    Abdomen:     Normal bowel sounds, no masses, no organomegaly, soft  non-tender, non-distended, no guarding, no rebound tenderness   Extremities:   Moves all extremities well, no edema, no cyanosis, no redness   Pulses:   Pulses palpable and equal bilaterally   Skin:   No bleeding, bruising or rash. Back dressing CDI.   Neurologic:   Cranial nerves 2 - 12 grossly intact, sensation intact, no gross deficit. Flexion and dorsiflexion intact bilateral feet.       Discharge Disposition: Home          Discharge Medications      New Medications      Instructions Start Date   GaviLAX 17 GM/SCOOP powder  Generic drug: polyethylene glycol   Dissolve 17 g (1 capful) in water and drink by mouth Daily for 15 days.      oxyCODONE-acetaminophen  MG per tablet  Commonly known as: PERCOCET   1 " tablet, Oral, Every 4 Hours PRN      tamsulosin 0.4 MG capsule 24 hr capsule  Commonly known as: FLOMAX   0.4 mg, Oral, Daily         Changes to Medications      Instructions Start Date   clopidogrel 75 MG tablet  Commonly known as: PLAVIX  What changed:   · additional instructions  · These instructions start on November 4, 2021. If you are unsure what to do until then, ask your doctor or other care provider.   75 mg, Oral, Daily, Resume if platelets are above 50 thousand on recheck on 11/3/21   Start Date: November 4, 2021     rosuvastatin 10 MG tablet  Commonly known as: CRESTOR  What changed: when to take this   10 mg, Oral, Daily      traZODone 100 MG tablet  Commonly known as: DESYREL  What changed:   · how much to take  · how to take this  · when to take this  · reasons to take this   TAKE 1/2 TABLET BY MOUTH EVERY NIGHT AT BEDTIME AND INCREASE TO 1 TABLET IF NEEDED         Continue These Medications      Instructions Start Date   acetaminophen 500 MG tablet  Commonly known as: TYLENOL   1,000 mg, Oral, Every 6 Hours PRN      aspirin 81 MG EC tablet   81 mg, Oral, Daily      bisoprolol 10 MG tablet  Commonly known as: ZEBeta   10 mg, Oral, Daily      cyclobenzaprine 10 MG tablet  Commonly known as: FLEXERIL   TAKE ONE TABLET BY MOUTH 3 TIMES A DAY AS NEEDED FOR MUSCLE SPASMS      Entresto 49-51 MG tablet  Generic drug: sacubitril-valsartan   1 tablet, Oral, Every 12 Hours      eplerenone 25 MG tablet  Commonly known as: INSPRA   25 mg, Oral, Daily      Lantus SoloStar 100 UNIT/ML injection pen  Generic drug: Insulin Glargine   30 Units, Subcutaneous, Daily      nitroglycerin 0.4 MG SL tablet  Commonly known as: Nitrostat   0.4 mg, Sublingual, Every 5 Minutes PRN      pantoprazole 40 MG EC tablet  Commonly known as: PROTONIX   TAKE ONE TABLET BY MOUTH EVERY DAY      SITagliptin 100 MG tablet  Commonly known as: Januvia   100 mg, Oral, Daily      Ultracare Pen Needles 32G X 5 MM misc  Generic drug: Insulin Pen  Needle   Use daily with insulin         Stop These Medications    HYDROcodone-acetaminophen 5-325 MG per tablet  Commonly known as: NORCO            Discharge Diet: Diabetic diet      Activity at Discharge: Ambulate.  Restrictions per orthopedics spine surgery      Follow-up Appointments  Dr. Fer Faulkner per his orders    Dr. Cheo Weiss.     Mary Lindsay MD  11/01/21  07:42 EDT    Cheo Weiss MD

## 2021-11-01 NOTE — OUTREACH NOTE
Call Center TCM Note      Responses   [facility] patient discharged from? [place]   Does the patient have one of the following disease processes/diagnoses(primary or secondary)? General Surgery   TCM attempt successful? Yes   Discharge diagnosis Lumbar fusion L4-5 with fusion cage and bone graft Acute blood loss anemia, Hc CHF, T2DM   Person spoke with today (if not patient) and relationship Spoke with pt and wife on speaker    Meds reviewed with patient/caregiver? Yes   Is the patient having any side effects they believe may be caused by any medication additions or changes? No   Does the patient have all medications related to this admission filled (includes all antibiotics, pain medications, etc.) Yes   Is the patient taking all medications as directed (includes completed medication regime)? Yes   Does the patient have a follow up appointment scheduled with their surgeon? Yes   Has the patient kept scheduled appointments due by today? Yes   What is the Home health agency?  [facility]   Has home health visited the patient within 72 hours of discharge? Yes   What DME was ordered? rolling walker from [employer]   Has all DME been delivered? Yes   Psychosocial issues? No   Did the patient receive a copy of their discharge instructions? Yes   Nursing interventions Reviewed instructions with patient   What is the patient's perception of their health status since discharge? Worsening   Nursing interventions Nurse provided patient education   Is the patient /caregiver able to teach back basic post-op care? Continue use of incentive spirometry at least 1 week post discharge,  Take showers only when approved by MD-sponge bathe until then,  Do not remove steri-strips,  Lifting as instructed by MD in discharge instructions,  No tub bath, swimming, or hot tub until instructed by MD,  Practice 'cough and deep breath',  Drive as instructed by MD in discharge instructions,  Keep incision areas clean,dry and protected   Is the  patient/caregiver able to teach back signs and symptoms of incisional infection? Pus or odor from incision,  Fever,  Increased drainage or bleeding,  Incisional warmth,  Increased redness, swelling or pain at the incisonal site   Is the patient/caregiver able to teach back steps to recovery at home? Set small, achievable goals for return to baseline health,  Practice good oral hygiene,  Eat a well-balance diet,  Rest and rebuild strength, gradually increase activity,  Weigh daily,  Make a list of questions for surgeon's appointment   If the patient is a current smoker, are they able to teach back resources for cessation? Not a smoker   Is the patient/caregiver able to teach back the hierarchy of who to call/visit for symptoms/problems? PCP, Specialist, Home health nurse, Urgent Care, ED, 911 Yes   TCM call completed? Yes   Wrap up additional comments Pt s/p lumbar fusion, states pre op back and LE pain has returned since yesterday. [facility] P.T. saw pt today, told to d/c P.T. until srgn aware of pain. Pt was going to wait til POST OP on 11/18, I urged him to call srgn to make him aware. Pt states he will do so. Meds in place. TCM FWP with PCP is 11/04/2021.          Hayley Golden MA    11/1/2021, 14:27 EDT

## 2021-11-02 NOTE — HOME HEALTH
Excerpt from [facility] hospitalization 10/27-10/29: Patient is a pleasant 66 y.o. male presented for scheduled surgery by Dr. Faulkner. He underwent lumbar fusion under general anesthesia. He tolerated surgery well and was admitted for further medical management. His back has been painful for about a year since he had a fall into his chicken coop. He denies use of assistive device for ambulation or recent falls. Prior to surgery he had numbness bilateral lower extremity. He denies saddle anesthesia.   PROCEDURES PERFORMED:   1. Segmental instrumentation L4-L5-S1 with DePuy transpedicular fixation.   2. L4-L5 and L5-S1 transforaminal lumbar interbody fusion with placement of interbody fusion cage and bone graft.   3. Posterolateral fusion L4-L5 and L5-S1 with bone graft.   4. Harvesting of bone graft locally from spinous processes and lamina.   He has history of hypertension, hyperlipidemia, CHF, CAD. He is followed by Dr. Willis, cardiology, and had preop cardiac clearance.    Pt d/c'd home with his wife where he lives in a one story home with 4 steps and a ramp at entry.  Pt has a FWW that he uses for mobility.

## 2021-11-02 NOTE — CASE COMMUNICATION
Patient seen for HHPT post op lumbar fusion.  The following medication potential interactions were noted for your review.  Eplerenone and Entresto can cause hyperkalemia; pantoprazole and Clopidogrel may increase the risk of adverse cardiac events.  Patient and spouse report that patient has taken these meds in the past without known complications.     Patient to be seen for HHPT 2wk2, 1wk2.  Please let me know if you have questions or  concerns.  Thank you!  Chari Alcaraz, PT

## 2021-11-04 ENCOUNTER — OFFICE VISIT (OUTPATIENT)
Dept: FAMILY MEDICINE CLINIC | Facility: CLINIC | Age: 66
End: 2021-11-04

## 2021-11-04 VITALS
BODY MASS INDEX: 36.51 KG/M2 | HEART RATE: 88 BPM | WEIGHT: 232.6 LBS | SYSTOLIC BLOOD PRESSURE: 108 MMHG | RESPIRATION RATE: 24 BRPM | HEIGHT: 67 IN | DIASTOLIC BLOOD PRESSURE: 65 MMHG | TEMPERATURE: 97.1 F

## 2021-11-04 DIAGNOSIS — D69.6 THROMBOCYTOPENIA (HCC): Primary | ICD-10-CM

## 2021-11-04 PROCEDURE — 99496 TRANSJ CARE MGMT HIGH F2F 7D: CPT | Performed by: NURSE PRACTITIONER

## 2021-11-04 PROCEDURE — 1111F DSCHRG MED/CURRENT MED MERGE: CPT | Performed by: NURSE PRACTITIONER

## 2021-11-04 NOTE — PROGRESS NOTES
"Chief Complaint  Hospital Follow Up Visit (lumbar fusion L4-5, L5-S1 10/27/21)    Subjective          Kai Av Garcia presents to [facility]  History of Present Illness   Transition of Care  Admitted 10/26 and discharged on 10/29/29 [facility]   Spinal surgery L4-L5, L5-S1 on 10/27/21  Fell October 19, 2020 and took a year to get surgical procedure, then had some complications sepsis and low platelets saw oncology.  Needs to have platelets checked today  taking Percocet 10/325 1/2 tablet BID which is helping with back pain. He is having some muscle spasms waking him at times.   He has PT twice a week  Not feeling as well as he thought he would a week out from surgery but Dr Roque reassured him that some of his pain may be phantom or nerves need to calm down.  No loss of bowel or bladder function, no weakness or numbness or recent falls he is using his walker  Denies constipation, having normal BMs and urinating without issues (he did have a catheter for his surgery and had a distended bladder once his catheter was removed so he had to be recathed.   No fever or chills  Taking a shower and cleaning the incision with soap and water, using small amt Neosporin Ointment on sutures.   No drainage, keeping a dry dressing on it.   Accompanied by his wife  Having some trouble sleeping but not anything new.   Objective   Vital Signs:   /65   Pulse 88   Temp 97.1 °F (36.2 °C)   Resp 24   Ht 170.2 cm (67.01\")   Wt 106 kg (232 lb 9.6 oz)   BMI 36.42 kg/m²     Physical Exam  Vitals and nursing note reviewed.   Constitutional:       General: He is in acute distress.      Appearance: Normal appearance. He is well-developed.   HENT:      Head: Normocephalic.   Eyes:      General: Lids are normal.   Cardiovascular:      Rate and Rhythm: Normal rate and regular rhythm.      Heart sounds: Normal heart sounds, S1 normal and S2 normal.   Pulmonary:      Effort: Pulmonary effort is normal.      " Phone Number Called: [phone] (home)       Call outcome: spoke to patient regarding message below    Message: Followed up with Wendy with Voicemail regards to her itching. Wendy stated she started itching at 440am Tuesday morning all over body and believes its because of the medication Torsemide That she had started.   Wendy has also today been feeling nauseas.   Wendy has stopped taking medication Torsemide since Tuesday 9/24/19.  Wendy advised she has an appointment with urgent care today at 1pm.     Roni Weber, Med Ass't     Breath sounds: Normal breath sounds.   Musculoskeletal:         General: Tenderness (lumbar spine) present.      Cervical back: Normal range of motion.      Lumbar back: Spasms and tenderness present. Decreased range of motion.   Skin:     General: Skin is warm and dry.      Findings: No erythema.      Comments: Midline incision along the lumbar and sacral spine, sutures intact, no swelling or redness or drainage.      Neurological:      Mental Status: He is alert and oriented to person, place, and time.      Sensory: No sensory deficit.      Motor: No abnormal muscle tone.      Gait: Abnormal gait: using walker to help ambulate.      Deep Tendon Reflexes: Reflexes normal.   Psychiatric:         Mood and Affect: Mood normal.         Speech: Speech normal.         Behavior: Behavior normal.         Thought Content: Thought content normal.         Judgment: Judgment normal.        Result Review :                 Assessment and Plan    Diagnoses and all orders for this visit:    1. Thrombocytopenia (HCC) (Primary)  -     CBC & Differential        Follow Up   No follow-ups on file.  Patient was given instructions and counseling regarding his condition or for health maintenance advice. Please see specific information pulled into the AVS if appropriate.   Will check CBC today and notify pt of results  Continue PT and monitoring wound for signs of infections, keep clean and dry, use soap and water and cover with dry bandage  Okay to take muscle relaxants but with caution with pain medication.   Keep follow up with neurosurgeon Dr Hernandez. Pt agrees.

## 2021-11-05 ENCOUNTER — HOME CARE VISIT (OUTPATIENT)
Dept: HOME HEALTH SERVICES | Facility: HOME HEALTHCARE | Age: 66
End: 2021-11-05

## 2021-11-05 VITALS
DIASTOLIC BLOOD PRESSURE: 61 MMHG | SYSTOLIC BLOOD PRESSURE: 99 MMHG | OXYGEN SATURATION: 98 % | HEART RATE: 78 BPM | RESPIRATION RATE: 16 BRPM | TEMPERATURE: 99 F

## 2021-11-05 LAB
BASOPHILS # BLD AUTO: 0.1 X10E3/UL (ref 0–0.2)
BASOPHILS NFR BLD AUTO: 1 %
EOSINOPHIL # BLD AUTO: 0.2 X10E3/UL (ref 0–0.4)
EOSINOPHIL NFR BLD AUTO: 3 %
ERYTHROCYTE [DISTWIDTH] IN BLOOD BY AUTOMATED COUNT: 12.5 % (ref 11.6–15.4)
HCT VFR BLD AUTO: 29.8 % (ref 37.5–51)
HGB BLD-MCNC: 9.6 G/DL (ref 13–17.7)
IMM GRANULOCYTES # BLD AUTO: 0.1 X10E3/UL (ref 0–0.1)
IMM GRANULOCYTES NFR BLD AUTO: 1 %
LYMPHOCYTES # BLD AUTO: 1.9 X10E3/UL (ref 0.7–3.1)
LYMPHOCYTES NFR BLD AUTO: 24 %
MCH RBC QN AUTO: 30.4 PG (ref 26.6–33)
MCHC RBC AUTO-ENTMCNC: 32.2 G/DL (ref 31.5–35.7)
MCV RBC AUTO: 94 FL (ref 79–97)
MONOCYTES # BLD AUTO: 0.7 X10E3/UL (ref 0.1–0.9)
MONOCYTES NFR BLD AUTO: 8 %
NEUTROPHILS # BLD AUTO: 5 X10E3/UL (ref 1.4–7)
NEUTROPHILS NFR BLD AUTO: 63 %
PLATELET # BLD AUTO: 196 X10E3/UL (ref 150–450)
RBC # BLD AUTO: 3.16 X10E6/UL (ref 4.14–5.8)
WBC # BLD AUTO: 7.9 X10E3/UL (ref 3.4–10.8)

## 2021-11-05 PROCEDURE — G0151 HHCP-SERV OF PT,EA 15 MIN: HCPCS

## 2021-11-05 NOTE — HOME HEALTH
Patient reports feeling about the same, continuing to have L LE pain, not sure if it is from the L hip or surgical area.  Reviewed supine post op exs with pt/spouse.  Plan to advance standing tasks next visit as pt tolerates

## 2021-11-07 PROCEDURE — 93296 REM INTERROG EVL PM/IDS: CPT | Performed by: INTERNAL MEDICINE

## 2021-11-07 PROCEDURE — 93295 DEV INTERROG REMOTE 1/2/MLT: CPT | Performed by: INTERNAL MEDICINE

## 2021-11-08 ENCOUNTER — READMISSION MANAGEMENT (OUTPATIENT)
Dept: CALL CENTER | Facility: HOSPITAL | Age: 66
End: 2021-11-08

## 2021-11-08 NOTE — OUTREACH NOTE
General Surgery Week 2 Survey      Responses   [facility] patient discharged from? [place]   Does the patient have one of the following disease processes/diagnoses(primary or secondary)? General Surgery   Week 2 attempt successful? Yes   Call start time 0944   Call end time [phone]   Discharge diagnosis Lumbar fusion L4-5 with fusion cage and bone graft Acute blood loss anemia, Hc CHF, T2DM   Person spoke with today (if not patient) and relationship Patient   Meds reviewed with patient/caregiver? Yes   Is the patient having any side effects they believe may be caused by any medication additions or changes? No   Does the patient have all medications related to this admission filled (includes all antibiotics, pain medications, etc.) Yes   Is the patient taking all medications as directed (includes completed medication regime)? Yes   Does the patient have a follow up appointment scheduled with their surgeon? Yes   Has the patient kept scheduled appointments due by today? N/A   What is the Home health agency?  [facility]   Has home health visited the patient within 72 hours of discharge? Yes   Home health comments  visit 11/9/21   What DME was ordered? rolling walker from [facility]   Psychosocial issues? No   What is the patient's perception of their health status since discharge? Improving   Nursing interventions Nurse provided patient education   Is the patient /caregiver able to teach back basic post-op care? Take showers only when approved by MD-sponge bathe until then,  No tub bath, swimming, or hot tub until instructed by MD,  Keep incision areas clean,dry and protected   Is the patient/caregiver able to teach back signs and symptoms of incisional infection? Increased redness, swelling or pain at the incisonal site,  Increased drainage or bleeding,  Incisional warmth,  Pus or odor from incision,  Fever   Is the patient/caregiver able to teach back steps to recovery at home? Rest and rebuild strength, gradually  increase activity,  Eat a well-balance diet   Week 2 call completed? Yes   Wrap up additional comments Pt states he is still having pain, but doing better. Pt shares he can walk now with the walker. Pt is taking half dose of percocet r/t rx making him feel too sleepy. Pt educated on maintaining pain control r/t having better mobility. Pt verbalized understanding. Pt states he is having a hard time keeping his BG down,  pt advised to contact PCP to report BG levels. Pt verbalized understanding. Questions/concerns addressed.          Leonora Vega RN

## 2021-11-09 ENCOUNTER — HOME CARE VISIT (OUTPATIENT)
Dept: HOME HEALTH SERVICES | Facility: HOME HEALTHCARE | Age: 66
End: 2021-11-09

## 2021-11-09 VITALS
HEART RATE: 76 BPM | RESPIRATION RATE: 16 BRPM | SYSTOLIC BLOOD PRESSURE: 114 MMHG | OXYGEN SATURATION: 95 % | DIASTOLIC BLOOD PRESSURE: 74 MMHG | TEMPERATURE: 99 F

## 2021-11-09 PROCEDURE — G0151 HHCP-SERV OF PT,EA 15 MIN: HCPCS

## 2021-11-10 ENCOUNTER — HOME CARE VISIT (OUTPATIENT)
Dept: HOME HEALTH SERVICES | Facility: HOME HEALTHCARE | Age: 66
End: 2021-11-10

## 2021-11-10 PROCEDURE — G0155 HHCP-SVS OF CSW,EA 15 MIN: HCPCS

## 2021-11-10 NOTE — ACP (ADVANCE CARE PLANNING)
SUMMARY OF ADVANCE CARE PLANNING DISCUSSION:  Reviewed purpose and goals for advance care planning discussion.   Reviewed the qualities to consider when choosing a healthcare decision maker.   Individual Geogria Garcia  as the healthcare decision maker. Encouraged ongoing discussions regarding preferences for future care with this person.   Individual identified the following as most important to living well:  healthcare surrogate    Individual describes cultural, spiritual or personal practices/beliefs that might help choose the care wanted, or not wanted: patient states he knows that he doesn't want to be on a ventilator and no feeding tube;  made his wife, [place], as the primary healthcare surrogate and his brother, Cain Garcia, the secondary healthcare surrogate    Explored past experiences and recent hospitalizations because of [medical condition or complications]   From these experiences, this  was learned. Patient is adamant about his wife and brother being healthcare surrogates    Goals of medical care were explored: yes        Education was provided on: living will    Living will document completed during this facilitation? yes    Recommended to talk with provider regarding advance care planning at the next visit.

## 2021-11-10 NOTE — HOME HEALTH
Pt reports he has decraesed use of pain med to twice daily.  He reports compliance with HEP.  PT initiated standing ther ex this visit and outdoor ambualtion on uneven surfaces.  Pt tolerated well.  Plan to continue to advance standing dyanmic tasks as pt tolerates.

## 2021-11-11 ENCOUNTER — HOME CARE VISIT (OUTPATIENT)
Dept: HOME HEALTH SERVICES | Facility: HOME HEALTHCARE | Age: 66
End: 2021-11-11

## 2021-11-11 VITALS
HEART RATE: 83 BPM | OXYGEN SATURATION: 97 % | TEMPERATURE: 97.5 F | DIASTOLIC BLOOD PRESSURE: 71 MMHG | SYSTOLIC BLOOD PRESSURE: 107 MMHG

## 2021-11-11 PROCEDURE — G0151 HHCP-SERV OF PT,EA 15 MIN: HCPCS

## 2021-11-11 NOTE — ACP (ADVANCE CARE PLANNING)
SUMMARY OF ADVANCE CARE PLANNING DISCUSSION:  Reviewed purpose and goals for advance care planning discussion.   Reviewed the qualities to consider when choosing a healthcare decision maker.   Appointed his wife, Anette, as the healthcare decision maker. Appointed his brother, Cain Garcia, as the secondary healthcare surrogate. Encouraged ongoing discussions regarding preferences for future care with this person.   Individual identified the following as most important to living well: able to mow the lawn and ride his tractor    Individual describes cultural, spiritual or personal practices/beliefs that might help choose the care wanted, or not wanted: not wanted    Explored past experiences and recent hospitalizations because of [medical condition or complications]  From these experiences, this was learned that he doesn't want to be on a ventilator and doesn't want a feeding tube.    Goals of medical care were explored: yes        Education was provided on: living will and durable POA document    Living will document completed during this facilitation? yes    Recommended to talk with provider regarding advance care planning at the next visit.

## 2021-11-12 NOTE — HOME HEALTH
Patient reports his L leg pain is a little better, has been walking more.  PT added standing ther ex this visit with modifications to decrease LLE discomfort.  Plan to advance outdoor ambulation next visit, weather permitting

## 2021-11-16 ENCOUNTER — READMISSION MANAGEMENT (OUTPATIENT)
Dept: CALL CENTER | Facility: HOSPITAL | Age: 66
End: 2021-11-16

## 2021-11-16 NOTE — OUTREACH NOTE
General Surgery Week 3 Survey      Responses   [facility] patient discharged from? [place]   Does the patient have one of the following disease processes/diagnoses(primary or secondary)? General Surgery   Week 3 attempt successful? Yes   Call start time 1226   Call end time 1232   Discharge diagnosis Lumbar fusion L4-5 with fusion cage and bone graft Acute blood loss anemia, Hc CHF, T2DM   Meds reviewed with patient/caregiver? Yes   Is the patient taking all medications as directed (includes completed medication regime)? Yes   Does the patient have a follow up appointment scheduled with their surgeon? Yes  [11/18/21]   Has the patient kept scheduled appointments due by today? N/A   Comments Pt has followed up with PCP office since hospital d/c   What is the patient's perception of their health status since discharge? Improving  [Pt still has pain on his left side]   Nursing interventions Nurse provided patient education   Is the patient/caregiver able to teach back steps to recovery at home? Rest and rebuild strength, gradually increase activity,  Eat a well-balance diet   Week 3 call completed? Yes          Courtney Aviles RN

## 2021-11-19 ENCOUNTER — HOME CARE VISIT (OUTPATIENT)
Dept: HOME HEALTH SERVICES | Facility: HOME HEALTHCARE | Age: 66
End: 2021-11-19

## 2021-11-19 PROCEDURE — G0151 HHCP-SERV OF PT,EA 15 MIN: HCPCS

## 2021-11-21 VITALS
RESPIRATION RATE: 16 BRPM | OXYGEN SATURATION: 96 % | SYSTOLIC BLOOD PRESSURE: 118 MMHG | HEART RATE: 80 BPM | DIASTOLIC BLOOD PRESSURE: 76 MMHG | TEMPERATURE: 98.2 F

## 2021-11-23 ENCOUNTER — TELEPHONE (OUTPATIENT)
Dept: FAMILY MEDICINE CLINIC | Facility: CLINIC | Age: 66
End: 2021-11-23

## 2021-11-23 ENCOUNTER — READMISSION MANAGEMENT (OUTPATIENT)
Dept: CALL CENTER | Facility: HOSPITAL | Age: 66
End: 2021-11-23

## 2021-11-23 NOTE — OUTREACH NOTE
General Surgery Week 4 Survey      Responses   [facility] patient discharged from? [place]   Does the patient have one of the following disease processes/diagnoses(primary or secondary)? General Surgery   Week 4 attempt successful? Yes   Call start time 1541   Call end time 1547   Discharge diagnosis Lumbar fusion L4-5 with fusion cage and bone graft Acute blood loss anemia, Hc CHF, T2DM   Person spoke with today (if not patient) and relationship Patient   Is the patient taking all medications as directed (includes completed medication regime)? Yes   Medication comments Decreasing pain medication   Has the patient kept scheduled appointments due by today? Yes   Is the patient still receiving Home Health Services? Yes   Comments Pain left hip down leg   What is the patient's perception of their health status since discharge? Improving  [Slowly getting better]   Week 4 call completed? Yes   Would the patient like one additional call? No   Graduated Yes   Is the patient interested in additional calls from an ambulatory ?  NOTE:  applies to high risk patients requiring additional follow-up. No   Did the patient feel the follow up calls were helpful during their recovery period? Yes          Ashley Kay RN

## 2021-11-23 NOTE — TELEPHONE ENCOUNTER
Caller: Georgia Garcia    Relationship: Emergency Contact    Best call back number: [phone]    What is the best time to reach you: ANYTIME    Who are you requesting to speak with (clinical staff, provider,  specific staff member):PCP OR MA    Do you know the name of the person who called:    What was the call regarding: SPOUSE CALLED IN TO PROVIDE BLOOD SUGAR READINGS FOR THE LAST 4 WEEKS:    MORNING                                   EVENING  140 AVERAGE      [phone] AVERAGE    Do you require a callback: YES

## 2021-12-06 ENCOUNTER — OFFICE VISIT (OUTPATIENT)
Dept: FAMILY MEDICINE CLINIC | Facility: CLINIC | Age: 66
End: 2021-12-06

## 2021-12-06 VITALS
WEIGHT: 241 LBS | BODY MASS INDEX: 37.83 KG/M2 | DIASTOLIC BLOOD PRESSURE: 72 MMHG | HEIGHT: 67 IN | SYSTOLIC BLOOD PRESSURE: 104 MMHG | RESPIRATION RATE: 18 BRPM | HEART RATE: 82 BPM | TEMPERATURE: 97.8 F | OXYGEN SATURATION: 98 %

## 2021-12-06 DIAGNOSIS — D64.9 ANEMIA, UNSPECIFIED TYPE: ICD-10-CM

## 2021-12-06 DIAGNOSIS — D69.6 THROMBOCYTOPENIA (HCC): ICD-10-CM

## 2021-12-06 DIAGNOSIS — E78.00 PURE HYPERCHOLESTEROLEMIA: ICD-10-CM

## 2021-12-06 DIAGNOSIS — Z12.5 PROSTATE CANCER SCREENING: ICD-10-CM

## 2021-12-06 DIAGNOSIS — Z79.4 TYPE 2 DIABETES MELLITUS WITH HYPERGLYCEMIA, WITH LONG-TERM CURRENT USE OF INSULIN (HCC): Primary | ICD-10-CM

## 2021-12-06 DIAGNOSIS — E11.65 TYPE 2 DIABETES MELLITUS WITH HYPERGLYCEMIA, WITH LONG-TERM CURRENT USE OF INSULIN (HCC): Primary | ICD-10-CM

## 2021-12-06 PROCEDURE — 99214 OFFICE O/P EST MOD 30 MIN: CPT | Performed by: FAMILY MEDICINE

## 2021-12-06 RX ORDER — INSULIN GLARGINE 100 [IU]/ML
32 INJECTION, SOLUTION SUBCUTANEOUS DAILY
Qty: 24 ML | Refills: 1 | COMMUNITY
Start: 2021-12-06 | End: 2022-03-08 | Stop reason: SDUPTHER

## 2021-12-06 NOTE — PROGRESS NOTES
Chief Complaint  Diabetes and Follow-up (spine)    Subjective          Kai Av Garcia presents to [facility]  History of Present Illness    The patient presents today for follow-up of diabetes. He is accompanied by an adult female who contributes to his history.    Back surgery  He states that he is not doing much better after having back surgery last month. He is still experiencing pain in the left side of his back. He has followed up with his surgeon who enrolled him in physical therapy twice per week, but it is not helping him. The surgeon informed him that it is either arthritis or he needs a hip or knee arthroplasty. Most of his pain is in his left knee radiating down the back of his left leg. He adds that he cannot stand or sit very well. He reports that prior to surgery, his leg would become numb, and he could not feel it. Now, he feels the pain all of the time, and this is very concerning to him. The surgeon informed him that the swelling should subside in his back after 90 days, and once the swelling has resolved in his back, they will talk about doing something else. There is swelling on either side of his spine. He adds that the pain in his back is mostly resolved except when he moves or sits down. He has discontinued the muscle relaxer cyclobenzaprine.    Diabetes  He reports blood sugars run 167 average. If he takes the medication for pain, his blood sugar will average 250 to 270. He takes one-half Percocet at night to sleep, and one-half tablet after physical therapy. He currently takes 32 units of insulin.    Anemia  When he was in the hospital, he was mildly anemic. When she saw Mimi about 1 month ago, he was still mildly anemic.    Prostate issues  He had an elevated PSA, and saw Dr. Zhang in 05/2021, who put him on medication for his prostate, which did not help him at all. He states that Dr. Zhang was supposed to make a 6-week appointment with him, but he  "never called the patient.    He denies chest pain or difficulty breathing.        Review of Systems   Constitutional: Positive for fatigue. Negative for unexpected weight loss.   Eyes: Positive for blurred vision.   Cardiovascular: Positive for chest pain.   Endocrine: Positive for polyuria. Negative for polydipsia.   Skin: Negative for pallor.   Neurological: Positive for weakness. Negative for dizziness, tremors, seizures, speech difficulty and confusion.   Psychiatric/Behavioral: The patient is not nervous/anxious.         Objective       Vital Signs:   /72   Pulse 82   Temp 97.8 °F (36.6 °C)   Resp 18   Ht 170.2 cm (67.01\")   Wt 109 kg (241 lb)   SpO2 98%   BMI 37.74 kg/m²     Physical Exam  Vitals and nursing note reviewed.   Constitutional:       General: He is not in acute distress.     Appearance: Normal appearance. He is well-developed. He is not ill-appearing.   HENT:      Head: Normocephalic and atraumatic.      Right Ear: Hearing and external ear normal.      Left Ear: Hearing and external ear normal.      Nose: Nose normal. No congestion or rhinorrhea.      Mouth/Throat:      Mouth: Mucous membranes are moist.      Pharynx: No oropharyngeal exudate or posterior oropharyngeal erythema.   Eyes:      General: Lids are normal.      Conjunctiva/sclera: Conjunctivae normal.      Pupils: Pupils are equal, round, and reactive to light.   Neck:      Thyroid: No thyromegaly.   Cardiovascular:      Rate and Rhythm: Normal rate and regular rhythm.      Heart sounds: Normal heart sounds. No murmur heard.  No friction rub.   Pulmonary:      Effort: Pulmonary effort is normal. No respiratory distress.      Breath sounds: Normal breath sounds. No wheezing or rales.   Abdominal:      General: Bowel sounds are normal. There is no distension.      Palpations: Abdomen is soft. There is no mass.      Tenderness: There is abdominal tenderness (LUQ , no change). There is no guarding or rebound.   Musculoskeletal: "      Cervical back: Normal range of motion and neck supple.      Right lower leg: Edema (trace) present.      Left lower leg: Edema ( trace) present.   Skin:     General: Skin is warm and dry.   Neurological:      General: No focal deficit present.      Mental Status: He is alert.   Psychiatric:         Mood and Affect: Mood normal.         Speech: Speech normal.     Lumbar incision looks good.  No evidence of infection.    Result Review :                     Assessment and Plan    Diagnoses and all orders for this visit:    1. Type 2 diabetes mellitus with hyperglycemia, with long-term current use of insulin (HCC) (Primary)  -     Comprehensive Metabolic Panel; Future  -     Lipid Panel With / Chol / HDL Ratio; Future  -     Hemoglobin A1c; Future    2. Pure hypercholesterolemia  -     Comprehensive Metabolic Panel; Future  -     Lipid Panel With / Chol / HDL Ratio; Future    3. Anemia, unspecified type  -     CBC & Differential; Future    4. Thrombocytopenia (HCC)    5. Prostate cancer screening  -     PSA Screen; Future    1. Type 2 diabetes  - Check A1c, kidney, and liver function.    2. Hypercholesterolemia  - Recheck cholesterol panel.    3. Anemia  - Recheck blood work.    4. History of elevated PSA   - Will recheck PSA.    5. Health maintenance  - No refills needed today.  - He has a Living Will, and we it in our computer.  - He will return tomorrow for fasting labs when he comes in for physical therapy tomorrow, 12/07/2021.          DISCUSSION        Follow Up   Return in about 3 months (around 3/6/2022).    Patient was given instructions and counseling regarding his condition or for health maintenance advice. Please see specific information pulled into the AVS if appropriate.       Cheo Weiss MD     Transcribed from ambient dictation for Cheo Weiss MD by Hiral De La O.  12/06/21   17:35 EST    Patient verbalized consent to the visit recording.  I have personally performed the services described in  this document as transcribed by the above individual, and it is both accurate and complete.  Cheo Weiss MD  12/6/2021  19:04 EST

## 2021-12-07 ENCOUNTER — LAB (OUTPATIENT)
Dept: FAMILY MEDICINE CLINIC | Facility: CLINIC | Age: 66
End: 2021-12-07

## 2021-12-07 DIAGNOSIS — E78.00 PURE HYPERCHOLESTEROLEMIA: ICD-10-CM

## 2021-12-07 DIAGNOSIS — Z12.5 PROSTATE CANCER SCREENING: ICD-10-CM

## 2021-12-07 DIAGNOSIS — E11.65 TYPE 2 DIABETES MELLITUS WITH HYPERGLYCEMIA, WITH LONG-TERM CURRENT USE OF INSULIN (HCC): ICD-10-CM

## 2021-12-07 DIAGNOSIS — D64.9 ANEMIA, UNSPECIFIED TYPE: ICD-10-CM

## 2021-12-07 DIAGNOSIS — Z79.4 TYPE 2 DIABETES MELLITUS WITH HYPERGLYCEMIA, WITH LONG-TERM CURRENT USE OF INSULIN (HCC): ICD-10-CM

## 2021-12-08 LAB
ALBUMIN SERPL-MCNC: 4.1 G/DL (ref 3.5–5.2)
ALBUMIN/GLOB SERPL: 1.1 G/DL
ALP SERPL-CCNC: 68 U/L (ref 39–117)
ALT SERPL-CCNC: 23 U/L (ref 1–41)
AST SERPL-CCNC: 27 U/L (ref 1–40)
BASOPHILS # BLD AUTO: 0.04 10*3/MM3 (ref 0–0.2)
BASOPHILS NFR BLD AUTO: 0.8 % (ref 0–1.5)
BILIRUB SERPL-MCNC: 0.2 MG/DL (ref 0–1.2)
BUN SERPL-MCNC: 15 MG/DL (ref 8–23)
BUN/CREAT SERPL: 13.4 (ref 7–25)
CALCIUM SERPL-MCNC: 9.9 MG/DL (ref 8.6–10.5)
CHLORIDE SERPL-SCNC: 99 MMOL/L (ref 98–107)
CHOLEST SERPL-MCNC: 123 MG/DL (ref 0–200)
CHOLEST/HDLC SERPL: 2.93 {RATIO}
CO2 SERPL-SCNC: 25.1 MMOL/L (ref 22–29)
CREAT SERPL-MCNC: 1.12 MG/DL (ref 0.76–1.27)
EOSINOPHIL # BLD AUTO: 0.11 10*3/MM3 (ref 0–0.4)
EOSINOPHIL NFR BLD AUTO: 2.3 % (ref 0.3–6.2)
ERYTHROCYTE [DISTWIDTH] IN BLOOD BY AUTOMATED COUNT: 12.7 % (ref 12.3–15.4)
GLOBULIN SER CALC-MCNC: 3.6 GM/DL
GLUCOSE SERPL-MCNC: 204 MG/DL (ref 65–99)
HBA1C MFR BLD: 7.8 % (ref 4.8–5.6)
HCT VFR BLD AUTO: 35.4 % (ref 37.5–51)
HDLC SERPL-MCNC: 42 MG/DL (ref 40–60)
HGB BLD-MCNC: 11.6 G/DL (ref 13–17.7)
IMM GRANULOCYTES # BLD AUTO: 0.01 10*3/MM3 (ref 0–0.05)
IMM GRANULOCYTES NFR BLD AUTO: 0.2 % (ref 0–0.5)
LDLC SERPL CALC-MCNC: 53 MG/DL (ref 0–100)
LYMPHOCYTES # BLD AUTO: 1.3 10*3/MM3 (ref 0.7–3.1)
LYMPHOCYTES NFR BLD AUTO: 27 % (ref 19.6–45.3)
MCH RBC QN AUTO: 30.7 PG (ref 26.6–33)
MCHC RBC AUTO-ENTMCNC: 32.8 G/DL (ref 31.5–35.7)
MCV RBC AUTO: 93.7 FL (ref 79–97)
MONOCYTES # BLD AUTO: 0.5 10*3/MM3 (ref 0.1–0.9)
MONOCYTES NFR BLD AUTO: 10.4 % (ref 5–12)
NEUTROPHILS # BLD AUTO: 2.85 10*3/MM3 (ref 1.7–7)
NEUTROPHILS NFR BLD AUTO: 59.3 % (ref 42.7–76)
NRBC BLD AUTO-RTO: 0 /100 WBC (ref 0–0.2)
PLATELET # BLD AUTO: 117 10*3/MM3 (ref 140–450)
POTASSIUM SERPL-SCNC: 4.2 MMOL/L (ref 3.5–5.2)
PROT SERPL-MCNC: 7.7 G/DL (ref 6–8.5)
PSA SERPL-MCNC: 4.38 NG/ML (ref 0–4)
RBC # BLD AUTO: 3.78 10*6/MM3 (ref 4.14–5.8)
SODIUM SERPL-SCNC: 134 MMOL/L (ref 136–145)
TRIGL SERPL-MCNC: 164 MG/DL (ref 0–150)
VLDLC SERPL CALC-MCNC: 28 MG/DL (ref 5–40)
WBC # BLD AUTO: 4.81 10*3/MM3 (ref 3.4–10.8)

## 2021-12-21 RX ORDER — PANTOPRAZOLE SODIUM 40 MG/1
TABLET, DELAYED RELEASE ORAL
Qty: 90 TABLET | Refills: 0 | Status: SHIPPED | OUTPATIENT
Start: 2021-12-21 | End: 2022-03-08 | Stop reason: SDUPTHER

## 2021-12-22 DIAGNOSIS — E11.65 UNCONTROLLED TYPE 2 DIABETES MELLITUS WITH HYPERGLYCEMIA, WITHOUT LONG-TERM CURRENT USE OF INSULIN (HCC): ICD-10-CM

## 2021-12-22 RX ORDER — SITAGLIPTIN 100 MG/1
TABLET, FILM COATED ORAL
Qty: 90 TABLET | Refills: 0 | Status: SHIPPED | OUTPATIENT
Start: 2021-12-22 | End: 2022-03-08 | Stop reason: SDUPTHER

## 2022-01-26 ENCOUNTER — OFFICE VISIT (OUTPATIENT)
Dept: CARDIOLOGY | Facility: CLINIC | Age: 67
End: 2022-01-26

## 2022-01-26 VITALS
WEIGHT: 246 LBS | OXYGEN SATURATION: 95 % | DIASTOLIC BLOOD PRESSURE: 62 MMHG | HEIGHT: 67 IN | SYSTOLIC BLOOD PRESSURE: 102 MMHG | HEART RATE: 75 BPM | BODY MASS INDEX: 38.61 KG/M2

## 2022-01-26 DIAGNOSIS — I25.10 CORONARY ARTERY DISEASE INVOLVING NATIVE CORONARY ARTERY OF NATIVE HEART WITHOUT ANGINA PECTORIS: Primary | ICD-10-CM

## 2022-01-26 DIAGNOSIS — I25.5 ISCHEMIC CARDIOMYOPATHY: ICD-10-CM

## 2022-01-26 DIAGNOSIS — I10 ESSENTIAL HYPERTENSION: ICD-10-CM

## 2022-01-26 DIAGNOSIS — E78.00 PURE HYPERCHOLESTEROLEMIA: ICD-10-CM

## 2022-01-26 PROCEDURE — 99214 OFFICE O/P EST MOD 30 MIN: CPT | Performed by: NURSE PRACTITIONER

## 2022-01-26 PROCEDURE — 93284 PRGRMG EVAL IMPLANTABLE DFB: CPT | Performed by: NURSE PRACTITIONER

## 2022-01-26 RX ORDER — SACUBITRIL AND VALSARTAN 49; 51 MG/1; MG/1
1 TABLET, FILM COATED ORAL EVERY 12 HOURS
Qty: 180 TABLET | Refills: 3 | Status: SHIPPED | OUTPATIENT
Start: 2022-01-26 | End: 2023-02-07 | Stop reason: SDUPTHER

## 2022-01-26 RX ORDER — CLOPIDOGREL BISULFATE 75 MG/1
75 TABLET ORAL DAILY
Qty: 90 TABLET | Refills: 3 | Status: SHIPPED | OUTPATIENT
Start: 2022-01-26 | End: 2023-02-07 | Stop reason: SDUPTHER

## 2022-01-26 RX ORDER — EPLERENONE 25 MG/1
25 TABLET, FILM COATED ORAL DAILY
Qty: 90 TABLET | Refills: 3 | Status: SHIPPED | OUTPATIENT
Start: 2022-01-26 | End: 2023-02-07 | Stop reason: SDUPTHER

## 2022-01-26 RX ORDER — ROSUVASTATIN CALCIUM 10 MG/1
10 TABLET, COATED ORAL DAILY
Qty: 90 TABLET | Refills: 3 | Status: SHIPPED | OUTPATIENT
Start: 2022-01-26 | End: 2023-02-07 | Stop reason: SDUPTHER

## 2022-01-26 RX ORDER — BISOPROLOL FUMARATE 10 MG/1
10 TABLET, FILM COATED ORAL DAILY
Qty: 90 TABLET | Refills: 3 | Status: SHIPPED | OUTPATIENT
Start: 2022-01-26 | End: 2023-02-07 | Stop reason: SDUPTHER

## 2022-01-26 NOTE — PROGRESS NOTES
Subjective:     Encounter Date:01/26/2022    Primary Care Physician: Cheo Weiss MD      Patient ID: Kai Garcia is a 66 y.o. male.    Chief Complaint:Follow-up and Pacemaker Check      Problem List:  1. Dissecting aortic aneurysm:  a. 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 Helton, stage I in June 2006 and stage II in March 2007.   b. Followed chronically with annual CTs by Dr. Helton. Stable by CT 2019  2. Coronary artery disease:  a. Cardiac cath prior to “elephant trunk” procedure in June 2006 and the patient underwent CABG at that time consisting of SVG to the OM and SVG to the PDA.   b. 02/08/2007 - inferoposterior MI, cardiac cath showed 100% left circumflex with severe OM-1 stenosis and 100% vein graft to the left circumflex. RCA was diffuse, 90% ostial stenosis at the SVG to the RCA. LAD had only 40% stenosis.   c. Cardiac cath in March 2007 due to recurrent angina - 2.5 Microdriver stent to the distal native RCA.   d. 4/27/18 cardiac catheter occluded vein grafts ×2.  Large posterior basal aneurysm with ejection fraction of 30-35%.  Severe stenosis and distal circumflex which supplies aneurysm.  Non-flow-limiting RCA disease.  e. MRI 8/18 LVEF 28%  f. S/P Bi- Ventricular ICD- [employer] 8/9/18, EMILIE Fox MD  3. Dyslipidemia.   4. Hypertension.   5. Diabetes  6. Chronic low back pain/multiple lumbar surgeries, currently disabled:  a. L4-L5 discectomy in 2015.   7. History of bleeding peptic ulcer disease.   8. Severe obstructive sleep apnea.   9. Appendectomy  10. Lumbar fusion 10/2021      Allergies   Allergen Reactions   • Ativan [Lorazepam] Mental Status Change     agitation   • Zetia [Ezetimibe] Other (See Comments)     increased LFTs   • Zocor [Simvastatin] Myalgia   • Isosorbide Other (See Comments)     headaches   • Jardiance [Empagliflozin] Other (See Comments)     Blood in urine and  burning sensation   • Metformin And Related Diarrhea         Current Outpatient Medications:   •  acetaminophen (TYLENOL) 500 MG tablet, Take 1,000 mg by mouth Every 6 (Six) Hours As Needed for Mild Pain ., Disp: , Rfl:   •  aspirin 81 MG EC tablet, Take 1 tablet by mouth Daily., Disp: , Rfl:   •  bisoprolol (ZEBeta) 10 MG tablet, Take 1 tablet by mouth Daily., Disp: 90 tablet, Rfl: 3  •  clopidogrel (PLAVIX) 75 MG tablet, Take 1 tablet by mouth Daily. Resume if platelets are above 50 thousand on recheck on 11/3/21, Disp: 90 tablet, Rfl: 3  •  eplerenone (INSPRA) 25 MG tablet, Take 1 tablet by mouth Daily., Disp: 90 tablet, Rfl: 3  •  Insulin Glargine (Lantus SoloStar) 100 UNIT/ML injection pen, Inject 32 Units under the skin into the appropriate area as directed Daily., Disp: 24 mL, Rfl: 1  •  Insulin Pen Needle (Ultracare Pen Needles) 32G X 5 MM misc, Use daily with insulin, Disp: 50 each, Rfl: 2  •  Januvia 100 MG tablet, TAKE ONE TABLET BY MOUTH EVERY DAY, Disp: 90 tablet, Rfl: 0  •  nitroglycerin (Nitrostat) 0.4 MG SL tablet, Place 1 tablet under the tongue Every 5 (Five) Minutes As Needed for Chest Pain., Disp: 25 tablet, Rfl: 3  •  oxyCODONE-acetaminophen (PERCOCET)  MG per tablet, Take 1 tablet by mouth Every 4 (Four) Hours As Needed for Moderate Pain ., Disp: 50 tablet, Rfl: 0  •  pantoprazole (PROTONIX) 40 MG EC tablet, TAKE ONE TABLET BY MOUTH EVERY DAY, Disp: 90 tablet, Rfl: 0  •  rosuvastatin (CRESTOR) 10 MG tablet, Take 1 tablet by mouth Daily. (Patient taking differently: Take 10 mg by mouth Every Night.), Disp: 90 tablet, Rfl: 3  •  sacubitril-valsartan (Entresto) 49-51 MG tablet, Take 1 tablet by mouth Every 12 (Twelve) Hours., Disp: 180 tablet, Rfl: 3  •  traZODone (DESYREL) 100 MG tablet, TAKE 1/2 TABLET BY MOUTH EVERY NIGHT AT BEDTIME AND INCREASE TO 1 TABLET IF NEEDED (Patient taking differently: Take 100 mg by mouth At Night As Needed for Sleep. TAKE 1/2 TABLET BY MOUTH EVERY NIGHT AT  "BEDTIME AND INCREASE TO 1 TABLET IF NEEDED), Disp: 30 tablet, Rfl: 5        History of Present Illness    Patient is a 66-year-old  male who is being seen today for 6-month follow-up of coronary artery disease, ischemic cardiomyopathy and device check.  Since last being seen he underwent lumbar surgery.  He is still recovering from this.  Denies any chest pain, pressure, tightness.  Denies any increase shortness of breath.  No syncope, near-syncope, or edema.  Is slowly improving status post back surgery still has some left lower extremity weakness.    The following portions of the patient's history were reviewed and updated as appropriate: allergies, current medications, past family history, past medical history, past social history, past surgical history and problem list.      Social History     Tobacco Use   • Smoking status: Former Smoker     Packs/day: 3.00     Years: 35.00     Pack years: 105.00     Types: Cigarettes     Quit date: 6/15/2006     Years since quitting: 15.6   • Smokeless tobacco: Never Used   Vaping Use   • Vaping Use: Never used   Substance Use Topics   • Alcohol use: No   • Drug use: No         Review of Systems   Constitutional: Negative.   Cardiovascular: Negative for chest pain, dyspnea on exertion, leg swelling, palpitations and syncope.   Respiratory: Negative.  Negative for shortness of breath.    Hematologic/Lymphatic: Negative for bleeding problem. Does not bruise/bleed easily.   Skin: Negative for rash.   Musculoskeletal: Positive for arthritis, back pain and muscle weakness. Negative for myalgias.   Gastrointestinal: Negative for heartburn, nausea and vomiting.   Neurological: Negative for dizziness, light-headedness, loss of balance and numbness.          Objective:   /62   Pulse 75   Ht 170.2 cm (67\")   Wt 112 kg (246 lb)   SpO2 95%   BMI 38.53 kg/m²         Vitals reviewed.   Constitutional:       Appearance: Healthy appearance. Well-developed and not in " distress.   Neck:      Vascular: No JVD.      Trachea: No tracheal deviation.   Pulmonary:      Effort: Pulmonary effort is normal.      Breath sounds: Normal breath sounds.   Cardiovascular:      Normal rate. Regular rhythm.   Pulses:     Intact distal pulses.   Edema:     Peripheral edema absent.   Abdominal:      General: Bowel sounds are normal.      Palpations: Abdomen is soft.      Tenderness: There is no abdominal tenderness.   Musculoskeletal:         General: No deformity. Skin:     General: Skin is warm and dry.   Neurological:      Mental Status: Alert and oriented to person, place, and time.         Procedures  Device check:  Normal functioning biventricular ICD.  99% biventricular paced, less than 1% atrially paced, no events.        Assessment:   Assessment/Plan      Diagnoses and all orders for this visit:    1. Coronary artery disease involving native coronary artery of native heart without angina pectoris (Primary).  Stable.  No current angina.  Recent back surgery with no cardiac complications.  On aspirin and Plavix.    2. Ischemic cardiomyopathy EF 40% (2019), stable.  No evidence of heart failure.  On Inspra and Entresto.    3. Essential hypertension, stable.  On beta-blocker    4. Pure hypercholesterolemia, stable.  On statin.  Most recent LDL 53      Plan:  1. Patient is overall stable from cardiac standpoint.  2. Continue current medication regimen.  3. Follow-up in 6 months time with a device check or sooner if needed.       Faye RYAN     Dictated utilizing Dragon dictation

## 2022-02-06 PROCEDURE — 93295 DEV INTERROG REMOTE 1/2/MLT: CPT | Performed by: INTERNAL MEDICINE

## 2022-02-06 PROCEDURE — 93296 REM INTERROG EVL PM/IDS: CPT | Performed by: INTERNAL MEDICINE

## 2022-02-21 DIAGNOSIS — Z79.4 TYPE 2 DIABETES MELLITUS WITH HYPERGLYCEMIA, WITH LONG-TERM CURRENT USE OF INSULIN: ICD-10-CM

## 2022-02-21 DIAGNOSIS — E11.65 TYPE 2 DIABETES MELLITUS WITH HYPERGLYCEMIA, WITH LONG-TERM CURRENT USE OF INSULIN: ICD-10-CM

## 2022-02-21 RX ORDER — PEN NEEDLE, DIABETIC 31 GX3/16"
NEEDLE, DISPOSABLE MISCELLANEOUS
Qty: 100 EACH | Refills: 4 | Status: SHIPPED | OUTPATIENT
Start: 2022-02-21 | End: 2023-02-24

## 2022-03-08 ENCOUNTER — OFFICE VISIT (OUTPATIENT)
Dept: FAMILY MEDICINE CLINIC | Facility: CLINIC | Age: 67
End: 2022-03-08

## 2022-03-08 VITALS
TEMPERATURE: 97.8 F | SYSTOLIC BLOOD PRESSURE: 112 MMHG | HEART RATE: 76 BPM | HEIGHT: 67 IN | DIASTOLIC BLOOD PRESSURE: 70 MMHG | BODY MASS INDEX: 37.07 KG/M2 | WEIGHT: 236.2 LBS | RESPIRATION RATE: 18 BRPM

## 2022-03-08 DIAGNOSIS — K21.9 GASTROESOPHAGEAL REFLUX DISEASE WITHOUT ESOPHAGITIS: ICD-10-CM

## 2022-03-08 DIAGNOSIS — G47.33 OSA ON CPAP: ICD-10-CM

## 2022-03-08 DIAGNOSIS — Z99.89 OSA ON CPAP: ICD-10-CM

## 2022-03-08 DIAGNOSIS — E78.00 PURE HYPERCHOLESTEROLEMIA: ICD-10-CM

## 2022-03-08 DIAGNOSIS — D69.6 THROMBOCYTOPENIA: ICD-10-CM

## 2022-03-08 DIAGNOSIS — Z79.4 TYPE 2 DIABETES MELLITUS WITH HYPERGLYCEMIA, WITH LONG-TERM CURRENT USE OF INSULIN: Primary | ICD-10-CM

## 2022-03-08 DIAGNOSIS — E11.65 TYPE 2 DIABETES MELLITUS WITH HYPERGLYCEMIA, WITH LONG-TERM CURRENT USE OF INSULIN: Primary | ICD-10-CM

## 2022-03-08 DIAGNOSIS — I10 PRIMARY HYPERTENSION: ICD-10-CM

## 2022-03-08 PROCEDURE — 99214 OFFICE O/P EST MOD 30 MIN: CPT | Performed by: FAMILY MEDICINE

## 2022-03-08 RX ORDER — PANTOPRAZOLE SODIUM 40 MG/1
40 TABLET, DELAYED RELEASE ORAL DAILY
Qty: 90 TABLET | Refills: 3 | Status: SHIPPED | OUTPATIENT
Start: 2022-03-08 | End: 2023-02-21

## 2022-03-08 RX ORDER — INSULIN GLARGINE 100 [IU]/ML
32 INJECTION, SOLUTION SUBCUTANEOUS DAILY
Qty: 10 PEN | Refills: 3 | Status: ON HOLD | OUTPATIENT
Start: 2022-03-08 | End: 2023-02-14 | Stop reason: SDUPTHER

## 2022-03-08 NOTE — PROGRESS NOTES
"Chief Complaint  Diabetes (3 month f/u, cpap supplies [place]) and Med Refill    Subjective          Kai Av Garcia presents to [facility]  History of Present Illness    The patient consents to being recorded using KRISHNA. The patient is accompanied by an adult female.    Spine surgery  The patient reports having spine surgery in 10/2021 by Dr. Hernandez. He reports possible cavitation crepitus of the spine before having the surgery. The physician reportedly stated that his spine was shifting into place and this was the cause for the cavitation crepitus. The patient reports that his spine pain has improved since having the surgery.    Fall injury  The patient reports previously having a fall injury 2 years ago. He reports previously having left hip and knee injury, possibly due to the fall injury. He has refused hip and knee arthroplasty and has chosen to \"live with the pain.\"     Bilateral leg pain  The patient reports pain of the legs.                                                                                Hand pain  The patient reports constant numbness of the right hand.    Diabetes mellitus   The patient reports that he has been checking his blood glucose levels at home. He reports previously having glucose levels below 100 mg/dL. He reports decreasing Lantus 30 units to 20 units, previously. He is now taking 25 units of Lantus nightly. He reports that his at-home glucose levels results have been constantly 125 mg/dL.    Weight loss  He has lost 10 pounds since his previous visit and possibly weighed 241 pounds. On 12/06/2021 and in his previous visit, the patient reports losing 5 pounds. On 01/26/2022, the patient was seen by the cardiologist and weighed 246 pounds. Today, he weighs 236 pounds.     History of diarrhea, medication side effect  According to the adult female, the patient had very low glucose levels approximately 3 weeks ago and took an insulin injection. He " "then had severe diarrhea for approximately 1 week, as a possible side effect of taking medication. He believes that the lidocaine patch that was placed on his knee may have also been the cause of diarrhea. He no longer has symptoms of diarrhea.     Arthritis  The patient reports severe arthritis of his left side.    Ruptured hemorrhoid  The patient reports having a ruptured hemorrhoid.     Hypertension  The patient's blood pressure is 112/70 mmHg. The patient reports that he is still taking Entresto. The cardiologist states that the patient is doing very well, according to the patient.     History of abdominal pain  The patient reports previously having abdominal pain and possible diverticulitis. He takes pantoprazole 40 mg with relief. Today, he report no abdominal pain     Health maintenance  The patient denies receiving the COVID-19 vaccinations due to use of multiple pain medications in the past. The patient is scheduled to see the hematologist on 04/12/2022. The patient's PSA level was 1.9 mg/nl and on 12/07/2021 his PSA was 4.3 mg/nl.     Medical questions  The patient reports wanting to go on a cruise but was not able to due use of multiple pain medications. The patient denies having chest pain, head pain, edema of legs, or shortness of breath. He is fasting, today. The patient denies taking any medications including Percocet or muscle relaxers. He does take Tylenol for arthritis. He has requested a prescription re-fill on all medications. He does uses a CPAP machine for 7 to 8 hours daily, including 4 hours at night. He reports sleeping in the afternoon for 1 hour from 12:00 pm to 1:00 pm.      Review of Systems   Constitutional: Negative.    HENT: Negative.    Respiratory: Negative.    Cardiovascular: Negative.    Musculoskeletal: Positive for back pain.        Objective       Vital Signs:   /70   Pulse 76   Temp 97.8 °F (36.6 °C)   Resp 18   Ht 170.2 cm (67\")   Wt 107 kg (236 lb 3.2 oz)   BMI " 36.99 kg/m²     Physical Exam  Vitals and nursing note reviewed.   Constitutional:       General: He is not in acute distress.     Appearance: Normal appearance. He is well-developed. He is not ill-appearing.   HENT:      Head: Normocephalic and atraumatic.      Right Ear: Hearing, tympanic membrane, ear canal and external ear normal.      Left Ear: Hearing, tympanic membrane, ear canal and external ear normal.      Nose: Nose normal. No congestion or rhinorrhea.      Mouth/Throat:      Mouth: Mucous membranes are moist.      Pharynx: No oropharyngeal exudate or posterior oropharyngeal erythema.   Eyes:      General: Lids are normal.      Conjunctiva/sclera: Conjunctivae normal.      Pupils: Pupils are equal, round, and reactive to light.   Neck:      Thyroid: No thyromegaly.   Cardiovascular:      Rate and Rhythm: Normal rate and regular rhythm.      Heart sounds: Normal heart sounds. No murmur heard.    No friction rub.   Pulmonary:      Effort: Pulmonary effort is normal. No respiratory distress.      Breath sounds: Normal breath sounds. No wheezing or rales.   Abdominal:      General: Bowel sounds are normal. There is no distension.      Palpations: Abdomen is soft. There is no mass.      Tenderness: There is no abdominal tenderness. There is no guarding or rebound.   Musculoskeletal:      Cervical back: Normal range of motion and neck supple.      Right lower leg: No edema.      Left lower leg: No edema.   Skin:     General: Skin is warm and dry.   Neurological:      General: No focal deficit present.      Mental Status: He is alert.   Psychiatric:         Mood and Affect: Mood normal.         Speech: Speech normal.         Behavior: Behavior normal.          Result Review :                     Assessment and Plan    Diagnoses and all orders for this visit:    1. Type 2 diabetes mellitus with hyperglycemia, with long-term current use of insulin (HCC) (Primary)  -     Comprehensive Metabolic Panel  -     Lipid  Panel With / Chol / HDL Ratio  -     Hemoglobin A1c  -     Insulin Glargine (Lantus SoloStar) 100 UNIT/ML injection pen; Inject 32 Units under the skin into the appropriate area as directed Daily.  Dispense: 10 pen; Refill: 3  -     SITagliptin (Januvia) 100 MG tablet; Take 1 tablet by mouth Daily.  Dispense: 90 tablet; Refill: 3    2. Primary hypertension  -     CBC & Differential  -     Comprehensive Metabolic Panel    3. Thrombocytopenia (HCC)  -     CBC & Differential    4. Pure hypercholesterolemia  -     Comprehensive Metabolic Panel  -     Lipid Panel With / Chol / HDL Ratio    5. LUCIAN on CPAP    6. Gastroesophageal reflux disease without esophagitis  -     pantoprazole (PROTONIX) 40 MG EC tablet; Take 1 tablet by mouth Daily.  Dispense: 90 tablet; Refill: 3    Other orders  -     Discontinue: SITagliptin (Januvia) 100 MG tablet; Take 1 tablet by mouth Daily.  Dispense: 90 tablet; Refill: 3      1. Diabetes mellitus type 2 with hyperglycemia and insulin usage  - Overall, his glucose levels have improved. We will continue the insulin up to 32 units. He is currently on 25 units and will remain on that until we get his A1c test results. We will also check the CMP and lipid panels.    2. Hypertension  - The patient's blood pressure is controlled on current medications. We will check his CBC and CMP levels.    3. Thrombocytopenia  - We will re-check his CBC levels. He has a follow-up appointment with hematologist on 04/2022. He will inquire about the COVID-19 vaccination with the hematologist.    4. Hyperlipidemia  - We will check CMP and lipid panels.     5. Obstructive sleep apnea on CPAP  - He uses the machine daily at least 4 hours a nightly and feels better after using the machine.    6. Gastroesophageal reflux disease  - We refilled pantoprazole 40 mg daily. The medication has been working well with no issues.          DISCUSSION        Follow Up   Return in about 3 months (around 6/8/2022).    Patient was  given instructions and counseling regarding his condition or for health maintenance advice. Please see specific information pulled into the AVS if appropriate.       Cheo Weiss MD     Transcribed from ambient dictation for Cheo Weiss MD by Юлия Harley   03/08/22   13:55 EST    Patient verbalized consent to the visit recording.  I have personally performed the services described in this document as transcribed by the above individual, and it is both accurate and complete.  Cheo Weiss MD  3/8/2022  17:53 EST

## 2022-03-09 LAB
ALBUMIN SERPL-MCNC: 4.4 G/DL (ref 3.5–5.2)
ALBUMIN/GLOB SERPL: 1.4 G/DL
ALP SERPL-CCNC: 47 U/L (ref 39–117)
ALT SERPL-CCNC: 21 U/L (ref 1–41)
AST SERPL-CCNC: 25 U/L (ref 1–40)
BASOPHILS # BLD AUTO: 0.03 10*3/MM3 (ref 0–0.2)
BASOPHILS NFR BLD AUTO: 0.6 % (ref 0–1.5)
BILIRUB SERPL-MCNC: 0.7 MG/DL (ref 0–1.2)
BUN SERPL-MCNC: 17 MG/DL (ref 8–23)
BUN/CREAT SERPL: 16 (ref 7–25)
CALCIUM SERPL-MCNC: 9.7 MG/DL (ref 8.6–10.5)
CHLORIDE SERPL-SCNC: 101 MMOL/L (ref 98–107)
CHOLEST SERPL-MCNC: 110 MG/DL (ref 0–200)
CHOLEST/HDLC SERPL: 2.75 {RATIO}
CO2 SERPL-SCNC: 24.9 MMOL/L (ref 22–29)
CREAT SERPL-MCNC: 1.06 MG/DL (ref 0.76–1.27)
EGFR GENE MUT ANL BLD/T: 76.9 ML/MIN/1.73
EOSINOPHIL # BLD AUTO: 0.1 10*3/MM3 (ref 0–0.4)
EOSINOPHIL NFR BLD AUTO: 2 % (ref 0.3–6.2)
ERYTHROCYTE [DISTWIDTH] IN BLOOD BY AUTOMATED COUNT: 13.7 % (ref 12.3–15.4)
GLOBULIN SER CALC-MCNC: 3.2 GM/DL
GLUCOSE SERPL-MCNC: 135 MG/DL (ref 65–99)
HBA1C MFR BLD: 8.2 % (ref 4.8–5.6)
HCT VFR BLD AUTO: 40.7 % (ref 37.5–51)
HDLC SERPL-MCNC: 40 MG/DL (ref 40–60)
HGB BLD-MCNC: 13.4 G/DL (ref 13–17.7)
IMM GRANULOCYTES # BLD AUTO: 0.01 10*3/MM3 (ref 0–0.05)
IMM GRANULOCYTES NFR BLD AUTO: 0.2 % (ref 0–0.5)
LDLC SERPL CALC-MCNC: 52 MG/DL (ref 0–100)
LYMPHOCYTES # BLD AUTO: 1.43 10*3/MM3 (ref 0.7–3.1)
LYMPHOCYTES NFR BLD AUTO: 27.9 % (ref 19.6–45.3)
MCH RBC QN AUTO: 30.9 PG (ref 26.6–33)
MCHC RBC AUTO-ENTMCNC: 32.9 G/DL (ref 31.5–35.7)
MCV RBC AUTO: 94 FL (ref 79–97)
MONOCYTES # BLD AUTO: 0.52 10*3/MM3 (ref 0.1–0.9)
MONOCYTES NFR BLD AUTO: 10.2 % (ref 5–12)
NEUTROPHILS # BLD AUTO: 3.03 10*3/MM3 (ref 1.7–7)
NEUTROPHILS NFR BLD AUTO: 59.1 % (ref 42.7–76)
NRBC BLD AUTO-RTO: 0 /100 WBC (ref 0–0.2)
PLATELET # BLD AUTO: 107 10*3/MM3 (ref 140–450)
POTASSIUM SERPL-SCNC: 5 MMOL/L (ref 3.5–5.2)
PROT SERPL-MCNC: 7.6 G/DL (ref 6–8.5)
RBC # BLD AUTO: 4.33 10*6/MM3 (ref 4.14–5.8)
SODIUM SERPL-SCNC: 140 MMOL/L (ref 136–145)
TRIGL SERPL-MCNC: 90 MG/DL (ref 0–150)
VLDLC SERPL CALC-MCNC: 18 MG/DL (ref 5–40)
WBC # BLD AUTO: 5.12 10*3/MM3 (ref 3.4–10.8)

## 2022-04-19 ENCOUNTER — OFFICE VISIT (OUTPATIENT)
Dept: ONCOLOGY | Facility: CLINIC | Age: 67
End: 2022-04-19

## 2022-04-19 ENCOUNTER — LAB (OUTPATIENT)
Dept: LAB | Facility: HOSPITAL | Age: 67
End: 2022-04-19

## 2022-04-19 VITALS
RESPIRATION RATE: 16 BRPM | WEIGHT: 231.4 LBS | TEMPERATURE: 96.4 F | OXYGEN SATURATION: 97 % | SYSTOLIC BLOOD PRESSURE: 137 MMHG | HEIGHT: 67 IN | BODY MASS INDEX: 36.32 KG/M2 | HEART RATE: 75 BPM | DIASTOLIC BLOOD PRESSURE: 77 MMHG

## 2022-04-19 DIAGNOSIS — D69.6 THROMBOCYTOPENIA: ICD-10-CM

## 2022-04-19 DIAGNOSIS — D69.6 THROMBOCYTOPENIA: Primary | ICD-10-CM

## 2022-04-19 DIAGNOSIS — R97.20 ELEVATED PSA: ICD-10-CM

## 2022-04-19 LAB
BASOPHILS # BLD AUTO: 0.04 10*3/MM3 (ref 0–0.2)
BASOPHILS NFR BLD AUTO: 0.6 % (ref 0–1.5)
DEPRECATED RDW RBC AUTO: 46.8 FL (ref 37–54)
EOSINOPHIL # BLD AUTO: 0.13 10*3/MM3 (ref 0–0.4)
EOSINOPHIL NFR BLD AUTO: 1.8 % (ref 0.3–6.2)
ERYTHROCYTE [DISTWIDTH] IN BLOOD BY AUTOMATED COUNT: 13.5 % (ref 12.3–15.4)
HCT VFR BLD AUTO: 40.8 % (ref 37.5–51)
HGB BLD-MCNC: 13.5 G/DL (ref 13–17.7)
IMM GRANULOCYTES # BLD AUTO: 0.03 10*3/MM3 (ref 0–0.05)
IMM GRANULOCYTES NFR BLD AUTO: 0.4 % (ref 0–0.5)
LYMPHOCYTES # BLD AUTO: 1.67 10*3/MM3 (ref 0.7–3.1)
LYMPHOCYTES NFR BLD AUTO: 23.5 % (ref 19.6–45.3)
MCH RBC QN AUTO: 31.3 PG (ref 26.6–33)
MCHC RBC AUTO-ENTMCNC: 33.1 G/DL (ref 31.5–35.7)
MCV RBC AUTO: 94.4 FL (ref 79–97)
MONOCYTES # BLD AUTO: 0.8 10*3/MM3 (ref 0.1–0.9)
MONOCYTES NFR BLD AUTO: 11.3 % (ref 5–12)
NEUTROPHILS NFR BLD AUTO: 4.44 10*3/MM3 (ref 1.7–7)
NEUTROPHILS NFR BLD AUTO: 62.4 % (ref 42.7–76)
NRBC BLD AUTO-RTO: 0 /100 WBC (ref 0–0.2)
PLATELET # BLD AUTO: 134 10*3/MM3 (ref 140–450)
PMV BLD AUTO: 10.8 FL (ref 6–12)
PSA SERPL-MCNC: 2.42 NG/ML (ref 0–4)
RBC # BLD AUTO: 4.32 10*6/MM3 (ref 4.14–5.8)
WBC NRBC COR # BLD: 7.11 10*3/MM3 (ref 3.4–10.8)

## 2022-04-19 PROCEDURE — 99214 OFFICE O/P EST MOD 30 MIN: CPT | Performed by: INTERNAL MEDICINE

## 2022-04-19 PROCEDURE — 36415 COLL VENOUS BLD VENIPUNCTURE: CPT

## 2022-04-19 PROCEDURE — 84153 ASSAY OF PSA TOTAL: CPT

## 2022-04-19 PROCEDURE — 85025 COMPLETE CBC W/AUTO DIFF WBC: CPT

## 2022-04-19 NOTE — PROGRESS NOTES
Follow Up Office Visit      Date: 2022     Patient Name: Kai Garcia  MRN: [id number]  : 1955  Referring Physician: Cheo Weiss     Chief Complaint:  Follow-up for thrombocytopenia     History of Present Illness: Kai Garcia is a pleasant 65 y.o. male past medical history of thrombocytopenia, chronic back pain secondary to multiple fractures, CAD, hypertension, hyperlipidemia, type 2 diabetes, CHF who presents today for evaluation of thrombocytopenia. The patient is accompanied by their wife who contributes to the history of their care.  Patient has been followed by their primary care physician with noted thrombocytopenia over the past several years.  Baseline platelet count range between 80-130K.  Previously had a septic episode in March of this year from a UTI.  At that time his platelet count dropped to as low as 22K but has since recovered to around 80K.  He denies any bleeding episodes but does note chronic bruising likely secondary to his dual antiplatelet use.  He states he was scheduled for back surgery in April of this year however this was delayed secondary to his thrombocytopenia.  He has significant pain and discomfort related to multiple spinal fractures.  He recently had a CT scan of his abdomen/pelvis in 2021 which did not show any evidence of cirrhosis or splenomegaly he otherwise has no other major complaints today and is compliant with all his other home medications     Interval History:  Presents clinic for follow-up.  Had back surgery in 2022 with improvement of his symptoms.  Still has some pain but is overall improved.  Denies any easy bleeding or bruising episodes.  States that he is going to be having left hip and knee surgery in the near future as well    Oncology History:    Oncology/Hematology History    No history exists.       Subjective      Review of Systems:   Constitutional: Negative for fevers, chills, or weight loss  Eyes: Negative for  blurred vision or discharge         Ear/Nose/Throat: Negative for difficulty swallowing, sore throat, LAD                                                       Respiratory: Negative for cough, SOA, wheezing                                                                                        Cardiovascular: Negative for chest pain or palpitations                                                                  Gastrointestinal: Negative for nausea, vomiting or diarrhea                                                                     Genitourinary: Negative for dysuria or hematuria                                                                                           Musculoskeletal: Negative for any joint pains or muscle aches                                                                        Neurologic: Negative for any weakness, headaches, dizziness                                                                         Hematologic: Negative for any easy bleeding or bruising                                                                                   Psychiatric: Negative for anxiety or depression                          Past Medical History/Past Surgical History/ Family History/ Social History: Reviewed by me and unchanged from my previous documentation done on October 2021.     Medications:     Current Outpatient Medications:   •  acetaminophen (TYLENOL) 500 MG tablet, Take 1,000 mg by mouth Every 6 (Six) Hours As Needed for Mild Pain ., Disp: , Rfl:   •  aspirin 81 MG EC tablet, Take 1 tablet by mouth Daily., Disp: , Rfl:   •  bisoprolol (ZEBeta) 10 MG tablet, Take 1 tablet by mouth Daily., Disp: 90 tablet, Rfl: 3  •  clopidogrel (PLAVIX) 75 MG tablet, Take 1 tablet by mouth Daily. Resume if platelets are above 50 thousand on recheck on 11/3/21, Disp: 90 tablet, Rfl: 3  •  eplerenone (INSPRA) 25 MG tablet, Take 1 tablet by mouth Daily., Disp: 90 tablet, Rfl: 3  •  Insulin Glargine (Lantus  "SoloStar) 100 UNIT/ML injection pen, Inject 32 Units under the skin into the appropriate area as directed Daily., Disp: 10 pen, Rfl: 3  •  Insulin Pen Needle (Easy Touch Pen Needles) 31G X 5 MM misc, USE DAILY WITH INSULIN, Disp: 100 each, Rfl: 4  •  nitroglycerin (Nitrostat) 0.4 MG SL tablet, Place 1 tablet under the tongue Every 5 (Five) Minutes As Needed for Chest Pain., Disp: 25 tablet, Rfl: 3  •  pantoprazole (PROTONIX) 40 MG EC tablet, Take 1 tablet by mouth Daily., Disp: 90 tablet, Rfl: 3  •  rosuvastatin (CRESTOR) 10 MG tablet, Take 1 tablet by mouth Daily., Disp: 90 tablet, Rfl: 3  •  sacubitril-valsartan (Entresto) 49-51 MG tablet, Take 1 tablet by mouth Every 12 (Twelve) Hours., Disp: 180 tablet, Rfl: 3  •  SITagliptin (Januvia) 100 MG tablet, Take 1 tablet by mouth Daily., Disp: 90 tablet, Rfl: 3  •  traZODone (DESYREL) 100 MG tablet, TAKE 1/2 TABLET BY MOUTH EVERY NIGHT AT BEDTIME AND INCREASE TO 1 TABLET IF NEEDED (Patient taking differently: Take 100 mg by mouth At Night As Needed for Sleep. TAKE 1/2 TABLET BY MOUTH EVERY NIGHT AT BEDTIME AND INCREASE TO 1 TABLET IF NEEDED), Disp: 30 tablet, Rfl: 5    Allergies:   Allergies   Allergen Reactions   • Ativan [Lorazepam] Mental Status Change     agitation   • Zetia [Ezetimibe] Other (See Comments)     increased LFTs   • Zocor [Simvastatin] Myalgia   • Isosorbide Other (See Comments)     headaches   • Jardiance [Empagliflozin] Other (See Comments)     Blood in urine and burning sensation   • Metformin And Related Diarrhea       Objective     Physical Exam:  Vital Signs:   Vitals:    04/19/22 1507   BP: 137/77   Pulse: 75   Resp: 16   Temp: 96.4 °F (35.8 °C)   TempSrc: Temporal   SpO2: 97%   Weight: 105 kg (231 lb 6.4 oz)   Height: 170.2 cm (67\")   PainSc: 0-No pain     Pain Score    04/19/22 1507   PainSc: 0-No pain     ECOG Performance Status: 1 - Symptomatic but completely ambulatory    Constitutional: NAD, ECOG 1  Eyes: PERRLA, scleral anicteric  ENT: " No LAD, no thyromegaly  Respiratory: CTAB, no wheezing, rales, rhonchi  Cardiovascular: RRR, no murmurs, pulses 2+ bilaterally  Abdomen: soft, NT/ND, no HSM  Musculoskeletal: strength 5/5 bilaterally, no c/c/e  Neurologic: A&O x 3, CN II-XII intact grossly    Results Review:   No visits with results within 2 Week(s) from this visit.   Latest known visit with results is:   Office Visit on 03/08/2022   Component Date Value Ref Range Status   • WBC 03/08/2022 5.12  3.40 - 10.80 10*3/mm3 Final   • RBC 03/08/2022 4.33  4.14 - 5.80 10*6/mm3 Final   • Hemoglobin 03/08/2022 13.4  13.0 - 17.7 g/dL Final   • Hematocrit 03/08/2022 40.7  37.5 - 51.0 % Final   • MCV 03/08/2022 94.0  79.0 - 97.0 fL Final   • MCH 03/08/2022 30.9  26.6 - 33.0 pg Final   • MCHC 03/08/2022 32.9  31.5 - 35.7 g/dL Final   • RDW 03/08/2022 13.7  12.3 - 15.4 % Final   • Platelets 03/08/2022 107 (A) 140 - 450 10*3/mm3 Final   • Neutrophil Rel % 03/08/2022 59.1  42.7 - 76.0 % Final   • Lymphocyte Rel % 03/08/2022 27.9  19.6 - 45.3 % Final   • Monocyte Rel % 03/08/2022 10.2  5.0 - 12.0 % Final   • Eosinophil Rel % 03/08/2022 2.0  0.3 - 6.2 % Final   • Basophil Rel % 03/08/2022 0.6  0.0 - 1.5 % Final   • Neutrophils Absolute 03/08/2022 3.03  1.70 - 7.00 10*3/mm3 Final   • Lymphocytes Absolute 03/08/2022 1.43  0.70 - 3.10 10*3/mm3 Final   • Monocytes Absolute 03/08/2022 0.52  0.10 - 0.90 10*3/mm3 Final   • Eosinophils Absolute 03/08/2022 0.10  0.00 - 0.40 10*3/mm3 Final   • Basophils Absolute 03/08/2022 0.03  0.00 - 0.20 10*3/mm3 Final   • Immature Granulocyte Rel % 03/08/2022 0.2  0.0 - 0.5 % Final   • Immature Grans Absolute 03/08/2022 0.01  0.00 - 0.05 10*3/mm3 Final   • nRBC 03/08/2022 0.0  0.0 - 0.2 /100 WBC Final   • Glucose 03/08/2022 135 (A) 65 - 99 mg/dL Final   • BUN 03/08/2022 17  8 - 23 mg/dL Final   • Creatinine 03/08/2022 1.06  0.76 - 1.27 mg/dL Final   • EGFR Result 03/08/2022 76.9  >60.0 mL/min/1.73 Final    Comment: National Kidney Foundation  and American Society of  Nephrology (ASN) Task Force recommended calculation based  on the Chronic Kidney Disease Epidemiology Collaboration  (CKD-EPI) equation refit without adjustment for race.  GFR Normal >60  Chronic Kidney Disease <60  Kidney Failure <15     • BUN/Creatinine Ratio 03/08/2022 16.0  7.0 - 25.0 Final   • Sodium 03/08/2022 140  136 - 145 mmol/L Final   • Potassium 03/08/2022 5.0  3.5 - 5.2 mmol/L Final    Comment: Slight hemolysis detected by analyzer. Results may be  affected.     • Chloride 03/08/2022 101  98 - 107 mmol/L Final   • Total CO2 03/08/2022 24.9  22.0 - 29.0 mmol/L Final   • Calcium 03/08/2022 9.7  8.6 - 10.5 mg/dL Final   • Total Protein 03/08/2022 7.6  6.0 - 8.5 g/dL Final   • Albumin 03/08/2022 4.40  3.50 - 5.20 g/dL Final   • Globulin 03/08/2022 3.2  gm/dL Final   • A/G Ratio 03/08/2022 1.4  g/dL Final   • Total Bilirubin 03/08/2022 0.7  0.0 - 1.2 mg/dL Final   • Alkaline Phosphatase 03/08/2022 47  39 - 117 U/L Final   • AST (SGOT) 03/08/2022 25  1 - 40 U/L Final   • ALT (SGPT) 03/08/2022 21  1 - 41 U/L Final   • Total Cholesterol 03/08/2022 110  0 - 200 mg/dL Final    Comment: Cholesterol Reference Ranges  (U.S. Department of Health and Human Services ATP III  Classifications)  Desirable          <200 mg/dL  Borderline High    200-239 mg/dL  High Risk          >240 mg/dL  Triglyceride Reference Ranges  (U.S. Department of Health and Human Services ATP III  Classifications)  Normal           <150 mg/dL  Borderline High  150-199 mg/dL  High             200-499 mg/dL  Very High        >500 mg/dL  HDL Reference Ranges  (U.S. Department of Health and Human Services ATP III  Classifications)  Low     <40 mg/dl (major risk factor for CHD)  High    >60 mg/dl ('negative' risk factor for CHD)  LDL Reference Ranges  (U.S. Department of Health and Human Services ATP III  Classifications)  Optimal          <100 mg/dL  Near Optimal     100-129 mg/dL  Borderline High  130-159 mg/dL  High              160-189 mg/dL  Very High        >189 mg/dL     • Triglycerides 03/08/2022 90  0 - 150 mg/dL Final   • HDL Cholesterol 03/08/2022 40  40 - 60 mg/dL Final   • VLDL Cholesterol Laurent 03/08/2022 18  5 - 40 mg/dL Final   • LDL Chol Calc ([facility]) 03/08/2022 52  0 - 100 mg/dL Final   • Chol/HDL Ratio 03/08/2022 2.75   Final   • Hemoglobin A1C 03/08/2022 8.20 (A) 4.80 - 5.60 % Final    Comment: Hemoglobin A1C Ranges:  Increased Risk for Diabetes  5.7% to 6.4%  Diabetes                     >= 6.5%  Diabetic Goal                < 7.0%         No results found.    Assessment / Plan      Assessment/Plan:   1. Thrombocytopenia (CMS/HCC) (Primary)  -Unclear etiology at this time. Likely medication induced vs autoimmune vs liver disease  -Repeat platelet count 97K in June 2021, repeat in August 2021 77K  -HIV within normal limits  -Vitamin B12 and folate within normal limits  -Iron studies within normal limits  -LDH mildly elevated, haptoglobin 20  -Lary test negative  -Patient with recent CT scan of the abdomen/pelvis which showed no splenomegaly, but did note some slight nodular morphology  -Patient's platelet count likely secondary to underlying liver disease vs him being on Plavix and aspirin  -Platelet count stable at 107K in March 2022.  Will repeat today and if it continues to remain stable he will require no further hematologic work-up or follow-up.  Can be transfused as needed for any surgical intervention    2. Elevated PSA  -Noted on labs in December 2021  -We will repeat PSA today and if it continues to increase, will refer to urology  -     PSA DIAGNOSTIC; Future         Follow Up:   Follow-up as needed     Hernan Kaiser MD  Hematology and Oncology     Please note that portions of this note may have been completed with a voice recognition program. Efforts were made to edit the dictations, but occasionally words are mistranscribed.

## 2022-04-20 ENCOUNTER — TELEPHONE (OUTPATIENT)
Dept: ONCOLOGY | Facility: CLINIC | Age: 67
End: 2022-04-20

## 2022-04-20 NOTE — TELEPHONE ENCOUNTER
Call to patient to inform him that Dr Kaiser has reviewed his lab work and all is okay.  Patient states understood

## 2022-06-14 ENCOUNTER — OFFICE VISIT (OUTPATIENT)
Dept: FAMILY MEDICINE CLINIC | Facility: CLINIC | Age: 67
End: 2022-06-14

## 2022-06-14 VITALS
OXYGEN SATURATION: 93 % | TEMPERATURE: 97.5 F | BODY MASS INDEX: 35.63 KG/M2 | SYSTOLIC BLOOD PRESSURE: 132 MMHG | HEART RATE: 66 BPM | RESPIRATION RATE: 16 BRPM | DIASTOLIC BLOOD PRESSURE: 89 MMHG | WEIGHT: 227 LBS | HEIGHT: 67 IN

## 2022-06-14 DIAGNOSIS — Z23 NEED FOR COVID-19 VACCINE: ICD-10-CM

## 2022-06-14 DIAGNOSIS — I10 PRIMARY HYPERTENSION: ICD-10-CM

## 2022-06-14 DIAGNOSIS — E78.00 PURE HYPERCHOLESTEROLEMIA: ICD-10-CM

## 2022-06-14 DIAGNOSIS — L84 CORN OF TOE: ICD-10-CM

## 2022-06-14 DIAGNOSIS — E11.65 TYPE 2 DIABETES MELLITUS WITH HYPERGLYCEMIA, WITH LONG-TERM CURRENT USE OF INSULIN: Primary | ICD-10-CM

## 2022-06-14 DIAGNOSIS — D69.6 THROMBOCYTOPENIA: ICD-10-CM

## 2022-06-14 DIAGNOSIS — M79.675 PAIN OF TOE OF LEFT FOOT: ICD-10-CM

## 2022-06-14 DIAGNOSIS — Z79.4 TYPE 2 DIABETES MELLITUS WITH HYPERGLYCEMIA, WITH LONG-TERM CURRENT USE OF INSULIN: Primary | ICD-10-CM

## 2022-06-14 PROCEDURE — 99214 OFFICE O/P EST MOD 30 MIN: CPT | Performed by: FAMILY MEDICINE

## 2022-06-14 PROCEDURE — 0051A COVID-19 (PFIZER) 12+ YRS: CPT | Performed by: FAMILY MEDICINE

## 2022-06-14 PROCEDURE — 91305 COVID-19 (PFIZER) 12+ YRS: CPT | Performed by: FAMILY MEDICINE

## 2022-06-14 RX ORDER — LANCETS
EACH MISCELLANEOUS
Qty: 100 EACH | Refills: 5 | Status: SHIPPED | OUTPATIENT
Start: 2022-06-14 | End: 2023-04-03

## 2022-06-14 RX ORDER — BLOOD SUGAR DIAGNOSTIC
STRIP MISCELLANEOUS
Qty: 100 EACH | Refills: 5 | Status: SHIPPED | OUTPATIENT
Start: 2022-06-14 | End: 2023-04-03

## 2022-06-14 NOTE — PROGRESS NOTES
"Chief Complaint  Diabetes (3 month f/u )    Subjective          Kai Garcia presents to [facility]  History of Present Illness    Diabetes mellitus type 2 with hyperglycemia  The patient is accompanied by an adult female. He checks his blood glucose twice per day. The patient reports his blood glucose as been in the 120s to 125.  He lowered his insulin injection to 25 units per night. The patient reports that he has been feeling well.     Left foot pain  He complains of pain and swelling in his left little toe. It is bothersome during sleep and it keeps him awake. He denies any injury. He states that he normally has a callus on it, but it is swelling under the callus. He states that he has \"stuck it\" a couple of times, but it did not drain anything. He states that he has never seen a podiatrist. He states that he has seen a knee doctor and a hip doctor. He states that he is removing barefoot in his house because of it. He states that he does not wish to see a podiatrist right now.     Hypertension  His blood pressure is 132/89 mmHg. He states that he checks his blood pressure at home all the time. He states that it has been running about 140 to 85 mmHg. He states that he has a lot of chest pain if he gets too hot outside. He states that he has to sit down and rest after that. He states that he has shortness of breath on these high hot days. He states that he was seeing Dr. Willis on 08/03/2021. He states that he saw Dr. Kaiser back in 04/2022. He states that Dr. Kaiser no longer needs to see him.    Health maintenance  He states that he would like to get the Pfizer COVID-19 vaccine. He states that he can not have the Genaro and Genaro vaccine. The patient reports bleeding in his ear for 5 days. He is unsure if a bug got in his ear.     Review of Systems   A review of systems was performed, and the pertinent positives are noted in the HPI.   Objective       Vital Signs: " "  /89   Pulse 66   Temp 97.5 °F (36.4 °C)   Resp 16   Ht 170.2 cm (67\")   Wt 103 kg (227 lb)   SpO2 93%   BMI 35.55 kg/m²     Physical Exam  Vitals and nursing note reviewed.   Constitutional:       Appearance: He is well-developed.   HENT:      Head: Normocephalic and atraumatic.      Right Ear: External ear normal.      Left Ear: External ear normal.   Eyes:      Pupils: Pupils are equal, round, and reactive to light.   Cardiovascular:      Rate and Rhythm: Normal rate and regular rhythm.      Pulses:           Dorsalis pedis pulses are 1+ on the right side and 1+ on the left side.      Heart sounds: Normal heart sounds.   Pulmonary:      Effort: Pulmonary effort is normal. No respiratory distress.      Breath sounds: Normal breath sounds. No wheezing or rales.   Musculoskeletal:      Right foot: Normal range of motion. No deformity.      Left foot: Normal range of motion. No deformity.   Feet:      Right foot:      Protective Sensation: 5 sites tested. 3 sites sensed.      Skin integrity: Callus present. No ulcer, skin breakdown or dry skin.      Left foot:      Protective Sensation: 5 sites tested. 2 sites sensed.      Skin integrity: Callus present. No ulcer, skin breakdown or dry skin.      Comments: Diabetic Foot Exam Performed and Monofilament Test Performed   Corn left 5th toe. + tender  Skin:     General: Skin is warm and dry.   Neurological:      Mental Status: He is alert and oriented to person, place, and time.   Psychiatric:         Behavior: Behavior normal.          Result Review :                     Assessment and Plan    Diagnoses and all orders for this visit:    1. Type 2 diabetes mellitus with hyperglycemia, with long-term current use of insulin ([facility]) (Primary)  -     Comprehensive Metabolic Panel  -     Lipid Panel With / Chol / HDL Ratio  -     Hemoglobin A1c  -     glucose blood (Accu-Chek Jodie Plus) test strip; Check sugars bid  Dispense: 100 each; Refill: 5  -     Accu-Chek " Softclix Lancets lancets; Check sugars bid  Dispense: 100 each; Refill: 5    2. Primary hypertension  -     Comprehensive Metabolic Panel  -     Lipid Panel With / Chol / HDL Ratio    3. Pure hypercholesterolemia  -     Comprehensive Metabolic Panel  -     Lipid Panel With / Chol / HDL Ratio    4. Thrombocytopenia (HCC)    5. Pain of toe of left foot    6. Corn of toe    7. Need for COVID-19 vaccine  -     Cancel: COVID-19 Vaccine (Pfizer) Purple Cap  -     COVID-19 Vaccine (Pfizer) Gray Cap      1. Diabetes mellitus type 2 with hyperglycemia on insulin  - Check CMP, lipid panel, and A1c. I refilled Accu-Chek and lancets. He will continue his insulin and diet changes. He has been losing weight, doing quite well.    2. Hypertension  - Blood pressure is stable. Continue Entresto and bisoprolol 10 mg daily. Check CMP and lipid panel.    3. Hyperlipidemia  - Check CMP and lipid panel.    4. Thrombocytopenia  - As last platelet count was [id number], doing much better. Dr. Kaiser from hematology has completed work-up and we will just continue to monitor.    5. Pain on the left foot due to a corn  - Offered podiatry evaluation, but he wishes to use some local treatment. Can try over-the-counter corn pads. If not improving, he will let us know.    6. Need for COVID-19 vaccine  - He would like to go ahead and get the COVID-19 vaccination started. He has never had one. He would like to get the Pfizer one that we have here in the office. Explained that he should get one now and then another one in 28 days. In addition, he was told the symptoms and side effects of the COVID-19 vaccine including flu-like symptoms, headache, chills, and fever. He will call with any problems.         DISCUSSION  He was also told that he may have some mild diabetic neuropathy of his feet and that he should not go barefoot and continue to watch and monitor for sores on his feet.      Follow Up   Return in about 3 months (around 9/14/2022).    Patient  was given instructions and counseling regarding his condition or for health maintenance advice. Please see specific information pulled into the AVS if appropriate.       Cheo Weiss MD     Transcribed from ambient dictation for Cheo Weiss MD by Julianne Powers.  06/14/22   10:35 EDT    Patient verbalized consent to the visit recording.  I have personally performed the services described in this document as transcribed by the above individual, and it is both accurate and complete.  Cheo Weiss MD  6/15/2022  13:27 EDT

## 2022-06-15 LAB
ALBUMIN SERPL-MCNC: 4.4 G/DL (ref 3.5–5.2)
ALBUMIN/GLOB SERPL: 1.4 G/DL
ALP SERPL-CCNC: 42 U/L (ref 39–117)
ALT SERPL-CCNC: 16 U/L (ref 1–41)
AST SERPL-CCNC: 20 U/L (ref 1–40)
BILIRUB SERPL-MCNC: 0.5 MG/DL (ref 0–1.2)
BUN SERPL-MCNC: 16 MG/DL (ref 8–23)
BUN/CREAT SERPL: 16.5 (ref 7–25)
CALCIUM SERPL-MCNC: 9.1 MG/DL (ref 8.6–10.5)
CHLORIDE SERPL-SCNC: 104 MMOL/L (ref 98–107)
CHOLEST SERPL-MCNC: 111 MG/DL (ref 0–200)
CHOLEST/HDLC SERPL: 2.71 {RATIO}
CO2 SERPL-SCNC: 26.7 MMOL/L (ref 22–29)
CREAT SERPL-MCNC: 0.97 MG/DL (ref 0.76–1.27)
EGFRCR SERPLBLD CKD-EPI 2021: 85.6 ML/MIN/1.73
GLOBULIN SER CALC-MCNC: 3.1 GM/DL
GLUCOSE SERPL-MCNC: 123 MG/DL (ref 65–99)
HBA1C MFR BLD: 6.4 % (ref 4.8–5.6)
HDLC SERPL-MCNC: 41 MG/DL (ref 40–60)
LDLC SERPL CALC-MCNC: 52 MG/DL (ref 0–100)
POTASSIUM SERPL-SCNC: 4.5 MMOL/L (ref 3.5–5.2)
PROT SERPL-MCNC: 7.5 G/DL (ref 6–8.5)
SODIUM SERPL-SCNC: 142 MMOL/L (ref 136–145)
TRIGL SERPL-MCNC: 95 MG/DL (ref 0–150)
VLDLC SERPL CALC-MCNC: 18 MG/DL (ref 5–40)

## 2022-07-18 ENCOUNTER — CLINICAL SUPPORT (OUTPATIENT)
Dept: FAMILY MEDICINE CLINIC | Facility: CLINIC | Age: 67
End: 2022-07-18

## 2022-07-18 DIAGNOSIS — Z23 NEED FOR SECOND DOSE OF COVID-19 VACCINE: Primary | ICD-10-CM

## 2022-07-18 DIAGNOSIS — Z23 NEED FOR COVID-19 VACCINE: Primary | ICD-10-CM

## 2022-07-18 DIAGNOSIS — Z28.311 NEED FOR SECOND DOSE OF COVID-19 VACCINE: Primary | ICD-10-CM

## 2022-07-18 PROCEDURE — 0052A COVID-19 (PFIZER) 12+ YRS: CPT | Performed by: FAMILY MEDICINE

## 2022-07-18 PROCEDURE — 91305 COVID-19 (PFIZER) 12+ YRS: CPT | Performed by: FAMILY MEDICINE

## 2022-08-03 ENCOUNTER — OFFICE VISIT (OUTPATIENT)
Dept: CARDIOLOGY | Facility: CLINIC | Age: 67
End: 2022-08-03

## 2022-08-03 VITALS
WEIGHT: 230 LBS | BODY MASS INDEX: 36.1 KG/M2 | SYSTOLIC BLOOD PRESSURE: 118 MMHG | HEIGHT: 67 IN | HEART RATE: 76 BPM | DIASTOLIC BLOOD PRESSURE: 60 MMHG | OXYGEN SATURATION: 96 %

## 2022-08-03 DIAGNOSIS — E78.00 PURE HYPERCHOLESTEROLEMIA: ICD-10-CM

## 2022-08-03 DIAGNOSIS — I10 ESSENTIAL HYPERTENSION: ICD-10-CM

## 2022-08-03 DIAGNOSIS — I25.10 CORONARY ARTERY DISEASE INVOLVING NATIVE CORONARY ARTERY OF NATIVE HEART WITHOUT ANGINA PECTORIS: Primary | ICD-10-CM

## 2022-08-03 DIAGNOSIS — E78.5 DYSLIPIDEMIA: ICD-10-CM

## 2022-08-03 PROCEDURE — 93284 PRGRMG EVAL IMPLANTABLE DFB: CPT | Performed by: INTERNAL MEDICINE

## 2022-08-03 PROCEDURE — 99214 OFFICE O/P EST MOD 30 MIN: CPT | Performed by: INTERNAL MEDICINE

## 2022-08-03 NOTE — PROGRESS NOTES
Subjective:     Encounter Date:08/03/2022    Primary Care Physician: Cheo Weiss MD      Patient ID: Kai Garcia is a 66 y.o. male.    Chief Complaint:Follow-up and Pacemaker Check      Problem List:  1. Dissecting aortic aneurysm:  a. 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 Helton, stage I in June 2006 and stage II in March 2007.   b. Followed chronically with annual CTs by Dr. Helton. Stable by CT 2019  2. Coronary artery disease:  a. Cardiac cath prior to “elephant trunk” procedure in June 2006 and the patient underwent CABG at that time consisting of SVG to the OM and SVG to the PDA.   b. 02/08/2007 - inferoposterior MI, cardiac cath showed 100% left circumflex with severe OM-1 stenosis and 100% vein graft to the left circumflex. RCA was diffuse, 90% ostial stenosis at the SVG to the RCA. LAD had only 40% stenosis.   c. Cardiac cath in March 2007 due to recurrent angina - 2.5 Microdriver stent to the distal native RCA.   d. 4/27/18 cardiac catheter occluded vein grafts ×2.  Large posterior basal aneurysm with ejection fraction of 30-35%.  Severe stenosis and distal circumflex which supplies aneurysm.  Non-flow-limiting RCA disease.  e. MRI 8/18 LVEF 28%  f. S/P Bi- Ventricular ICD- [employer] 8/9/18, EMILIE Fox MD  3. Dyslipidemia.   4. Hypertension.   5. Diabetes  6. Chronic low back pain/multiple lumbar surgeries, currently disabled:  a. L4-L5 discectomy in 2015.   7. History of bleeding peptic ulcer disease.   8. Severe obstructive sleep apnea.   9. Appendectomy  10. Lumbar fusion 10/2021      Allergies   Allergen Reactions   • Ativan [Lorazepam] Mental Status Change     agitation   • Zetia [Ezetimibe] Other (See Comments)     increased LFTs   • Zocor [Simvastatin] Myalgia   • Isosorbide Other (See Comments)     headaches   • Jardiance [Empagliflozin] Other (See Comments)     Blood in urine and  burning sensation   • Metformin And Related Diarrhea         Current Outpatient Medications:   •  Accu-Chek Softclix Lancets lancets, Check sugars bid, Disp: 100 each, Rfl: 5  •  acetaminophen (TYLENOL) 500 MG tablet, Take 1,000 mg by mouth Every 6 (Six) Hours As Needed for Mild Pain ., Disp: , Rfl:   •  aspirin 81 MG EC tablet, Take 1 tablet by mouth Daily., Disp: , Rfl:   •  bisoprolol (ZEBeta) 10 MG tablet, Take 1 tablet by mouth Daily., Disp: 90 tablet, Rfl: 3  •  clopidogrel (PLAVIX) 75 MG tablet, Take 1 tablet by mouth Daily. Resume if platelets are above 50 thousand on recheck on 11/3/21, Disp: 90 tablet, Rfl: 3  •  eplerenone (INSPRA) 25 MG tablet, Take 1 tablet by mouth Daily., Disp: 90 tablet, Rfl: 3  •  glucose blood (Accu-Chek Jodie Plus) test strip, Check sugars bid, Disp: 100 each, Rfl: 5  •  Insulin Glargine (Lantus SoloStar) 100 UNIT/ML injection pen, Inject 32 Units under the skin into the appropriate area as directed Daily., Disp: 10 pen, Rfl: 3  •  Insulin Pen Needle (Easy Touch Pen Needles) 31G X 5 MM misc, USE DAILY WITH INSULIN, Disp: 100 each, Rfl: 4  •  nitroglycerin (Nitrostat) 0.4 MG SL tablet, Place 1 tablet under the tongue Every 5 (Five) Minutes As Needed for Chest Pain., Disp: 25 tablet, Rfl: 3  •  pantoprazole (PROTONIX) 40 MG EC tablet, Take 1 tablet by mouth Daily., Disp: 90 tablet, Rfl: 3  •  rosuvastatin (CRESTOR) 10 MG tablet, Take 1 tablet by mouth Daily., Disp: 90 tablet, Rfl: 3  •  sacubitril-valsartan (Entresto) 49-51 MG tablet, Take 1 tablet by mouth Every 12 (Twelve) Hours., Disp: 180 tablet, Rfl: 3  •  SITagliptin (Januvia) 100 MG tablet, Take 1 tablet by mouth Daily., Disp: 90 tablet, Rfl: 3  •  traZODone (DESYREL) 100 MG tablet, TAKE 1/2 TABLET BY MOUTH EVERY NIGHT AT BEDTIME AND INCREASE TO 1 TABLET IF NEEDED (Patient taking differently: Take 100 mg by mouth At Night As Needed for Sleep. TAKE 1/2 TABLET BY MOUTH EVERY NIGHT AT BEDTIME AND INCREASE TO 1 TABLET IF NEEDED),  "Disp: 30 tablet, Rfl: 5        History of Present Illness    Patient returns today for 6-month follow-up of dissecting arctic aneurysm coronary disease dyslipidemia.  Since her last visit, patient from a cardiovascular standpoint is doing well.  He has lost 17 pounds.  He is moving better since his lumbar spine surgery but still has persistent left leg and left arm symptoms.  Denies chest pain dyspnea shortness of breath orthopnea PND or any cardiovascular symptoms.    The following portions of the patient's history were reviewed and updated as appropriate: allergies, current medications, past family history, past medical history, past social history, past surgical history and problem list.      Social History     Tobacco Use   • Smoking status: Former Smoker     Packs/day: 3.00     Years: 35.00     Pack years: 105.00     Types: Cigarettes, Cigarettes     Quit date: 6/15/2006     Years since quittin.1   • Smokeless tobacco: Never Used   Vaping Use   • Vaping Use: Never used   Substance Use Topics   • Alcohol use: No   • Drug use: No         ROS       Objective:   /60   Pulse 76   Ht 170.2 cm (67\")   Wt 104 kg (230 lb)   SpO2 96%   BMI 36.02 kg/m²         Vitals reviewed.   Constitutional:       Appearance: Well-developed and not in distress.   Neck:      Thyroid: No thyromegaly.      Vascular: No carotid bruit or JVD.   Pulmonary:      Breath sounds: Normal breath sounds.   Cardiovascular:      Regular rhythm.      No gallop. No S3 and S4 gallop.   Abdominal:      General: Bowel sounds are normal.      Palpations: Abdomen is soft. There is no abdominal mass.      Tenderness: There is no abdominal tenderness.   Musculoskeletal:         General: No deformity.      Extremities: No clubbing present.Skin:     General: Skin is warm and dry.      Findings: No rash.   Neurological:      Mental Status: Alert and oriented to person, place, and time.         Procedures  Device check:  1% atrially paced 98% " RV/LV pacing.  Normal thresholds impedances normal charge time estimated battery voltage and 7/2 years left.  Less than 1% mode switch (15 seconds only).        Assessment:   Assessment & Plan      Diagnoses and all orders for this visit:    1. Coronary artery disease involving native coronary artery of native heart without angina pectoris (Primary)    2. Dyslipidemia    3. Pure hypercholesterolemia    4. Essential hypertension      1.  Chronic aortic dissection status postrepair.  Stable.  2.  Coronary artery disease, no current angina.  Known occluded vein grafts.  3.  Ischemic cardiomyopathy last LVEF of 40%.  Currently euvolemic and functional class II  4.  Normal biventricular ICD.  Hypertension well-controlled  Dyslipidemia on high intensity statin       Recommendations:  1.  Patient is currently stable on medical therapy regimen consisting of bisoprolol, Entresto, Inspra, and diuretic.  2.  No change to the above.  Continue current medical therapy  3.  Revisit 6 months or as needed symptom change    Uri Willis MD      Dictated utilizing Dragon dictation

## 2022-09-02 DIAGNOSIS — I25.10 CORONARY ARTERY DISEASE INVOLVING NATIVE CORONARY ARTERY OF NATIVE HEART WITHOUT ANGINA PECTORIS: ICD-10-CM

## 2022-09-02 RX ORDER — NITROGLYCERIN 0.4 MG/1
TABLET SUBLINGUAL
Qty: 25 TABLET | Refills: 5 | Status: SHIPPED | OUTPATIENT
Start: 2022-09-02 | End: 2023-02-07 | Stop reason: SDUPTHER

## 2022-09-15 ENCOUNTER — HOSPITAL ENCOUNTER (OUTPATIENT)
Dept: GENERAL RADIOLOGY | Facility: HOSPITAL | Age: 67
Discharge: HOME | End: 2022-09-15
Admitting: FAMILY MEDICINE

## 2022-09-15 ENCOUNTER — OFFICE VISIT (OUTPATIENT)
Dept: FAMILY MEDICINE CLINIC | Facility: CLINIC | Age: 67
End: 2022-09-15

## 2022-09-15 VITALS
HEIGHT: 67 IN | SYSTOLIC BLOOD PRESSURE: 110 MMHG | BODY MASS INDEX: 35.31 KG/M2 | RESPIRATION RATE: 18 BRPM | TEMPERATURE: 97.1 F | DIASTOLIC BLOOD PRESSURE: 72 MMHG | OXYGEN SATURATION: 96 % | WEIGHT: 225 LBS | HEART RATE: 69 BPM

## 2022-09-15 DIAGNOSIS — M25.562 CHRONIC PAIN OF LEFT KNEE: ICD-10-CM

## 2022-09-15 DIAGNOSIS — G89.29 CHRONIC PAIN OF LEFT KNEE: ICD-10-CM

## 2022-09-15 DIAGNOSIS — F51.01 PRIMARY INSOMNIA: ICD-10-CM

## 2022-09-15 DIAGNOSIS — E78.00 PURE HYPERCHOLESTEROLEMIA: ICD-10-CM

## 2022-09-15 DIAGNOSIS — M16.10 HIP ARTHRITIS: ICD-10-CM

## 2022-09-15 DIAGNOSIS — Z79.4 TYPE 2 DIABETES MELLITUS WITH HYPERGLYCEMIA, WITH LONG-TERM CURRENT USE OF INSULIN: Primary | ICD-10-CM

## 2022-09-15 DIAGNOSIS — E11.65 TYPE 2 DIABETES MELLITUS WITH HYPERGLYCEMIA, WITH LONG-TERM CURRENT USE OF INSULIN: Primary | ICD-10-CM

## 2022-09-15 DIAGNOSIS — D69.6 THROMBOCYTOPENIA: ICD-10-CM

## 2022-09-15 DIAGNOSIS — I10 PRIMARY HYPERTENSION: ICD-10-CM

## 2022-09-15 PROCEDURE — 99214 OFFICE O/P EST MOD 30 MIN: CPT | Performed by: FAMILY MEDICINE

## 2022-09-15 PROCEDURE — 73502 X-RAY EXAM HIP UNI 2-3 VIEWS: CPT

## 2022-09-15 PROCEDURE — 73562 X-RAY EXAM OF KNEE 3: CPT

## 2022-09-15 RX ORDER — OXYCODONE AND ACETAMINOPHEN 10; 325 MG/1; MG/1
.5-1 TABLET ORAL EVERY 4 HOURS PRN
Qty: 18 TABLET | Refills: 0 | Status: SHIPPED | OUTPATIENT
Start: 2022-09-15 | End: 2023-02-16

## 2022-09-15 RX ORDER — TRAZODONE HYDROCHLORIDE 100 MG/1
100 TABLET ORAL NIGHTLY
Qty: 30 TABLET | Refills: 5 | Status: SHIPPED | OUTPATIENT
Start: 2022-09-15 | End: 2023-02-24

## 2022-09-15 NOTE — PROGRESS NOTES
"Chief Complaint  Diabetes (3 month F/U,  Left side numbness with sleeping also, trazodone not helping him sleep.), Knee Pain (Getting worse in the last 30 days), and Hip Pain    Subjective          Kai Av Garcia presents to [facility]  History of Present Illness    The patient is accompanied by his wife today.    Left hip and knee pain  According to the patient, he has been experiencing pain in his left hip and knee for approximately 30 days. The pain is exacerbated by sitting for prolonged periods of time. He reports he fell approximately 3 years ago and injured his back. The patient had pins and screws placed. He was informed that there was nothing wrong with his knee or hip at that time. He has run out of all of the pain medications that were prescribed to him for when he had back surgery. He states the muscle relaxers and the sleeping pills are not working. Additionally, he has a full bottle of the muscle relaxers that he no longer takes. He states he has been taking 1 tablet of the sleeping pills; however, they do not work. He experiences pain with movement of his left hip and bilateral knees. He reports his left shoulder has been painful for approximately 30 days. The pain is worse in his bilateral knees when he flexes them. He states the pain medication did help with the pain; however, he had to cut them in 0.5. He states his back is doing better than it ever did before. According to the patient, Dr. Hernandez, informed him that he needed a hip and knee arthroplasty; however, Dr. Hernandez, does not perform the surgery. He denies having pain in his right hip. He states his left hip is painful when he is sleeping, standing up, and sitting down. He notes it is not as severe when he stands up to sitting down. He states the pain is so severe that he is unable to ambulate. His wife states the patient's left knee is \" crooked. \" The patient is unable to start his chainsaw or  " "anything over 16 pounds. He states he has been splitting a lot of wood this week for extra exercise; however, it did not increase his pain.    Diabetes  His blood glucose has been running between 100 and 115 mg/dL. He reports he has already lowered his insulin doses. He states he has 4 containers of insulin in his refrigerator. He denies feeling shaky or sweaty; however, he has been feeling lightheaded. He reports he has been eating plain cake with no icing on it, occasionally. Additionally, he has cut back to 0.5 of a portion of ice cream. He has lost 5 pounds since 08/2022, and 2 pounds since 06/2022. He does not have any energy.    Abdominal pain  He reports he has had abdominal pain for years.  This has previously been evaluated.  No cause found.    Breathing  He states his breathing has been doing well; however, he has been unable to sleep. If he sleeps 1 good night, the pain in his upper arm or shoulder resolves; however, by midday it begins to hurt again.    Health maintenance  He has received both doses of the COVID-19 vaccine. He is going on a cruise in 01/2023.    Review of Systems   Constitutional: Positive for fatigue.   HENT: Negative.    Respiratory: Negative.    Cardiovascular: Negative.    Gastrointestinal: Negative.    Musculoskeletal: Positive for arthralgias.   Psychiatric/Behavioral: Positive for sleep disturbance.   All other systems reviewed and are negative.       Objective       Vital Signs:   /72   Pulse 69   Temp 97.1 °F (36.2 °C)   Resp 18   Ht 170.2 cm (67\")   Wt 102 kg (225 lb)   SpO2 96%   BMI 35.24 kg/m²     Physical Exam  Vitals and nursing note reviewed.   Constitutional:       Appearance: He is well-developed.   HENT:      Head: Normocephalic and atraumatic.      Right Ear: External ear normal.      Left Ear: External ear normal.   Eyes:      Pupils: Pupils are equal, round, and reactive to light.   Cardiovascular:      Rate and Rhythm: Normal rate and regular rhythm. "      Heart sounds: Normal heart sounds.   Pulmonary:      Effort: Pulmonary effort is normal. No respiratory distress.      Breath sounds: Normal breath sounds. No wheezing or rales.   Abdominal:      Tenderness: There is no abdominal tenderness. There is no guarding or rebound.   Musculoskeletal:      Left hip: Decreased range of motion.      Left knee: Decreased range of motion. Tenderness present.      Right lower leg: No edema.      Left lower leg: No edema.      Comments: Hurts for any movement.  He has significant issue with ambulation.  Due to pain.   Skin:     General: Skin is warm and dry.   Neurological:      Mental Status: He is alert.   Psychiatric:         Behavior: Behavior normal.          Result Review :                     Assessment and Plan    Diagnoses and all orders for this visit:    1. Type 2 diabetes mellitus with hyperglycemia, with long-term current use of insulin (HCC) (Primary)  -     Comprehensive Metabolic Panel  -     Hemoglobin A1c    2. Primary hypertension  -     CBC & Differential  -     Comprehensive Metabolic Panel    3. Pure hypercholesterolemia  -     Comprehensive Metabolic Panel  -     Lipid Panel With / Chol / HDL Ratio    4. Thrombocytopenia (HCC)  -     CBC & Differential    5. Hip arthritis  -     XR Hip With or Without Pelvis 2 - 3 View Left; Future  -     oxyCODONE-acetaminophen (Percocet)  MG per tablet; Take 0.5-1 tablets by mouth Every 4 (Four) Hours As Needed for Moderate Pain.  Dispense: 18 tablet; Refill: 0    6. Chronic pain of left knee  -     XR knee 3 vw left; Future  -     oxyCODONE-acetaminophen (Percocet)  MG per tablet; Take 0.5-1 tablets by mouth Every 4 (Four) Hours As Needed for Moderate Pain.  Dispense: 18 tablet; Refill: 0    7. Primary insomnia  -     traZODone (DESYREL) 100 MG tablet; Take 1 tablet by mouth Every Night.  Dispense: 30 tablet; Refill: 5          DISCUSSION    Diabetes mellitus type 2.  Blood sugars have been doing well.   Check A1c.  If doing well, then we can continue to decrease insulin usage.  He is currently on 20 units daily.    Hypertension.  Blood pressure is doing well.  Continue current medications.    Hyperlipidemia we will check CMP and lipid panel    Thrombocytopenia.  Check CBC.    Persistent pain in the left hip and left knee.  Check x-ray.  Suspect worsening arthritis of the left hip that was previously seen and possible arthritis now of the left knee.  He was given a prescription for oxycodone/APAP 10/325 1/2-1 every 4 hours as needed for pain.  He was told not to take that if he does not need it.  He is aware of side effects and dependence of narcotic pain medication.  Further plan once x-ray is back.    Insomnia.  Continue trazodone.  I suspect his issue with sleeping is related to pain in his knee and hip.        Loi dated 9/15/2022  was reviewed and appropriate.     Follow Up   Return in about 3 months (around 12/15/2022).        Transcribed from ambient dictation for Cheo Weiss MD by Annalise Zarate.  09/15/22   10:39 EDT    Patient verbalized consent to the visit recording.  I have personally performed the services described in this document as transcribed by the above individual, and it is both accurate and complete.  Cheo Weiss MD  9/15/2022  15:09 EDT      Patient was given instructions and counseling regarding his condition or for health maintenance advice. Please see specific information pulled into the AVS if appropriate.       Cheo Weiss MD

## 2022-09-18 LAB
ALBUMIN SERPL-MCNC: 4.8 G/DL (ref 3.5–5.2)
ALBUMIN/GLOB SERPL: 1.9 G/DL
ALP SERPL-CCNC: 41 U/L (ref 39–117)
ALT SERPL-CCNC: 15 U/L (ref 1–41)
AST SERPL-CCNC: 21 U/L (ref 1–40)
BASOPHILS # BLD AUTO: 0.03 10*3/MM3 (ref 0–0.2)
BASOPHILS NFR BLD AUTO: 0.5 % (ref 0–1.5)
BILIRUB SERPL-MCNC: 0.5 MG/DL (ref 0–1.2)
BUN SERPL-MCNC: 14 MG/DL (ref 8–23)
BUN/CREAT SERPL: 15.1 (ref 7–25)
CALCIUM SERPL-MCNC: 9.4 MG/DL (ref 8.6–10.5)
CHLORIDE SERPL-SCNC: 101 MMOL/L (ref 98–107)
CHOLEST SERPL-MCNC: 115 MG/DL (ref 0–200)
CHOLEST/HDLC SERPL: 2.61 {RATIO}
CO2 SERPL-SCNC: 28 MMOL/L (ref 22–29)
CREAT SERPL-MCNC: 0.93 MG/DL (ref 0.76–1.27)
EGFRCR SERPLBLD CKD-EPI 2021: 89.4 ML/MIN/1.73
EOSINOPHIL # BLD AUTO: 0.15 10*3/MM3 (ref 0–0.4)
EOSINOPHIL NFR BLD AUTO: 2.5 % (ref 0.3–6.2)
ERYTHROCYTE [DISTWIDTH] IN BLOOD BY AUTOMATED COUNT: 12.8 % (ref 12.3–15.4)
GLOBULIN SER CALC-MCNC: 2.5 GM/DL
GLUCOSE SERPL-MCNC: 125 MG/DL (ref 65–99)
HBA1C MFR BLD: 6.5 % (ref 4.8–5.6)
HCT VFR BLD AUTO: 38.5 % (ref 37.5–51)
HDLC SERPL-MCNC: 44 MG/DL (ref 40–60)
HGB BLD-MCNC: 13.3 G/DL (ref 13–17.7)
IMM GRANULOCYTES # BLD AUTO: 0.01 10*3/MM3 (ref 0–0.05)
IMM GRANULOCYTES NFR BLD AUTO: 0.2 % (ref 0–0.5)
LDLC SERPL CALC-MCNC: 51 MG/DL (ref 0–100)
LYMPHOCYTES # BLD AUTO: 1.64 10*3/MM3 (ref 0.7–3.1)
LYMPHOCYTES NFR BLD AUTO: 27.7 % (ref 19.6–45.3)
MCH RBC QN AUTO: 32.2 PG (ref 26.6–33)
MCHC RBC AUTO-ENTMCNC: 34.5 G/DL (ref 31.5–35.7)
MCV RBC AUTO: 93.2 FL (ref 79–97)
MONOCYTES # BLD AUTO: 0.62 10*3/MM3 (ref 0.1–0.9)
MONOCYTES NFR BLD AUTO: 10.5 % (ref 5–12)
NEUTROPHILS # BLD AUTO: 3.46 10*3/MM3 (ref 1.7–7)
NEUTROPHILS NFR BLD AUTO: 58.6 % (ref 42.7–76)
NRBC BLD AUTO-RTO: 0 /100 WBC (ref 0–0.2)
PLATELET # BLD AUTO: 117 10*3/MM3 (ref 140–450)
POTASSIUM SERPL-SCNC: 4.5 MMOL/L (ref 3.5–5.2)
PROT SERPL-MCNC: 7.3 G/DL (ref 6–8.5)
RBC # BLD AUTO: 4.13 10*6/MM3 (ref 4.14–5.8)
SODIUM SERPL-SCNC: 139 MMOL/L (ref 136–145)
TRIGL SERPL-MCNC: 111 MG/DL (ref 0–150)
VLDLC SERPL CALC-MCNC: 20 MG/DL (ref 5–40)
WBC # BLD AUTO: 5.91 10*3/MM3 (ref 3.4–10.8)

## 2022-09-19 DIAGNOSIS — M16.12 ARTHRITIS OF LEFT HIP: Primary | ICD-10-CM

## 2022-09-19 DIAGNOSIS — M17.12 ARTHRITIS OF LEFT KNEE: ICD-10-CM

## 2022-09-29 ENCOUNTER — OFFICE VISIT (OUTPATIENT)
Dept: ORTHOPEDIC SURGERY | Facility: CLINIC | Age: 67
End: 2022-09-29

## 2022-09-29 VITALS
WEIGHT: 235 LBS | BODY MASS INDEX: 36.88 KG/M2 | HEIGHT: 67 IN | SYSTOLIC BLOOD PRESSURE: 140 MMHG | DIASTOLIC BLOOD PRESSURE: 82 MMHG

## 2022-09-29 DIAGNOSIS — M25.552 LEFT HIP PAIN: ICD-10-CM

## 2022-09-29 DIAGNOSIS — M16.12 PRIMARY OSTEOARTHRITIS OF LEFT HIP: Primary | ICD-10-CM

## 2022-09-29 PROCEDURE — 99204 OFFICE O/P NEW MOD 45 MIN: CPT | Performed by: ORTHOPAEDIC SURGERY

## 2022-09-29 RX ORDER — PREGABALIN 75 MG/1
75 CAPSULE ORAL ONCE
Status: CANCELLED | OUTPATIENT
Start: 2022-09-29 | End: 2022-09-29

## 2022-09-29 RX ORDER — MELOXICAM 7.5 MG/1
15 TABLET ORAL ONCE
Status: CANCELLED | OUTPATIENT
Start: 2022-09-29 | End: 2022-09-29

## 2022-09-29 RX ORDER — ACETAMINOPHEN 325 MG/1
1000 TABLET ORAL ONCE
Status: CANCELLED | OUTPATIENT
Start: 2022-09-29 | End: 2022-09-29

## 2022-09-29 NOTE — PROGRESS NOTES
Orthopaedic Clinic Note: Hip New Patient    Chief Complaint   Patient presents with   • Left Knee - Pain   • Left Hip - Pain        HPI  Consult from: Cheo Weiss MD    Kai Garcia is a 67 y.o. male who presents with left hip pain for 2 month(s). Onset atraumatic and gradual in nature. Pain is localized to groin and lateral trochanter and is a 9/10 on the pain scale.Pain is described as burning and stabbing. Associated symptoms include pain, popping and giving way/buckling.  The pain is worse with standing, sitting and climbing stairs; resting and heat improve the pain. Previous treatments have included: cane/walker, physical therapy and weight loss since symptom onset. Although some transient relief was reported with these interventions, these conservative measures have failed and symptoms have persisted. The patient is limited in daily activities and has had a significant decrease in quality of life as a result. He denies fevers, chills, or constitutional symptoms.    I have reviewed the following portions of the patient's history:History of Present Illness     Kai Garcia is a 67 y.o. male who presents with new problem of: left knee pain.  Onset: atraumatic and gradual in nature. The issue has been ongoing for 2 month(s). Pain is a 9/10 on the pain scale. Pain is described as burning and stabbing. Associated symptoms include pain, popping and giving way/buckling. The pain is worse with standing, sitting and climbing stairs; resting and heat improve the pain. Previous treatments have included: cane/walker, physical therapy and weight loss.    I have reviewed the following portions of the patient's history:History of Present Illness and review of systems.          Past Medical History:   Diagnosis Date   • Arthritis of back 2015   • Back pain    • Cataract     needs surgery    • Chest pain     Atypical chest pain, noncardiac.  Suspect either musculoskeletal versus gastrointestinal   • CHF (congestive  heart failure) ([facility])    • Chronic low back pain     /multiple lumbar surgeries, currently disabled: L4-L5 discectomy in 2015.    • Clotting disorder ([facility])    • Coronary artery disease    • Diabetes mellitus ([facility])     type 2, diagnosed withing past 6months, checks fsbg qam, last a1c 8.4 7-19-18   • Dissecting aortic aneurysm ([facility])    • Diverticulosis    • Dyslipidemia    • Elevated liver function tests    • Fracture, finger 1976   • GERD (gastroesophageal reflux disease)    • Heart murmur    • Hip arthrosis 2019   • History of bleeding peptic ulcer    • History of sepsis 03/2021    UTI that caused sepsis    • History of transfusion 2006    no reaction    • Hyperlipidemia    • Hypertension    • Knee swelling 2019   • Left leg pain    • Low back strain 2002   • Lumbar pain    • Lumbosacral disc disease 2002   • MI (myocardial infarction) ([facility]) 2007   • Morbid obesity ([facility])    • LUCIAN on CPAP     setting 12   • Severe obstructive sleep apnea    • Shortness of breath    • Temporary low platelet count ([facility]) 03/2021    due to sepsis    • Wears glasses       Past Surgical History:   Procedure Laterality Date   • APPENDECTOMY     • ARTERIOGRAM AORTIC  2006    aortic disection with elephant trunk procedure   • BACK SURGERY  2002,2015 2021   • CARDIAC CATHETERIZATION N/A 04/27/2018    Procedure: Left Heart Cath;  Surgeon: Uri Willis MD;  Location:  [facility] CATH [facility]ASIVE LOCATION;  Service: Cardiovascular   • CARDIAC CATHETERIZATION  04/2018   • CARDIAC ELECTROPHYSIOLOGY PROCEDURE N/A 08/09/2018    Procedure: Biv ICD Implant w/limited echo on arrival;  Surgeon: Jose Fox DO;  Location:  [facility] EP INVASIVE LOCATION;  Service: Cardiology   • CARDIAC SURGERY  06/19/2006   • COLONOSCOPY     • COLONOSCOPY N/A 06/18/2019    Procedure: COLONOSCOPY;  Surgeon: Meir Salguero MD;  Location:  [facility] ENDOSCOPY;  Service: Gastroenterology   • CORONARY ANGIOPLASTY WITH STENT PLACEMENT  2007    x 2   • CORONARY ARTERY BYPASS GRAFT       2 grafts    • LUMBAR DISCECTOMY     • LUMBAR DISCECTOMY FUSION INSTRUMENTATION N/A 10/27/2021    Procedure: LUMBAR FUSION L4-5, L5-S1;  Surgeon: Fer Faulkner MD;  Location: [facility];  Service: Orthopedic Spine;  Laterality: N/A;   • LUMBAR FUSION        Family History   Problem Relation Age of Onset   • Coronary artery disease Other    • Rheum arthritis Other    • Heart disease Mother         afib   • Rheum arthritis Mother    • Heart disease Father    • Rheum arthritis Father    • Hyperlipidemia Sister    • Mental illness Brother      Social History     Socioeconomic History   • Marital status:    Tobacco Use   • Smoking status: Former Smoker     Packs/day: 3.00     Years: 35.00     Pack years: 105.00     Types: Cigarettes     Quit date: 6/15/2006     Years since quittin.3   • Smokeless tobacco: Never Used   Vaping Use   • Vaping Use: Never used   Substance and Sexual Activity   • Alcohol use: No   • Drug use: No   • Sexual activity: Not Currently     Partners: Female      Current Outpatient Medications on File Prior to Visit   Medication Sig Dispense Refill   • Accu-Chek Softclix Lancets lancets Check sugars bid 100 each 5   • acetaminophen (TYLENOL) 500 MG tablet Take 1,000 mg by mouth Every 6 (Six) Hours As Needed for Mild Pain .     • aspirin 81 MG EC tablet Take 1 tablet by mouth Daily.     • bisoprolol (ZEBeta) 10 MG tablet Take 1 tablet by mouth Daily. 90 tablet 3   • clopidogrel (PLAVIX) 75 MG tablet Take 1 tablet by mouth Daily. Resume if platelets are above 50 thousand on recheck on 11/3/21 90 tablet 3   • eplerenone (INSPRA) 25 MG tablet Take 1 tablet by mouth Daily. 90 tablet 3   • glucose blood (Accu-Chek Jodie Plus) test strip Check sugars bid 100 each 5   • Insulin Glargine (Lantus SoloStar) 100 UNIT/ML injection pen Inject 32 Units under the skin into the appropriate area as directed Daily. 10 pen 3   • Insulin Pen Needle (Easy Touch Pen Needles) 31G X 5 MM misc USE DAILY  WITH INSULIN 100 each 4   • nitroglycerin (NITROSTAT) 0.4 MG SL tablet PLACE 1 TABLET UNDER THE TONGUE 5 MINUTES AS NEEDED FOR CHEST PAIN 25 tablet 5   • oxyCODONE-acetaminophen (Percocet)  MG per tablet Take 0.5-1 tablets by mouth Every 4 (Four) Hours As Needed for Moderate Pain. 18 tablet 0   • pantoprazole (PROTONIX) 40 MG EC tablet Take 1 tablet by mouth Daily. 90 tablet 3   • rosuvastatin (CRESTOR) 10 MG tablet Take 1 tablet by mouth Daily. 90 tablet 3   • sacubitril-valsartan (Entresto) 49-51 MG tablet Take 1 tablet by mouth Every 12 (Twelve) Hours. 180 tablet 3   • SITagliptin (Januvia) 100 MG tablet Take 1 tablet by mouth Daily. 90 tablet 3   • traZODone (DESYREL) 100 MG tablet Take 1 tablet by mouth Every Night. 30 tablet 5     No current facility-administered medications on file prior to visit.      Allergies   Allergen Reactions   • Ativan [Lorazepam] Mental Status Change     agitation   • Zetia [Ezetimibe] Other (See Comments)     increased LFTs   • Zocor [Simvastatin] Myalgia   • Isosorbide Other (See Comments)     headaches   • Jardiance [Empagliflozin] Other (See Comments)     Blood in urine and burning sensation   • Metformin And Related Diarrhea        Review of Systems   Constitutional: Positive for activity change and fatigue.   HENT: Positive for drooling, ear pain, hearing loss, postnasal drip, sore throat and tinnitus.    Eyes: Positive for visual disturbance.   Respiratory: Positive for apnea, chest tightness and shortness of breath.    Cardiovascular: Positive for chest pain.   Gastrointestinal: Negative.    Endocrine: Positive for cold intolerance and heat intolerance.   Genitourinary: Positive for frequency and urgency.   Musculoskeletal: Positive for arthralgias, back pain and joint swelling.   Skin: Negative.    Allergic/Immunologic: Negative.    Neurological: Positive for dizziness, light-headedness, numbness and headaches.   Hematological: Bruises/bleeds easily.  "  Psychiatric/Behavioral: Positive for agitation, decreased concentration and sleep disturbance.        The patient's Review of Systems was personally reviewed and confirmed as accurate.    The following portions of the patient's history were reviewed and updated as appropriate: allergies, current medications, past family history, past medical history, past social history, past surgical history and problem list.    Physical Exam  Blood pressure 140/82, height 170.2 cm (67.01\"), weight 107 kg (235 lb).    Body mass index is 36.8 kg/m².    GENERAL APPEARANCE: awake, alert & oriented x 3, in no acute distress and well developed, well nourished  PSYCH: normal affect  LUNGS:  breathing nonlabored  EYES: sclera anicteric  CARDIOVASCULAR: palpable dorsalis pedis, palpable posterior tibial bilaterally. Capillary refill less than 2 seconds  EXTREMITIES: no clubbing, cyanosis  GAIT:  Antalgic           Right Hip Exam:  RANGE OF MOTION:   FLEXION CONTRACTURE: None   FLEXION: 110 degrees   INTERNAL ROTATION: 20 degrees at 90 degrees of flexion   EXTERNAL ROTATION: 40 degrees at 90 degrees of flexion    PAIN WITH HIP MOTION: no  PAIN WITH LOGROLL: no  STINCHFIELD TEST: negative    KNEE EXAM: full knee ROM (0-120 degrees), stable to varus and valgus stress at terminal extension and 30 degrees flexion     STRENGTH:  5/5 hip adduction, abduction, flexion. 5/5 strength knee flexion, extension. 5/5 strength ankle dorsiflexion and plantarflexion.     GREATER TROCHANTER BURSAL PAIN:  no     REFLEXES:   PATELLAR 2+/4   ACHILLES 2+/4    CLONUS: negative  STRAIGHT LEG TEST:   negative    SENSATION TO LIGHT TOUCH:  DEEP PERONEAL/SUPERFICIAL PERONEAL/SURAL/SAPHENOUS/TIBIAL:  intact    EDEMA:   no  ERYTHEMA:  no  WOUNDS/INCISIONS: no overlying skin problems.      Left Hip Exam:   RANGE OF MOTION:   FLEXION CONTRACTURE: 5 degree  FLEXION: 100 degrees  INTERNAL ROTATION: neutral degrees at 90 degrees of flexion   EXTERNAL ROTATION: 25 degrees " at 90 degrees of flexion    PAIN WITH HIP MOTION: yes  PAIN WITH LOGROLL: yes  STINCHFIELD TEST: positive    KNEE EXAM: full knee ROM (0-120 degrees), stable to varus and valgus stress at terminal extension and 30 degrees flexion     STRENGTH:  4/5 hip adduction, abduction, flexion. 5/5 knee flexion, extension. 5/5 ankle dorsiflexion and plantarflexion.     GREATER TROCHANTER BURSAL PAIN:  yes     REFLEXES:   PATELLAR 2+/4   ACHILLES 2+/4    CLONUS: no  STRAIGHT LEG TEST:   negative    SENSATION TO LIGHT TOUCH:  DEEP PERONEAL/SUPERFICIAL PERONEAL/SURAL/SAPHENOUS/TIBIAL:  intact    EDEMA:   no  ERYTHEMA:  no  WOUNDS/INCISIONS:  no        ------------------------------------------------------------------    LEG LENGTHS:  equal  _____________________________________________________  _____________________________________________________    RADIOGRAPHIC FINDINGS:   Indication: Left hip pain    Comparison: Todays xrays were compared to previous xrays from 9/15/2022    AP pelvis, hip 2 views: Right: mild joint space narrowing, minimal osteophyte formation and No significant changes compared to prior radiographs.; Left: advanced, end-stage osteoarthritis with bone on bone articulation, subchondral sclerosis, and subchondral cysts, there are marginal osteophytes visualized at the femoral head-neck junction and acetabular margins and No significant changes compared to prior radiographs.    Assessment/Plan:   Diagnosis Plan   1. Primary osteoarthritis of left hip  Case Request    CBC and Differential    Comprehensive metabolic panel    Protime-INR    APTT    Hemoglobin A1c    ECG 12 Lead    Nicotine & Metabolite, Quant    Case Request   2. Left hip pain  XR Pelvis 1 or 2 View     The patient has clinical and radiographic evidence of end-stage left hip joint degeneration. Conservative measures have been tried for 3 months or longer, but have failed to adequately treat or improve the patient's symptoms. Pain is restricting the  patient's daily activities as well as quality of life. The recommendation at this time is to proceed with a left total hip arthroplasty with the goal to improve patient function and pain. The risks, benefits, potential complications, and alternatives were discussed with the patient in detail. Risks included but were not limited to bleeding, infection, anesthesia risks, damage to neurovascular structures, osteolysis, aseptic loosening, instability, dislocation, pain, continued pain, iatrogenic fracture, leg length discrepancy, possible need for future surgery including the potential for amputation, blood clots, myocardial infarction, stroke, and death. Liliana-operative blood management and the potential for blood transfusion were discussed with risks and options clearly outlined. Specific details of the surgical procedure, hospitalization, recovery, rehabilitation, and long-term precautions were also presented. Pre-operative teaching was provided. Implant/prosthesis selection was outlined, and the many options available were explained; the final choice will be made at the time of the procedure to match the anatomy and condition of the bone, ligaments, tendons, and muscles. Given this instruction, the patient elected to proceed with the left total hip arthroplasty. The patient will be seen by pre-admission testing for pre-operative optimization and risk assessment and will be scheduled for surgery once this is completed.    The patient is considered standard risk for DVT based on patient risk factors and will be placed on aspirin postoperatively for DVT prophylaxis.      Romero Hutchinson MD  09/29/22  09:25 EDT

## 2022-11-06 PROCEDURE — 93295 DEV INTERROG REMOTE 1/2/MLT: CPT | Performed by: INTERNAL MEDICINE

## 2022-11-06 PROCEDURE — 93296 REM INTERROG EVL PM/IDS: CPT | Performed by: INTERNAL MEDICINE

## 2022-12-16 ENCOUNTER — OFFICE VISIT (OUTPATIENT)
Dept: FAMILY MEDICINE CLINIC | Facility: CLINIC | Age: 67
End: 2022-12-16

## 2022-12-16 VITALS
RESPIRATION RATE: 20 BRPM | TEMPERATURE: 97.3 F | WEIGHT: 224 LBS | HEART RATE: 74 BPM | SYSTOLIC BLOOD PRESSURE: 124 MMHG | HEIGHT: 67 IN | DIASTOLIC BLOOD PRESSURE: 80 MMHG | OXYGEN SATURATION: 98 % | BODY MASS INDEX: 35.16 KG/M2

## 2022-12-16 DIAGNOSIS — E11.65 TYPE 2 DIABETES MELLITUS WITH HYPERGLYCEMIA, WITH LONG-TERM CURRENT USE OF INSULIN: ICD-10-CM

## 2022-12-16 DIAGNOSIS — Z00.00 MEDICARE ANNUAL WELLNESS VISIT, SUBSEQUENT: Primary | ICD-10-CM

## 2022-12-16 DIAGNOSIS — M16.12 ARTHRITIS OF LEFT HIP: ICD-10-CM

## 2022-12-16 DIAGNOSIS — I10 PRIMARY HYPERTENSION: ICD-10-CM

## 2022-12-16 DIAGNOSIS — Z79.4 TYPE 2 DIABETES MELLITUS WITH HYPERGLYCEMIA, WITH LONG-TERM CURRENT USE OF INSULIN: ICD-10-CM

## 2022-12-16 DIAGNOSIS — E78.00 PURE HYPERCHOLESTEROLEMIA: ICD-10-CM

## 2022-12-16 LAB
EXPIRATION DATE: ABNORMAL
HBA1C MFR BLD: 6.1 %
Lab: ABNORMAL

## 2022-12-16 PROCEDURE — 1160F RVW MEDS BY RX/DR IN RCRD: CPT | Performed by: FAMILY MEDICINE

## 2022-12-16 PROCEDURE — 1170F FXNL STATUS ASSESSED: CPT | Performed by: FAMILY MEDICINE

## 2022-12-16 PROCEDURE — G0439 PPPS, SUBSEQ VISIT: HCPCS | Performed by: FAMILY MEDICINE

## 2022-12-16 PROCEDURE — 83036 HEMOGLOBIN GLYCOSYLATED A1C: CPT | Performed by: FAMILY MEDICINE

## 2022-12-16 PROCEDURE — 99214 OFFICE O/P EST MOD 30 MIN: CPT | Performed by: FAMILY MEDICINE

## 2022-12-16 PROCEDURE — 3044F HG A1C LEVEL LT 7.0%: CPT | Performed by: FAMILY MEDICINE

## 2022-12-16 NOTE — PROGRESS NOTES
The ABCs of the Annual Wellness Visit  Subsequent Medicare Wellness Visit    Subjective    Kai Garcia is a 67 y.o. male who presents for a Subsequent Medicare Wellness Visit.    Health maintenance  The patient reports that he takes aspirin 81 mg daily. He states that he has a living will. He notes that he has not fallen, but he has to be very careful. He reports that his memory is pretty good. He states that he has not received the flu vaccine. He notes that he has received 2 doses of the COVID-19 vaccine. He reports that he has not had a pneumonia vaccine. He states that he does not want the shingles vaccine. He notes that he had shingles in 1979. He reports that he had a colonoscopy in 2019. He states that he had 3 polyps removed. He notes that he is due for a repeat colonoscopy in 3 years.    The following portions of the patient's history were reviewed and   updated as appropriate: allergies, current medications, past family history, past medical history, past social history, past surgical history and problem list.    Compared to one year ago, the patient feels his physical   health is worse.    Compared to one year ago, the patient feels his mental   health is the same.    Recent Hospitalizations:  He was not admitted to the hospital during the last year.       Current Medical Providers:  Patient Care Team:  Cheo Weiss MD as PCP - General  Uri Willis MD as Consulting Physician (Cardiology)  Fer Faulkner MD as Consulting Physician (Orthopedic Surgery)    Outpatient Medications Prior to Visit   Medication Sig Dispense Refill   • Accu-Chek Softclix Lancets lancets Check sugars bid 100 each 5   • acetaminophen (TYLENOL) 500 MG tablet Take 1,000 mg by mouth Every 6 (Six) Hours As Needed for Mild Pain .     • aspirin 81 MG EC tablet Take 1 tablet by mouth Daily.     • bisoprolol (ZEBeta) 10 MG tablet Take 1 tablet by mouth Daily. 90 tablet 3   • Chlorhexidine Gluconate 4 % solution Apply   topically to the appropriate area as directed Daily As Needed for Wound Care. Shower daily with hibiclens solution as directed 5 days prior to surgery. 237 mL 0   • clopidogrel (PLAVIX) 75 MG tablet Take 1 tablet by mouth Daily. Resume if platelets are above 50 thousand on recheck on 11/3/21 90 tablet 3   • glucose blood (Accu-Chek Jodie Plus) test strip Check sugars bid 100 each 5   • Insulin Glargine (Lantus SoloStar) 100 UNIT/ML injection pen Inject 32 Units under the skin into the appropriate area as directed Daily. 10 pen 3   • Insulin Pen Needle (Easy Touch Pen Needles) 31G X 5 MM misc USE DAILY WITH INSULIN 100 each 4   • nitroglycerin (NITROSTAT) 0.4 MG SL tablet PLACE 1 TABLET UNDER THE TONGUE 5 MINUTES AS NEEDED FOR CHEST PAIN 25 tablet 5   • oxyCODONE-acetaminophen (Percocet)  MG per tablet Take 0.5-1 tablets by mouth Every 4 (Four) Hours As Needed for Moderate Pain. 18 tablet 0   • pantoprazole (PROTONIX) 40 MG EC tablet Take 1 tablet by mouth Daily. 90 tablet 3   • rosuvastatin (CRESTOR) 10 MG tablet Take 1 tablet by mouth Daily. 90 tablet 3   • sacubitril-valsartan (Entresto) 49-51 MG tablet Take 1 tablet by mouth Every 12 (Twelve) Hours. 180 tablet 3   • SITagliptin (Januvia) 100 MG tablet Take 1 tablet by mouth Daily. 90 tablet 3   • traZODone (DESYREL) 100 MG tablet Take 1 tablet by mouth Every Night. 30 tablet 5   • eplerenone (INSPRA) 25 MG tablet Take 1 tablet by mouth Daily. 90 tablet 3   • mupirocin (BACTROBAN) 2 % ointment Apply pea-sized amount to each nostril twice daily for 5 days prior to surgery 22 g 0     No facility-administered medications prior to visit.       Opioid medication/s are on active medication list.  and I have evaluated his active treatment plan and pain score trends (see table).  Vitals:    12/16/22 1023   PainSc:   8     I have reviewed the chart for potential of high risk medication and harmful drug interactions in the elderly.            Aspirin is on active  "medication list. Aspirin use is indicated based on review of current medical condition/s. Pros and cons of this therapy have been discussed today. Benefits of this medication outweigh potential harm.  Patient has been encouraged to continue taking this medication.  .      Patient Active Problem List   Diagnosis   • Aortic dissection following procedure (HCC)   • Multiple vessel coronary artery disease   • Abnormal liver function tests   • Gastroesophageal reflux disease   • Hyperlipidemia   • Hyperglycemia   • Sleep apnea   • Type II diabetes mellitus, uncontrolled (HCC)   • Coronary artery disease involving native coronary artery of native heart   • ALT (SGPT) level raised   • Elevated AST (SGOT)   • Tinnitus   • Dyslipidemia   • Hypertension   • Chronic low back pain   • History of bleeding peptic ulcer   • LUCIAN on CPAP   • Essential hypertension   • Coronary artery disease involving native coronary artery of native heart without angina pectoris   • Ischemic cardiomyopathy EF 40% (2019)   • RBBB   • Screen for colon cancer   • Sepsis   • MORGAN (acute kidney injury) ([facility])   • Acute cystitis without hematuria   • Hyponatremia   • Thrombocytopenia ([facility])   • Back pain   • S/P lumbar fusion   • CHF (congestive heart failure) ([facility])   • Acute blood loss anemia, asymptomatic   • Postoperative urinary retention   • Primary osteoarthritis of left hip     Advance Care Planning  Advance Directive is on file.  ACP discussion was held with the patient during this visit. Patient has an advance directive in EMR which is still valid.      Objective    Vitals:    12/16/22 1023   BP: 124/80   Pulse: 74   Resp: 20   Temp: 97.3 °F (36.3 °C)   SpO2: 98%   Weight: 102 kg (224 lb)   Height: 170.2 cm (67\")   PainSc:   8     Estimated body mass index is 35.08 kg/m² as calculated from the following:    Height as of this encounter: 170.2 cm (67\").    Weight as of this encounter: 102 kg (224 lb).    Class 2 Severe Obesity (BMI >=35 and <=39.9). " Obesity-related health conditions include the following: hypertension and osteoarthritis. Obesity is improving with treatment. BMI is is above average; BMI management plan is completed. We discussed portion control, increasing exercise and difficult due to OA of hip. .      Does the patient have evidence of cognitive impairment? No    Lab Results   Component Value Date    HGBA1C 6.1 2022        HEALTH RISK ASSESSMENT    Smoking Status:  Social History     Tobacco Use   Smoking Status Former   • Packs/day: 3.00   • Years: 35.00   • Pack years: 105.00   • Types: Cigarettes   • Quit date: 6/15/2006   • Years since quittin.5   Smokeless Tobacco Never     Alcohol Consumption:  Social History     Substance and Sexual Activity   Alcohol Use No     Fall Risk Screen:    STEADI Fall Risk Assessment was completed, and patient is at LOW risk for falls.Assessment completed on:2022    Depression Screening:  PHQ-2/PHQ-9 Depression Screening 2022   Retired PHQ-9 Total Score -   Retired Total Score -   Little Interest or Pleasure in Doing Things 0-->not at all   Feeling Down, Depressed or Hopeless 0-->not at all   PHQ-9: Brief Depression Severity Measure Score 0       Health Habits and Functional and Cognitive Screening:  Functional & Cognitive Status 2022   Do you have difficulty preparing food and eating? No   Do you have difficulty bathing yourself, getting dressed or grooming yourself? No   Do you have difficulty using the toilet? No   Do you have difficulty moving around from place to place? Yes   Do you have trouble with steps or getting out of a bed or a chair? Yes   Current Diet Well Balanced Diet   Dental Exam Up to date   Eye Exam Up to date        Eye Exam Comment -   Exercise (times per week) 7 times per week   Current Exercises Include Walking   Do you need help using the phone?  No   Are you deaf or do you have serious difficulty hearing?  Yes   Do you need help with transportation? No   Do  you need help shopping? No   Do you need help preparing meals?  No   Do you need help with housework?  No   Do you need help with laundry? No   Do you need help taking your medications? No   Do you need help managing money? No   Do you ever drive or ride in a car without wearing a seat belt? No   Have you felt unusual stress, anger or loneliness in the last month? No   Who do you live with? Spouse   If you need help, do you have trouble finding someone available to you? No   Have you been bothered in the last four weeks by sexual problems? No   Do you have difficulty concentrating, remembering or making decisions? No       Age-appropriate Screening Schedule:  Refer to the list below for future screening recommendations based on patient's age, sex and/or medical conditions. Orders for these recommended tests are listed in the plan section. The patient has been provided with a written plan.    Health Maintenance   Topic Date Due   • TDAP/TD VACCINES (1 - Tdap) Never done   • URINE MICROALBUMIN  10/21/2020   • INFLUENZA VACCINE  03/31/2023 (Originally 8/1/2022)   • ZOSTER VACCINE (1 of 2) 12/16/2023 (Originally 9/13/2004)   • DIABETIC EYE EXAM  03/15/2023   • DIABETIC FOOT EXAM  06/14/2023   • HEMOGLOBIN A1C  06/16/2023   • LIPID PANEL  09/15/2023                CMS Preventative Services Quick Reference  Risk Factors Identified During Encounter  Chronic Pain: To have THR  Fall Risk-High or Moderate: Discussed Fall Prevention in the home  Hearing Problem: Reeval after hip surg  Immunizations Discussed/Encouraged: Influenza, Prevnar 20 (Pneumococcal 20-valent conjugate), Shingrix, COVID19 and He wants to hold on vaccines at this time  Dental Screening Recommended  Vision Screening Recommended  The above risks/problems have been discussed with the patient.  Pertinent information has been shared with the patient in the After Visit Summary.  An After Visit Summary and PPPS were made available to the patient.    Follow Up:    Next Medicare Wellness visit to be scheduled in 1 year.       Additional E&M Note during same encounter follows:  Patient has multiple medical problems which are significant and separately identifiable that require additional work above and beyond the Medicare Wellness Visit.      Chief Complaint  Medicare Wellness-subsequent (3 month f/u)    Subjective        HPI  Kai Garcia is also being seen today for     Left hip pain  The patient presents today for a Medicare annual wellness visit. He is accompanied by an adult female who contributes to his history. He reports that he is scheduled to have a left hip arthroplasty on 02/13/2023. He states that he has been experiencing pain in his left knee.    Diabetes  The patient reports that he has been checking his blood glucose levels at home. He states that his blood glucose levels have been 106 to 109 mg/dL. He notes that he has lost some weight. He reports that he is taking 21 units of Lantus daily.    Hypertension  The patient's blood pressure is 124/80 mmHg. He denies any chest pain. He states that he has trouble breathing when he stands up.    Abdominal pain  The patient reports that his stomach pain has improved since he lost weight.    Hearing loss  The patient reports that he has always had trouble hearing for the last 16 years. He states that it is not getting any better. He notes that it is getting worse because the ringing in his ears is bad. He reports that he has hearing aids. He states that he gets a headache after wearing them for 30 minutes to 1 hour. He notes that he has worn them for a couple of years because he does not like it.    Review of Systems   Constitutional: Negative.    HENT: Positive for hearing loss.    Respiratory: Negative.  Negative for shortness of breath.    Cardiovascular: Negative.  Negative for chest pain.   Gastrointestinal: Positive for abdominal pain ( chronic and better since wt loss).       Objective   Vital Signs:  BP  "124/80   Pulse 74   Temp 97.3 °F (36.3 °C)   Resp 20   Ht 170.2 cm (67\")   Wt 102 kg (224 lb)   SpO2 98%   BMI 35.08 kg/m²     Physical Exam  Vitals and nursing note reviewed.   Constitutional:       Appearance: He is well-developed.   HENT:      Head: Normocephalic and atraumatic.      Right Ear: External ear normal.      Left Ear: External ear normal.   Eyes:      Pupils: Pupils are equal, round, and reactive to light.   Cardiovascular:      Rate and Rhythm: Normal rate and regular rhythm.      Heart sounds: Normal heart sounds.   Pulmonary:      Effort: Pulmonary effort is normal. No respiratory distress.      Breath sounds: Normal breath sounds. No wheezing or rales.   Abdominal:      General: There is no distension.      Tenderness: There is no abdominal tenderness. There is no guarding or rebound.   Musculoskeletal:      Right lower leg: No edema.      Left lower leg: No edema.   Skin:     General: Skin is warm and dry.   Neurological:      Mental Status: He is alert and oriented to person, place, and time.   Psychiatric:         Behavior: Behavior normal.          Decreased range of motion of the left hip and left knee.  Significant antalgic gait.                   Assessment and Plan   Diagnoses and all orders for this visit:    1. Medicare annual wellness visit, subsequent (Primary)    2. Arthritis of left hip    3. Type 2 diabetes mellitus with hyperglycemia, with long-term current use of insulin ([facility])  -     POC Glycosylated Hemoglobin (Hb A1C)    4. Primary hypertension    5. Pure hypercholesterolemia      A1c was 6.1.  Doing well.  Continue current doses of medication and diet and weight loss.    Significant arthritis of the left hip.  To have hip replacement surgery.    Hypertension.  Blood pressure is doing well.  Continue bisoprolol.    Hyperlipidemia.  Last cholesterol was checked and was good.       Follow Up   Return in about 3 months (around 3/16/2023).  Patient was given instructions and " counseling regarding his condition or for health maintenance advice. Please see specific information pulled into the AVS if appropriate.       Cheo Weiss MD    Transcribed from ambient dictation for Cheo Weiss MD by Zoila Abdalla.  12/16/22   12:33 EST    Patient or patient representative verbalized consent to the visit recording.  I have personally performed the services described in this document as transcribed by the above individual, and it is both accurate and complete.  Cheo Weiss MD  12/18/2022  13:25 EST

## 2023-02-02 ENCOUNTER — TELEPHONE (OUTPATIENT)
Dept: CARDIOLOGY | Facility: CLINIC | Age: 68
End: 2023-02-02
Payer: MEDICARE

## 2023-02-02 ENCOUNTER — PRE-ADMISSION TESTING (OUTPATIENT)
Dept: PREADMISSION TESTING | Facility: HOSPITAL | Age: 68
End: 2023-02-02
Payer: MEDICARE

## 2023-02-02 VITALS — HEIGHT: 67 IN | BODY MASS INDEX: 36.26 KG/M2 | WEIGHT: 231.04 LBS

## 2023-02-02 DIAGNOSIS — M16.12 PRIMARY OSTEOARTHRITIS OF LEFT HIP: ICD-10-CM

## 2023-02-02 LAB
ALBUMIN SERPL-MCNC: 4.7 G/DL (ref 3.5–5.2)
ALBUMIN/GLOB SERPL: 1.6 G/DL
ALP SERPL-CCNC: 43 U/L (ref 39–117)
ALT SERPL W P-5'-P-CCNC: 15 U/L (ref 1–41)
ANION GAP SERPL CALCULATED.3IONS-SCNC: 8 MMOL/L (ref 5–15)
APTT PPP: 26.7 SECONDS (ref 22–39)
AST SERPL-CCNC: 18 U/L (ref 1–40)
BASOPHILS # BLD AUTO: 0.03 10*3/MM3 (ref 0–0.2)
BASOPHILS NFR BLD AUTO: 0.6 % (ref 0–1.5)
BILIRUB SERPL-MCNC: 0.4 MG/DL (ref 0–1.2)
BUN SERPL-MCNC: 14 MG/DL (ref 8–23)
BUN/CREAT SERPL: 15.1 (ref 7–25)
CALCIUM SPEC-SCNC: 9.7 MG/DL (ref 8.6–10.5)
CHLORIDE SERPL-SCNC: 102 MMOL/L (ref 98–107)
CO2 SERPL-SCNC: 28 MMOL/L (ref 22–29)
CREAT SERPL-MCNC: 0.93 MG/DL (ref 0.76–1.27)
DEPRECATED RDW RBC AUTO: 41.9 FL (ref 37–54)
EGFRCR SERPLBLD CKD-EPI 2021: 89.4 ML/MIN/1.73
EOSINOPHIL # BLD AUTO: 0.11 10*3/MM3 (ref 0–0.4)
EOSINOPHIL NFR BLD AUTO: 2.1 % (ref 0.3–6.2)
ERYTHROCYTE [DISTWIDTH] IN BLOOD BY AUTOMATED COUNT: 12.2 % (ref 12.3–15.4)
GLOBULIN UR ELPH-MCNC: 2.9 GM/DL
GLUCOSE SERPL-MCNC: 137 MG/DL (ref 65–99)
HBA1C MFR BLD: 6.4 % (ref 4.8–5.6)
HCT VFR BLD AUTO: 39.2 % (ref 37.5–51)
HGB BLD-MCNC: 13.6 G/DL (ref 13–17.7)
IMM GRANULOCYTES # BLD AUTO: 0.02 10*3/MM3 (ref 0–0.05)
IMM GRANULOCYTES NFR BLD AUTO: 0.4 % (ref 0–0.5)
INR PPP: 1.06 (ref 0.84–1.13)
LYMPHOCYTES # BLD AUTO: 1.41 10*3/MM3 (ref 0.7–3.1)
LYMPHOCYTES NFR BLD AUTO: 26.3 % (ref 19.6–45.3)
MCH RBC QN AUTO: 32.4 PG (ref 26.6–33)
MCHC RBC AUTO-ENTMCNC: 34.7 G/DL (ref 31.5–35.7)
MCV RBC AUTO: 93.3 FL (ref 79–97)
MONOCYTES # BLD AUTO: 0.49 10*3/MM3 (ref 0.1–0.9)
MONOCYTES NFR BLD AUTO: 9.1 % (ref 5–12)
NEUTROPHILS NFR BLD AUTO: 3.3 10*3/MM3 (ref 1.7–7)
NEUTROPHILS NFR BLD AUTO: 61.5 % (ref 42.7–76)
NRBC BLD AUTO-RTO: 0 /100 WBC (ref 0–0.2)
PLATELET # BLD AUTO: 110 10*3/MM3 (ref 140–450)
PMV BLD AUTO: 10.5 FL (ref 6–12)
POTASSIUM SERPL-SCNC: 4.6 MMOL/L (ref 3.5–5.2)
PROT SERPL-MCNC: 7.6 G/DL (ref 6–8.5)
PROTHROMBIN TIME: 13.7 SECONDS (ref 11.4–14.4)
QT INTERVAL: 394 MS
QTC INTERVAL: 434 MS
RBC # BLD AUTO: 4.2 10*6/MM3 (ref 4.14–5.8)
SODIUM SERPL-SCNC: 138 MMOL/L (ref 136–145)
WBC NRBC COR # BLD: 5.36 10*3/MM3 (ref 3.4–10.8)

## 2023-02-02 PROCEDURE — 85730 THROMBOPLASTIN TIME PARTIAL: CPT

## 2023-02-02 PROCEDURE — 93005 ELECTROCARDIOGRAM TRACING: CPT

## 2023-02-02 PROCEDURE — 85610 PROTHROMBIN TIME: CPT

## 2023-02-02 PROCEDURE — 83036 HEMOGLOBIN GLYCOSYLATED A1C: CPT

## 2023-02-02 PROCEDURE — 85025 COMPLETE CBC W/AUTO DIFF WBC: CPT

## 2023-02-02 PROCEDURE — 93010 ELECTROCARDIOGRAM REPORT: CPT | Performed by: INTERNAL MEDICINE

## 2023-02-02 PROCEDURE — G0480 DRUG TEST DEF 1-7 CLASSES: HCPCS

## 2023-02-02 PROCEDURE — 36415 COLL VENOUS BLD VENIPUNCTURE: CPT

## 2023-02-02 PROCEDURE — 80053 COMPREHEN METABOLIC PANEL: CPT

## 2023-02-02 NOTE — TELEPHONE ENCOUNTER
Requesting pre op risk assessment for left hip surgery with Dr. Romero Hutchinson.     Patient on Plavix, has f/u scheduled for 2/7/2023

## 2023-02-02 NOTE — PAT
An arrival time for procedure was not provided during PAT visit. If patient had any questions or concerns about their arrival time, they were instructed to contact their surgeon/physician.  Additionally, if the patient referred to an arrival time that was acquired from their my chart account, patient was encouraged to verify that time with their surgeon/physician. Arrival times are NOT provided in Pre Admission Testing Department.    Patient viewed general PAT education video as instructed in their preoperative information received from their surgeon.  Patient stated the general PAT education video was viewed in its entirety and survey completed.  Copies of PAT general education handouts (Incentive Spirometry, Meds to Beds Program, Patient Belongings, Pre-op skin preparation instructions, Blood Glucose testing, Visitor policy, Surgery FAQ, Code H) distributed to patient if not printed. Education related to the PAT pass and skin preparation for surgery (if applicable) completed in PAT as a reinforcement to PAT education video. Patient instructed to return PAT pass provided today as well as completed skin preparation sheet (if applicable) on the day of procedure.     Additionally if patient had not viewed video yet but intended to view it at home or in our waiting area, then referred them to the handout with QR code/link provided during PAT visit.  Instructed patient to complete survey after viewing the video in its entirety.  Encouraged patient/family to read PAT general education handouts thoroughly and notify PAT staff with any questions or concerns. Patient verbalized understanding of all information and priority content.    Patient denies any current skin issues.     Clean catch urinalysis not indicated because patient denied recent urinary frequency, urinary urgency, burning/pain upon urination, or flank pain. No recent UTIs.    Patient to apply Chlorhexadine wipes  to surgical area (as instructed) the night  before procedure and the AM of procedure. Wipes provided.    Patient instructed to drink 20 ounces of Gatorade and it needs to be completed 1 hour (for Main OR patients) or 2 hours (scheduled  section & [facility] patients) before given arrival time for procedure (NO RED Gatorade)    Patient verbalized understanding.    Prescription for Bactroban (if prescribed) and Chlorhexidine shower called into patient's pharmacy or [facility] pharmacy by patient's surgeon.  Reinforced with patient to  the prescription from applicable pharmacy if they haven't already.  Verbal and written instructions given regarding proper use of the Bactroban (if prescribed) and Chlorhexidine to patient and/or famlily during PAT visit. Patient/family also instructed to complete checklist and return it to Pre-op on the day of surgery.  Patient and/or family verbalized understanding.    Per Anesthesia Request, patient instructed not to take their ACE/ARB medications on the AM of surgery.    Patient instructed to bring CPAP mask and tubing to the hospital for overnight stay.  Explained that it is not necessary to bring their CPAP machine to the hospital instead a CPAP machine will be provided for use by the hospital. If patient knows their CPAP settings, those settings will be implemented.  If not, the CPAP machine will be utilized on the auto setting using their mask and tubing.    Patient verbalized understanding.    Discussed with patient options for receiving total joint replacement education and assessed patient's ability and preference. Joint Replacement Guide given to patient during PAT visit since not received a copy within the last year. Encouraged patient/family to read guide thoroughly and notify PAT staff with any questions or concerns. Handout provided directing patient to links to watch online videos related to joint replacement surgery on the [facility] website. The handout gives detailed instructions for joining an  online joint replacement class through Zoom or phone conference offered on Thursdays. Patient agreed to participate by joining an online class through phone conference. Patient verbalized understanding of instructions and to complete the online learning tool survey. Encouraged to share information with family and/or . An overview of the joint replacement education was provided during the visit including general perioperative instructions that are routine for all surgical patients (PAT PASS, wipes, directions to pre-op, etc.). PT VERBALIZED HE WILL READ JOINT BOOK OR JOIN PHONE CLASS.     DENTAL CLEARANCE FROM 11/21/22 ON CHART.    NO CARDIAC CLEARANCE PRIOR TO PAT APPOINTMENT. LM WITH DR. MARTINEZ'S NURSE TO REPORT ABOVE.

## 2023-02-07 ENCOUNTER — OFFICE VISIT (OUTPATIENT)
Dept: CARDIOLOGY | Facility: CLINIC | Age: 68
End: 2023-02-07
Payer: MEDICARE

## 2023-02-07 VITALS
BODY MASS INDEX: 35.94 KG/M2 | HEIGHT: 67 IN | HEART RATE: 75 BPM | WEIGHT: 229 LBS | SYSTOLIC BLOOD PRESSURE: 122 MMHG | DIASTOLIC BLOOD PRESSURE: 68 MMHG | OXYGEN SATURATION: 93 %

## 2023-02-07 DIAGNOSIS — I25.10 CORONARY ARTERY DISEASE INVOLVING NATIVE CORONARY ARTERY OF NATIVE HEART WITHOUT ANGINA PECTORIS: ICD-10-CM

## 2023-02-07 DIAGNOSIS — I25.10 CORONARY ARTERY DISEASE INVOLVING NATIVE CORONARY ARTERY OF NATIVE HEART WITHOUT ANGINA PECTORIS: Primary | ICD-10-CM

## 2023-02-07 DIAGNOSIS — I50.22 CHRONIC SYSTOLIC HEART FAILURE: ICD-10-CM

## 2023-02-07 DIAGNOSIS — E78.5 DYSLIPIDEMIA: ICD-10-CM

## 2023-02-07 DIAGNOSIS — I25.5 ISCHEMIC CARDIOMYOPATHY: ICD-10-CM

## 2023-02-07 DIAGNOSIS — Z01.810 PREOP CARDIOVASCULAR EXAM: ICD-10-CM

## 2023-02-07 DIAGNOSIS — I10 ESSENTIAL HYPERTENSION: ICD-10-CM

## 2023-02-07 LAB
COTININE SERPL-MCNC: <1 NG/ML
NICOTINE SERPL-MCNC: <1 NG/ML

## 2023-02-07 PROCEDURE — 99214 OFFICE O/P EST MOD 30 MIN: CPT | Performed by: INTERNAL MEDICINE

## 2023-02-07 RX ORDER — BISOPROLOL FUMARATE 10 MG/1
10 TABLET, FILM COATED ORAL DAILY
Qty: 90 TABLET | Refills: 3 | Status: SHIPPED | OUTPATIENT
Start: 2023-02-07

## 2023-02-07 RX ORDER — CLOPIDOGREL BISULFATE 75 MG/1
75 TABLET ORAL DAILY
Qty: 90 TABLET | Refills: 3 | Status: SHIPPED | OUTPATIENT
Start: 2023-02-07

## 2023-02-07 RX ORDER — NITROGLYCERIN 0.4 MG/1
0.4 TABLET SUBLINGUAL
Qty: 25 TABLET | Refills: 5 | Status: SHIPPED | OUTPATIENT
Start: 2023-02-07

## 2023-02-07 RX ORDER — ROSUVASTATIN CALCIUM 10 MG/1
10 TABLET, COATED ORAL DAILY
Qty: 90 TABLET | Refills: 3 | Status: SHIPPED | OUTPATIENT
Start: 2023-02-07

## 2023-02-07 RX ORDER — SACUBITRIL AND VALSARTAN 49; 51 MG/1; MG/1
1 TABLET, FILM COATED ORAL EVERY 12 HOURS
Qty: 180 TABLET | Refills: 3 | Status: SHIPPED | OUTPATIENT
Start: 2023-02-07

## 2023-02-07 RX ORDER — EPLERENONE 25 MG/1
25 TABLET, FILM COATED ORAL DAILY
Qty: 90 TABLET | Refills: 3 | Status: SHIPPED | OUTPATIENT
Start: 2023-02-07

## 2023-02-07 NOTE — PROGRESS NOTES
Subjective:     Encounter Date:02/07/2023    Primary Care Physician: Cheo Weiss MD      Patient ID: Kai Garcia is a 67 y.o. male.    Chief Complaint:Coronary Artery Disease (Total left hip replacement,Monday)    Problem List:  1. Dissecting aortic aneurysm:  a. 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 Helton, stage I in June 2006 and stage II in March 2007.   b. Followed chronically with annual CTs by Dr. Helton. Stable by CT 2019  2. Coronary artery disease:  a. Cardiac cath prior to “elephant trunk” procedure in June 2006 and the patient underwent CABG at that time consisting of SVG to the OM and SVG to the PDA.   b. 02/08/2007 - inferoposterior MI, cardiac cath showed 100% left circumflex with severe OM-1 stenosis and 100% vein graft to the left circumflex. RCA was diffuse, 90% ostial stenosis at the SVG to the RCA. LAD had only 40% stenosis.   c. Cardiac cath in March 2007 due to recurrent angina - 2.5 Microdriver stent to the distal native RCA.   d. 4/27/18 cardiac catheter occluded vein grafts ×2.  Large posterior basal aneurysm with ejection fraction of 30-35%.  Severe stenosis and distal circumflex which supplies aneurysm.  Non-flow-limiting RCA disease.  e. MRI 8/18 LVEF 28%  f. S/P Bi- Ventricular ICD- built.io 8/9/18, EMILIE Fox MD  3. Dyslipidemia.   4. Hypertension.   5. Diabetes  6. Chronic low back pain/multiple lumbar surgeries, currently disabled:  a. L4-L5 discectomy in 2015.   7. History of bleeding peptic ulcer disease.   8. Severe obstructive sleep apnea.   9. Appendectomy  10. Lumbar fusion 10/2021      Allergies   Allergen Reactions   • Ativan [Lorazepam] Mental Status Change     agitation   • Zetia [Ezetimibe] Other (See Comments)     increased LFTs   • Zocor [Simvastatin] Myalgia   • Isosorbide Other (See Comments)     headaches   • Jardiance [Empagliflozin] Other (See Comments)      Blood in urine and burning sensation   • Metformin And Related Diarrhea         Current Outpatient Medications:   •  Accu-Chek Softclix Lancets lancets, Check sugars bid, Disp: 100 each, Rfl: 5  •  acetaminophen (TYLENOL) 500 MG tablet, Take 1,000 mg by mouth Every 6 (Six) Hours As Needed for Mild Pain ., Disp: , Rfl:   •  aspirin 81 MG EC tablet, Take 1 tablet by mouth Daily., Disp: , Rfl:   •  bisoprolol (ZEBeta) 10 MG tablet, Take 1 tablet by mouth Daily., Disp: 90 tablet, Rfl: 3  •  clopidogrel (PLAVIX) 75 MG tablet, Take 1 tablet by mouth Daily. Resume if platelets are above 50 thousand on recheck on 11/3/21 (Patient taking differently: Take 75 mg by mouth Daily. PT TO STOP TAKING PLAVIX 1 WEEK PRIOR TO SURGERY PER DR. BROOKS'S OFFICE.), Disp: 90 tablet, Rfl: 3  •  eplerenone (INSPRA) 25 MG tablet, Take 1 tablet by mouth Daily., Disp: 90 tablet, Rfl: 3  •  glucose blood (Accu-Chek Jodie Plus) test strip, Check sugars bid, Disp: 100 each, Rfl: 5  •  Insulin Glargine (Lantus SoloStar) 100 UNIT/ML injection pen, Inject 32 Units under the skin into the appropriate area as directed Daily. (Patient taking differently: Inject 20 Units under the skin into the appropriate area as directed Daily.), Disp: 10 pen, Rfl: 3  •  Insulin Pen Needle (Easy Touch Pen Needles) 31G X 5 MM misc, USE DAILY WITH INSULIN, Disp: 100 each, Rfl: 4  •  mupirocin (BACTROBAN) 2 % ointment, Apply pea-sized amount to each nostril twice daily for 5 days prior to surgery, Disp: 22 g, Rfl: 0  •  nitroglycerin (NITROSTAT) 0.4 MG SL tablet, PLACE 1 TABLET UNDER THE TONGUE 5 MINUTES AS NEEDED FOR CHEST PAIN, Disp: 25 tablet, Rfl: 5  •  oxyCODONE-acetaminophen (Percocet)  MG per tablet, Take 0.5-1 tablets by mouth Every 4 (Four) Hours As Needed for Moderate Pain., Disp: 18 tablet, Rfl: 0  •  pantoprazole (PROTONIX) 40 MG EC tablet, Take 1 tablet by mouth Daily., Disp: 90 tablet, Rfl: 3  •  rosuvastatin (CRESTOR) 10 MG tablet, Take 1 tablet  by mouth Daily., Disp: 90 tablet, Rfl: 3  •  sacubitril-valsartan (Entresto) 49-51 MG tablet, Take 1 tablet by mouth Every 12 (Twelve) Hours., Disp: 180 tablet, Rfl: 3  •  SITagliptin (Januvia) 100 MG tablet, Take 1 tablet by mouth Daily., Disp: 90 tablet, Rfl: 3  •  traZODone (DESYREL) 100 MG tablet, Take 1 tablet by mouth Every Night., Disp: 30 tablet, Rfl: 5  •  Chlorhexidine Gluconate 4 % solution, Apply  topically to the appropriate area as directed Daily As Needed for Wound Care. Shower daily with hibiclens solution as directed 5 days prior to surgery., Disp: 237 mL, Rfl: 0        History of Present Illness    Patient is a 67-year-old  male who is being seen today for 6-month follow-up of coronary artery disease, ischemic cardiomyopathy, and cardiac risk factors as well as previous history of aortic aneurysm with dissection.  He is currently scheduled for left hip replacement on Monday.  Patient currently denies any chest pain, pressure.  Denies any increasing shortness of breath.  No reported syncope, near-syncope, or edema.    The following portions of the patient's history were reviewed and updated as appropriate: allergies, current medications, past family history, past medical history, past social history, past surgical history and problem list.      Social History     Tobacco Use   • Smoking status: Former     Packs/day: 3.00     Years: 35.00     Pack years: 105.00     Types: Cigarettes     Quit date: 6/15/2006     Years since quittin.6   • Smokeless tobacco: Never   Vaping Use   • Vaping Use: Never used   Substance Use Topics   • Alcohol use: No   • Drug use: No         Review of Systems   Cardiovascular: Negative for chest pain, dyspnea on exertion, leg swelling, palpitations and syncope.   Respiratory: Negative.  Negative for shortness of breath.    Hematologic/Lymphatic: Negative for bleeding problem. Does not bruise/bleed easily.   Skin: Negative for rash.   Musculoskeletal: Positive  "for arthritis and joint pain. Negative for muscle weakness and myalgias.   Gastrointestinal: Negative for heartburn, nausea and vomiting.   Neurological: Negative for dizziness, light-headedness, loss of balance and numbness.          Objective:   /68 (BP Location: Left arm, Patient Position: Sitting)   Pulse 75   Ht 170.2 cm (67\")   Wt 104 kg (229 lb)   SpO2 93%   BMI 35.87 kg/m²         Vitals reviewed.   Constitutional:       Appearance: Well-developed and not in distress.   Neck:      Vascular: No JVD.      Trachea: No tracheal deviation.   Pulmonary:      Effort: Pulmonary effort is normal.      Breath sounds: Normal breath sounds.   Cardiovascular:      Normal rate. Regular rhythm.   Edema:     Peripheral edema absent.   Abdominal:      Tenderness: There is no abdominal tenderness.   Musculoskeletal:         General: No deformity. Skin:     General: Skin is warm and dry.   Neurological:      Mental Status: Alert and oriented to person, place, and time.         Procedures  Device check:  Normal functioning biventricular ICD.  No new events since August.  Atrial paced 1 percent, RV/LV paced 99%.        Assessment:   Assessment & Plan      Diagnoses and all orders for this visit:    1. Coronary artery disease involving native coronary artery of native heart without angina pectoris (Primary), stable.  No angina.  On aspirin.    2. Preop cardiovascular exam, upcoming left hip replacement.    3. Essential hypertension, controlled.  On beta-blocker.    4. Ischemic cardiomyopathy EF 40% (2019), stable.  On Entresto    5. Dyslipidemia, controlled.  LDL in September 51.  On statin.    6.  Chronic systolic congestive heart failure, stable.  No active heart failure.  On Inspra.      Plan:  1. Patient overall stable from cardiac standpoint.  2. May proceed with scheduled left hip replacement at low cardiac risk without any further testing required.  3. May hold Plavix 5 to 7 days prior to surgical " intervention.  4. Will prescribe, at patient's request, Viagra to use as needed.  Patient was educated regarding its use and needing to avoid nitroglycerin within 24 hours of use.  5. Follow-up in 6 months time or sooner if needed with device check.        CONNOR Islas scribed this note for Dr. Uri Willis.    I have seen and examined the patient, I have reviewed the note, discussed the case with the advance practice clinician, made necessary changes and I agree with the final note.    Uri Willis MD  02/07/23  15:59 EST              Dictated utilizing Dragon dictation

## 2023-02-12 ENCOUNTER — ANESTHESIA EVENT (OUTPATIENT)
Dept: PERIOP | Facility: HOSPITAL | Age: 68
End: 2023-02-12
Payer: MEDICARE

## 2023-02-12 RX ORDER — FAMOTIDINE 10 MG/ML
20 INJECTION, SOLUTION INTRAVENOUS ONCE
Status: CANCELLED | OUTPATIENT
Start: 2023-02-12 | End: 2023-02-12

## 2023-02-13 ENCOUNTER — APPOINTMENT (OUTPATIENT)
Dept: GENERAL RADIOLOGY | Facility: HOSPITAL | Age: 68
End: 2023-02-13
Payer: MEDICARE

## 2023-02-13 ENCOUNTER — ANESTHESIA (OUTPATIENT)
Dept: PERIOP | Facility: HOSPITAL | Age: 68
End: 2023-02-13
Payer: MEDICARE

## 2023-02-13 ENCOUNTER — HOSPITAL ENCOUNTER (OUTPATIENT)
Facility: HOSPITAL | Age: 68
Discharge: HOME HEALTH | End: 2023-02-14
Attending: ORTHOPAEDIC SURGERY | Admitting: ORTHOPAEDIC SURGERY
Payer: MEDICARE

## 2023-02-13 DIAGNOSIS — M16.12 PRIMARY OSTEOARTHRITIS OF LEFT HIP: ICD-10-CM

## 2023-02-13 DIAGNOSIS — Z79.4 TYPE 2 DIABETES MELLITUS WITH HYPERGLYCEMIA, WITH LONG-TERM CURRENT USE OF INSULIN: ICD-10-CM

## 2023-02-13 DIAGNOSIS — E11.65 TYPE 2 DIABETES MELLITUS WITH HYPERGLYCEMIA, WITH LONG-TERM CURRENT USE OF INSULIN: ICD-10-CM

## 2023-02-13 DIAGNOSIS — Z96.642 S/P TOTAL LEFT HIP ARTHROPLASTY: Primary | ICD-10-CM

## 2023-02-13 PROBLEM — E11.9 DM2 (DIABETES MELLITUS, TYPE 2) (HCC): Status: ACTIVE | Noted: 2023-02-13

## 2023-02-13 PROBLEM — E66.9 OBESITY: Status: ACTIVE | Noted: 2023-02-13

## 2023-02-13 LAB
GLUCOSE BLDC GLUCOMTR-MCNC: 118 MG/DL (ref 70–130)
GLUCOSE BLDC GLUCOMTR-MCNC: 141 MG/DL (ref 70–130)
POTASSIUM SERPL-SCNC: 4.4 MMOL/L (ref 3.5–5.2)

## 2023-02-13 PROCEDURE — 97161 PT EVAL LOW COMPLEX 20 MIN: CPT

## 2023-02-13 PROCEDURE — 25010000002 FENTANYL CITRATE (PF) 50 MCG/ML SOLUTION

## 2023-02-13 PROCEDURE — A9270 NON-COVERED ITEM OR SERVICE: HCPCS | Performed by: INTERNAL MEDICINE

## 2023-02-13 PROCEDURE — A9270 NON-COVERED ITEM OR SERVICE: HCPCS | Performed by: ORTHOPAEDIC SURGERY

## 2023-02-13 PROCEDURE — 72170 X-RAY EXAM OF PELVIS: CPT

## 2023-02-13 PROCEDURE — 27130 TOTAL HIP ARTHROPLASTY: CPT | Performed by: ORTHOPAEDIC SURGERY

## 2023-02-13 PROCEDURE — 25010000002 MORPHINE PER 10 MG: Performed by: ORTHOPAEDIC SURGERY

## 2023-02-13 PROCEDURE — 63710000001 MELOXICAM 15 MG TABLET: Performed by: ORTHOPAEDIC SURGERY

## 2023-02-13 PROCEDURE — 25010000002 ONDANSETRON PER 1 MG: Performed by: NURSE ANESTHETIST, CERTIFIED REGISTERED

## 2023-02-13 PROCEDURE — 25010000002 FENTANYL CITRATE (PF) 100 MCG/2ML SOLUTION: Performed by: NURSE ANESTHETIST, CERTIFIED REGISTERED

## 2023-02-13 PROCEDURE — 25010000002 DEXAMETHASONE PER 1 MG: Performed by: NURSE ANESTHETIST, CERTIFIED REGISTERED

## 2023-02-13 PROCEDURE — A9270 NON-COVERED ITEM OR SERVICE: HCPCS | Performed by: ANESTHESIOLOGY

## 2023-02-13 PROCEDURE — 84132 ASSAY OF SERUM POTASSIUM: CPT | Performed by: ORTHOPAEDIC SURGERY

## 2023-02-13 PROCEDURE — 63710000001 PREGABALIN 75 MG CAPSULE: Performed by: ORTHOPAEDIC SURGERY

## 2023-02-13 PROCEDURE — 25010000002 CEFAZOLIN IN DEXTROSE 2-4 GM/100ML-% SOLUTION: Performed by: ORTHOPAEDIC SURGERY

## 2023-02-13 PROCEDURE — 63710000001 POVIDONE-IODINE 10 % SOLUTION 30 ML BOTTLE: Performed by: ORTHOPAEDIC SURGERY

## 2023-02-13 PROCEDURE — 82962 GLUCOSE BLOOD TEST: CPT

## 2023-02-13 PROCEDURE — 97110 THERAPEUTIC EXERCISES: CPT

## 2023-02-13 PROCEDURE — 63710000001 ACETAMINOPHEN 500 MG TABLET: Performed by: ORTHOPAEDIC SURGERY

## 2023-02-13 PROCEDURE — 63710000001 SACUBITRIL-VALSARTAN 49-51 MG TABLET: Performed by: INTERNAL MEDICINE

## 2023-02-13 PROCEDURE — C1713 ANCHOR/SCREW BN/BN,TIS/BN: HCPCS | Performed by: ORTHOPAEDIC SURGERY

## 2023-02-13 PROCEDURE — 25010000002 KETOROLAC TROMETHAMINE PER 15 MG: Performed by: ORTHOPAEDIC SURGERY

## 2023-02-13 PROCEDURE — C1776 JOINT DEVICE (IMPLANTABLE): HCPCS | Performed by: ORTHOPAEDIC SURGERY

## 2023-02-13 PROCEDURE — G0378 HOSPITAL OBSERVATION PER HR: HCPCS

## 2023-02-13 PROCEDURE — 25010000002 PROPOFOL 10 MG/ML EMULSION: Performed by: NURSE ANESTHETIST, CERTIFIED REGISTERED

## 2023-02-13 PROCEDURE — 63710000001 ROSUVASTATIN 10 MG TABLET: Performed by: INTERNAL MEDICINE

## 2023-02-13 PROCEDURE — 27130 TOTAL HIP ARTHROPLASTY: CPT | Performed by: PHYSICIAN ASSISTANT

## 2023-02-13 PROCEDURE — 63710000001 FAMOTIDINE 20 MG TABLET: Performed by: ANESTHESIOLOGY

## 2023-02-13 PROCEDURE — 25010000002 ROPIVACAINE PER 1 MG: Performed by: ORTHOPAEDIC SURGERY

## 2023-02-13 PROCEDURE — 63710000001 INSULIN DETEMIR PER 5 UNITS: Performed by: INTERNAL MEDICINE

## 2023-02-13 DEVICE — IMPLANTABLE DEVICE: Type: IMPLANTABLE DEVICE | Site: HIP | Status: FUNCTIONAL

## 2023-02-13 DEVICE — LINER MDM CMTLS COCR 42MM: Type: IMPLANTABLE DEVICE | Site: HIP | Status: FUNCTIONAL

## 2023-02-13 DEVICE — DEV CONTRL TISS STRATAFIX SPIRAL PDO BIDIR 1 36X36CM: Type: IMPLANTABLE DEVICE | Site: HIP | Status: FUNCTIONAL

## 2023-02-13 DEVICE — SCRW HEX LP TRIDENT2 6.5X25MM: Type: IMPLANTABLE DEVICE | Site: HIP | Status: FUNCTIONAL

## 2023-02-13 DEVICE — CAP TOTL HIP MOBL BEAR 5 PC: Type: IMPLANTABLE DEVICE | Site: HIP | Status: FUNCTIONAL

## 2023-02-13 DEVICE — SCRW HEX LP TRIDENT2 6.5X35MM: Type: IMPLANTABLE DEVICE | Site: HIP | Status: FUNCTIONAL

## 2023-02-13 DEVICE — STEM FEM ACCOLADE2 V40 127D 35X108X44 SZ5: Type: IMPLANTABLE DEVICE | Site: HIP | Status: FUNCTIONAL

## 2023-02-13 DEVICE — DEV CONTRL TISS STRATAFIX SPIRAL PDO BIDIR MO4 36X36CM: Type: IMPLANTABLE DEVICE | Site: HIP | Status: FUNCTIONAL

## 2023-02-13 DEVICE — SHLL TRIDENT2 TRITANIUM C/HL 52MM: Type: IMPLANTABLE DEVICE | Site: HIP | Status: FUNCTIONAL

## 2023-02-13 DEVICE — WAX BONE HEMO AESCULAP 2.5GM: Type: IMPLANTABLE DEVICE | Site: HIP | Status: FUNCTIONAL

## 2023-02-13 DEVICE — HD FEM/HIP BIOLOX/DELTA V40 CERAM 28MM: Type: IMPLANTABLE DEVICE | Site: HIP | Status: FUNCTIONAL

## 2023-02-13 RX ORDER — LIDOCAINE HYDROCHLORIDE 10 MG/ML
0.5 INJECTION, SOLUTION EPIDURAL; INFILTRATION; INTRACAUDAL; PERINEURAL ONCE AS NEEDED
Status: COMPLETED | OUTPATIENT
Start: 2023-02-13 | End: 2023-02-13

## 2023-02-13 RX ORDER — ASPIRIN 81 MG/1
81 TABLET ORAL EVERY 12 HOURS SCHEDULED
Status: DISCONTINUED | OUTPATIENT
Start: 2023-02-14 | End: 2023-02-14 | Stop reason: HOSPADM

## 2023-02-13 RX ORDER — PROPOFOL 10 MG/ML
VIAL (ML) INTRAVENOUS AS NEEDED
Status: DISCONTINUED | OUTPATIENT
Start: 2023-02-13 | End: 2023-02-13 | Stop reason: SURG

## 2023-02-13 RX ORDER — SODIUM CHLORIDE 0.9 % (FLUSH) 0.9 %
10 SYRINGE (ML) INJECTION EVERY 12 HOURS SCHEDULED
Status: DISCONTINUED | OUTPATIENT
Start: 2023-02-13 | End: 2023-02-13 | Stop reason: HOSPADM

## 2023-02-13 RX ORDER — SODIUM CHLORIDE 0.9 % (FLUSH) 0.9 %
10 SYRINGE (ML) INJECTION AS NEEDED
Status: DISCONTINUED | OUTPATIENT
Start: 2023-02-13 | End: 2023-02-13 | Stop reason: HOSPADM

## 2023-02-13 RX ORDER — ONDANSETRON 2 MG/ML
4 INJECTION INTRAMUSCULAR; INTRAVENOUS EVERY 6 HOURS PRN
Status: DISCONTINUED | OUTPATIENT
Start: 2023-02-13 | End: 2023-02-14 | Stop reason: HOSPADM

## 2023-02-13 RX ORDER — NITROGLYCERIN 0.4 MG/1
0.4 TABLET SUBLINGUAL
Status: DISCONTINUED | OUTPATIENT
Start: 2023-02-13 | End: 2023-02-14 | Stop reason: HOSPADM

## 2023-02-13 RX ORDER — MELOXICAM 15 MG/1
15 TABLET ORAL ONCE
Status: COMPLETED | OUTPATIENT
Start: 2023-02-13 | End: 2023-02-13

## 2023-02-13 RX ORDER — NICOTINE POLACRILEX 4 MG
15 LOZENGE BUCCAL
Status: DISCONTINUED | OUTPATIENT
Start: 2023-02-13 | End: 2023-02-14 | Stop reason: HOSPADM

## 2023-02-13 RX ORDER — ACETAMINOPHEN 500 MG
1000 TABLET ORAL EVERY 8 HOURS
Status: DISCONTINUED | OUTPATIENT
Start: 2023-02-13 | End: 2023-02-14 | Stop reason: HOSPADM

## 2023-02-13 RX ORDER — OXYCODONE HYDROCHLORIDE 5 MG/1
10 TABLET ORAL EVERY 4 HOURS PRN
Status: DISCONTINUED | OUTPATIENT
Start: 2023-02-13 | End: 2023-02-14 | Stop reason: HOSPADM

## 2023-02-13 RX ORDER — ONDANSETRON 4 MG/1
4 TABLET, FILM COATED ORAL EVERY 6 HOURS PRN
Status: DISCONTINUED | OUTPATIENT
Start: 2023-02-13 | End: 2023-02-14 | Stop reason: HOSPADM

## 2023-02-13 RX ORDER — SODIUM CHLORIDE 9 MG/ML
100 INJECTION, SOLUTION INTRAVENOUS CONTINUOUS
Status: DISCONTINUED | OUTPATIENT
Start: 2023-02-13 | End: 2023-02-14 | Stop reason: HOSPADM

## 2023-02-13 RX ORDER — LIDOCAINE HYDROCHLORIDE 10 MG/ML
INJECTION, SOLUTION EPIDURAL; INFILTRATION; INTRACAUDAL; PERINEURAL AS NEEDED
Status: DISCONTINUED | OUTPATIENT
Start: 2023-02-13 | End: 2023-02-13 | Stop reason: SURG

## 2023-02-13 RX ORDER — EPHEDRINE SULFATE 50 MG/ML
INJECTION, SOLUTION INTRAVENOUS AS NEEDED
Status: DISCONTINUED | OUTPATIENT
Start: 2023-02-13 | End: 2023-02-13 | Stop reason: SURG

## 2023-02-13 RX ORDER — SODIUM CHLORIDE, SODIUM LACTATE, POTASSIUM CHLORIDE, CALCIUM CHLORIDE 600; 310; 30; 20 MG/100ML; MG/100ML; MG/100ML; MG/100ML
9 INJECTION, SOLUTION INTRAVENOUS CONTINUOUS
Status: DISCONTINUED | OUTPATIENT
Start: 2023-02-13 | End: 2023-02-14 | Stop reason: HOSPADM

## 2023-02-13 RX ORDER — FENTANYL CITRATE 50 UG/ML
INJECTION, SOLUTION INTRAMUSCULAR; INTRAVENOUS AS NEEDED
Status: DISCONTINUED | OUTPATIENT
Start: 2023-02-13 | End: 2023-02-13 | Stop reason: SURG

## 2023-02-13 RX ORDER — DEXTROSE MONOHYDRATE 25 G/50ML
25 INJECTION, SOLUTION INTRAVENOUS
Status: DISCONTINUED | OUTPATIENT
Start: 2023-02-13 | End: 2023-02-14 | Stop reason: HOSPADM

## 2023-02-13 RX ORDER — DEXAMETHASONE SODIUM PHOSPHATE 4 MG/ML
INJECTION, SOLUTION INTRA-ARTICULAR; INTRALESIONAL; INTRAMUSCULAR; INTRAVENOUS; SOFT TISSUE AS NEEDED
Status: DISCONTINUED | OUTPATIENT
Start: 2023-02-13 | End: 2023-02-13 | Stop reason: SURG

## 2023-02-13 RX ORDER — LABETALOL HYDROCHLORIDE 5 MG/ML
10 INJECTION, SOLUTION INTRAVENOUS EVERY 4 HOURS PRN
Status: DISCONTINUED | OUTPATIENT
Start: 2023-02-13 | End: 2023-02-14 | Stop reason: HOSPADM

## 2023-02-13 RX ORDER — OXYCODONE HYDROCHLORIDE 5 MG/1
5 TABLET ORAL EVERY 4 HOURS PRN
Status: DISCONTINUED | OUTPATIENT
Start: 2023-02-13 | End: 2023-02-14 | Stop reason: HOSPADM

## 2023-02-13 RX ORDER — FENTANYL CITRATE 50 UG/ML
50 INJECTION, SOLUTION INTRAMUSCULAR; INTRAVENOUS
Status: DISCONTINUED | OUTPATIENT
Start: 2023-02-13 | End: 2023-02-13

## 2023-02-13 RX ORDER — BISOPROLOL FUMARATE 5 MG/1
10 TABLET, FILM COATED ORAL DAILY
Status: DISCONTINUED | OUTPATIENT
Start: 2023-02-14 | End: 2023-02-14 | Stop reason: HOSPADM

## 2023-02-13 RX ORDER — PHENYLEPHRINE HCL IN 0.9% NACL 1 MG/10 ML
SYRINGE (ML) INTRAVENOUS AS NEEDED
Status: DISCONTINUED | OUTPATIENT
Start: 2023-02-13 | End: 2023-02-13 | Stop reason: SURG

## 2023-02-13 RX ORDER — CEFAZOLIN SODIUM 2 G/100ML
2 INJECTION, SOLUTION INTRAVENOUS ONCE
Status: COMPLETED | OUTPATIENT
Start: 2023-02-13 | End: 2023-02-13

## 2023-02-13 RX ORDER — MELOXICAM 7.5 MG/1
15 TABLET ORAL DAILY
Status: DISCONTINUED | OUTPATIENT
Start: 2023-02-14 | End: 2023-02-14 | Stop reason: HOSPADM

## 2023-02-13 RX ORDER — MIDAZOLAM HYDROCHLORIDE 1 MG/ML
0.5 INJECTION INTRAMUSCULAR; INTRAVENOUS
Status: DISCONTINUED | OUTPATIENT
Start: 2023-02-13 | End: 2023-02-13 | Stop reason: HOSPADM

## 2023-02-13 RX ORDER — ONDANSETRON 2 MG/ML
INJECTION INTRAMUSCULAR; INTRAVENOUS AS NEEDED
Status: DISCONTINUED | OUTPATIENT
Start: 2023-02-13 | End: 2023-02-13 | Stop reason: SURG

## 2023-02-13 RX ORDER — ROSUVASTATIN CALCIUM 10 MG/1
10 TABLET, COATED ORAL NIGHTLY
Status: DISCONTINUED | OUTPATIENT
Start: 2023-02-13 | End: 2023-02-14 | Stop reason: HOSPADM

## 2023-02-13 RX ORDER — NALOXONE HCL 0.4 MG/ML
0.1 VIAL (ML) INJECTION
Status: DISCONTINUED | OUTPATIENT
Start: 2023-02-13 | End: 2023-02-14 | Stop reason: HOSPADM

## 2023-02-13 RX ORDER — CEFAZOLIN SODIUM 2 G/100ML
2 INJECTION, SOLUTION INTRAVENOUS EVERY 8 HOURS
Status: COMPLETED | OUTPATIENT
Start: 2023-02-13 | End: 2023-02-13

## 2023-02-13 RX ORDER — FAMOTIDINE 20 MG/1
20 TABLET, FILM COATED ORAL ONCE
Status: COMPLETED | OUTPATIENT
Start: 2023-02-13 | End: 2023-02-13

## 2023-02-13 RX ORDER — TRAZODONE HYDROCHLORIDE 50 MG/1
100 TABLET ORAL NIGHTLY
Status: DISCONTINUED | OUTPATIENT
Start: 2023-02-13 | End: 2023-02-14 | Stop reason: HOSPADM

## 2023-02-13 RX ORDER — PREGABALIN 75 MG/1
75 CAPSULE ORAL ONCE
Status: COMPLETED | OUTPATIENT
Start: 2023-02-13 | End: 2023-02-13

## 2023-02-13 RX ORDER — ROCURONIUM BROMIDE 10 MG/ML
INJECTION, SOLUTION INTRAVENOUS AS NEEDED
Status: DISCONTINUED | OUTPATIENT
Start: 2023-02-13 | End: 2023-02-13 | Stop reason: SURG

## 2023-02-13 RX ORDER — FENTANYL CITRATE 50 UG/ML
INJECTION, SOLUTION INTRAMUSCULAR; INTRAVENOUS
Status: COMPLETED
Start: 2023-02-13 | End: 2023-02-13

## 2023-02-13 RX ORDER — KETAMINE HCL IN NACL, ISO-OSM 100MG/10ML
SYRINGE (ML) INJECTION AS NEEDED
Status: DISCONTINUED | OUTPATIENT
Start: 2023-02-13 | End: 2023-02-13 | Stop reason: SURG

## 2023-02-13 RX ORDER — MAGNESIUM HYDROXIDE 1200 MG/15ML
LIQUID ORAL AS NEEDED
Status: DISCONTINUED | OUTPATIENT
Start: 2023-02-13 | End: 2023-02-13 | Stop reason: HOSPADM

## 2023-02-13 RX ORDER — EPLERENONE 25 MG/1
25 TABLET, FILM COATED ORAL DAILY
Status: DISCONTINUED | OUTPATIENT
Start: 2023-02-14 | End: 2023-02-14 | Stop reason: HOSPADM

## 2023-02-13 RX ORDER — SODIUM CHLORIDE 9 MG/ML
40 INJECTION, SOLUTION INTRAVENOUS AS NEEDED
Status: DISCONTINUED | OUTPATIENT
Start: 2023-02-13 | End: 2023-02-13 | Stop reason: HOSPADM

## 2023-02-13 RX ORDER — TRANEXAMIC ACID 10 MG/ML
1000 INJECTION, SOLUTION INTRAVENOUS ONCE
Status: COMPLETED | OUTPATIENT
Start: 2023-02-13 | End: 2023-02-13

## 2023-02-13 RX ORDER — ACETAMINOPHEN 500 MG
1000 TABLET ORAL ONCE
Status: COMPLETED | OUTPATIENT
Start: 2023-02-13 | End: 2023-02-13

## 2023-02-13 RX ORDER — PANTOPRAZOLE SODIUM 40 MG/1
40 TABLET, DELAYED RELEASE ORAL
Status: DISCONTINUED | OUTPATIENT
Start: 2023-02-14 | End: 2023-02-14 | Stop reason: HOSPADM

## 2023-02-13 RX ADMIN — FENTANYL CITRATE 100 MCG: 50 INJECTION, SOLUTION INTRAMUSCULAR; INTRAVENOUS at 10:11

## 2023-02-13 RX ADMIN — Medication 100 MCG: at 11:43

## 2023-02-13 RX ADMIN — Medication 100 MCG: at 11:54

## 2023-02-13 RX ADMIN — SACUBITRIL AND VALSARTAN 1 TABLET: 49; 51 TABLET, FILM COATED ORAL at 20:30

## 2023-02-13 RX ADMIN — PROPOFOL 200 MG: 10 INJECTION, EMULSION INTRAVENOUS at 10:11

## 2023-02-13 RX ADMIN — Medication 100 MCG: at 11:13

## 2023-02-13 RX ADMIN — Medication 100 MCG: at 11:57

## 2023-02-13 RX ADMIN — Medication 100 MCG: at 12:03

## 2023-02-13 RX ADMIN — DEXAMETHASONE SODIUM PHOSPHATE 4 MG: 4 INJECTION, SOLUTION INTRAMUSCULAR; INTRAVENOUS at 10:27

## 2023-02-13 RX ADMIN — LIDOCAINE HYDROCHLORIDE 100 MG: 10 INJECTION, SOLUTION EPIDURAL; INFILTRATION; INTRACAUDAL; PERINEURAL at 10:11

## 2023-02-13 RX ADMIN — Medication 100 MCG: at 11:16

## 2023-02-13 RX ADMIN — EPHEDRINE SULFATE 5 MG: 50 INJECTION INTRAVENOUS at 10:53

## 2023-02-13 RX ADMIN — Medication 30 MG: at 11:43

## 2023-02-13 RX ADMIN — CEFAZOLIN SODIUM 2 G: 2 INJECTION, SOLUTION INTRAVENOUS at 15:26

## 2023-02-13 RX ADMIN — SUGAMMADEX 200 MG: 100 INJECTION, SOLUTION INTRAVENOUS at 12:04

## 2023-02-13 RX ADMIN — PREGABALIN 75 MG: 75 CAPSULE ORAL at 08:29

## 2023-02-13 RX ADMIN — Medication 50 MCG: at 11:28

## 2023-02-13 RX ADMIN — FENTANYL CITRATE 50 MCG: 50 INJECTION, SOLUTION INTRAMUSCULAR; INTRAVENOUS at 12:52

## 2023-02-13 RX ADMIN — SODIUM CHLORIDE, POTASSIUM CHLORIDE, SODIUM LACTATE AND CALCIUM CHLORIDE: 600; 310; 30; 20 INJECTION, SOLUTION INTRAVENOUS at 10:05

## 2023-02-13 RX ADMIN — TRANEXAMIC ACID 1000 MG: 10 INJECTION, SOLUTION INTRAVENOUS at 10:17

## 2023-02-13 RX ADMIN — SODIUM CHLORIDE, POTASSIUM CHLORIDE, SODIUM LACTATE AND CALCIUM CHLORIDE 9 ML/HR: 600; 310; 30; 20 INJECTION, SOLUTION INTRAVENOUS at 08:25

## 2023-02-13 RX ADMIN — INSULIN DETEMIR 20 UNITS: 100 INJECTION, SOLUTION SUBCUTANEOUS at 15:37

## 2023-02-13 RX ADMIN — Medication 50 MCG: at 11:25

## 2023-02-13 RX ADMIN — ROSUVASTATIN CALCIUM 10 MG: 10 TABLET, COATED ORAL at 20:30

## 2023-02-13 RX ADMIN — Medication 100 MCG: at 11:37

## 2023-02-13 RX ADMIN — ACETAMINOPHEN 1000 MG: 500 TABLET ORAL at 15:25

## 2023-02-13 RX ADMIN — Medication 100 MCG: at 10:54

## 2023-02-13 RX ADMIN — Medication 20 MG: at 10:28

## 2023-02-13 RX ADMIN — CEFAZOLIN SODIUM 2 G: 2 INJECTION, SOLUTION INTRAVENOUS at 10:16

## 2023-02-13 RX ADMIN — CEFAZOLIN SODIUM 2 G: 2 INJECTION, SOLUTION INTRAVENOUS at 23:13

## 2023-02-13 RX ADMIN — ROCURONIUM BROMIDE 50 MG: 10 INJECTION INTRAVENOUS at 10:12

## 2023-02-13 RX ADMIN — FAMOTIDINE 20 MG: 20 TABLET, FILM COATED ORAL at 08:25

## 2023-02-13 RX ADMIN — LIDOCAINE HYDROCHLORIDE 0.5 ML: 10 INJECTION, SOLUTION EPIDURAL; INFILTRATION; INTRACAUDAL; PERINEURAL at 08:25

## 2023-02-13 RX ADMIN — ONDANSETRON 4 MG: 2 INJECTION INTRAMUSCULAR; INTRAVENOUS at 11:38

## 2023-02-13 RX ADMIN — MELOXICAM 15 MG: 15 TABLET ORAL at 08:29

## 2023-02-13 RX ADMIN — TRANEXAMIC ACID 1000 MG: 10 INJECTION, SOLUTION INTRAVENOUS at 11:31

## 2023-02-13 RX ADMIN — Medication 100 MCG: at 12:08

## 2023-02-13 RX ADMIN — ACETAMINOPHEN 1000 MG: 500 TABLET ORAL at 08:28

## 2023-02-13 RX ADMIN — SODIUM CHLORIDE 100 ML/HR: 9 INJECTION, SOLUTION INTRAVENOUS at 14:47

## 2023-02-13 NOTE — THERAPY EVALUATION
Patient Name: Kai Garcia  : 1955    MRN: [id number]                              Today's Date: 2023       Admit Date: 2023    Visit Dx:     ICD-10-CM ICD-9-CM   1. Primary osteoarthritis of left hip  M16.12 715.15     Patient Active Problem List   Diagnosis   • Multiple vessel coronary artery disease   • Abnormal liver function tests   • Gastroesophageal reflux disease   • Hyperlipidemia   • Hyperglycemia   • Sleep apnea   • Type II diabetes mellitus, uncontrolled (HCC)   • Coronary artery disease involving native coronary artery of native heart   • ALT (SGPT) level raised   • Elevated AST (SGOT)   • Tinnitus   • Dyslipidemia   • Hypertension   • Chronic low back pain   • History of bleeding peptic ulcer   • LUCIAN on CPAP   • Essential hypertension   • Coronary artery disease involving native coronary artery of native heart without angina pectoris   • Ischemic cardiomyopathy EF 40% (2019)   • RBBB   • Screen for colon cancer   • Sepsis   • MORGAN (acute kidney injury) ([facility])   • Acute cystitis without hematuria   • Hyponatremia   • Thrombocytopenia ([facility])   • Back pain   • S/P lumbar fusion   • CHF (congestive heart failure) ([facility])   • Acute blood loss anemia, asymptomatic   • Postoperative urinary retention   • Primary osteoarthritis of left hip   • S/P total left hip arthroplasty   • Obesity   • DM2 (diabetes mellitus, type 2) ([facility])     Past Medical History:   Diagnosis Date   • Arthritis of back    • Back pain    • Cataract     needs surgery    • Chest pain     Atypical chest pain, noncardiac.  Suspect either musculoskeletal versus gastrointestinal   • CHF (congestive heart failure) ([facility])    • Chronic low back pain     /multiple lumbar surgeries, currently disabled: L4-L5 discectomy in 2015.    • Clotting disorder ([facility])    • Coronary artery disease    • Diabetes mellitus (HCC)     type 2, diagnosed withing past 6months, checks fsbg qam, last a1c 8.4 18   • Dissecting aortic aneurysm ([facility])     • Diverticulosis    • Dyslipidemia    • Elevated cholesterol    • Elevated liver function tests    • Fracture, finger 1976   • GERD (gastroesophageal reflux disease)    • Heart murmur    • Hip arthrosis 2019   • History of bleeding peptic ulcer    • History of sepsis 03/2021    UTI that caused sepsis    • History of transfusion 2006    no reaction    • Hyperlipidemia    • Hypertension    • Knee swelling 2019   • Left leg pain    • Low back strain 2002   • Lumbar pain    • Lumbosacral disc disease 2002   • MI (myocardial infarction) ([facility]) 2007   • Morbid obesity ([facility])    • LUCIAN on CPAP     setting 12   • Severe obstructive sleep apnea    • Shortness of breath    • Temporary low platelet count ([facility]) 03/2021    due to sepsis    • Wears glasses    • Wears glasses      Past Surgical History:   Procedure Laterality Date   • APPENDECTOMY     • ARTERIOGRAM AORTIC  2006    aortic disection with elephant trunk procedure   • BACK SURGERY  2002,2015 2021   • CARDIAC CATHETERIZATION N/A 04/27/2018    Procedure: Left Heart Cath;  Surgeon: Uri Willis MD;  Location:  [facility];  Service: Cardiovascular   • CARDIAC CATHETERIZATION  04/2018   • CARDIAC ELECTROPHYSIOLOGY PROCEDURE N/A 08/09/2018    Procedure: Biv ICD Implant w/limited echo on arrival;  Surgeon: Jose Fox DO;  Location:  [facility];  Service: Cardiology   • CARDIAC SURGERY  06/19/2006   • COLONOSCOPY     • COLONOSCOPY N/A 06/18/2019    Procedure: COLONOSCOPY;  Surgeon: Meir Salguero MD;  Location:  [facility];  Service: Gastroenterology   • CORONARY ANGIOPLASTY WITH STENT PLACEMENT  2007    x 2   • CORONARY ARTERY BYPASS GRAFT  2006    2 grafts    • LUMBAR DISCECTOMY     • LUMBAR DISCECTOMY FUSION INSTRUMENTATION N/A 10/27/2021    Procedure: LUMBAR FUSION L4-5, L5-S1;  Surgeon: Fre Faulkner MD;  Location:  [facility];  Service: Orthopedic Spine;  Laterality: N/A;   • LUMBAR FUSION        General Information      Row Name 02/13/23 1723          Physical Therapy Time and Intention    Document Type evaluation  -     Mode of Treatment physical therapy  -     Row Name 02/13/23 1723          General Information    Patient Profile Reviewed yes  -     Prior Level of Function min assist:;all household mobility;community mobility;gait;transfer  -     Existing Precautions/Restrictions fall;hip, posterior  -     Barriers to Rehab previous functional deficit  -     Row Name 02/13/23 1723          Living Environment    People in Home spouse  -     Row Name 02/13/23 1723          Home Main Entrance    Number of Stairs, Main Entrance other (see comments)  ramps  -     Row Name 02/13/23 1723          Stairs Within Home, Primary    Number of Stairs, Within Home, Primary none  -     Row Name 02/13/23 1723          Cognition    Orientation Status (Cognition) oriented x 3  -     Row Name 02/13/23 1723          Safety Issues, Functional Mobility    Safety Issues Affecting Function (Mobility) insight into deficits/self-awareness;awareness of need for assistance;safety precaution awareness;sequencing abilities;safety precautions follow-through/compliance  pt does not take direction well  -     Impairments Affecting Function (Mobility) balance;endurance/activity tolerance;pain;strength;sensation/sensory awareness;range of motion (ROM)  -           User Key  (r) = Recorded By, (t) = Taken By, (c) = Cosigned By    Initials Name Provider Type     Sil Grayson, PT Physical Therapist               Mobility     Row Name 02/13/23 1724          Bed Mobility    Bed Mobility supine-sit  -     Supine-Sit [place] (Bed Mobility) contact guard;nonverbal cues (demo/gesture);verbal cues  -     Assistive Device (Bed Mobility) head of bed elevated  -     Comment, (Bed Mobility) VC to avoid hip IR and ADD and manual assist to try to correct. Pt declined assist.  -     Row Name 02/13/23 1724          Transfers    Comment,  (Transfers) VC for hand placement  -     Row Name 02/13/23 1724          Sit-Stand Transfer    Sit-Stand [place] (Transfers) contact guard;2 person assist  -HP     Assistive Device (Sit-Stand Transfers) walker, front-wheeled  -HP     Row Name 02/13/23 1724          Gait/Stairs (Locomotion)    [place] Level (Gait) contact guard;2 person assist  -HP     Assistive Device (Gait) walker, front-wheeled  -HP     Ambulated day of surgery or within 4 hours of PACU discharge yes  -HP     Distance in Feet (Gait) 350  -HP     Deviations/Abnormal Patterns (Gait) bilateral deviations;base of support, wide;weight shifting decreased;stride length decreased;gait speed decreased  -HP     Bilateral Gait Deviations forward flexed posture;heel strike decreased  -HP     Comment, (Gait/Stairs) Pt amb 350' with FWW and CGAx2. Pt demonstrated step-through gait pattern with decreased stride length B, wide VANESSA, decreased heel strike, and stiff knee gait. Minimal improvement with VC as pt reported increased pain and limitation from L knee. No LOB or knee buckling noted. Activity limited by L hip and knee pain. Pt reported he felt a pop when taking steps to chair and had instant sharp pain noted in L hip at 5/10. Pt able to weight bear to safely return to sitting in chair. Pain diminished.  -     Row Name 02/13/23 1724          Mobility    Extremity Weight-bearing Status left lower extremity  -     Left Lower Extremity (Weight-bearing Status) weight-bearing as tolerated (WBAT);other (see comments)  with FWW  -HP           User Key  (r) = Recorded By, (t) = Taken By, (c) = Cosigned By    Initials Name Provider Type     Sil Grayson PT Physical Therapist               Obj/Interventions     Row Name 02/13/23 1729          Range of Motion Comprehensive    General Range of Motion no range of motion deficits identified  -     Row Name 02/13/23 1729          Strength Comprehensive (MMT)    General Manual Muscle Testing (MMT)  Assessment lower extremity strength deficits identified  -     Comment, General Manual Muscle Testing (MMT) Assessment Pt able to perform B active ankle DF and SLR  -     Row Name 02/13/23 1729          Motor Skills    Therapeutic Exercise hip;knee;ankle  -[facility] Name 02/13/23 1729          Hip (Therapeutic Exercise)    Hip (Therapeutic Exercise) strengthening exercise  -     Hip Strengthening (Therapeutic Exercise) left;aBduction;heel slides;3 repetitions  -     Row Name 02/13/23 1729          Knee (Therapeutic Exercise)    Knee (Therapeutic Exercise) strengthening exercise;isometric exercises  -     Knee Isometrics (Therapeutic Exercise) left;quad sets;10 repetitions  -     Knee Strengthening (Therapeutic Exercise) left;SLR (straight leg raise);LAQ (long arc quad);heel slides;3 repetitions  -[facility] Name 02/13/23 1729          Ankle (Therapeutic Exercise)    Ankle (Therapeutic Exercise) AROM (active range of motion)  -     Ankle AROM (Therapeutic Exercise) bilateral;dorsiflexion;plantarflexion;10 repetitions  -[facility] Name 02/13/23 1729          Balance    Balance Assessment sitting static balance;sitting dynamic balance;sit to stand dynamic balance;standing static balance;standing dynamic balance  -     Static Sitting Balance standby assist  -     Dynamic Sitting Balance standby assist  -     Position, Sitting Balance sitting edge of bed  -     Static Standing Balance contact guard  -     Dynamic Standing Balance contact guard  -     Position/Device Used, Standing Balance supported;walker, rolling  -     Balance Interventions sitting;standing;sit to stand;occupation based/functional task  -     Row Name 02/13/23 1729          Sensory Assessment (Somatosensory)    Sensory Assessment (Somatosensory) LE sensation intact  -           User Key  (r) = Recorded By, (t) = Taken By, (c) = Cosigned By    Initials Name Provider Type     Sil Grayson, PT Physical Therapist                Goals/Plan     Row Name 02/13/23 1750          Bed Mobility Goal 1 (PT)    Activity/Assistive Device (Bed Mobility Goal 1, PT) sit to supine/supine to sit  -HP     [place] Level/Cues Needed (Bed Mobility Goal 1, PT) modified independence  -HP     Time Frame (Bed Mobility Goal 1, PT) long term goal (LTG);3 days  -HP     Progress/Outcomes (Bed Mobility Goal 1, PT) goal ongoing  -     Row Name 02/13/23 1750          Transfer Goal 1 (PT)    Activity/Assistive Device (Transfer Goal 1, PT) sit-to-stand/stand-to-sit  -HP     [place] Level/Cues Needed (Transfer Goal 1, PT) modified independence  -HP     Time Frame (Transfer Goal 1, PT) long term goal (LTG);3 days  -HP     Progress/Outcome (Transfer Goal 1, PT) goal ongoing  -[facility] Name 02/13/23 1750          Gait Training Goal 1 (PT)    Activity/Assistive Device (Gait Training Goal 1, PT) gait (walking locomotion)  -HP     [place] Level (Gait Training Goal 1, PT) modified independence  -HP     Distance (Gait Training Goal 1, PT) 500  -HP     Time Frame (Gait Training Goal 1, PT) long term goal (LTG);3 days  -HP     Progress/Outcome (Gait Training Goal 1, PT) goal ongoing  -[facility] Name 02/13/23 1750          Therapy Assessment/Plan (PT)    Planned Therapy Interventions (PT) balance training;bed mobility training;gait training;home exercise program;patient/family education;transfer training;stretching;strengthening;stair training;ROM (range of motion)  -           User Key  (r) = Recorded By, (t) = Taken By, (c) = Cosigned By    Initials Name Provider Type     Sil Grayson, PT Physical Therapist               Clinical Impression     Row Name 02/13/23 3744          Pain    Pretreatment Pain Rating 7/10  -HP     Posttreatment Pain Rating 7/10  -HP     Pain Location - Side/Orientation Left  -HP     Pain Location generalized  -     Pain Location - knee  -HP     Pain Intervention(s) Repositioned;Cold applied;Ambulation/increased  activity;Elevated  -HP     Row Name 02/13/23 1748          Plan of Care Review    Plan of Care Reviewed With patient;spouse  -HP     Progress no change  -HP     Outcome Evaluation PT eval complete. Pt performed bed mobility with SBA. Pt performed STS and amb 350' with FWW and CGAx2. No LOB or knee buckling noted. Gait deviations requiring VC to correct. Activity limited by fatigue. HEP and precautions reviewed with pt. Recommend d/c home with assist and OPPT when medically appropriate.  -     Row Name 02/13/23 1748          Therapy Assessment/Plan (PT)    Rehab Potential (PT) good, to achieve stated therapy goals  -     Criteria for Skilled Interventions Met (PT) yes;meets criteria;skilled treatment is necessary  -HP     Therapy Frequency (PT) 2 times/day  -HP     Row Name 02/13/23 1748          Vital Signs    Pre Systolic BP Rehab --  VSS  -HP     Pre Patient Position Supine  -HP     Intra Patient Position Standing  -HP     Post Patient Position Sitting  -HP     Row Name 02/13/23 1748          Positioning and Restraints    Pre-Treatment Position in bed  -HP     Post Treatment Position chair  -HP     In Chair notified nsg;reclined;call light within reach;encouraged to call for assist;sitting;exit alarm on;with family/caregiver;legs elevated;compression device  -HP           User Key  (r) = Recorded By, (t) = Taken By, (c) = Cosigned By    Initials Name Provider Type    HP Sil Grayson, PT Physical Therapist               Outcome Measures     Row Name 02/13/23 2744          How much help from another person do you currently need...    Turning from your back to your side while in flat bed without using bedrails? 4  -HP     Moving from lying on back to sitting on the side of a flat bed without bedrails? 3  -HP     Moving to and from a bed to a chair (including a wheelchair)? 3  -HP     Standing up from a chair using your arms (e.g., wheelchair, bedside chair)? 3  -HP     Climbing 3-5 steps with a railing? 2  -HP      To walk in hospital room? 3  -HP     AM-PAC 6 Clicks Score (PT) 18  -     Highest level of mobility 6 --> Walked 10 steps or more  -     Row Name 02/13/23 1751          PADD    Diagnosis 2  -     Gender 2  -     Age Group 1  -     Gait Distance 1  -     Assist Level 1  -     Home Support 3  -     PADD Score 10  -     Patient Preference home with outpatient rehab  -     Prediction by PADD Score directly home (with home health or out-patient rehab)  -     Row Name 02/13/23 1751          Functional Assessment    Outcome Measure Options AM-PAC 6 Clicks Basic Mobility (PT);PADD  -           User Key  (r) = Recorded By, (t) = Taken By, (c) = Cosigned By    Initials Name Provider Type     Sil Grayson, ZARINA Physical Therapist                             Physical Therapy Education     Title: PT OT SLP Therapies (In Progress)     Topic: Physical Therapy (Done)     Point: Mobility training (Done)     Learning Progress Summary           Patient Acceptance, E,D, VU,NR by  at 2/13/2023 1752                   Point: Home exercise program (Done)     Learning Progress Summary           Patient Acceptance, E,D, VU,NR by  at 2/13/2023 1752                   Point: Body mechanics (Done)     Learning Progress Summary           Patient Acceptance, E,D, VU,NR by  at 2/13/2023 1752                   Point: Precautions (Done)     Learning Progress Summary           Patient Acceptance, E,D, VU,NR by  at 2/13/2023 1752                               User Key     Initials Effective Dates Name Provider Type Discipline     06/01/21 -  Sil Grayson, ZARINA Physical Therapist PT              PT Recommendation and Plan  Planned Therapy Interventions (PT): balance training, bed mobility training, gait training, home exercise program, patient/family education, transfer training, stretching, strengthening, stair training, ROM (range of motion)  Plan of Care Reviewed With: patient, spouse  Progress: no  change  Outcome Evaluation: PT eval complete. Pt performed bed mobility with SBA. Pt performed STS and amb 350' with FWW and CGAx2. No LOB or knee buckling noted. Gait deviations requiring VC to correct. Activity limited by fatigue. HEP and precautions reviewed with pt. Recommend d/c home with assist and OPPT when medically appropriate.     Time Calculation:    PT Charges     Row Name 02/13/23 1500             Time Calculation    Start Time 1500  -HP      PT Received On 02/13/23  -HP      PT Goal Re-Cert Due Date 02/23/23  -HP         Timed Charges    81908 - PT Therapeutic Exercise Minutes 10  -HP      32720 - Gait Training Minutes  8  -HP         Untimed Charges    PT Eval/Re-eval Minutes 46  -HP         Total Minutes    Timed Charges Total Minutes 18  -HP      Untimed Charges Total Minutes 46  -HP       Total Minutes 64  -HP            User Key  (r) = Recorded By, (t) = Taken By, (c) = Cosigned By    Initials Name Provider Type     Sil Grayson, PT Physical Therapist              Therapy Charges for Today     Code Description Service Date Service Provider Modifiers Qty    89691215424 HC PT THER PROC EA 15 MIN 2/13/2023 Sil Grayson, PT GP 1    14618949295 HC PT EVAL LOW COMPLEXITY 4 2/13/2023 Sil Grayson, PT GP 1    90641405229 HC PT THER SUPP EA 15 MIN 2/13/2023 Sil Grayson, PT GP 3          PT G-Codes  Outcome Measure Options: AM-PAC 6 Clicks Basic Mobility (PT), PADD  AM-PAC 6 Clicks Score (PT): 18  PT Discharge Summary  Anticipated Discharge Disposition (PT): home with assist, home with outpatient therapy services    Sil Grayson PT  2/13/2023

## 2023-02-13 NOTE — H&P
Pre-Op H&P  Kai Garcia  [id number]  1955      Chief complaint: Left hip pain      Subjective:  Patient is a 67 y.o.male presents for scheduled surgery by Dr. Hutchinson. He anticipates a TOTAL HIP ARTHROPLASTY - LEFT today. He reports left hip pain over the last 4 years. He denies use of assistive device for ambulation or recent falls. The pain is worse with standing, sitting and climbing stairs; resting and heat improve the pain.      Review of Systems:  Constitutional-- No fever, chills or sweats. No fatigue.  CV-- No chest pain, palpitation or syncope. +HTN, HLD, CAD, CHF  Resp-- No SOB, cough, hemoptysis. +LUCIAN no cpap   Skin--No rashes or lesions      Allergies:   Allergies   Allergen Reactions   • Ativan [Lorazepam] Mental Status Change     agitation   • Zetia [Ezetimibe] Other (See Comments)     increased LFTs   • Zocor [Simvastatin] Myalgia   • Isosorbide Other (See Comments)     headaches   • Jardiance [Empagliflozin] Other (See Comments)     Blood in urine and burning sensation   • Metformin And Related Diarrhea         Home Meds:  Medications Prior to Admission   Medication Sig Dispense Refill Last Dose   • acetaminophen (TYLENOL) 500 MG tablet Take 1,000 mg by mouth Every 6 (Six) Hours As Needed for Mild Pain .   Past Week   • aspirin 81 MG EC tablet Take 1 tablet by mouth Daily.   2/13/2023   • bisoprolol (ZEBeta) 10 MG tablet Take 1 tablet by mouth Daily. 90 tablet 3 2/13/2023 at 0530   • Chlorhexidine Gluconate 4 % solution Apply  topically to the appropriate area as directed Daily As Needed for Wound Care. Shower daily with hibiclens solution as directed 5 days prior to surgery. 237 mL 0 2/13/2023   • eplerenone (INSPRA) 25 MG tablet Take 1 tablet by mouth Daily. 90 tablet 3 2/13/2023   • Insulin Glargine (Lantus SoloStar) 100 UNIT/ML injection pen Inject 32 Units under the skin into the appropriate area as directed Daily. (Patient taking differently: Inject 20 Units under the skin into the  appropriate area as directed Daily.) 10 pen 3 2/12/2023   • mupirocin (BACTROBAN) 2 % ointment Apply pea-sized amount to each nostril twice daily for 5 days prior to surgery 22 g 0 2/12/2023   • oxyCODONE-acetaminophen (Percocet)  MG per tablet Take 0.5-1 tablets by mouth Every 4 (Four) Hours As Needed for Moderate Pain. 18 tablet 0 Past Month   • pantoprazole (PROTONIX) 40 MG EC tablet Take 1 tablet by mouth Daily. 90 tablet 3 2/12/2023   • rosuvastatin (CRESTOR) 10 MG tablet Take 1 tablet by mouth Daily. 90 tablet 3 2/12/2023   • sacubitril-valsartan (Entresto) 49-51 MG tablet Take 1 tablet by mouth Every 12 (Twelve) Hours. 180 tablet 3 2/12/2023   • SITagliptin (Januvia) 100 MG tablet Take 1 tablet by mouth Daily. 90 tablet 3 2/12/2023   • traZODone (DESYREL) 100 MG tablet Take 1 tablet by mouth Every Night. 30 tablet 5 2/12/2023   • Accu-Chek Softclix Lancets lancets Check sugars bid 100 each 5    • clopidogrel (PLAVIX) 75 MG tablet Take 1 tablet by mouth Daily. PT TO STOP TAKING PLAVIX 1 WEEK PRIOR TO SURGERY PER DR. BROOKS'S OFFICE. 90 tablet 3 2/7/2023   • glucose blood (Accu-Chek Jodie Plus) test strip Check sugars bid 100 each 5    • Insulin Pen Needle (Easy Touch Pen Needles) 31G X 5 MM misc USE DAILY WITH INSULIN 100 each 4    • nitroglycerin (NITROSTAT) 0.4 MG SL tablet Place 1 tablet under the tongue Every 5 (Five) Minutes As Needed for Chest Pain. Take no more than 3 doses in 15 minutes. 25 tablet 5 More than a month         PMH:   Past Medical History:   Diagnosis Date   • Arthritis of back 2015   • Back pain    • Cataract     needs surgery    • Chest pain     Atypical chest pain, noncardiac.  Suspect either musculoskeletal versus gastrointestinal   • CHF (congestive heart failure) ([facility])    • Chronic low back pain     /multiple lumbar surgeries, currently disabled: L4-L5 discectomy in 2015.    • Clotting disorder ([facility])    • Coronary artery disease    • Diabetes mellitus ([facility])     type 2,  diagnosed withing past 6months, checks fsbg qam, last a1c 8.4 7-19-18   • Dissecting aortic aneurysm (HCC)    • Diverticulosis    • Dyslipidemia    • Elevated cholesterol    • Elevated liver function tests    • Fracture, finger 1976   • GERD (gastroesophageal reflux disease)    • Heart murmur    • Hip arthrosis 2019   • History of bleeding peptic ulcer    • History of sepsis 03/2021    UTI that caused sepsis    • History of transfusion 2006    no reaction    • Hyperlipidemia    • Hypertension    • Knee swelling 2019   • Left leg pain    • Low back strain 2002   • Lumbar pain    • Lumbosacral disc disease 2002   • MI (myocardial infarction) ([facility]) 2007   • Morbid obesity (HCC)    • LUCIAN on CPAP     setting 12   • Severe obstructive sleep apnea    • Shortness of breath    • Temporary low platelet count ([facility]) 03/2021    due to sepsis    • Wears glasses    • Wears glasses      PSH:    Past Surgical History:   Procedure Laterality Date   • APPENDECTOMY     • ARTERIOGRAM AORTIC  2006    aortic disection with elephant trunk procedure   • BACK SURGERY  2002,2015 2021   • CARDIAC CATHETERIZATION N/A 04/27/2018    Procedure: Left Heart Cath;  Surgeon: Uri Willis MD;  Location:  [facility] CATH INVASIVE LOCATION;  Service: Cardiovascular   • CARDIAC CATHETERIZATION  04/2018   • CARDIAC ELECTROPHYSIOLOGY PROCEDURE N/A 08/09/2018    Procedure: Biv ICD Implant w/limited echo on arrival;  Surgeon: Jose Fox DO;  Location:  [facility] EP INVASIVE LOCATION;  Service: Cardiology   • CARDIAC SURGERY  06/19/2006   • COLONOSCOPY     • COLONOSCOPY N/A 06/18/2019    Procedure: COLONOSCOPY;  Surgeon: Meir Salguero MD;  Location:  [facility];  Service: Gastroenterology   • CORONARY ANGIOPLASTY WITH STENT PLACEMENT  2007    x 2   • CORONARY ARTERY BYPASS GRAFT  2006    2 grafts    • LUMBAR DISCECTOMY     • LUMBAR DISCECTOMY FUSION INSTRUMENTATION N/A 10/27/2021    Procedure: LUMBAR FUSION L4-5, L5-S1;  Surgeon: Fer Faulkner,  "MD;  Location: [facility];  Service: Orthopedic Spine;  Laterality: N/A;   • LUMBAR FUSION         Immunization History:  Influenza: No  Pneumococcal: No  Tetanus: UTD  Covid x2:     Social History:   Tobacco:   Social History     Tobacco Use   Smoking Status Former   • Packs/day: 3.00   • Years: 35.00   • Pack years: 105.00   • Types: Cigarettes   • Quit date: 6/15/2006   • Years since quittin.6   Smokeless Tobacco Never      Alcohol:     Social History     Substance and Sexual Activity   Alcohol Use No         Physical Exam:/87 (BP Location: Right arm, Patient Position: Lying)   Pulse 72   Temp 98 °F (36.7 °C) (Temporal)   Resp 18   Ht 170.2 cm (67.01\")   Wt 104 kg (229 lb 4.5 oz)   SpO2 98%   BMI 35.90 kg/m²       General Appearance:    Alert, cooperative, no distress, appears stated age   Head:    Normocephalic, without obvious abnormality, atraumatic   Lungs:     Clear to auscultation bilaterally, respirations unlabored    Heart:   Regular rate and rhythm, S1 and S2 normal    Abdomen:    Soft without tenderness   Extremities:   Extremities normal, atraumatic, no cyanosis or edema   Skin:   Skin color, texture, turgor normal, no rashes or lesions   Neurologic:   Grossly intact     Results Review:     LABS:  Lab Results   Component Value Date    WBC 5.36 2023    HGB 13.6 2023    HCT 39.2 2023    MCV 93.3 2023     (L) 2023    NEUTROABS 3.30 2023    GLUCOSE 137 (H) 2023    BUN 14 2023    CREATININE 0.93 2023    EGFRIFNONA 65 2021    EGFRIFAFRI 79 2021     2023    K 4.6 2023     2023    CO2 28.0 2023    MG 2.1 2021    PHOS 2.6 2021    CALCIUM 9.7 2023    ALBUMIN 4.7 2023    AST 18 2023    ALT 15 2023    BILITOT 0.4 2023       RADIOLOGY:  Imaging Results (Last 72 Hours)     ** No results found for the last 72 hours. **          I reviewed the " patient's new clinical results.    Cancer Staging (if applicable)  Cancer Patient: __ yes __no __unknown; If yes, clinical stage T:__ N:__M:__, stage group or __N/A      Impression: Left hip pain      Plan: TOTAL HIP ARTHROPLASTY - LEFT      Nataly Pierce, APRHARJIT   2/13/2023   08:44 EST

## 2023-02-13 NOTE — PLAN OF CARE
Goal Outcome Evaluation:  Plan of Care Reviewed With: patient, spouse        Progress: no change  Outcome Evaluation: PT eval complete. Pt performed bed mobility with SBA. Pt performed STS and amb 350' with FWW and CGAx2. No LOB or knee buckling noted. Gait deviations requiring VC to correct. Activity limited by fatigue. HEP and precautions reviewed with pt. Recommend d/c home with assist and OPPT when medically appropriate.

## 2023-02-13 NOTE — ANESTHESIA PREPROCEDURE EVALUATION
Anesthesia Evaluation     Patient summary reviewed and Nursing notes reviewed   no history of anesthetic complications:  NPO Solid Status: > 8 hours  NPO Liquid Status: > 2 hours           Airway   Mallampati: III  TM distance: >3 FB  Neck ROM: full  Possible difficult intubation and Large neck circumference  Dental - normal exam     Pulmonary - normal exam   (+) a smoker Former, shortness of breath, sleep apnea on CPAP,   Cardiovascular     ECG reviewed  Patient on routine beta blocker and Beta blocker given within 24 hours of surgery    (+) pacemaker ICD, hypertension, CAD, CABG (~2006; ruptured aneurysm s/p arch reconstruction with DHCA), CHF , murmur, hyperlipidemia,     ROS comment: 4/18lhc-Occluded SVG ×2; Large posterior basal aneurysm; LV EF 30-35 percent;  Severe stenosis in distal left circumflex, though supplies the aneurysm.  Non-flow-limiting RCA disease.    Echo 3/21: EF 40%, posterior basal aneurysm, nl valves     2/7/23-[facility]/cardiology - Patient overall stable from cardiac standpoint.   May proceed with scheduled left hip replacement at low cardiac risk without any further testing required.   May hold Plavix 5 to 7 days prior to surgical intervention.    ICD 2018    A sensed V paced    Neuro/Psych  (-) seizures, CVA  GI/Hepatic/Renal/Endo    (+) morbid obesity, GERD,  renal disease, diabetes mellitus,     Musculoskeletal     (+) back pain, chronic pain,   Abdominal  - normal exam   Substance History - negative use     OB/GYN          Other          Other Comment: Thrombocytopenia PLT 114K  ROS/Med Hx Other: plavix 6 days ago  [id number]/[id number] view  hgb 13.6 k 4.6 plt 110                  Anesthesia Plan    ASA 3     general     (Risks and benefits of general anesthesia discussed with patient (including MI, CVA, death, recall, aspiration, oropharyngeal/dental damage), questions answered, agreeable to proceed.   Discussed possible FI SSB if surgeon requests (Benefits and risks of nerve block/catheter  discussed with patient ((including failed block, damage to nerve/nearby structures, intravascular injection)); questions answered, agreeable to proceed.))        rosenberg/glidescope available, magnet available, clearsite Hd monitoring)  intravenous induction     Anesthetic plan, risks, benefits, and alternatives have been provided, discussed and informed consent has been obtained with: patient and spouse/significant other.    Use of blood products discussed with patient and spouse/significant other .   Plan discussed with CRNA.

## 2023-02-13 NOTE — H&P
Patient Name: Kai Garcia  MRN: [id number]  : 1955  DOS: 2023    Attending: Romero Hutchinson MD    Primary Care Provider: Cheo Weiss MD      Chief complaint: Left hip pain    Subjective   Patient is a pleasant 67 y.o. male presented for scheduled surgery by Dr. Hutchinson.    Per his note (This patient has a history of progressive left hip pain and arthritis. The hip pain is severe with activity and has progressed significantly. Non-operative treatment has been attempted, but has not improved or controlled symptoms during normal daily activities. Motion has become limited and rotation severely restricted. X-rays reveal moderate-to-severe eburnation of articular cartilage on the superior weight bearing surface of the hip with circumferential acetabular and femoral neck osteophytes consistent with advanced hip osteoarthritis. A total hip arthroplasty was recommended at this time.).    Patient underwent left total hip arthroplasty under general anesthesia and periarticular block, tolerated surgery well, is being admitted for the management.    I saw him in PACU where he is doing fairly well, good pain control, no complains of nausea, vomiting, or shortness of breath.  Still drowsy from sedation.    He has no history of DVT or PE.    He has history of ischemic cardiomyopathy, hypertension, dyslipidemia, diabetes mellitus, he also has history of obesity and sleep apnea.    Patient with extensive cardiac history including history of ICD implantation, and history of CABG.       Allergies   Allergen Reactions   • Ativan [Lorazepam] Mental Status Change     agitation   • Zetia [Ezetimibe] Other (See Comments)     increased LFTs   • Zocor [Simvastatin] Myalgia   • Isosorbide Other (See Comments)     headaches   • Jardiance [Empagliflozin] Other (See Comments)     Blood in urine and burning sensation   • Metformin And Related Diarrhea     .  The    Medications Prior to Admission   Medication Sig Dispense  Refill Last Dose   • acetaminophen (TYLENOL) 500 MG tablet Take 1,000 mg by mouth Every 6 (Six) Hours As Needed for Mild Pain .   Past Week   • aspirin 81 MG EC tablet Take 1 tablet by mouth Daily.   2/13/2023   • bisoprolol (ZEBeta) 10 MG tablet Take 1 tablet by mouth Daily. 90 tablet 3 2/13/2023 at 0530   • Chlorhexidine Gluconate 4 % solution Apply  topically to the appropriate area as directed Daily As Needed for Wound Care. Shower daily with hibiclens solution as directed 5 days prior to surgery. 237 mL 0 2/13/2023   • eplerenone (INSPRA) 25 MG tablet Take 1 tablet by mouth Daily. 90 tablet 3 2/13/2023   • Insulin Glargine (Lantus SoloStar) 100 UNIT/ML injection pen Inject 32 Units under the skin into the appropriate area as directed Daily. (Patient taking differently: Inject 20 Units under the skin into the appropriate area as directed Daily.) 10 pen 3 2/12/2023   • mupirocin (BACTROBAN) 2 % ointment Apply pea-sized amount to each nostril twice daily for 5 days prior to surgery 22 g 0 2/12/2023   • oxyCODONE-acetaminophen (Percocet)  MG per tablet Take 0.5-1 tablets by mouth Every 4 (Four) Hours As Needed for Moderate Pain. 18 tablet 0 Past Month   • pantoprazole (PROTONIX) 40 MG EC tablet Take 1 tablet by mouth Daily. 90 tablet 3 2/12/2023   • rosuvastatin (CRESTOR) 10 MG tablet Take 1 tablet by mouth Daily. 90 tablet 3 2/12/2023   • sacubitril-valsartan (Entresto) 49-51 MG tablet Take 1 tablet by mouth Every 12 (Twelve) Hours. 180 tablet 3 2/12/2023   • SITagliptin (Januvia) 100 MG tablet Take 1 tablet by mouth Daily. 90 tablet 3 2/12/2023   • traZODone (DESYREL) 100 MG tablet Take 1 tablet by mouth Every Night. 30 tablet 5 2/12/2023   • Accu-Chek Softclix Lancets lancets Check sugars bid 100 each 5    • clopidogrel (PLAVIX) 75 MG tablet Take 1 tablet by mouth Daily. PT TO STOP TAKING PLAVIX 1 WEEK PRIOR TO SURGERY PER DR. BROOKS'S OFFICE. 90 tablet 3 2/7/2023   • glucose blood (Accu-Chek Jodie Plus)  test strip Check sugars bid 100 each 5    • Insulin Pen Needle (Easy Touch Pen Needles) 31G X 5 MM misc USE DAILY WITH INSULIN 100 each 4    • nitroglycerin (NITROSTAT) 0.4 MG SL tablet Place 1 tablet under the tongue Every 5 (Five) Minutes As Needed for Chest Pain. Take no more than 3 doses in 15 minutes. 25 tablet 5 More than a month         History:   Past Medical History:   Diagnosis Date   • Arthritis of back 2015   • Back pain    • Cataract     needs surgery    • Chest pain     Atypical chest pain, noncardiac.  Suspect either musculoskeletal versus gastrointestinal   • CHF (congestive heart failure) ([facility])    • Chronic low back pain     /multiple lumbar surgeries, currently disabled: L4-L5 discectomy in 2015.    • Clotting disorder (formerly [facility])    • Coronary artery disease    • Diabetes mellitus (formerly [facility])     type 2, diagnosed withing past 6months, checks fsbg qam, last a1c 8.4 7-19-18   • Dissecting aortic aneurysm (formerly [facility])    • Diverticulosis    • Dyslipidemia    • Elevated cholesterol    • Elevated liver function tests    • Fracture, finger 1976   • GERD (gastroesophageal reflux disease)    • Heart murmur    • Hip arthrosis 2019   • History of bleeding peptic ulcer    • History of sepsis 03/2021    UTI that caused sepsis    • History of transfusion 2006    no reaction    • Hyperlipidemia    • Hypertension    • Knee swelling 2019   • Left leg pain    • Low back strain 2002   • Lumbar pain    • Lumbosacral disc disease 2002   • MI (myocardial infarction) ([facility]) 2007   • Morbid obesity (formerly [facility])    • LUCIAN on CPAP     setting 12   • Severe obstructive sleep apnea    • Shortness of breath    • Temporary low platelet count (formerly [facility]) 03/2021    due to sepsis    • Wears glasses    • Wears glasses      Past Surgical History:   Procedure Laterality Date   • APPENDECTOMY     • ARTERIOGRAM AORTIC  2006    aortic disection with elephant trunk procedure   • BACK SURGERY  2002,2015 2021   • CARDIAC CATHETERIZATION N/A 04/27/2018    Procedure:  "Left Heart Cath;  Surgeon: Uri Willis MD;  Location:  [facility]ATH INVASIVE LOC[facility];  Service: Cardiovascular   • CARDIAC CATHETERIZATION  2018   • CARDIAC ELECTROPHYSIOLOGY PROCEDURE N/A 2018    Procedure: Biv ICD Implant w/limited echo on arrival;  Surgeon: Jose Fox DO;  Location:  [facility] INV[facility];  Service: Cardiology   • CARDIAC SURGERY  2006   • COLONOSCOPY     • COLONOSCOPY N/A 2019    Procedure: COLONOSCOPY;  Surgeon: Meir Salguero MD;  Location:  [facility];  Service: Gastroenterology   • CORONARY ANGIOPLASTY WITH STENT PLACEMENT  2007    x 2   • CORONARY ARTERY BYPASS GRAFT  2006    2 grafts    • LUMBAR DISCECTOMY     • LUMBAR DISCECTOMY FUSION INSTRUMENTATION N/A 10/27/2021    Procedure: LUMBAR FUSION L4-5, L5-S1;  Surgeon: Fer Faulkner MD;  Location:  [facility];  Service: Orthopedic Spine;  Laterality: N/A;   • LUMBAR FUSION       Family History   Problem Relation Age of Onset   • Coronary artery disease Other    • Rheum arthritis Other    • Heart disease Mother         afib   • Rheum arthritis Mother    • Heart disease Father    • Rheum arthritis Father    • Hyperlipidemia Sister    • Mental illness Brother      Social History     Tobacco Use   • Smoking status: Former     Packs/day: 3.00     Years: 35.00     Pack years: 105.00     Types: Cigarettes     Quit date: 6/15/2006     Years since quittin.6   • Smokeless tobacco: Never   Vaping Use   • Vaping Use: Never used   Substance Use Topics   • Alcohol use: No   • Drug use: No   .  Retired from [employer] work    Review of Systems  Pertinent items are noted in HPI, all other systems reviewed and negative    Vital Signs  BP 95/57   Pulse 63   Temp 98.3 °F (36.8 °C)   Resp 14   Ht 170.2 cm (67.01\")   Wt 104 kg (229 lb 4.5 oz)   SpO2 96%   BMI 35.90 kg/m²     Physical Exam:    General Appearance:    Alert, cooperative, in no acute distress   Head:    Normocephalic, without obvious " abnormality, atraumatic   Eyes:            Lids and lashes normal, conjunctivae and sclerae normal, no   icterus, no pallor, corneas clear    Ears:    Ears appear intact with no abnormalities noted   Throat:   No oral lesions, no thrush, oral mucosa moist   Neck:   No adenopathy, supple, trachea midline, no thyromegaly         Lungs:     Clear to auscultation,respirations regular, even and   unlabored. No wheezes or rales.    Heart:    Regular rhythm and normal rate, normal S1 and S2, no murmur, no gallop   Abdomen:     Normal bowel sounds, no masses, no organomegaly, soft        non-tender, non-distended, no guarding, no rebound                 tenderness   Genitalia:    Deferred   Extremities:  Left LE, CDI dressing Aquacel over left hip.  No clubbing, cyanosis, or edema   Pulses:   Pulses palpable and equal bilaterally   Skin:   No bleeding, bruising or rash   Neurologic:   Cranial nerves 2 - 12 grossly intact, intact flexion and dorsiflexion bilateral feet      I reviewed the patient's new clinical results.             Invalid input(s): NEUTOPHILPCT,  EOSPCT        Invalid input(s): LABALBU, PROT  Lab Results   Component Value Date    HGBA1C 6.40 (H) 02/02/2023      Latest Reference Range & Units 02/02/23 10:56   Glucose 65 - 99 mg/dL 137 (H)   Sodium 136 - 145 mmol/L 138   Potassium 3.5 - 5.2 mmol/L 4.6   CO2 22.0 - 29.0 mmol/L 28.0   Chloride 98 - 107 mmol/L 102   Anion Gap 5.0 - 15.0 mmol/L 8.0   Creatinine 0.76 - 1.27 mg/dL 0.93   BUN 8 - 23 mg/dL 14   BUN/Creatinine Ratio 7.0 - 25.0  15.1   Calcium 8.6 - 10.5 mg/dL 9.7   eGFR >60.0 mL/min/1.73 89.4   (H): Data is abnormally high   Latest Reference Range & Units 02/02/23 10:56   WBC 3.40 - 10.80 10*3/mm3 5.36   RBC 4.14 - 5.80 10*6/mm3 4.20   Hemoglobin 13.0 - 17.7 g/dL 13.6   Hematocrit 37.5 - 51.0 % 39.2   RDW 12.3 - 15.4 % 12.2 (L)   MCV 79.0 - 97.0 fL 93.3   MCH 26.6 - 33.0 pg 32.4   MCHC 31.5 - 35.7 g/dL 34.7   MPV 6.0 - 12.0 fL 10.5   Platelets 140 -  450 10*3/mm3 110 (L)   RDW-SD 37.0 - 54.0 fl 41.9   (L): Data is abnormally low    Assessment and Plan:       S/P total left hip arthroplasty    Gastroesophageal reflux disease    Hyperlipidemia    Essential hypertension    Ischemic cardiomyopathy EF 40% (2019)    Primary osteoarthritis of left hip    Obesity    DM2 (diabetes mellitus, type 2) ([facility])      Plan:    1. PT/OT, protected weight bearing as tolerated left LE.  Posterior hip precautions 6 weeks  2. Pain control-prns  3. IS-encourage  4. DVT proph- Mechanicals and resume patient's home regimen of aspirin and Plavix postop day 1   5. Bowel regimen  6. Resume home medications as appropriate  7. Monitor post-op labs  8. DC planning for home    - Hypertension:  Resume home medications as appropriate, formulary substitution when indicated.  Holding parameters.  Prn medications for elevated blood pressure.    -LUCIAN:  Continue CPAP.   Monitor O2 sats.    -DM type 2  Hgb A1C 6.4  Hold OHA as appropriate  FSBG AC/HS, ( q 6 when NPO), along with correction humalog.  Long acting insulin .    -Ischemic cardiomyopathy: Resume home regimen of Inspra, Entresto, and Zebeta.  On dual antiplatelet therapy.  Resume statin.    -Dyslipidemia:  Resume home regimen statin ( formulary substitution when appropriate).      Dragon disclaimer:  Part of this encounter note is an electronic transcription/translation of spoken language to printed text. The electronic translation of spoken language may permit erroneous, or at times, nonsensical words or phrases to be inadvertently transcribed; Although I have reviewed the note for such errors, some may still exist.    Mary Lindsay MD  02/13/23  12:44 EST

## 2023-02-13 NOTE — BRIEF OP NOTE
TOTAL HIP ARTHROPLASTY  Progress Note    Kai Garcia  2/13/2023    Pre-op Diagnosis:   Primary osteoarthritis of left hip [M16.12]       Post-Op Diagnosis Codes:     * Primary osteoarthritis of left hip [M16.12]    Procedure/CPT® Codes:  [place] ARTHRP ACETBLR/PROX FEM PROSTC AGRFT/ALGRFT [94262]      Procedure(s):  TOTAL HIP ARTHROPLASTY - LEFT    Surgical Approach: Hip Posterior      Surgeon(s):  Romero Hutchinson MD    Anesthesia: General plus local    Staff:   Circulator: Indio Cody RN; Lo Chavarria RN  Scrub Person: Gaby Ramirez  Vendor Representative: Manfred Villegas  Nursing Assistant: Juanita Vasquez  Assistant: Carmen Justice PA-C  Assistant: Carmen Justice PA-C      Estimated Blood Loss: 250 mL    Urine Voided: * No values recorded between 2/13/2023 10:05 AM and 2/13/2023 11:41 AM *    Specimens:                None          Drains:   [REMOVED] Closed/Suction Drain 1 Inferior; Midline Back Accordion 10 Fr. (Removed)       [REMOVED] Urethral Catheter Non-latex 16 Fr. (Removed)       Findings: End-stage osteoarthritis left hip        Complications: None apparent    Assistant: Carmen Jusitce PA-C  was responsible for performing the following activities: Retraction, Suction, Irrigation, Suturing, Closing and Placing Dressing and their skilled assistance was necessary for the success of this case.    Romero Hutchinson MD     Date: 2/13/2023  Time: 12:05 EST

## 2023-02-13 NOTE — ANESTHESIA POSTPROCEDURE EVALUATION
Patient: Kai Garcia    Procedure Summary     Date: 02/13/23 [facility] / [facility]:  [facility]    Anesthesia Start: 1003 Anesthesia Stop:     Procedure: TOTAL HIP ARTHROPLASTY - LEFT (Left: Hip) Diagnosis:       Primary osteoarthritis of left hip      (Primary osteoarthritis of left hip [M16.12])    Surgeons: Romero Hutchinson MD Provider: Isabela Lombardo DO    Anesthesia Type: general ASA Status: 3          Anesthesia Type: general    Vitals  Vitals Value Taken Time   /53 02/13/23 1223   Temp 98.3 °F (36.8 °C) 02/13/23 1223   Pulse 63 02/13/23 1223   Resp 12 02/13/23 1223   SpO2 95 % 02/13/23 1223           Post Anesthesia Care and Evaluation    Patient location during evaluation: PACU  Patient participation: complete - patient cannot participate  Level of consciousness: responsive to physical stimuli  Pain score: 0  Pain management: adequate    Airway patency: patent  Anesthetic complications: No anesthetic complications    Cardiovascular status: stable  Respiratory status: acceptable, nasal cannula, oral airway and spontaneous ventilation  Hydration status: stable    Comments: Pt transferred to PACU with O2. Vital signs stable. Report to PACU RN and care accepted.

## 2023-02-13 NOTE — OP NOTE
OPERATIVE REPORT     DATE OF PROCEDURE: 2/13/2023    SURGEON: Romero Hutchinson M.D.     ASSISTANT(S): Circulator: Indio Cody RN; Lo Chavarria RN  Scrub Person: Gaby Ramirez  Vendor Representative: Manfred Villegas  Nursing Assistant: Juanita Vasquez  Assistant: Carmen Justice PA-C  Assistant: Carmen Justice PA-C    Note-PA was utilized during the case to facilitate positioning the patient, exposure, retraction, placement of final components and definitive closure.    PREOPERATIVE DIAGNOSIS: Advanced degenerative joint disease of the left hip secondary to osteoarthritis    POSTOPERATIVE DIAGNOSIS: same     PROCEDURE: Left total Hip Arthroplasty     SURGICAL DETAILS:     APPROACH: Posterior    ANESTHESIA: General plus local periarticular block    PREOPERATIVE ANTIBIOTICS: Ancef 2 g IV    TRANEXAMIC ACID: IV    ESTIMATED BLOOD LOSS: 300 cc     SPECIMENS: None    IMPLANTS:   : Ethan  Acetabular component: 52 mm Trident 2   Acetabular screws: 2  Acetabular liner: 42E MDM metal liner   Femoral component: Accolade  degree size 5  Femoral head: 20+0 mm Biolox delta ceramic with 28/48 polyhead    DRAINS: None    LOCAL INJECTION: 1 cc Toradol 30mg/ml, 4 cc duramorph 2mg/ml, 20 cc 0.5% ropivicaine, 20 cc 0.5% lidocaine with 1:200,000 epinephrine, 15 cc preservative free normal saline     MODIFIER(S): None    COMPLICATIONS: None apparent    INDICATIONS FOR PROCEDURE: This patient has a history of progressive left hip pain and arthritis. The hip pain is severe with activity and has progressed significantly. Non-operative treatment has been attempted, but has not improved or controlled symptoms during normal daily activities. Motion has become limited and rotation severely restricted. X-rays reveal moderate-to-severe eburnation of articular cartilage on the superior weight bearing surface of the hip with circumferential acetabular and femoral neck osteophytes consistent with advanced hip  osteoarthritis. A total hip arthroplasty was recommended at this time. The risks, benefits, alternatives, and potential complications of the arthroplasty surgery were discussed with the patient in detail to include but not limited to infection, bleeding, anesthesia risks, sciatic nerve palsy, instability/dislocation, limb length discrepancy, aseptic loosening, osteolysis, blood clots, continued pain, iatrogenic fracture, myocardial infarction, stroke, and death. Specific details of the procedure, hospitalization, recovery, rehabilitation, and long-term precautions were also provided. Pre-operative teaching was provided. Implant/prosthesis selection was outlined, and the many options available were explained; the final choice will be made at the time of the procedure to match the anatomy and condition of the bone, ligaments, tendons, and muscles. Understanding of all topics was conveyed to me by the patient, and consent was given to proceed with a left total hip arthroplasty. The patient completed preoperative medical optimization and risk assessment, joint arthroplasty education, and MRSA decolonization using a universal decolonization protocol. Perioperative blood management and the potential for blood transfusion were discussed with risks and options clearly outlined.     INTRAOPERATIVE FINDINGS: End-stage osteoarthritis left hip    PROCEDURE: The patient was identified in the preoperative holding area. The operative site was confirmed and marked. A sequential compression device was placed on the nonoperative leg. The risks, benefits, and alternatives to surgery were again confirmed with the patient and the patient wished to proceed. The patient was brought to the operating room and placed on the operating room table in the supine position. A huddle was performed with the patient and all vital surgical team members to confirm the correct operative site, procedure, anesthesia type, and operative plan with the  patient. After anesthesia was performed, the patient was positioned in the lateral decubitus position on the pegboard and secured with the operative side up. An axillary roll was placed in the axilla and all bony prominences and pressure points were checked and padded. A relative leg length assessment was carried out and markers were placed for intraoperative assessment. Intravenous antibiotic prophylaxis was given and confirmed with the anesthesia team.     The operative leg was prepped and draped in the usual sterile fashion. A surgical time out was performed immediately preceding the incision with all personnel in the operating room to again confirm patient identity, the correct operative site and extremity, correct radiographic studies, availability of appropriate surgical equipment and agreement on the planned procedure. A posterolateral approach to the hip was performed through an incision centered over the greater trochanter. The incision was carried through the subcutaneous tissue to the underlying fascia jeff and gluteus tasha fascia, which were incised and split posteriorly over the trochanter in the direction of the fibers. Hemostasis was obtained with electrocautery. The Charnley retractor was placed after carefully palpating the sciatic nerve which was protected throughout the case.     A standard posterior approach to the hip was performed by releasing the piriformis, short external rotators and posterior capsule and reflecting them posteriorly as a rectangular flap. The superior capsule was scarred down; it was released and excised. The labrum was split and the femoral head mobilized. The hip was then flexed, internally rotated and dislocated from the acetabulum without excessive force. Assessment of the femoral head revealed eburnation of the articular cartilage with complete loss of the weight bearing chondral surface. Osteophytes were present as well. Careful measurements were performed using the  center of the femoral head and the lesser trochanter as markers and a femoral neck cut was made according to the preoperative plan.     Attention was then turned to the acetabulum. Retractors were placed circumferentially for wide acetabular exposure. The labrum and osteophytes were debrided from the rim, and the medial wall was identified and the depth of the socket assessed by excising the pulvinar. Bleeders were controlled, especially the area of the obturator artery with the electrocautery. Acetabular reaming was then started with the hemispherical instrument matching the size of the excised femoral head. Sequential reaming of the acetabulum was then performed by increasing size in 2 mm increments.  Reaming was performed line to line. The reamers created an excellent hemispherical bed of bleeding cancellous bone. The cup was impacted into position, targeting 40-45 degrees of abduction and 20-25 degrees of anteversion, with an excellent press-fit. The press-fit was firm, stable, and apically seated.  2 screw(s) were used for additional support of the fixation. Further osteophyte debridement was done around the socket. All impinging soft tissue was removed from the edges of the socket. The MDM bearing/liner was then impacted into place and checked for stability.     Attention was then turned to the femur. The leg was positioned so access did not result in soft-tissue injury. The femoral preparation was started with a box osteotome. The medullary cavity of the femur was then entered and opened with hand reamers. Femoral stem broaches were then employed in an incremental fashion up to the final size, targeting 15-20 degrees of anteversion. The final broach was fully seated, had good rotational and axial stability, and was seated at the appropriate height in relation to the greater trochanter and the preoperative plan. Trial reduction was done. Excellent stability and range of motion was achieved without impingement  at any position. The hip was stable in full extension and external rotation as well as in flexion past 90 degrees, 20 degrees adduction and 60-70 degrees of internal rotation. Leg lengths were re-created within millimeters based on the markers and relative measurement. The hip was then dislocated and the trials removed. The wound was copiously irrigated, and the permanent femoral stem was then impacted down in approximately 15-20 degrees of anteversion. The press-fit was firm, and stable to axial and rotational force in all planes. The permanent femoral head was then impacted on the clean trunnion. The socket and wound were irrigated, suctioned, and inspected for debris. The final reduction was performed, and again leg length assessment and stability assessment of the hip were performed to confirm optimal component selection and stability in all planes without impingement when stressed to the extremes.     The wound was irrigated with dilute betadine solution as well as saline, and hemostasis obtained with electrocautery. A pain cocktail was injected into the pericapsular tissues. The posterior capsule, piriformis, and short external rotators were repaired utilizing #2 Ticron through drill holes in the greater trochanter. The sciatic nerve was palpated and found to be intact and the wound was irrigated. Instrument and sponge counts were completed and confirmed correct. The fascia jeff and gluteal fascia were closed with interrupted #1 Vicryl suture and oversewn with #2 Stratafix. The deep subcutaneous tissue was closed with running #1 Stratafix suture and the superficial subcutaneous tissue with interrupted 2-0 Vicryl suture. A 3-0 monocryl running stitch was used to close skin followed by skin glue adhesive to seal the wound. A silver impregnated dressing was then placed, followed by a sequential compression device to the operative limb, followed by an abduction pillow. The patient was then returned to a supine  position on the operating room table. The patient was sufficiently recovered from anesthesia, transferred to a hospital bed and taken to the PACU in stable condition.     One gram (1000 mg) of intravenous tranexamic acid was administered prior to incision. A second one gram (1000 mg) intravenous dose was given prior to wound closure.    No apparent complications occurred during the procedure Instrument, sponge and needle counts were correct x 2.     The patient underwent risk stratification preoperatively and aspirin was chosen for DVT prophylaxis. Delay in starting chemical prophylaxis for 23 hours from surgical incision was over concerns for hematoma formation and wound related issues.     POST OPERATIVE PLAN:   Protected weight bearing as tolerated   Posterior hip precautions x 6 weeks   PT/OT for mobilization and medical equipment needs   23 hours perioperative antibiotic prophylaxis   Pain control with PO/IV meds   Keep silver dressing in place for 7 days post op. Change dressing only if saturated.   SCD's to bilateral lower extremities   Social work for discharge planning needs   Follow up in 3 weeks for post operative wound check with XR AP pelvis.

## 2023-02-13 NOTE — DISCHARGE INSTRUCTIONS
DISCHARGE INSTRUCTIONS   Dr. Hutchinson   [facility] COLD THERAPY - PATIENT INSTRUCTION SHEET    Cold Compression Therapy for your comfort and rehabilitation  Your caregivers want you to be productive in your rehab and comfortable during your stay. In keeping with those goals, you will be receiving an SMI Cold Therapy Wrap to help ease post-operative pain and swelling that might keep you from getting back on track! Your SMI Cold Therapy Wrap is effective and simple-to-use, and you will be encouraged to apply it throughout your hospital stay and at home through the duration of your recovery.    When you are ready to go home  Be sure to take your SMI Cold Therapy Wrap and both sets of Gel Bags with you for continued comfort and use throughout your rehabilitation. If you don't already have them, ask your nurse or aide to retrieve your SMI Gel Bags from the patient freezer.    Home use precautions  Always follow your medical professional's application instructions upon discharge. Your SMI Cold Therapy Wrap and Gel Bags are designed to last for months following your surgery. Never heat the Gel Bags unless specified by your healthcare provider. Supervision is advised when using this product on children or geriatric patients. To avoid danger of suffocation, please keep the outer plastic packaging away from children & pets.    Cold Therapy Instructions  Place Gel Bags in a freezer set ¾ of the way to max temperature for at least (4) hours. For best results, lay the Gel Bags flat and [id number] in the freezer. Once frozen, slide Gel Bags into the gel pouch and secure your wrap to the affected area with the straps.  Gel wraps that have been stored in a freezer for an extended period of time may require a (10) minute period of softening up in a room temperature environment before application.  The gel pouch acts as a protective barrier. NEVER place frozen bags directly onto skin, as this may cause frostbite injury.  The [facility] Cold  Therapy Wrap is designed to be able to be worm while ambulating. The compression straps can be secured well enough so that the Wrap won't fall off while moving.  Wrap Application Videos can be viewed at [employer].  An additional protective barrier such as clothing, a washcloth, hand-towel or pillowcase may be used during prolonged treatment applications.  The Gel-Pouch and Wrap are both Latex-Free and the Gel Bag ingredients are non toxic.    [facility] Wrap care instructions  The [facility] Cold Therapy Wrap may be hand washed and hung to dry when needed.    [facility] re-order information  Additional [facility] body specific wraps and/or Gel Bags can be re-ordered from [employer] or call [facility] ([facility]: [phone] ([phone])     Total Hip Replacement/Hip Hemiarthroplasty     Wound Care   1) Keep wound / incision area clean and dry.   2) Dressing to remain in place until post-operative day 7. Upon dressing removal, assess for wound drainage. If no drainage is present, keep wound / incision area open to air as much as possible. If drainage is present, place sterile dressing to cover wound and assess daily. If drainage continues to occur after post-operative day 14, call the office for an urgent appointment. (You should be seen in the clinic within 1-2 days of calling). DO NOT REMOVE SUTURES (IF PRESENT) UNDER ANY CIRCUMSTANCES PRIOR TO FOLLOW UP APPOINTMENT.  3) No baths or swimming until otherwise instructed. The wound must remain dry for 10 days after surgery. After 10 days, you may begin to shower only if no drainage is present. No submerging the wound under standing water until cleared by your physician (no baths, hot tubs, swimming pools, etc). Sponge baths are the best way to perform personal hygiene while at the same time protecting the wound from moisture.   4) Prior to showering, the wound must remain dry for 72 consecutive hours (no drainage whatsoever) prior to showering. If the wound drains or spots, the clock  "\"resets\" - make sure the wound has been drainage-free for 72 consecutive hours.   5) Once you are allowed to get the wound wet, please use gentle soap to wash the wound area. DO NOT aggressively scrub the wound with a washcloth or bath sponge. Please visually inspect your wound(s) at least once daily. If the wound(s) are in a difficult to see location, please use a mirror or have someone else assist with visual inspection.   6) No scrubbing the wound. You may \"pad dry\" the wound, but do not rub, as this may open up the wound and pre-dispose to wound infection.   7) Do not apply lotions or creams to incision site, unless instructed otherwise.   8) Observe for redness, swelling, or drainage. Please call the clinic immediately if you have fevers, chills with warmth/redness surrounding wound site or if you notice pus drainage from the wound site     Activity   No heavy lifting objects greater than 10 pounds.   No driving while on narcotic pain medication.   No submerging wound under standing water (pool, bath tub, etc.) until otherwise instructed.   You may be protected weightbearing as tolerated on your operative (left lower) extremity   Use a walker for ambulation for at least 2 weeks after surgery.  May wean from walker after 2 weeks if approved by your therapist.   Posterior hip precautions for 6 weeks: No bending the hip past 90 degrees. Do not allow the leg to cross the midline of your body (adduction). No twisting motions. Ask your physical therapist to review these precautions with you.    Blood Clot Prophylaxis   (Aspirin vs. Lovenox vs. Eliquis administration is determined by your surgeon and tailored to your specific risk profile. You will be discharged with one of these medications.) You will need to complete a total 4 week course of enteric coated aspirin 325 mg (or 81mg) twice daily or Eliquis 2.5mg twice daily, in order to minimize your risk of blood clots following surgery. You will be supplied with a " "prescription to obtain this. Alternatively, you will need to compete a total 2 week course of Lovenox after surgery (followed by a 2 week course of aspirin twice daily), in order to minimize the risk of blood clots following surgery. Lovenox requires a single shot in the abdomen, to be taken once daily. You will be supplied with the prescription to obtain this. Prior to your discharge from the hospital, the nursing staff will instruct you on self-administration of the Lovenox, if you will be returning directly home from the hospital.     Discharge Pain Medications   You will be given a prescription for pain medication. You should start taking this the same day after your surgery. Wean off as tolerated. Do not wait to take the pain medication until the pain is severe, as it will be difficult to \"catch up\" once this occurs. The pain medication usually reaches its full effect ~1 hour after ingesting. If you have been sent home on Colace, this medication should be taken until you are off all narcotic (i.e. Oxycodone, etc) pain medications, in order to prevent constipation. If you have been sent home with a combination of oxycodone and Tylenol, please take Tylenol as scheduled.  You must be careful not to exceed 4,000mg (4 grams) of Tylenol. The oxycodone is to be taken as needed for \"breakthrough\" pain.  Some common side effects of the narcotic pain medications include nausea and itching. Benadryl is a great over the counter medication that helps calm your stomach, decreases your anxiety levels, and minimizes the itching. You can easily purchase this at your local pharmacy as an over-the-counter medication. Please abide by the instructions as printed on the bottle. If your nausea persists, make sure to take small amounts of crackers or other lighter foods.     Follow-Up   Follow-up with Dr. Hutchinson's office in 3 weeks from the surgery date for a post-operative evaluation. Have the following xrays done upon arrival to " the follow-up appointment: AP pelvis. Please call Dr. Hutchinson's office at [phone] for orthopaedic appointments or questions.

## 2023-02-13 NOTE — ANESTHESIA PROCEDURE NOTES
Airway  Date/Time: 2/13/2023 10:13 AM    General Information and Staff    CRNA/CAA: Ira Celis CRNA  SRNA: Yessenia Araya SRNA  Indications and Patient Condition  Indications for airway management: airway protection    Preoxygenated: yes  MILS maintained throughout  Mask difficulty assessment: 1 - vent by mask    Final Airway Details  Final airway type: endotracheal airway      Successful airway: ETT  Cuffed: yes   Successful intubation technique: direct laryngoscopy  Facilitating devices/methods: intubating stylet  Endotracheal tube insertion site: oral  Blade: Brand  Blade size: 2  ETT size (mm): 7.5  Cormack-Lehane Classification: grade IIa - partial view of glottis  Placement verified by: chest auscultation and capnometry   Cuff volume (mL): 7  Measured from: gums  ETT/EBT to gums (cm): 23  Number of attempts at approach: 2  Assessment: lips, teeth, and gum same as pre-op and atraumatic intubation    Additional Comments  First attempt by SRNA; grade 3 view. Second attempt by CRNA; grade 2A view. Atraumatic and smooth intubation performed

## 2023-02-14 ENCOUNTER — HOME HEALTH ADMISSION (OUTPATIENT)
Dept: HOME HEALTH SERVICES | Facility: HOME HEALTHCARE | Age: 68
End: 2023-02-14
Payer: MEDICARE

## 2023-02-14 ENCOUNTER — TELEPHONE (OUTPATIENT)
Dept: FAMILY MEDICINE CLINIC | Facility: CLINIC | Age: 68
End: 2023-02-14
Payer: MEDICARE

## 2023-02-14 ENCOUNTER — READMISSION MANAGEMENT (OUTPATIENT)
Dept: CALL CENTER | Facility: HOSPITAL | Age: 68
End: 2023-02-14
Payer: MEDICARE

## 2023-02-14 VITALS
TEMPERATURE: 98.6 F | DIASTOLIC BLOOD PRESSURE: 59 MMHG | RESPIRATION RATE: 14 BRPM | SYSTOLIC BLOOD PRESSURE: 100 MMHG | HEART RATE: 73 BPM | WEIGHT: 229.28 LBS | OXYGEN SATURATION: 94 % | BODY MASS INDEX: 35.99 KG/M2 | HEIGHT: 67 IN

## 2023-02-14 DIAGNOSIS — E11.65 TYPE 2 DIABETES MELLITUS WITH HYPERGLYCEMIA, WITH LONG-TERM CURRENT USE OF INSULIN: ICD-10-CM

## 2023-02-14 DIAGNOSIS — Z79.4 TYPE 2 DIABETES MELLITUS WITH HYPERGLYCEMIA, WITH LONG-TERM CURRENT USE OF INSULIN: ICD-10-CM

## 2023-02-14 PROBLEM — G89.18 POSTOPERATIVE PAIN: Status: ACTIVE | Noted: 2023-02-14

## 2023-02-14 LAB
ANION GAP SERPL CALCULATED.3IONS-SCNC: 7 MMOL/L (ref 5–15)
BUN SERPL-MCNC: 18 MG/DL (ref 8–23)
BUN/CREAT SERPL: 20.7 (ref 7–25)
CALCIUM SPEC-SCNC: 8.6 MG/DL (ref 8.6–10.5)
CHLORIDE SERPL-SCNC: 103 MMOL/L (ref 98–107)
CO2 SERPL-SCNC: 28 MMOL/L (ref 22–29)
CREAT SERPL-MCNC: 0.87 MG/DL (ref 0.76–1.27)
EGFRCR SERPLBLD CKD-EPI 2021: 94 ML/MIN/1.73
GLUCOSE BLDC GLUCOMTR-MCNC: 175 MG/DL (ref 70–130)
GLUCOSE SERPL-MCNC: 147 MG/DL (ref 65–99)
HCT VFR BLD AUTO: 32.5 % (ref 37.5–51)
HGB BLD-MCNC: 10.9 G/DL (ref 13–17.7)
POTASSIUM SERPL-SCNC: 4.1 MMOL/L (ref 3.5–5.2)
SODIUM SERPL-SCNC: 138 MMOL/L (ref 136–145)

## 2023-02-14 PROCEDURE — A9270 NON-COVERED ITEM OR SERVICE: HCPCS | Performed by: INTERNAL MEDICINE

## 2023-02-14 PROCEDURE — 99024 POSTOP FOLLOW-UP VISIT: CPT | Performed by: ORTHOPAEDIC SURGERY

## 2023-02-14 PROCEDURE — A9270 NON-COVERED ITEM OR SERVICE: HCPCS | Performed by: ORTHOPAEDIC SURGERY

## 2023-02-14 PROCEDURE — 97165 OT EVAL LOW COMPLEX 30 MIN: CPT

## 2023-02-14 PROCEDURE — 63710000001 ASPIRIN 81 MG TABLET DELAYED-RELEASE: Performed by: ORTHOPAEDIC SURGERY

## 2023-02-14 PROCEDURE — 97116 GAIT TRAINING THERAPY: CPT

## 2023-02-14 PROCEDURE — G0378 HOSPITAL OBSERVATION PER HR: HCPCS

## 2023-02-14 PROCEDURE — 85014 HEMATOCRIT: CPT | Performed by: ORTHOPAEDIC SURGERY

## 2023-02-14 PROCEDURE — 63710000001 EPLERENONE 25 MG TABLET: Performed by: INTERNAL MEDICINE

## 2023-02-14 PROCEDURE — 63710000001 ACETAMINOPHEN 500 MG TABLET: Performed by: ORTHOPAEDIC SURGERY

## 2023-02-14 PROCEDURE — 85018 HEMOGLOBIN: CPT | Performed by: ORTHOPAEDIC SURGERY

## 2023-02-14 PROCEDURE — 80048 BASIC METABOLIC PNL TOTAL CA: CPT | Performed by: ORTHOPAEDIC SURGERY

## 2023-02-14 PROCEDURE — 63710000001 INSULIN DETEMIR PER 5 UNITS: Performed by: INTERNAL MEDICINE

## 2023-02-14 PROCEDURE — 63710000001 BISOPROLOL 5 MG TABLET: Performed by: INTERNAL MEDICINE

## 2023-02-14 PROCEDURE — 63710000001 PANTOPRAZOLE 40 MG TABLET DELAYED-RELEASE: Performed by: INTERNAL MEDICINE

## 2023-02-14 PROCEDURE — 63710000001 SACUBITRIL-VALSARTAN 49-51 MG TABLET: Performed by: INTERNAL MEDICINE

## 2023-02-14 PROCEDURE — 63710000001 OXYCODONE 5 MG TABLET: Performed by: ORTHOPAEDIC SURGERY

## 2023-02-14 PROCEDURE — 63710000001 MELOXICAM 7.5 MG TABLET: Performed by: ORTHOPAEDIC SURGERY

## 2023-02-14 PROCEDURE — 82962 GLUCOSE BLOOD TEST: CPT

## 2023-02-14 PROCEDURE — 97110 THERAPEUTIC EXERCISES: CPT

## 2023-02-14 RX ORDER — INSULIN GLARGINE 100 [IU]/ML
20 INJECTION, SOLUTION SUBCUTANEOUS DAILY
Start: 2023-02-14

## 2023-02-14 RX ORDER — POLYETHYLENE GLYCOL 3350 17 G/17G
17 POWDER, FOR SOLUTION ORAL DAILY
Qty: 15 PACKET | Refills: 0 | Status: SHIPPED | OUTPATIENT
Start: 2023-02-14 | End: 2023-03-01

## 2023-02-14 RX ORDER — OXYCODONE HYDROCHLORIDE 5 MG/1
5 TABLET ORAL EVERY 4 HOURS PRN
Qty: 40 TABLET | Refills: 0 | Status: SHIPPED | OUTPATIENT
Start: 2023-02-14 | End: 2023-02-24

## 2023-02-14 RX ADMIN — OXYCODONE 10 MG: 5 TABLET ORAL at 01:19

## 2023-02-14 RX ADMIN — OXYCODONE 10 MG: 5 TABLET ORAL at 11:37

## 2023-02-14 RX ADMIN — SACUBITRIL AND VALSARTAN 1 TABLET: 49; 51 TABLET, FILM COATED ORAL at 09:07

## 2023-02-14 RX ADMIN — ASPIRIN 81 MG: 81 TABLET, COATED ORAL at 09:07

## 2023-02-14 RX ADMIN — INSULIN DETEMIR 20 UNITS: 100 INJECTION, SOLUTION SUBCUTANEOUS at 09:08

## 2023-02-14 RX ADMIN — PANTOPRAZOLE SODIUM 40 MG: 40 TABLET, DELAYED RELEASE ORAL at 06:04

## 2023-02-14 RX ADMIN — MELOXICAM 15 MG: 7.5 TABLET ORAL at 09:07

## 2023-02-14 RX ADMIN — BISOPROLOL FUMARATE 10 MG: 5 TABLET, FILM COATED ORAL at 09:07

## 2023-02-14 RX ADMIN — ACETAMINOPHEN 1000 MG: 500 TABLET ORAL at 09:08

## 2023-02-14 RX ADMIN — EPLERENONE 25 MG: 25 TABLET, FILM COATED ORAL at 09:08

## 2023-02-14 NOTE — PLAN OF CARE
Goal Outcome Evaluation:   Patient has been resting comfortably with no acute signs of distress. VSS. AO x4. Pain well controlled overnight. Dressing C/D/I. Ambulated well with A x1, gb, and walker outside of room. Voiding. Will continue to monitor as patient progresses.

## 2023-02-14 NOTE — PLAN OF CARE
Goal Outcome Evaluation:           Progress: improving  Outcome Evaluation: Pt presenting below baseline w/ transfers, mobility, balance leading to increased need for assist w/ ADLs. Pt educated on posterior hip precautions and safety w/ transfers. CGA and v/c for safety w/ transfers and advancing the LLE forward. OT issued and educated pt and spouse on AE for increased I while adhering to PHP. Recommend home w/ assist and OPPT when medically appropriate.

## 2023-02-14 NOTE — PLAN OF CARE
Goal Outcome Evaluation:  Plan of Care Reviewed With: patient, spouse        Progress: improving  Outcome Evaluation: Pt with improvements in gait mechanics compared to previous session. Pt ambulated 350' with CGAx1 and FWW. No LOB or knee buckling noted. Cues provided for improved gait mechanics. HEP performed and post hip precautions reviewed prior to mobility. Activity limited by pain in L knee, minimal hip pain. Pt continues to be limited by decreased strength, poor activity tolerance, and pain levels causing functional mobility below baseline. PT rec d/c home with assist and OPPT when medically appropriate.

## 2023-02-14 NOTE — TELEPHONE ENCOUNTER
Caller: DAHLIA    Relationship: Home Health    Best call back number: [phone]    Additional notes: DAHLIA ASKED IF DR. GONSALEZ WOULD FOLLOW HOME HEALTH ORDERS FOR PATIENT.  PATIENT HAD HIP SURGERY

## 2023-02-14 NOTE — DISCHARGE SUMMARY
Patient Name: Kai Garcia  MRN: [id number]  : 1955  DOS: 2023    Attending: Romero Hutchinson MD    Primary Care Provider: Cheo Weiss MD    Date of Admission:.2023  7:25 AM    Date of Discharge:  2023    Discharge Diagnosis:   S/P total left hip arthroplasty    Gastroesophageal reflux disease    Hyperlipidemia    Essential hypertension    Ischemic cardiomyopathy EF 40% ()    Primary osteoarthritis of left hip    Obesity    DM2 (diabetes mellitus, type 2) ([facility])    Postoperative pain      Hospital Course    At admit:    Patient is a pleasant 67 y.o. male presented for scheduled surgery by Dr. Hutchinson.     Per his note (This patient has a history of progressive left hip pain and arthritis. The hip pain is severe with activity and has progressed significantly. Non-operative treatment has been attempted, but has not improved or controlled symptoms during normal daily activities. Motion has become limited and rotation severely restricted. X-rays reveal moderate-to-severe eburnation of articular cartilage on the superior weight bearing surface of the hip with circumferential acetabular and femoral neck osteophytes consistent with advanced hip osteoarthritis. A total hip arthroplasty was recommended at this time.).     Patient underwent left total hip arthroplasty under general anesthesia and periarticular block, tolerated surgery well, is being admitted for the management.     I saw him in PACU where he is doing fairly well, good pain control, no complains of nausea, vomiting, or shortness of breath.  Still drowsy from sedation.     He has no history of DVT or PE.     He has history of ischemic cardiomyopathy, hypertension, dyslipidemia, diabetes mellitus, he also has history of obesity and sleep apnea.     Patient with extensive cardiac history including history of ICD implantation, and history of CABG.     After admit:    Patient was provided pain medications as needed for pain  control.  Adjustments were made to pain medications to optimize postop pain management when indicated. Risks and benefits of opiate medications discussed with patient. KATHIE report was reviewed.    He was seen by PT and has progressed well over his stay.    He used an IS for atelectasis prophylaxis and aspirin and plavix along with mechanicals for DVT prophylaxis.    Home medications were resumed as appropriate, and labs were monitored and remained fairly stable.     With the progress he has made, he is ready for DC home today.      Discussed with patient regarding plan and he shows understanding and agreement.    Patient will have HHPT following discharge.    Procedures Performed    DATE OF PROCEDURE: 2/13/2023     SURGEON: Romero Hutchinson M.D.      PREOPERATIVE DIAGNOSIS: Advanced degenerative joint disease of the left hip secondary to osteoarthritis     POSTOPERATIVE DIAGNOSIS: same      PROCEDURE: Left total Hip Arthroplasty     Pertinent Test Results:    I reviewed the patient's new clinical results.   Results from last 7 days   Lab Units 02/14/23  0322   HEMOGLOBIN g/dL 10.9*   HEMATOCRIT % 32.5*     Results from last 7 days   Lab Units 02/14/23  0322 02/13/23  1820   SODIUM mmol/L 138  --    POTASSIUM mmol/L 4.1 4.4   CHLORIDE mmol/L 103  --    CO2 mmol/L 28.0  --    BUN mg/dL 18  --    CREATININE mg/dL 0.87  --    CALCIUM mg/dL 8.6  --    GLUCOSE mg/dL 147*  --      I reviewed the patient's new imaging including images and reports.    Physical therapy: Pt with improvements in gait mechanics compared to previous session. Pt ambulated 350' with CGAx1 and FWW. No LOB or knee buckling noted. Cues provided for improved gait mechanics. HEP performed and post hip precautions reviewed prior to mobility. Activity limited by pain in L knee, minimal hip pain. Pt continues to be limited by decreased strength, poor activity tolerance, and pain levels causing functional mobility below baseline. PT rec d/c home with  "assist and OPPT when medically appropriate.    Discharge Assessment:      Visit Vitals  /59 (BP Location: Left arm, Patient Position: Lying)   Pulse 73   Temp 98.6 °F (37 °C) (Oral)   Resp 14   Ht 170.2 cm (67.01\")   Wt 104 kg (229 lb 4.5 oz)   SpO2 94%   BMI 35.90 kg/m²     Temp (24hrs), Av.2 °F (36.8 °C), Min:97.6 °F (36.4 °C), Max:98.6 °F (37 °C)      General Appearance:    Alert, cooperative, in no acute distress   Lungs:     Clear to auscultation,respirations regular, even and                   unlabored    Heart:    Regular rhythm and normal rate, normal S1 and S2   Abdomen:     Normal bowel sounds, no masses, no organomegaly, soft        non-tender, non-distended, no guarding, no rebound                 tenderness   Extremities:   CDI dressing left hip   Pulses:   Pulses palpable and equal bilaterally   Skin:   No bleeding, bruising or rash   Neurologic:   Cranial nerves 2 - 12 grossly intact, sensation intact, Flexion and dorsiflexion intact bilateral feet.         Discharge Disposition: Home         Discharge Medications      New Medications      Instructions Start Date   oxyCODONE 5 MG immediate release tablet  Commonly known as: Roxicodone   5 mg, Oral, Every 4 Hours PRN      polyethylene glycol 17 g packet  Commonly known as: MIRALAX   17 g, Oral, Daily         Continue These Medications      Instructions Start Date   Accu-Chek Jodie Plus test strip  Generic drug: glucose blood   Check sugars bid      Accu-Chek Softclix Lancets lancets   Check sugars bid      acetaminophen 500 MG tablet  Commonly known as: TYLENOL   1,000 mg, Oral, Every 6 Hours PRN      aspirin 81 MG EC tablet   81 mg, Oral, Daily      bisoprolol 10 MG tablet  Commonly known as: ZEBeta   10 mg, Oral, Daily      clopidogrel 75 MG tablet  Commonly known as: PLAVIX   75 mg, Oral, Daily, PT TO STOP TAKING PLAVIX 1 WEEK PRIOR TO SURGERY PER DR. BROOKS'S OFFICE.      Easy Touch Pen Needles 31G X 5 MM misc  Generic drug: Insulin Pen " Needle   USE DAILY WITH INSULIN      Entresto 49-51 MG tablet  Generic drug: sacubitril-valsartan   1 tablet, Oral, Every 12 Hours      eplerenone 25 MG tablet  Commonly known as: INSPRA   25 mg, Oral, Daily      Lantus SoloStar 100 UNIT/ML injection pen  Generic drug: Insulin Glargine   20 Units, Subcutaneous, Daily      nitroglycerin 0.4 MG SL tablet  Commonly known as: NITROSTAT   0.4 mg, Sublingual, Every 5 Minutes PRN, Take no more than 3 doses in 15 minutes.      oxyCODONE-acetaminophen  MG per tablet  Commonly known as: Percocet   0.5-1 tablets, Oral, Every 4 Hours PRN      pantoprazole 40 MG EC tablet  Commonly known as: PROTONIX   40 mg, Oral, Daily      rosuvastatin 10 MG tablet  Commonly known as: CRESTOR   10 mg, Oral, Daily      SITagliptin 100 MG tablet  Commonly known as: Januvia   100 mg, Oral, Daily      traZODone 100 MG tablet  Commonly known as: DESYREL   100 mg, Oral, Nightly         Stop These Medications    Antiseptic Skin Cleanser 4 % solution  Generic drug: Chlorhexidine Gluconate     mupirocin 2 % ointment  Commonly known as: BACTROBAN            Discharge Diet: Regular diet    Activity at Discharge: protected WBAT LLE, post hip precautions 6 weeks.    Follow-up Appointments:  Future Appointments   Date Time Provider Department Center   2/28/2023 10:00 AM Carmen Justice PA-C [facility]   3/17/2023  8:30 AM Cheo Weiss MD [facility]      Scribed for Dr. Lindsay by Nataly Pierce, CONNOR. 2/15/2023  14:28 EST    Dragon disclaimer:  Part of this encounter note is an electronic transcription/translation of spoken language to printed text. The electronic translation of spoken language may permit erroneous, or at times, nonsensical words or phrases to be inadvertently transcribed; Although I have reviewed the note for such errors, some may still exist.    I, Mary Lindsay MD, personally performed the services described in this documentation as scribed by Nataly  CONNOR Pierce ,and it is both accurate and complete.        Mary Lindsay MD  02/14/23  08:11 EST

## 2023-02-14 NOTE — PROGRESS NOTES
Spoke with patient he is agreeable to [facility]. Message sent to Dr Cheo Weiss for follow for home health and sign orders. Spoke with [place]. Stefanie Ruiz, [facility]-Liaison    14:21 Received a call from [place]. Dr Cheo Weiss will follow patient and sign home health orders. Stefanie RUIZ, [facility]-Liaison

## 2023-02-14 NOTE — CASE MANAGEMENT/SOCIAL WORK
Continued Stay Note  [facility]     Patient Name: Kai Garcia  MRN: [id number]  Today's Date: 2/14/2023    Admit Date: 2/13/2023    Plan: home with Hh   Discharge Plan     Row Name 02/14/23 0859       Plan    Plan home with Hh    Patient/Family in Agreement with Plan yes    Plan Comments Pt lives in [place] with his wife. He was independent with ADLs prior to admit and owns a rolling walker, cane, elevated commode, and a cpap. He is followed by his PCP and has drug coverage. At this time pt is requesting home health for PT due to transportation issues. CM has given referral to Humberto with [facility]  at pts request. CM to follow for any other discharge needs.    Final Discharge Disposition Code 06 - home with home health care               Discharge Codes    No documentation.                     Angie Enriquez RN

## 2023-02-14 NOTE — THERAPY TREATMENT NOTE
Patient Name: Kai Garcia  : 1955    MRN: [id number]                              Today's Date: 2023       Admit Date: 2023    Visit Dx:     ICD-10-CM ICD-9-CM   1. S/P total left hip arthroplasty  Z96.642 V43.64   2. Primary osteoarthritis of left hip  M16.12 715.15   3. Type 2 diabetes mellitus with hyperglycemia, with long-term current use of insulin (Formerly [facility])  E11.65 250.00    Z79.4 790.29     V58.67     Patient Active Problem List   Diagnosis   • Multiple vessel coronary artery disease   • Abnormal liver function tests   • Gastroesophageal reflux disease   • Hyperlipidemia   • Hyperglycemia   • Sleep apnea   • Type II diabetes mellitus, uncontrolled (Formerly [facility])   • Coronary artery disease involving native coronary artery of native heart   • ALT (SGPT) level raised   • Elevated AST (SGOT)   • Tinnitus   • Dyslipidemia   • Hypertension   • Chronic low back pain   • History of bleeding peptic ulcer   • LUCIAN on CPAP   • Essential hypertension   • Coronary artery disease involving native coronary artery of native heart without angina pectoris   • Ischemic cardiomyopathy EF 40% ()   • RBBB   • Screen for colon cancer   • Sepsis   • MORGAN (acute kidney injury) (Formerly [facility])   • Acute cystitis without hematuria   • Hyponatremia   • Thrombocytopenia (Formerly [facility])   • Back pain   • S/P lumbar fusion   • CHF (congestive heart failure) (Formerly [facility])   • Acute blood loss anemia, asymptomatic   • Postoperative urinary retention   • Primary osteoarthritis of left hip   • S/P total left hip arthroplasty   • Obesity   • DM2 (diabetes mellitus, type 2) (Formerly [facility])   • Postoperative pain     Past Medical History:   Diagnosis Date   • Arthritis of back    • Back pain    • Cataract     needs surgery    • Chest pain     Atypical chest pain, noncardiac.  Suspect either musculoskeletal versus gastrointestinal   • CHF (congestive heart failure) (Formerly [facility])    • Chronic low back pain     /multiple lumbar surgeries, currently disabled: L4-L5 discectomy in  2015.    • Clotting disorder ([facility])    • Coronary artery disease    • Diabetes mellitus ([facility])     type 2, diagnosed withing past 6months, checks fsbg qam, last a1c 8.4 7-19-18   • Dissecting aortic aneurysm ([facility])    • Diverticulosis    • Dyslipidemia    • Elevated cholesterol    • Elevated liver function tests    • Fracture, finger 1976   • GERD (gastroesophageal reflux disease)    • Heart murmur    • Hip arthrosis 2019   • History of bleeding peptic ulcer    • History of sepsis 03/2021    UTI that caused sepsis    • History of transfusion 2006    no reaction    • Hyperlipidemia    • Hypertension    • Knee swelling 2019   • Left leg pain    • Low back strain 2002   • Lumbar pain    • Lumbosacral disc disease 2002   • MI (myocardial infarction) ([facility]) 2007   • Morbid obesity ([facility])    • LUCIAN on CPAP     setting 12   • Severe obstructive sleep apnea    • Shortness of breath    • Temporary low platelet count ([facility]) 03/2021    due to sepsis    • Wears glasses    • Wears glasses      Past Surgical History:   Procedure Laterality Date   • APPENDECTOMY     • ARTERIOGRAM AORTIC  2006    aortic disection with elephant trunk procedure   • BACK SURGERY  2002,2015 2021   • CARDIAC CATHETERIZATION N/A 04/27/2018    Procedure: Left Heart Cath;  Surgeon: Uri Willis MD;  Location:  [facility];  Service: Cardiovascular   • CARDIAC CATHETERIZATION  04/2018   • CARDIAC ELECTROPHYSIOLOGY PROCEDURE N/A 08/09/2018    Procedure: Biv ICD Implant w/limited echo on arrival;  Surgeon: Jose Fox DO;  Location:  [facility];  Service: Cardiology   • CARDIAC SURGERY  06/19/2006   • COLONOSCOPY     • COLONOSCOPY N/A 06/18/2019    Procedure: COLONOSCOPY;  Surgeon: Meir Salguero MD;  Location:  [facility];  Service: Gastroenterology   • CORONARY ANGIOPLASTY WITH STENT PLACEMENT  2007    x 2   • CORONARY ARTERY BYPASS GRAFT  2006    2 grafts    • LUMBAR DISCECTOMY     • LUMBAR DISCECTOMY FUSION  INSTRUMENTATION N/A 10/27/2021    Procedure: LUMBAR FUSION L4-5, L5-S1;  Surgeon: Fer Faulkner MD;  Location: [facility];  Service: Orthopedic Spine;  Laterality: N/A;   • LUMBAR FUSION        General Information     Row Name 02/14/23 0825          Physical Therapy Time and Intention    Document Type therapy note (daily note)  -AB     Mode of Treatment physical therapy  -AB     Row Name 02/14/23 0825          General Information    Patient Profile Reviewed yes  -AB     Existing Precautions/Restrictions fall;hip, posterior  -AB     Barriers to Rehab previous functional deficit  -AB     Row Name 02/14/23 0825          Cognition    Orientation Status (Cognition) oriented x 3  -AB     Row Name 02/14/23 0825          Safety Issues, Functional Mobility    Safety Issues Affecting Function (Mobility) awareness of need for assistance;insight into deficits/self-awareness;safety precaution awareness;safety precautions follow-through/compliance  pt does not take direction well  -AB     Impairments Affecting Function (Mobility) balance;endurance/activity tolerance;pain;strength;sensation/sensory awareness;range of motion (ROM)  -AB     Comment, Safety Issues/Impairments (Mobility) Pt does not take direction very well.  -AB           User Key  (r) = Recorded By, (t) = Taken By, (c) = Cosigned By    Initials Name Provider Type    AB Isabela Blanco, PT Physical Therapist               Mobility     Row Name 02/14/23 0826          Bed Mobility    Bed Mobility supine-sit;scooting/bridging  -AB     Scooting/Bridging [place] (Bed Mobility) independent  -AB     Supine-Sit [place] (Bed Mobility) contact guard;nonverbal cues (demo/gesture);verbal cues  -AB     Assistive Device (Bed Mobility) head of bed elevated  -AB     Comment, (Bed Mobility) VC to avoid hip IR and ADD and manual assist to try to correct while pt getting EOB. Pt declined assist. Education provided on risk for hip dislocation.  -AB     Row Name 02/14/23 0826           Transfers    Comment, (Transfers) Cues for hand placement, sequencing, and precautions. Post hip precautions reviewed prior to mobility. Pt verbailzed understanding.  -AB     Row Name 02/14/23 0826          Bed-Chair Transfer    Bed-Chair [place] (Transfers) contact guard;1 person assist  -AB     Assistive Device (Bed-Chair Transfers) walker, front-wheeled  -AB     Row Name 02/14/23 0826          Sit-Stand Transfer    Sit-Stand [place] (Transfers) contact guard;1 person assist;verbal cues  -AB     Assistive Device (Sit-Stand Transfers) walker, front-wheeled  -AB     Comment, (Sit-Stand Transfer) STSx2 cues for kicking L leg out in front to avoid flexion past 90 degrees.  -AB     Row Name 02/14/23 0826          Gait/Stairs (Locomotion)    [place] (Gait) contact guard;1 person assist;verbal cues  -AB     Assistive Device (Gait) walker, front-wheeled  -AB     Distance in Feet (Gait) 350  -AB     Deviations/Abnormal Patterns (Gait) bilateral deviations;base of support, wide;weight shifting decreased;stride length decreased;gait speed decreased  -AB     Bilateral Gait Deviations forward flexed posture;heel strike decreased  -AB     Left Sided Gait Deviations hip hiking;weight shift ability decreased  Pt with some IR of LLE from previous injury. Decreased weigth acceptance onto LLE d/t knee pain.  -AB     Comment, (Gait/Stairs) Pt ambulated 350' with CGAx1 and FWW. Pt demo's step through gait patern with decreased stride length d/t poor weight acceptance onto LLE. Minimal improvement with verbal cues. Pt with noted LLE IR, which he states is from previous injury. Cues given to prevent IR but pt states he is unable to correct. Pt reports limited ability to flex L knee d/t chronic pain. No LOB or knee buckling. Activity limited by pain levels. Pain improved with rest.  -AB     Row Name 02/14/23 0826          Mobility    Extremity Weight-bearing Status left lower extremity  -AB     Left Lower  Extremity (Weight-bearing Status) weight-bearing as tolerated (WBAT);other (see comments)  with FWW.  -AB           User Key  (r) = Recorded By, (t) = Taken By, (c) = Cosigned By    Initials Name Provider Type    AB Isabela Blanco PT Physical Therapist               Obj/Interventions     Row Name 02/14/23 0834          Motor Skills    Therapeutic Exercise hip;knee;ankle  -AB     Row Name 02/14/23 0834          Hip (Therapeutic Exercise)    Hip Strengthening (Therapeutic Exercise) left;aBduction;10 repetitions  -AB     Row Name 02/14/23 0834          Knee (Therapeutic Exercise)    Knee (Therapeutic Exercise) strengthening exercise;isometric exercises  -AB     Knee Isometrics (Therapeutic Exercise) left;quad sets;10 repetitions  -AB     Knee Strengthening (Therapeutic Exercise) left;LAQ (long arc quad);SLR (straight leg raise);heel slides;10 repetitions  -AB     Row Name 02/14/23 0834          Ankle (Therapeutic Exercise)    Ankle (Therapeutic Exercise) AROM (active range of motion)  -AB     Ankle AROM (Therapeutic Exercise) bilateral;dorsiflexion;plantarflexion;10 repetitions  -AB     Row Name 02/14/23 0834          Balance    Balance Assessment sitting static balance;sitting dynamic balance;standing static balance;standing dynamic balance  -AB     Static Sitting Balance independent  -AB     Dynamic Sitting Balance standby assist  -AB     Position, Sitting Balance unsupported;sitting edge of bed  -AB     Static Standing Balance contact guard;1-person assist  -AB     Dynamic Standing Balance contact guard;verbal cues;1-person assist  -AB     Position/Device Used, Standing Balance supported;walker, front-wheeled  -AB     Balance Interventions sitting;standing;sit to stand;supported;static;dynamic  -AB     Comment, Balance No LOB or knee buckling.  -AB           User Key  (r) = Recorded By, (t) = Taken By, (c) = Cosigned By    Initials Name Provider Type    AB Isabela Blanco PT Physical Therapist                Goals/Plan    No documentation.                Clinical Impression     Row Name 02/14/23 0835          Pain    Pretreatment Pain Rating 7/10  -AB     Posttreatment Pain Rating 9/10  -AB     Pain Location - Side/Orientation Left  -AB     Pain Location generalized  -AB     Pain Location - knee  -AB     Pre/Posttreatment Pain Comment Pt reports high pain levels in knee with minimal pain in hip. Improved with rest.  -AB     Pain Intervention(s) Repositioned;Ambulation/increased activity  -AB     Additional Documentation Pain Scale: Numbers Pre/Post-Treatment (Group)  -AB     Row Name 02/14/23 0835          Plan of Care Review    Plan of Care Reviewed With patient;spouse  -AB     Progress improving  -AB     Outcome Evaluation Pt with improvements in gait mechanics compared to previous session. Pt ambulated 350' with CGAx1 and FWW. No LOB or knee buckling noted. Cues provided for improved gait mechanics. HEP performed and post hip precautions reviewed prior to mobility. Activity limited by pain in L knee, minimal hip pain. Pt continues to be limited by decreased strength, poor activity tolerance, and pain levels causing functional mobility below baseline. PT rec d/c home with assist and OPPT when medically appropriate.  -AB     Row Name 02/14/23 0835          Vital Signs    Pre Systolic BP Rehab 100  -AB     Pre Treatment Diastolic BP 59  -AB     Intra Systolic BP Rehab 108  -AB     Intra Treatment Diastolic BP 53  -AB     Post Systolic BP Rehab 132  -AB     Post Treatment Diastolic BP 58  -AB     Pretreatment Heart Rate (beats/min) 78  -AB     Pre SpO2 (%) 93  -AB     O2 Delivery Pre Treatment room air  -AB     O2 Delivery Intra Treatment room air  -AB     O2 Delivery Post Treatment room air  -AB     Pre Patient Position Supine  -AB     Intra Patient Position Standing  -AB     Post Patient Position Sitting  -AB     Row Name 02/14/23 0835          Positioning and Restraints    Pre-Treatment Position in bed  -AB     Post  Treatment Position chair  -AB     In Chair notified nsg;reclined;sitting;with OT;with family/caregiver  Pt left with OT sitting in chair.  -AB           User Key  (r) = Recorded By, (t) = Taken By, (c) = Cosigned By    Initials Name Provider Type    Isabela Yu PT Physical Therapist               Outcome Measures     Row Name 02/14/23 0840          How much help from another person do you currently need...    Turning from your back to your side while in flat bed without using bedrails? 4  -AB     Moving from lying on back to sitting on the side of a flat bed without bedrails? 3  -AB     Moving to and from a bed to a chair (including a wheelchair)? 3  -AB     Standing up from a chair using your arms (e.g., wheelchair, bedside chair)? 3  -AB     Climbing 3-5 steps with a railing? 2  -AB     To walk in hospital room? 3  -AB     AM-PAC 6 Clicks Score (PT) 18  -AB     Highest level of mobility 6 --> Walked 10 steps or more  -AB     Row Name 02/14/23 0840          Functional Assessment    Outcome Measure Options AM-PAC 6 Clicks Basic Mobility (PT)  -AB           User Key  (r) = Recorded By, (t) = Taken By, (c) = Cosigned By    Initials Name Provider Type    Isabela Yu PT Physical Therapist                             Physical Therapy Education     Title: PT OT SLP Therapies (In Progress)     Topic: Physical Therapy (Done)     Point: Mobility training (Done)     Learning Progress Summary           Patient Acceptance, E,D, VU,DU,NR by AB at 2/14/2023 0840    Comment: Pt required cues for safety awareness. Does not take direction well.    Acceptance, E,D, VU,NR by  at 2/13/2023 1752   Significant Other Acceptance, E,D, VU,DU,NR by AB at 2/14/2023 0840    Comment: Pt required cues for safety awareness. Does not take direction well.                   Point: Home exercise program (Done)     Learning Progress Summary           Patient Acceptance, E,D, VU,DU,NR by AB at 2/14/2023 0840    Comment: Pt required  cues for safety awareness. Does not take direction well.    Acceptance, E,D, VU,NR by  at 2/13/2023 1752   Significant Other Acceptance, E,D, VU,DU,NR by AB at 2/14/2023 0840    Comment: Pt required cues for safety awareness. Does not take direction well.                   Point: Body mechanics (Done)     Learning Progress Summary           Patient Acceptance, E,D, VU,DU,NR by AB at 2/14/2023 0840    Comment: Pt required cues for safety awareness. Does not take direction well.    Acceptance, E,D, VU,NR by  at 2/13/2023 1752   Significant Other Acceptance, E,D, VU,DU,NR by AB at 2/14/2023 0840    Comment: Pt required cues for safety awareness. Does not take direction well.                   Point: Precautions (Done)     Learning Progress Summary           Patient Acceptance, E,D, VU,DU,NR by AB at 2/14/2023 0840    Comment: Pt required cues for safety awareness. Does not take direction well.    Acceptance, E,D, VU,NR by  at 2/13/2023 1752   Significant Other Acceptance, E,D, VU,DU,NR by AB at 2/14/2023 0840    Comment: Pt required cues for safety awareness. Does not take direction well.                               User Key     Initials Effective Dates Name Provider Type Discipline     06/01/21 -  Sil Grayson, PT Physical Therapist PT    AB 09/22/22 -  Isabela Blanco, PT Physical Therapist PT              PT Recommendation and Plan     Plan of Care Reviewed With: patient, spouse  Progress: improving  Outcome Evaluation: Pt with improvements in gait mechanics compared to previous session. Pt ambulated 350' with CGAx1 and FWW. No LOB or knee buckling noted. Cues provided for improved gait mechanics. HEP performed and post hip precautions reviewed prior to mobility. Activity limited by pain in L knee, minimal hip pain. Pt continues to be limited by decreased strength, poor activity tolerance, and pain levels causing functional mobility below baseline. PT rec d/c home with assist and OPPT when medically  appropriate.     Time Calculation:    PT Charges     Row Name 02/14/23 0842             Time Calculation    Start Time 0756  -AB      PT Received On 02/14/23  -AB      PT Goal Re-Cert Due Date 02/23/23  -AB         Timed Charges    70520 - PT Therapeutic Exercise Minutes 10  -AB      06660 - Gait Training Minutes  20  -AB         Total Minutes    Timed Charges Total Minutes 30  -AB       Total Minutes 30  -AB            User Key  (r) = Recorded By, (t) = Taken By, (c) = Cosigned By    Initials Name Provider Type    AB Isabela Blanco, PT Physical Therapist              Therapy Charges for Today     Code Description Service Date Service Provider Modifiers Qty    04906510740 HC PT THER PROC EA 15 MIN 2/14/2023 Isabela Blanco, PT GP 1    [id number] HC GAIT TRAINING EA 15 MIN 2/14/2023 Isabela Blanco, PT GP 1          PT G-Codes  Outcome Measure Options: AM-PAC 6 Clicks Basic Mobility (PT)  AM-PAC 6 Clicks Score (PT): 18  PT Discharge Summary  Anticipated Discharge Disposition (PT): home with assist, home with outpatient therapy services    Isabela Blanco PT  2/14/2023     Insulin, bowel regimen, Protonix

## 2023-02-14 NOTE — THERAPY EVALUATION
Patient Name: Kai Garcia  : 1955    MRN: [id number]                              Today's Date: 2023       Admit Date: 2023    Visit Dx:     ICD-10-CM ICD-9-CM   1. S/P total left hip arthroplasty  Z96.642 V43.64   2. Primary osteoarthritis of left hip  M16.12 715.15   3. Type 2 diabetes mellitus with hyperglycemia, with long-term current use of insulin ([facility])  E11.65 250.00    Z79.4 790.29     V58.67     Patient Active Problem List   Diagnosis   • Multiple vessel coronary artery disease   • Abnormal liver function tests   • Gastroesophageal reflux disease   • Hyperlipidemia   • Hyperglycemia   • Sleep apnea   • Type II diabetes mellitus, uncontrolled ([facility])   • Coronary artery disease involving native coronary artery of native heart   • ALT (SGPT) level raised   • Elevated AST (SGOT)   • Tinnitus   • Dyslipidemia   • Hypertension   • Chronic low back pain   • History of bleeding peptic ulcer   • LUCIAN on CPAP   • Essential hypertension   • Coronary artery disease involving native coronary artery of native heart without angina pectoris   • Ischemic cardiomyopathy EF 40% ()   • RBBB   • Screen for colon cancer   • Sepsis   • MORGAN (acute kidney injury) ([facility])   • Acute cystitis without hematuria   • Hyponatremia   • Thrombocytopenia ([facility])   • Back pain   • S/P lumbar fusion   • CHF (congestive heart failure) ([facility])   • Acute blood loss anemia, asymptomatic   • Postoperative urinary retention   • Primary osteoarthritis of left hip   • S/P total left hip arthroplasty   • Obesity   • DM2 (diabetes mellitus, type 2) ([facility])   • Postoperative pain     Past Medical History:   Diagnosis Date   • Arthritis of back    • Back pain    • Cataract     needs surgery    • Chest pain     Atypical chest pain, noncardiac.  Suspect either musculoskeletal versus gastrointestinal   • CHF (congestive heart failure) ([facility])    • Chronic low back pain     /multiple lumbar surgeries, currently disabled: L4-L5 discectomy in  2015.    • Clotting disorder ([facility])    • Coronary artery disease    • Diabetes mellitus ([facility])     type 2, diagnosed withing past 6months, checks fsbg qam, last a1c 8.4 7-19-18   • Dissecting aortic aneurysm ([facility])    • Diverticulosis    • Dyslipidemia    • Elevated cholesterol    • Elevated liver function tests    • Fracture, finger 1976   • GERD (gastroesophageal reflux disease)    • Heart murmur    • Hip arthrosis 2019   • History of bleeding peptic ulcer    • History of sepsis 03/2021    UTI that caused sepsis    • History of transfusion 2006    no reaction    • Hyperlipidemia    • Hypertension    • Knee swelling 2019   • Left leg pain    • Low back strain 2002   • Lumbar pain    • Lumbosacral disc disease 2002   • MI (myocardial infarction) ([facility]) 2007   • Morbid obesity ([facility])    • LUCIAN on CPAP     setting 12   • Severe obstructive sleep apnea    • Shortness of breath    • Temporary low platelet count ([facility]) 03/2021    due to sepsis    • Wears glasses    • Wears glasses      Past Surgical History:   Procedure Laterality Date   • APPENDECTOMY     • ARTERIOGRAM AORTIC  2006    aortic disection with elephant trunk procedure   • BACK SURGERY  2002,2015 2021   • CARDIAC CATHETERIZATION N/A 04/27/2018    Procedure: Left Heart Cath;  Surgeon: Uri Willis MD;  Location:  [email or web address] CATH INVASIVE LOCATION;  Service: Cardiovascular   • CARDIAC CATHETERIZATION  04/2018   • CARDIAC ELECTROPHYSIOLOGY PROCEDURE N/A 08/09/2018    Procedure: Biv ICD Implant w/limited echo on arrival;  Surgeon: Jose Fox DO;  Location:  [facility] INVAS[facility] LOCATION;  Service: Cardiology   • CARDIAC SURGERY  06/19/2006   • COLONOSCOPY     • COLONOSCOPY N/A 06/18/2019    Procedure: COLONOSCOPY;  Surgeon: Meir Salguero MD;  Location:  [facility];  Service: Gastroenterology   • CORONARY ANGIOPLASTY WITH STENT PLACEMENT  2007    x 2   • CORONARY ARTERY BYPASS GRAFT  2006    2 grafts    • LUMBAR DISCECTOMY     • LUMBAR DISCECTOMY FUSION  INSTRUMENTATION N/A 10/27/2021    Procedure: LUMBAR FUSION L4-5, L5-S1;  Surgeon: Fer Faulkner MD;  Location: [facility];  Service: Orthopedic Spine;  Laterality: N/A;   • LUMBAR FUSION        General Information     Row Name 02/14/23 0836          OT Time and Intention    Document Type evaluation  -MR     Mode of Treatment occupational therapy  -MR     Row Name 02/14/23 0836          General Information    Patient Profile Reviewed yes  -MR     Prior Level of Function min assist:;all household mobility;community mobility;gait;transfer;bed mobility;ADL's  -MR     Existing Precautions/Restrictions fall;hip, posterior;other (see comments)  Chronic L knee pain from fall in 2019  -MR     Barriers to Rehab previous functional deficit  -MR     Row Name 02/14/23 0836          Occupational Profile    Environmental Supports and Barriers (Occupational Profile) Pt endorsing he has a walk in shower and comfort height toilets.  -MR     Row Name 02/14/23 0836          Living Environment    People in Home spouse  -MR     Row Name 02/14/23 0836          Home Main Entrance    Number of Stairs, Main Entrance other (see comments)  Pt has a ramp to enter the home  -MR     Row Name 02/14/23 0836          Stairs Within Home, Primary    Number of Stairs, Within Home, Primary none  -MR     Row Name 02/14/23 0836          Cognition    Orientation Status (Cognition) oriented x 3  -MR     Row Name 02/14/23 0836          Safety Issues, Functional Mobility    Safety Issues Affecting Function (Mobility) awareness of need for assistance;insight into deficits/self-awareness;safety precaution awareness;impulsivity;judgment;safety precautions follow-through/compliance;sequencing abilities  -MR     Impairments Affecting Function (Mobility) balance;endurance/activity tolerance;pain;strength;sensation/sensory awareness;range of motion (ROM)  -MR           User Key  (r) = Recorded By, (t) = Taken By, (c) = Cosigned By    Initials Name Provider Type      Nikkie Khanna, OT Occupational Therapist                 Mobility/ADL's     Row Name 02/14/23 0837          Bed Mobility    Comment, (Bed Mobility) Received standing in room w/ PT. Pt left sitting up in chair w/ spouse present.  -MR     Row Name 02/14/23 0837          Transfers    Transfers sit-stand transfer  -MR     Row Name 02/14/23 0837          Sit-Stand Transfer    Sit-Stand [place] (Transfers) contact guard;1 person assist;verbal cues  -MR     Assistive Device (Sit-Stand Transfers) walker, front-wheeled  -MR     Comment, (Sit-Stand Transfer) verbal cues to advance the LLE forward during transitional phases of transfers.  -MR     Row Name 02/14/23 0837          Functional Mobility    Functional Mobility- Ind. Level contact guard assist;verbal cues required  -MR     Functional Mobility- Device walker, front-wheeled  -MR     Functional Mobility-Distance (Feet) --  HH distances w/in pt's room  -MR     Functional Mobility- Comment Pt demo toe in on the LLE during gait. Pt endorsing this was present at baseline and pt compensates for L knee pain.  -MR     Row Name 02/14/23 0837          Activities of Daily Living    BADL Assessment/Intervention upper body dressing;lower body dressing;bathing;feeding  -MR     Row Name 02/14/23 0837          Mobility    Extremity Weight-bearing Status left lower extremity  -MR     Left Lower Extremity (Weight-bearing Status) weight-bearing as tolerated (WBAT);other (see comments)  with FWW.  -MR     Row Name 02/14/23 0837          Upper Body Dressing Assessment/Training    [place] Level (Upper Body Dressing) doff;other (see comments);contact guard assist  hopsital gown  -MR     Position (Upper Body Dressing) supported sitting  -MR     Row Name 02/14/23 0837          Lower Body Dressing Assessment/Training    [place] Level (Lower Body Dressing) don;socks;maximum assist (25% patient effort)  -MR     Assistive Devices (Lower Body Dressing) long-handled shoe  horn;reacher;sock-aid  -MR     Position (Lower Body Dressing) supported sitting  -MR     Comment, (Lower Body Dressing) OT issued and provided demo of AE for increased I and safety w/ LB dressing while limited by posterior hip precautions.  -MR     Row Name 02/14/23 0837          Bathing Assessment/Intervention    [place] Level (Bathing) bathing skills  -MR     Comment, (Bathing) Pt educated on safety w/ bathing and use of long handled sponge.  -MR     Row Name 02/14/23 0837          Self-Feeding Assessment/Training    [place] Level (Feeding) feeding skills;set up  -MR     Position (Self-Feeding) supported sitting  -MR           User Key  (r) = Recorded By, (t) = Taken By, (c) = Cosigned By    Initials Name Provider Type     JeyNikkie, MARELY Occupational Therapist               Obj/Interventions     Row Name 02/14/23 0840          Sensory Assessment (Somatosensory)    Sensory Assessment (Somatosensory) UE sensation intact  -MR     Row Name 02/14/23 0840          Vision Assessment/Intervention    Visual Impairment/Limitations WFL;corrective lenses full-time  -MR     Row Name 02/14/23 0840          Range of Motion Comprehensive    General Range of Motion bilateral upper extremity ROM WFL  -MR     Row Name 02/14/23 0840          Strength Comprehensive (MMT)    Comment, General Manual Muscle Testing (MMT) Assessment BUE grossly 4+/5 in all functional planes  -MR     Row Name 02/14/23 0840          Balance    Balance Assessment sitting static balance;sitting dynamic balance;standing static balance;standing dynamic balance  -     Static Sitting Balance independent  -MR     Dynamic Sitting Balance standby assist  -MR     Position, Sitting Balance unsupported;sitting edge of bed  -MR     Static Standing Balance contact guard  -MR     Dynamic Standing Balance contact guard  -MR     Position/Device Used, Standing Balance supported;walker, front-wheeled  -MR     Balance Interventions sitting;standing;sit to  stand;supported;static;dynamic;minimal challenge;occupation based/functional task  -MR           User Key  (r) = Recorded By, (t) = Taken By, (c) = Cosigned By    Initials Name Provider Type    MR Khanna Nikkie, OT Occupational Therapist               Goals/Plan     Row Name 02/14/23 0844          Bed Mobility Goal 1 (OT)    Activity/Assistive Device (Bed Mobility Goal 1, OT) sit to supine;supine to sit  -MR     [place]ble Level/Cues Needed (Bed Mobility Goal 1, OT) supervision required  -MR     Time Frame (Bed Mobility Goal 1, OT) long term goal (LTG);by discharge  -MR     Progress/Outcomes (Bed Mobility Goal 1, OT) new goal  -MR     Row Name 02/14/23 0844          Dressing Goal 1 (OT)    Activity/Device (Dressing Goal 1, OT) lower body dressing;long-handled shoe horn;reacher;sock-aid  -MR     [place]/Cues Needed (Dressing Goal 1, OT) contact guard required;tactile cues required;verbal cues required  -MR     Time Frame (Dressing Goal 1, OT) long term goal (LTG);by discharge  -MR     Progress/Outcome (Dressing Goal 1, OT) new goal  -MR     Row Name 02/14/23 0844          Grooming Goal 1 (OT)    Activity/Device (Grooming Goal 1, OT) grooming skills, all;other (see comments)  standing at sink side  -MR     [place] (Grooming Goal 1, OT) contact guard required;tactile cues required;verbal cues required  -MR     Time Frame (Grooming Goal 1, OT) long term goal (LTG);by discharge  -MR     Progress/Outcome (Grooming Goal 1, OT) new goal  -MR     Row Name 02/14/23 0844          Therapy Assessment/Plan (OT)    Planned Therapy Interventions (OT) activity tolerance training;adaptive equipment training;BADL retraining;functional balance retraining;IADL retraining;occupation/activity based interventions;patient/caregiver education/training;transfer/mobility retraining;strengthening exercise;ROM/therapeutic exercise  -MR           User Key  (r) = Recorded By, (t) = Taken By, (c) = Cosigned By    Initials Name Provider  Type    MR Nikkie Khanna, OT Occupational Therapist               Clinical Impression     Row Name 02/14/23 0840          Pain Assessment    Pretreatment Pain Rating 8/10  -MR     Posttreatment Pain Rating 7/10  4 or 5/10 L knee pain  -MR     Pain Location - Side/Orientation Left  -MR     Pain Location generalized  -MR     Pain Location - hip;knee  -MR     Pain Intervention(s) Ambulation/increased activity;Repositioned;Cold applied;Nursing Notified  -     Row Name 02/14/23 0840          Plan of Care Review    Progress improving  -MR     Outcome Evaluation Pt presenting below baseline w/ transfers, mobility, balance leading to increased need for assist w/ ADLs. Pt educated on posterior hip precautions and safety w/ transfers. CGA and v/c for safety w/ transfers and advancing the LLE forward. OT issued and educated pt and spouse on AE for increased I while adhering to PHP. Recommend home w/ assist and OPPT when medically appropriate.  -     Row Name 02/14/23 0840          Therapy Assessment/Plan (OT)    Patient/Family Therapy Goal Statement (OT) Return to PLOF  -MR     Rehab Potential (OT) good, to achieve stated therapy goals  -MR     Criteria for Skilled Therapeutic Interventions Met (OT) yes;meets criteria;skilled treatment is necessary  -MR     Therapy Frequency (OT) daily  -MR     Row Name 02/14/23 0840          Therapy Plan Review/Discharge Plan (OT)    Anticipated Discharge Disposition (OT) home with assist;home with outpatient therapy services  -MR     Row Name 02/14/23 0840          Vital Signs    Pre Systolic BP Rehab 132  -MR     Pre Treatment Diastolic BP 58  -MR     Pretreatment Heart Rate (beats/min) 78  -MR     Posttreatment Heart Rate (beats/min) 72  -MR     Pre SpO2 (%) 96  -MR     O2 Delivery Pre Treatment room air  -MR     O2 Delivery Intra Treatment room air  -MR     Post SpO2 (%) 92  -MR     O2 Delivery Post Treatment room air  -MR     Pre Patient Position Standing  -MR     Intra Patient  Position Standing  -MR     Post Patient Position Sitting  -MR     Row Name 02/14/23 0840          Positioning and Restraints    Pre-Treatment Position other (comment)  standing w/ PT  -MR     Post Treatment Position chair  -MR     In Chair notified nsg;reclined;sitting;call light within reach;encouraged to call for assist;exit alarm on;with family/caregiver;legs elevated;waffle cushion;LLE elevated  ice pack applied to L hip  -MR           User Key  (r) = Recorded By, (t) = Taken By, (c) = Cosigned By    Initials Name Provider Type    Nikkie Brown, OT Occupational Therapist               Outcome Measures     Row Name 02/14/23 0845          How much help from another is currently needed...    Putting on and taking off regular lower body clothing? 2  -MR     Bathing (including washing, rinsing, and drying) 2  -MR     Toileting (which includes using toilet bed pan or urinal) 3  -MR     Putting on and taking off regular upper body clothing 3  -MR     Taking care of personal grooming (such as brushing teeth) 3  -MR     Eating meals 4  -MR     AM-PAC 6 Clicks Score (OT) 17  -MR     Row Name 02/14/23 0840          How much help from another person do you currently need...    Turning from your back to your side while in flat bed without using bedrails? 4  -AB     Moving from lying on back to sitting on the side of a flat bed without bedrails? 3  -AB     Moving to and from a bed to a chair (including a wheelchair)? 3  -AB     Standing up from a chair using your arms (e.g., wheelchair, bedside chair)? 3  -AB     Climbing 3-5 steps with a railing? 2  -AB     To walk in hospital room? 3  -AB     AM-PAC 6 Clicks Score (PT) 18  -AB     Highest level of mobility 6 --> Walked 10 steps or more  -AB     Row Name 02/14/23 0845 02/14/23 0840       Functional Assessment    Outcome Measure Options AM-PAC 6 Clicks Daily Activity (OT)  -MR AM-PAC 6 Clicks Basic Mobility (PT)  -AB          User Key  (r) = Recorded By, (t) = Taken By,  (c) = Cosigned By    Initials Name Provider Type    MR Jey Nikkie, OT Occupational Therapist    Isabela Yu, PT Physical Therapist                Occupational Therapy Education     Title: PT OT SLP Therapies (Done)     Topic: Occupational Therapy (Done)     Point: ADL training (Done)     Description:   Instruct learner(s) on proper safety adaptation and remediation techniques during self care or transfers.   Instruct in proper use of assistive devices.              Learning Progress Summary           Patient Acceptance, E, VU by MR at 2/14/2023 0845                   Point: Precautions (Done)     Description:   Instruct learner(s) on prescribed precautions during self-care and functional transfers.              Learning Progress Summary           Patient Acceptance, E, VU by MR at 2/14/2023 0845                   Point: Body mechanics (Done)     Description:   Instruct learner(s) on proper positioning and spine alignment during self-care, functional mobility activities and/or exercises.              Learning Progress Summary           Patient Acceptance, E, VU by MR at 2/14/2023 0845                               User Key     Initials Effective Dates Name Provider Type Discipline    MR 09/22/22 -  Nikkie Khanna, OT Occupational Therapist OT              OT Recommendation and Plan  Planned Therapy Interventions (OT): activity tolerance training, adaptive equipment training, BADL retraining, functional balance retraining, IADL retraining, occupation/activity based interventions, patient/caregiver education/training, transfer/mobility retraining, strengthening exercise, ROM/therapeutic exercise  Therapy Frequency (OT): daily  Plan of Care Review  Progress: improving  Outcome Evaluation: Pt presenting below baseline w/ transfers, mobility, balance leading to increased need for assist w/ ADLs. Pt educated on posterior hip precautions and safety w/ transfers. CGA and v/c for safety w/ transfers and advancing  the LLE forward. OT issued and educated pt and spouse on AE for increased I while adhering to PHP. Recommend home w/ assist and OPPT when medically appropriate.     Time Calculation:    Time Calculation- OT     Row Name 02/14/23 0845 02/14/23 0842          Time Calculation- OT    OT Start Time 0808  -MR --     OT Received On 02/14/23  -MR --     OT Goal Re-Cert Due Date 02/24/23  -MR --        Timed Charges    78703 - Gait Training Minutes  -- 20  -AB        Untimed Charges    OT Eval/Re-eval Minutes 46  -MR --        Total Minutes    Timed Charges Total Minutes -- 20  -AB     Untimed Charges Total Minutes 46  -MR --      Total Minutes 46  -MR 20  -AB           User Key  (r) = Recorded By, (t) = Taken By, (c) = Cosigned By    Initials Name Provider Type    Nikkie Brown OT Occupational Therapist    AB Isabela Blanco, PT Physical Therapist              Therapy Charges for Today     Code Description Service Date Service Provider Modifiers Qty    48871276401 HC OT EVAL LOW COMPLEXITY 4 2/14/2023 Nikkie Khanna OT GO 1               Nikkie Khanna OT  2/14/2023

## 2023-02-14 NOTE — PROGRESS NOTES
"  SUBJECTIVE  Patient resting actively.  Pain well controlled.  No events overnight    PHYSICAL THERAPY PROGRESS  Outcome Evaluation: PT eval complete. Pt performed bed mobility with SBA. Pt performed STS and amb 350' with FWW and CGAx2. No LOB or knee buckling noted. Gait deviations requiring VC to correct. Activity limited by fatigue. HEP and precautions reviewed with pt. Recommend d/c home with assist and OPPT when medically appropriate. (23 383)     OBJECTIVE  Temp (24hrs), Av.1 °F (36.7 °C), Min:97.6 °F (36.4 °C), Max:98.6 °F (37 °C)    Blood pressure 100/59, pulse 73, temperature 98.6 °F (37 °C), temperature source Oral, resp. rate 14, height 170.2 cm (67.01\"), weight 104 kg (229 lb 4.5 oz), SpO2 94 %.    Lab Results (last 24 hours)     Procedure Component Value Units Date/Time    Basic Metabolic Panel [[id number]]  (Abnormal) Collected: 23 0322    Specimen: Blood Updated: 23 0503     Glucose 147 mg/dL      BUN 18 mg/dL      Creatinine 0.87 mg/dL      Sodium 138 mmol/L      Potassium 4.1 mmol/L      Chloride 103 mmol/L      CO2 28.0 mmol/L      Calcium 8.6 mg/dL      BUN/Creatinine Ratio 20.7     Anion Gap 7.0 mmol/L      eGFR 94.0 mL/min/1.73     Narrative:      GFR Normal >60  Chronic Kidney Disease <60  Kidney Failure <15      Hemoglobin & Hematocrit, Blood [[id number]]  (Abnormal) Collected: 23 0322    Specimen: Blood Updated: 23 0453     Hemoglobin 10.9 g/dL      Hematocrit 32.5 %     Potassium [[id number]]  (Normal) Collected: 23 1820    Specimen: Blood Updated: 23 1901     Potassium 4.4 mmol/L     POC Glucose Once [[id number]]  (Abnormal) Collected: 23 1619    Specimen: Blood Updated: 23 1620     Glucose 141 mg/dL      Comment: Meter: [id number] : [id number] Shon Flores       POC Glucose Once [[id number]]  (Normal) Collected: 23 0805    Specimen: Blood Updated: 23 0809     Glucose 118 mg/dL      Comment: Meter: [id number] : " [id number] [facility]               PHYSICAL EXAM  Left lower extremity: Dressing clean, dry and intact.  Leg lengths symmetric.  Intact EHL, FHL, tibialis anterior, and gastrocsoleus. Sensation intact to light touch to deep peroneal, superficial peroneal, sural, saphenous, tibial nerves. 2+ palpable DP and PT pulses.         S/P total left hip arthroplasty    Gastroesophageal reflux disease    Hyperlipidemia    Essential hypertension    Ischemic cardiomyopathy EF 40% (2019)    Primary osteoarthritis of left hip    Obesity    DM2 (diabetes mellitus, type 2) ([facility])      PLAN / DISPOSITION:  1 Day Post-Op left total hip arthroplasty    Protected weight bearing as tolerated left lower extremity, posterior precautions x6 weeks  Pain control  PT/OT for post op mobilization and medical equipment needs   23 hours perioperative antibiotic prophylaxis   SCD's bilateral lower extremities   Aspirin for DVT prophylaxis   Social work for discharge planning.  Anticipate discharge home today.  Dressing to remain in place for 7 days. May remove on POD#7. If no drainage, may shower on POD#10. No submerging wound in water. If drainage is noted, sterile dressing should be placed and wound checked daily. No showering until wound has remained dry for 72 consecutive hours.   Follow up in 3 weeks for re-assessment.      Future Appointments   Date Time Provider Department Center   2/28/2023 10:00 AM Carmen Justice PA-C [facility]   3/17/2023  8:30 AM Cheo Weiss MD [facility] Matthew R Luckett, MD  02/14/23  07:24 EST

## 2023-02-15 ENCOUNTER — HOME CARE VISIT (OUTPATIENT)
Dept: HOME HEALTH SERVICES | Facility: HOME HEALTHCARE | Age: 68
End: 2023-02-15
Payer: MEDICARE

## 2023-02-15 ENCOUNTER — TRANSITIONAL CARE MANAGEMENT TELEPHONE ENCOUNTER (OUTPATIENT)
Dept: CALL CENTER | Facility: HOSPITAL | Age: 68
End: 2023-02-15
Payer: MEDICARE

## 2023-02-15 VITALS
DIASTOLIC BLOOD PRESSURE: 73 MMHG | OXYGEN SATURATION: 98 % | SYSTOLIC BLOOD PRESSURE: 157 MMHG | RESPIRATION RATE: 16 BRPM | HEART RATE: 72 BPM | TEMPERATURE: 97.9 F

## 2023-02-15 PROCEDURE — G0151 HHCP-SERV OF PT,EA 15 MIN: HCPCS

## 2023-02-15 PROCEDURE — 93297 REM INTERROG DEV EVAL ICPMS: CPT | Performed by: INTERNAL MEDICINE

## 2023-02-15 PROCEDURE — G2066 INTER DEVC REMOTE 30D: HCPCS | Performed by: INTERNAL MEDICINE

## 2023-02-15 RX ORDER — SITAGLIPTIN 100 MG/1
TABLET, FILM COATED ORAL
Qty: 90 TABLET | Refills: 0 | Status: SHIPPED | OUTPATIENT
Start: 2023-02-15

## 2023-02-15 NOTE — OUTREACH NOTE
Prep Survey    Flowsheet Row Responses   [facility] patient discharged from? [place]   Is LACE score < 7 ? Yes   Eligibility [facility]   Date of Admission 02/13/23   Date of Discharge 02/14/23   Discharge Disposition Home or Self Care   Discharge diagnosis s/p Total left hip arhtro   Does the patient have one of the following disease processes/diagnoses(primary or secondary)? Total Joint Replacement   Does the patient have Home health ordered? Yes   What is the Home health agency?   [facility]   Is there a DME ordered? No   Prep survey completed? Yes          SOFIA REDMOND - Registered Nurse

## 2023-02-16 NOTE — HOME HEALTH
SOC Note:    Home Health ordered for: PT    Reason for Hosp/Primary Dx/Co-morbidities: L AYSHA/Gastroesophageal reflux disease, Hyperlipidemia, Essential hypertension, Ischemic cardiomyopathy with EF 40%, DM2    Focus of Care: HHPT 2wk3 for gait training, therapeutic exercise, pt education and HEP instruction related to L AYSHA    Current Functional status/mobility/DME: pt uses FWW for gait in home with CGA; also has BSC    HB status/Living Arrangements: Pt lives with spouse in a one story home, ramp at entry; brother staying with pt to assist temporarily    Skin Integrity/wound status: incision covered by non removable dressing on L hip    Code Status: full code    Fall Risk: high per Tonia    POC confirmed with Dr Weiss via CC on 2/15/23    Plan for next visit: review HEP with pt and caregiver; assess pain

## 2023-02-16 NOTE — CASE COMMUNICATION
Pt admitted to Home Health on 2/15/23 s/p L AYSHA     SOC Note:    Home Health ordered for: PT    Reason for Hosp/Primary Dx/Co-morbidities: L AYSHA/Gastroesophageal reflux disease, Hyperlipidemia, Essential hypertension, Ischemic cardiomyopathy with EF 40%, DM2    Focus of Care: HHPT 2wk3 for gait training, therapeutic exercise, pt education and HEP instruction related to L AYSHA    Current Functional status/mobility/DME: pt uses FWW for gai t in home with CGA; also has BSC    HB status/Living Arrangements: Pt lives with spouse in a one story home, ramp at entry; brother staying with pt to assist temporarily    Skin Integrity/wound status: incision covered by non removable dressing on L hip    Code Status: full code    Fall Risk: high per Tinetti    Review of pt's current medications revealed the following major interactions:  -Coadministration of pantoprazole and clopidogr el may increase the risk of major adverse cardiovascular events.  -Serious hyperkalemia, possibly with cardiac arrhythmias or arrest, may occur with the combination of eplerenone and angiotensin antagonists.    Please let me know if you have questions or concerns!  Chari Alcaraz, PT

## 2023-02-17 ENCOUNTER — HOME CARE VISIT (OUTPATIENT)
Dept: HOME HEALTH SERVICES | Facility: HOME HEALTHCARE | Age: 68
End: 2023-02-17
Payer: MEDICARE

## 2023-02-17 PROCEDURE — G0180 MD CERTIFICATION HHA PATIENT: HCPCS | Performed by: FAMILY MEDICINE

## 2023-02-17 PROCEDURE — G0151 HHCP-SERV OF PT,EA 15 MIN: HCPCS

## 2023-02-18 VITALS
RESPIRATION RATE: 13 BRPM | OXYGEN SATURATION: 95 % | SYSTOLIC BLOOD PRESSURE: 136 MMHG | DIASTOLIC BLOOD PRESSURE: 80 MMHG | TEMPERATURE: 98.8 F | HEART RATE: 76 BPM

## 2023-02-19 NOTE — HOME HEALTH
Routine Visit Note:    Skill/education provided: upgraded HEP, gait training with FWW, ther ex in supine    Patient/caregiver response: pt reports decreased pain this visit, improved gait     Plan for next visit: advance to seated ther ex if tolerated    Other pertinent info: none

## 2023-02-20 ENCOUNTER — HOME CARE VISIT (OUTPATIENT)
Dept: HOME HEALTH SERVICES | Facility: HOME HEALTHCARE | Age: 68
End: 2023-02-20
Payer: MEDICARE

## 2023-02-20 ENCOUNTER — TELEPHONE (OUTPATIENT)
Dept: ORTHOPEDIC SURGERY | Facility: CLINIC | Age: 68
End: 2023-02-20
Payer: MEDICARE

## 2023-02-20 ENCOUNTER — OFFICE VISIT (OUTPATIENT)
Dept: ORTHOPEDIC SURGERY | Facility: CLINIC | Age: 68
End: 2023-02-20
Payer: MEDICARE

## 2023-02-20 VITALS
SYSTOLIC BLOOD PRESSURE: 122 MMHG | OXYGEN SATURATION: 94 % | RESPIRATION RATE: 16 BRPM | HEART RATE: 80 BPM | TEMPERATURE: 98.2 F | DIASTOLIC BLOOD PRESSURE: 76 MMHG

## 2023-02-20 VITALS — TEMPERATURE: 96.9 F

## 2023-02-20 DIAGNOSIS — Z96.642 STATUS POST TOTAL HIP REPLACEMENT, LEFT: Primary | ICD-10-CM

## 2023-02-20 PROCEDURE — G0151 HHCP-SERV OF PT,EA 15 MIN: HCPCS

## 2023-02-20 PROCEDURE — 99024 POSTOP FOLLOW-UP VISIT: CPT | Performed by: PHYSICIAN ASSISTANT

## 2023-02-20 RX ORDER — OXYCODONE HYDROCHLORIDE AND ACETAMINOPHEN 5; 325 MG/1; MG/1
1 TABLET ORAL EVERY 4 HOURS PRN
Qty: 30 TABLET | Refills: 0 | Status: SHIPPED | OUTPATIENT
Start: 2023-02-20

## 2023-02-20 RX ORDER — OXYCODONE AND ACETAMINOPHEN 7.5; 325 MG/1; MG/1
1 TABLET ORAL EVERY 6 HOURS PRN
Qty: 30 TABLET | Refills: 0 | Status: CANCELLED | OUTPATIENT
Start: 2023-02-20

## 2023-02-20 NOTE — PROGRESS NOTES
"    [facility] [place] [facility] Note        Subjective     Post-op (1 week status post Left hip arthroplasty 2/13/23)       HPI    Kai Garcia is a 67 y.o. male.  Patient's physical therapist called this morning concerned that patient might have dislocated his left hip.  He underwent left AYSHA by Dr. Hutchinson on the above-stated date.    According to the therapist patient has an increased amount of pain and unable to direct his toes straight out or straight up depending on if he is standing or sitting.  The only comfortable position for him is keeping his toes turned in.  No reported numbness or tingling.  He is using a walker to assist with ambulation.    Pain scale:7-8/10.  Associated symptoms swelling and giving away.  Pain is worse with walking, standing, sitting.  Ice, medication and lying down do help.    Patient is also concerned because while he was in the hospital he felt a pop in his hip.    States he is only taking the Tylenol because the oxycodone makes him \"too loopy\".  He reports he is icing.    No reported fever.  Notes occasional chills and night sweats.          Objective      Physical Exam  Temp 96.9 °F (36.1 °C)     There is no height or weight on file to calculate BMI.        Ortho Exam  Integument:   Left hip: Incision is healing well without redness, warmth or evidence of infection.  There is some soft tissue swelling and bruising noted especially to the posterior aspect of the incision.    Lower Extremity:   Left hip:    Tenderness:  Generalized tenderness noted over incision and buttock region    Swelling:  Positive    Crepitus:  None    Range of motion:  External Rotation: 25°       Internal Rotation: 15°       Flexion:  90°       Extension:  0°    Deformities:  None  Functional testing: Stinchfield positive for pain noted throughout the hip.    No leg length discrepancy    Ambulating  Minimal flexion noted at the knee as well as allowing leg to internally rotate, toe " in.        Imaging Reviewed:  Ordered left hip plain films.  Imaging read/interpreted by Dr. Hutchinson.    Imaging Results (Last 24 Hours)     Procedure Component Value Units Date/Time    XR Pelvis 1 or 2 View [[id number]] Resulted: 02/20/23 1359     Updated: 02/20/23 1400    Narrative:      Indication: Status post left total hip arthroplasty    Comparison: Todays xrays were compared to previous xrays from 2/13/2023      Impression:           AP pelvis, right hip: mild joint space narrowing, minimal osteophyte   formation and No significant changes compared to prior radiographs.  AP pelvis, left hip: Demonstrate a well positioned total hip without   evidence of wear, loosening, fracture or osteolysis, femoral head is   concentrically reduced within the acetabulum and No significant changes   compared to prior radiographs.          Assessment:  1. Status post total hip replacement, left        Plan:  1. Status post left AYSHA--imaging shows hip is stable and well-positioned.  2. Reviewed imaging.  3. New Covaderm dressing placed over the incision--no evidence of redness, warmth or infection.  4. Patient to continue use of walker as directed.  5. Continue working with PT on range of motion and strengthening as directed.  6. Continue with icing for inflammation and swelling control.  7. Patient reports oxycodone IR makes him loopy but he has taken Percocet in the past which has worked well for him--Dr. Nick able to prescribe Percocet as Dr. Hutchinson is in the OR.  8. Continue sleeping with pillow between legs, to maintain abduction.  9. To keep follow-up appointment on 2/28/2023.  10. Questions and concerns answered.      Lois Li PA-C  02/20/23  14:34 EST      Dictated Utilizing Dragon Dictation.

## 2023-02-20 NOTE — TELEPHONE ENCOUNTER
Pt IS EXPERIENCING PAIN 9/10. Pt HAD SX ON 2/13/23    THERAPIST TOLD Pt IT HIP MAY BE OUT OF PLACE AND MAY NEED A XRAY TO EVAL.

## 2023-02-21 DIAGNOSIS — K21.9 GASTROESOPHAGEAL REFLUX DISEASE WITHOUT ESOPHAGITIS: ICD-10-CM

## 2023-02-21 RX ORDER — PANTOPRAZOLE SODIUM 40 MG/1
TABLET, DELAYED RELEASE ORAL
Qty: 90 TABLET | Refills: 0 | Status: SHIPPED | OUTPATIENT
Start: 2023-02-21

## 2023-02-21 NOTE — HOME HEALTH
"Routine Visit Note:    Skill/education provided: education to pt and spouse re: post op; gait training in home with FWW    Patient/caregiver response: pt reports increased L hip pain this visit, \"feels out of place\"    Plan for next visit: advance ther ex as tolerated, d/c planning    Other pertinent info: PT called office of Dr Hutchinson re: pt c/o pain and feeling L hip \"out of place\"; office to work pt in with PA today for appt"

## 2023-02-22 ENCOUNTER — HOME CARE VISIT (OUTPATIENT)
Dept: HOME HEALTH SERVICES | Facility: HOME HEALTHCARE | Age: 68
End: 2023-02-22
Payer: MEDICARE

## 2023-02-22 NOTE — CASE COMMUNICATION
"HUD SOC – HUDDLE START OF CARE  Discussed pt POC at Case Conference 2/22/23  Caregiver Status: pt lives with spouse  Availability: 24/7  Ability to meet patient's needs: yes  Willingness: yes    Risk for Hospitalization: high    Patient's goal(s): \"to be able to get around without pain\"    Services required to achieve goals: PT    Discipline Assessment Updates: PT 2wk3 for gait training, therapeutic exercise, pt education and HEP instru ction related to L AYSHA     Problems identified: deficits in strength, gait, knowledge of post op precautions, knowledge of HEP    Current conditions: s/p L AYSHA  Medications: reviewed and reconciled    Functional status and safety: CGA for mobility in home with FWW    Self-care: min A    ADLs: min A    Any Duplication of Services: no    Environmental issues/physical environment: one story home, ramp at entry    Devices for care present i n the home: FWW, cane, shower chair"

## 2023-02-23 ENCOUNTER — HOME CARE VISIT (OUTPATIENT)
Dept: HOME HEALTH SERVICES | Facility: HOME HEALTHCARE | Age: 68
End: 2023-02-23
Payer: MEDICARE

## 2023-02-23 VITALS
RESPIRATION RATE: 16 BRPM | HEART RATE: 70 BPM | DIASTOLIC BLOOD PRESSURE: 76 MMHG | OXYGEN SATURATION: 97 % | TEMPERATURE: 96.7 F | SYSTOLIC BLOOD PRESSURE: 132 MMHG

## 2023-02-23 PROCEDURE — G0151 HHCP-SERV OF PT,EA 15 MIN: HCPCS

## 2023-02-24 ENCOUNTER — OFFICE VISIT (OUTPATIENT)
Dept: FAMILY MEDICINE CLINIC | Facility: CLINIC | Age: 68
End: 2023-02-24
Payer: MEDICARE

## 2023-02-24 VITALS
DIASTOLIC BLOOD PRESSURE: 74 MMHG | OXYGEN SATURATION: 96 % | HEART RATE: 76 BPM | BODY MASS INDEX: 34.72 KG/M2 | WEIGHT: 221.2 LBS | HEIGHT: 67 IN | RESPIRATION RATE: 18 BRPM | SYSTOLIC BLOOD PRESSURE: 118 MMHG | TEMPERATURE: 97.5 F

## 2023-02-24 DIAGNOSIS — Z09 HOSPITAL DISCHARGE FOLLOW-UP: ICD-10-CM

## 2023-02-24 DIAGNOSIS — F51.01 PRIMARY INSOMNIA: ICD-10-CM

## 2023-02-24 DIAGNOSIS — Z79.4 TYPE 2 DIABETES MELLITUS WITH HYPERGLYCEMIA, WITH LONG-TERM CURRENT USE OF INSULIN: ICD-10-CM

## 2023-02-24 DIAGNOSIS — M25.552 LEFT HIP PAIN: Primary | ICD-10-CM

## 2023-02-24 DIAGNOSIS — E11.65 TYPE 2 DIABETES MELLITUS WITH HYPERGLYCEMIA, WITH LONG-TERM CURRENT USE OF INSULIN: ICD-10-CM

## 2023-02-24 PROCEDURE — 99213 OFFICE O/P EST LOW 20 MIN: CPT | Performed by: FAMILY MEDICINE

## 2023-02-24 RX ORDER — PEN NEEDLE, DIABETIC 31 GX3/16"
NEEDLE, DISPOSABLE MISCELLANEOUS
Qty: 100 EACH | Refills: 5 | Status: SHIPPED | OUTPATIENT
Start: 2023-02-24

## 2023-02-24 RX ORDER — TRAZODONE HYDROCHLORIDE 100 MG/1
TABLET ORAL
Qty: 90 TABLET | Refills: 0 | Status: SHIPPED | OUTPATIENT
Start: 2023-02-24

## 2023-02-24 NOTE — PROGRESS NOTES
"Chief Complaint  Follow-up (Hospital FU)    Subjective          Kai Av Garcia presents to [facility]  History of Present Illness    The patient presents today accompanied by his wife.    Left hip total arthroplasty  The patient has a lot of pain. He states that when he was in the hospital the first day, his left hip popped. He was unable to turn his left foot the right way. The patient's wife states that his physical therapist told him he does not seem to be aligned when he walks. He went to Dr. Hutchinson's office on Monday, 02/20/2023 and saw the PA, and was told that everything looked perfect on the x-rays. The patient's wife states that the patient was told that his buttock muscles are weak and he needs to retrain his brain. He is unable to sit down or get up easily. He states that he fell in 2019 in the chicken coop. He feels like he has a clubfoot. He is taking Percocet for pain, which does help. He has been icing his hip. He is having physical therapy at home 3 to 4 days a week. He has been using his CPAP machine for 14 hours a day. He denies coughing or shortness of breath.    Review of Systems   HENT: Negative.    Respiratory: Negative.    Cardiovascular: Negative.    Musculoskeletal: Positive for arthralgias.        Objective       Vital Signs:   /74   Pulse 76   Temp 97.5 °F (36.4 °C)   Resp 18   Ht 170.2 cm (67\")   Wt 100 kg (221 lb 3.2 oz)   SpO2 96%   BMI 34.64 kg/m²     Physical Exam  Vitals and nursing note reviewed.   Constitutional:       Appearance: He is well-developed.   HENT:      Head: Normocephalic and atraumatic.      Right Ear: External ear normal.      Left Ear: External ear normal.   Eyes:      Pupils: Pupils are equal, round, and reactive to light.   Cardiovascular:      Rate and Rhythm: Normal rate and regular rhythm.      Heart sounds: Normal heart sounds.   Pulmonary:      Effort: Pulmonary effort is normal. No respiratory distress.      " Breath sounds: Normal breath sounds. No wheezing or rales.   Musculoskeletal:      Comments: Lateral right hip - healing incision. No d/c   Skin:     General: Skin is warm and dry.   Neurological:      Mental Status: He is alert.   Psychiatric:         Behavior: Behavior normal.     He is able to stand up straighter than before surgery.  He is using a walker.    Result Review :                     Assessment and Plan    Diagnoses and all orders for this visit:    1. Left hip pain (Primary)    2. Hospital discharge follow-up          DISCUSSION    Follow-up hospitalization for left hip total replacement. Overall doing well, but having significant pain still at times. X-ray shows normal alignment. We will continue physical therapy. I explained that it may just take more time to get this better since he has had an issue with that left hip for many, many years. He expressed understanding.        Follow Up   Return for Next scheduled follow up.    Patient was given instructions and counseling regarding his condition or for health maintenance advice. Please see specific information pulled into the AVS if appropriate.       Cheo Weiss MD   Transcribed from ambient dictation for Cheo Weiss MD by Maria E Alvarez.  02/24/23   13:22 EST    Patient or patient representative verbalized consent to the visit recording.  I have personally performed the services described in this document as transcribed by the above individual, and it is both accurate and complete.  Cheo Weiss MD  2/24/2023  13:58 EST

## 2023-02-27 ENCOUNTER — HOME CARE VISIT (OUTPATIENT)
Dept: HOME HEALTH SERVICES | Facility: HOME HEALTHCARE | Age: 68
End: 2023-02-27
Payer: MEDICARE

## 2023-02-27 VITALS
OXYGEN SATURATION: 98 % | SYSTOLIC BLOOD PRESSURE: 104 MMHG | DIASTOLIC BLOOD PRESSURE: 74 MMHG | RESPIRATION RATE: 16 BRPM | HEART RATE: 48 BPM | TEMPERATURE: 97.7 F

## 2023-02-27 PROCEDURE — G0151 HHCP-SERV OF PT,EA 15 MIN: HCPCS

## 2023-02-28 ENCOUNTER — OFFICE VISIT (OUTPATIENT)
Dept: ORTHOPEDIC SURGERY | Facility: CLINIC | Age: 68
End: 2023-02-28
Payer: MEDICARE

## 2023-02-28 DIAGNOSIS — Z96.642 STATUS POST TOTAL HIP REPLACEMENT, LEFT: Primary | ICD-10-CM

## 2023-02-28 PROCEDURE — 99024 POSTOP FOLLOW-UP VISIT: CPT

## 2023-02-28 NOTE — HOME HEALTH
Routine Visit Note:    Skill/education provided: gait training with SPC and SBQC in home, d/c planning, fall prevention education    Patient/caregiver response: pt continues to c/o L hip pain with walking and ther ex, has follow up appt with Dr Hutchinson's office tomorrow, 2/28/23    Plan for next visit: d/c to outpatient; upgrade HEP    Other pertinent info: none

## 2023-03-03 ENCOUNTER — HOME CARE VISIT (OUTPATIENT)
Dept: HOME HEALTH SERVICES | Facility: HOME HEALTHCARE | Age: 68
End: 2023-03-03
Payer: MEDICARE

## 2023-03-03 VITALS
DIASTOLIC BLOOD PRESSURE: 76 MMHG | OXYGEN SATURATION: 72 % | TEMPERATURE: 98.6 F | SYSTOLIC BLOOD PRESSURE: 122 MMHG | HEART RATE: 96 BPM | RESPIRATION RATE: 16 BRPM

## 2023-03-03 PROCEDURE — G0151 HHCP-SERV OF PT,EA 15 MIN: HCPCS

## 2023-03-04 NOTE — HOME HEALTH
Routine Visit Note:   Skill/education provided: ther ex in standing, sitting and supine; gait training without AD  Patient/caregiver response: pt continues to report pain in L hip and knee with activity, increases with ER of L hip  Plan for next visit: advance as tolerated  Other pertinent info: pt had f/u with PA for Dr Hutchinson on 2/27, given order to begin outpatient PT 3/13/23

## 2023-03-06 ENCOUNTER — HOME CARE VISIT (OUTPATIENT)
Dept: HOME HEALTH SERVICES | Facility: HOME HEALTHCARE | Age: 68
End: 2023-03-06
Payer: MEDICARE

## 2023-03-06 VITALS
RESPIRATION RATE: 16 BRPM | SYSTOLIC BLOOD PRESSURE: 108 MMHG | DIASTOLIC BLOOD PRESSURE: 64 MMHG | TEMPERATURE: 97.7 F | OXYGEN SATURATION: 96 % | HEART RATE: 82 BPM

## 2023-03-06 PROCEDURE — G0151 HHCP-SERV OF PT,EA 15 MIN: HCPCS

## 2023-03-07 NOTE — HOME HEALTH
Routine Visit Note:   Skill/education provided: ther ex for LE stength and balance; gait training without AD; d/c planning (outpatient to begin week of 3/13)  Patient/caregiver response: pt with improved pain levels; improved gait pattern  Plan for next visit: d/c if appropriate  Other pertinent info: none

## 2023-03-09 ENCOUNTER — HOME CARE VISIT (OUTPATIENT)
Dept: HOME HEALTH SERVICES | Facility: HOME HEALTHCARE | Age: 68
End: 2023-03-09
Payer: MEDICARE

## 2023-03-09 VITALS
TEMPERATURE: 97.5 F | HEART RATE: 92 BPM | RESPIRATION RATE: 16 BRPM | OXYGEN SATURATION: 97 % | DIASTOLIC BLOOD PRESSURE: 78 MMHG | SYSTOLIC BLOOD PRESSURE: 118 MMHG

## 2023-03-09 PROCEDURE — G0151 HHCP-SERV OF PT,EA 15 MIN: HCPCS

## 2023-03-09 NOTE — HOME HEALTH
PT D/C OASIS Visit Note:   Skill/education provided: reviewed HEP with pt;assessment of progress toward goals; completion of D/C OASIS  Patient/caregiver response: pt has met POC goals; in agreement with d/c from  today  Plan for next visit: n/a  Other pertinent info: pt scheduled to begin outpatient PT on 3/13/23

## 2023-03-17 ENCOUNTER — OFFICE VISIT (OUTPATIENT)
Dept: FAMILY MEDICINE CLINIC | Facility: CLINIC | Age: 68
End: 2023-03-17
Payer: MEDICARE

## 2023-03-17 VITALS
SYSTOLIC BLOOD PRESSURE: 116 MMHG | BODY MASS INDEX: 35.31 KG/M2 | DIASTOLIC BLOOD PRESSURE: 70 MMHG | RESPIRATION RATE: 18 BRPM | HEART RATE: 78 BPM | TEMPERATURE: 97.3 F | HEIGHT: 67 IN | WEIGHT: 225 LBS

## 2023-03-17 DIAGNOSIS — Z99.89 OSA ON CPAP: ICD-10-CM

## 2023-03-17 DIAGNOSIS — Z23 NEED FOR PNEUMOCOCCAL VACCINATION: ICD-10-CM

## 2023-03-17 DIAGNOSIS — G47.33 OSA ON CPAP: ICD-10-CM

## 2023-03-17 DIAGNOSIS — Z79.4 TYPE 2 DIABETES MELLITUS WITH HYPERGLYCEMIA, WITH LONG-TERM CURRENT USE OF INSULIN: Primary | ICD-10-CM

## 2023-03-17 DIAGNOSIS — E11.65 TYPE 2 DIABETES MELLITUS WITH HYPERGLYCEMIA, WITH LONG-TERM CURRENT USE OF INSULIN: Primary | ICD-10-CM

## 2023-03-17 DIAGNOSIS — I10 PRIMARY HYPERTENSION: ICD-10-CM

## 2023-03-17 DIAGNOSIS — D64.9 POSTOPERATIVE ANEMIA: ICD-10-CM

## 2023-03-17 DIAGNOSIS — M25.552 LEFT HIP PAIN: ICD-10-CM

## 2023-03-17 PROCEDURE — 99214 OFFICE O/P EST MOD 30 MIN: CPT | Performed by: FAMILY MEDICINE

## 2023-03-17 PROCEDURE — 90677 PCV20 VACCINE IM: CPT | Performed by: FAMILY MEDICINE

## 2023-03-17 PROCEDURE — 1159F MED LIST DOCD IN RCRD: CPT | Performed by: FAMILY MEDICINE

## 2023-03-17 PROCEDURE — 3074F SYST BP LT 130 MM HG: CPT | Performed by: FAMILY MEDICINE

## 2023-03-17 PROCEDURE — 3078F DIAST BP <80 MM HG: CPT | Performed by: FAMILY MEDICINE

## 2023-03-17 PROCEDURE — G0009 ADMIN PNEUMOCOCCAL VACCINE: HCPCS | Performed by: FAMILY MEDICINE

## 2023-03-17 PROCEDURE — 1160F RVW MEDS BY RX/DR IN RCRD: CPT | Performed by: FAMILY MEDICINE

## 2023-03-17 PROCEDURE — 3044F HG A1C LEVEL LT 7.0%: CPT | Performed by: FAMILY MEDICINE

## 2023-03-17 NOTE — PROGRESS NOTES
"Chief Complaint  Diabetes (F/u )    Subjective          Kai Av Garcia presents to [facility]  History of Present Illness    The patient presents today for a followup. He is accompanied by his wife.    Left hip pain  The patient reports that he is doing much better. He states that he still has pain in his hip, knee, and bilateral feet. He reports that he is going to physical therapy 2 times per week. He reports that he has been able to move a lot better.    Sleep apnea  The patient reports that he has a CPAP machine for 2016. He reports that his machine is only 3 years old.    Diabetes  The patient reports that he is checking his blood glucose at home. He reports that it was 111 this morning. He reports that he has been working hard on his diet. He reports that he has not had an eye exam this year.    Hypertension  The patient's blood pressure today is 116/70 mmHg. He denies any chest pain or trouble breathing.    Abdominal pain  He reports that he still has pain in his stomach. He reports that he has not called Dr. Salguero to schedule a colonoscopy. He reports that his last colonoscopy was in 2019. He reports that they found some polyps.    Health maintenance  He reports that he has received both doses of the COVID-19 vaccine. He reports that he has not received the pneumonia vaccine.    Review of Systems   Respiratory: Negative.  Negative for shortness of breath.    Cardiovascular: Negative.  Negative for chest pain.   Gastrointestinal: Positive for abdominal pain (unchanged).   Musculoskeletal: Positive for arthralgias.        Objective       Vital Signs:   /70   Pulse 78   Temp 97.3 °F (36.3 °C)   Resp 18   Ht 170.2 cm (67\")   Wt 102 kg (225 lb)   BMI 35.24 kg/m²     Physical Exam  Vitals and nursing note reviewed.   Constitutional:       Appearance: He is well-developed.   HENT:      Head: Normocephalic and atraumatic.      Right Ear: External ear normal.      Left " Ear: External ear normal.   Eyes:      Pupils: Pupils are equal, round, and reactive to light.   Cardiovascular:      Rate and Rhythm: Normal rate and regular rhythm.      Heart sounds: Normal heart sounds.   Pulmonary:      Effort: Pulmonary effort is normal. No respiratory distress.      Breath sounds: Normal breath sounds. No wheezing or rales.   Skin:     General: Skin is warm and dry.   Neurological:      Mental Status: He is alert and oriented to person, place, and time.   Psychiatric:         Behavior: Behavior normal.     Antalgic gait but much improved since last visit.    Result Review :                     Assessment and Plan    Diagnoses and all orders for this visit:    1. Type 2 diabetes mellitus with hyperglycemia, with long-term current use of insulin (HCC) (Primary)    2. Primary hypertension    3. Postoperative anemia    4. Left hip pain  Comments:  post op pain     5. LUCIAN on CPAP    6. Need for pneumococcal vaccination  -     Pneumococcal Conjugate Vaccine 20-Valent (PCV20)          DISCUSSION    Diabetes mellitus type 2.  Stable.  Last A1c 1 month ago was doing well.  Recheck this in 3 months.    Hypertension.  Blood pressure is doing great.  Continue bisoprolol 10 mg daily and Entresto.    Postoperative anemia.  Recheck CBC at follow-up in 3 months.    Left hip pain.  Postop.  Had left total hip replacement.  Doing well.  He is improving.  Continue therapy.    Obstructive sleep apnea on CPAP.  Uses nightly.  Will let us know when he needs supplies.    Prevnar 20 today.        Follow Up   Return in about 3 months (around 6/17/2023).    Patient was given instructions and counseling regarding his condition or for health maintenance advice. Please see specific information pulled into the AVS if appropriate.       Cheo Weiss MD     Transcribed from ambient dictation for Cheo Weiss MD by Zoila Abdalla.  03/17/23   09:46 EDT    Patient or patient representative verbalized consent to the  visit recording.  I have personally performed the services described in this document as transcribed by the above individual, and it is both accurate and complete.  Cheo Weiss MD  3/17/2023  17:59 EDT

## 2023-03-23 ENCOUNTER — OFFICE VISIT (OUTPATIENT)
Dept: ORTHOPEDIC SURGERY | Facility: CLINIC | Age: 68
End: 2023-03-23
Payer: MEDICARE

## 2023-03-23 DIAGNOSIS — Z96.642 STATUS POST TOTAL HIP REPLACEMENT, LEFT: Primary | ICD-10-CM

## 2023-03-23 PROCEDURE — 99024 POSTOP FOLLOW-UP VISIT: CPT | Performed by: ORTHOPAEDIC SURGERY

## 2023-03-23 NOTE — PROGRESS NOTES
Orthopaedic Clinic Note:  Hip Post Op    Chief Complaint   Patient presents with   • Post-op     3 week follow up; 5.5 weeks status post Left hip arthroplasty 2/13/23        HPI    Mr. Garcia is 6  week(s) s/p left total hip arthroplasty. Rates pain 4/10. He is ambulating with no assistive device and is taking Tylenol for pain control. He denies fevers, chills, or constitutional symptoms. He is continuing outpatient PT. Patient is improving overall.  Denies complications.  Overall he is improving.    I have reviewed the following portions of the patient's history:History of Present Illness    Past Medical History:   Diagnosis Date   • Arthritis of back 2015   • Back pain    • Cataract     needs surgery    • Chest pain     Atypical chest pain, noncardiac.  Suspect either musculoskeletal versus gastrointestinal   • CHF (congestive heart failure) ([facility])    • Chronic low back pain     /multiple lumbar surgeries, currently disabled: L4-L5 discectomy in 2015.    • Clotting disorder ([facility])    • Coronary artery disease    • Diabetes mellitus ([facility])     type 2, diagnosed withing past 6months, checks fsbg qam, last a1c 8.4 7-19-18   • Dissecting aortic aneurysm ([facility])    • Diverticulosis    • Dyslipidemia    • Elevated cholesterol    • Elevated liver function tests    • Fracture, finger 1976   • GERD (gastroesophageal reflux disease)    • Heart murmur    • Hip arthrosis 2019   • History of bleeding peptic ulcer    • History of sepsis 03/2021    UTI that caused sepsis    • History of transfusion 2006    no reaction    • Hyperlipidemia    • Hypertension    • Knee swelling 2019   • Left leg pain    • Low back strain 2002   • Lumbar pain    • Lumbosacral disc disease 2002   • MI (myocardial infarction) ([facility]) 2007   • Morbid obesity ([facility])    • LUCIAN on CPAP     setting 12   • Severe obstructive sleep apnea    • Shortness of breath    • Temporary low platelet count ([facility]) 03/2021    due to sepsis    • Wears glasses    • Wears glasses        Past Surgical History:   Procedure Laterality Date   • APPENDECTOMY     • ARTERIOGRAM AORTIC  2006    aortic disection with elephant trunk procedure   • BACK SURGERY  ,2015   • CARDIAC CATHETERIZATION N/A 2018    Procedure: Left Heart Cath;  Surgeon: Uri Willis MD;  Location:  [facility];  Service: Cardiovascular   • CARDIAC CATHETERIZATION  2018   • CARDIAC ELECTROPHYSIOLOGY PROCEDURE N/A 2018    Procedure: Biv ICD Implant w/limited echo on arrival;  Surgeon: Jose Fox DO;  Location:  [facility];  Service: Cardiology   • CARDIAC SURGERY  2006   • COLONOSCOPY     • COLONOSCOPY N/A 2019    Procedure: COLONOSCOPY;  Surgeon: Meir Salguero MD;  Location:  [facility];  Service: Gastroenterology   • CORONARY ANGIOPLASTY WITH STENT PLACEMENT  2007    x 2   • CORONARY ARTERY BYPASS GRAFT  2006    2 grafts    • LUMBAR DISCECTOMY     • LUMBAR DISCECTOMY FUSION INSTRUMENTATION N/A 10/27/2021    Procedure: LUMBAR FUSION L4-5, L5-S1;  Surgeon: Fer Faulkner MD;  Location:  [facility];  Service: Orthopedic Spine;  Laterality: N/A;   • LUMBAR FUSION     • TOTAL HIP ARTHROPLASTY Left 2023    Procedure: TOTAL HIP ARTHROPLASTY - LEFT;  Surgeon: Romero Hutchinson MD;  Location:  [facility];  Service: Orthopedics;  Laterality: Left;      Family History   Problem Relation Age of Onset   • Coronary artery disease Other    • Rheum arthritis Other    • Heart disease Mother         afib   • Rheum arthritis Mother    • Heart disease Father    • Rheum arthritis Father    • Hyperlipidemia Sister    • Mental illness Brother      Social History     Socioeconomic History   • Marital status:    Tobacco Use   • Smoking status: Former     Packs/day: 3.00     Years: 35.00     Pack years: 105.00     Types: Cigarettes     Quit date: 6/15/2006     Years since quittin.7   • Smokeless tobacco: Never   Vaping Use   • Vaping Use: Never used    Substance and Sexual Activity   • Alcohol use: No   • Drug use: No   • Sexual activity: Not Currently     Partners: Female      Current Outpatient Medications on File Prior to Visit   Medication Sig Dispense Refill   • Accu-Chek Softclix Lancets lancets Check sugars bid 100 each 5   • acetaminophen (TYLENOL) 500 MG tablet Take 2 tablets by mouth Every 6 (Six) Hours As Needed for Mild Pain. Indications: Pain     • aspirin 81 MG EC tablet Take 1 tablet by mouth Daily.     • bisoprolol (ZEBeta) 10 MG tablet Take 1 tablet by mouth Daily. 90 tablet 3   • clopidogrel (PLAVIX) 75 MG tablet Take 1 tablet by mouth Daily. PT TO STOP TAKING PLAVIX 1 WEEK PRIOR TO SURGERY PER DR. BROOKS'S OFFICE. (Patient taking differently: Take 1 tablet by mouth Daily. Indications: Disease involving a Thrombosis or an Embolism) 90 tablet 3   • Easy Touch Pen Needles 31G X 5 MM misc USE DAILY WITH INSULIN 100 each 5   • eplerenone (INSPRA) 25 MG tablet Take 1 tablet by mouth Daily. 90 tablet 3   • glucose blood (Accu-Chek Jodie Plus) test strip Check sugars bid 100 each 5   • Insulin Glargine (Lantus SoloStar) 100 UNIT/ML injection pen Inject 20 Units under the skin into the appropriate area as directed Daily.     • Januvia 100 MG tablet TAKE ONE TABLET BY MOUTH EVERY DAY 90 tablet 0   • nitroglycerin (NITROSTAT) 0.4 MG SL tablet Place 1 tablet under the tongue Every 5 (Five) Minutes As Needed for Chest Pain. Take no more than 3 doses in 15 minutes. 25 tablet 5   • oxyCODONE-acetaminophen (PERCOCET) 5-325 MG per tablet Take 1 tablet by mouth Every 4 (Four) Hours As Needed for Moderate Pain or Severe Pain. 30 tablet 0   • pantoprazole (PROTONIX) 40 MG EC tablet TAKE ONE TABLET BY MOUTH EVERY DAY 90 tablet 0   • rosuvastatin (CRESTOR) 10 MG tablet Take 1 tablet by mouth Daily. 90 tablet 3   • sacubitril-valsartan (Entresto) 49-51 MG tablet Take 1 tablet by mouth Every 12 (Twelve) Hours. 180 tablet 3   • traZODone (DESYREL) 100 MG tablet TAKE  ONE TABLET BY MOUTH EVERY NIGHT AT BEDTIME 90 tablet 0     No current facility-administered medications on file prior to visit.      Allergies   Allergen Reactions   • Ativan [Lorazepam] Mental Status Change     agitation   • Zetia [Ezetimibe] Other (See Comments)     increased LFTs   • Zocor [Simvastatin] Myalgia   • Isosorbide Other (See Comments)     headaches   • Jardiance [Empagliflozin] Other (See Comments)     Blood in urine and burning sensation   • Metformin And Related Diarrhea        Review of Systems   Constitutional: Negative.    HENT: Negative.    Eyes: Negative.    Respiratory: Negative.    Cardiovascular: Negative.    Gastrointestinal: Negative.    Endocrine: Negative.    Genitourinary: Negative.    Musculoskeletal: Positive for arthralgias.   Skin: Negative.    Allergic/Immunologic: Negative.    Neurological: Negative.    Hematological: Negative.    Psychiatric/Behavioral: Negative.         Physical Exam  There were no vitals taken for this visit.    There is no height or weight on file to calculate BMI.    GENERAL APPEARANCE: awake, alert, oriented, in no acute distress and well developed, well nourished  LUNGS:  breathing nonlabored  EXTREMITIES: no clubbing, cyanosis  PERIPHERAL PULSES: palpable dorsalis pedis and posterior tibial pulses bilaterally.    GAIT:  Normal            Hip Exam:  Left    RANGE OF MOTION:  EXTENSION/FLEXION:  normal (0-110 degrees)  IR:  20  ER:  40  PAIN WITH HIP MOTION:  no  PAIN WITH LOGROLL:  no     STRENGTH:  ABDUCTOR:  4/5  ADDUCTOR:  4/5  HIP FLEXION:  4/5    GREATER TROCHANTER BURSAL PAIN:  yes    SENSATION TO LIGHT TOUCH:  DEEP PERONEAL/SUPERFICIAL PERONEAL/SURAL/SAPHENOUS/TIBIAL:   intact    EDEMA:  no  ERYTHEMA:  no  WOUNDS/INCISIONS:   yes, well healed surgical incision without evidence of erythema or drainage  _______________________________________________________________  _______________________________________________________________    RADIOGRAPHIC  FINDINGS:   Indication: Status post left total hip arthroplasty    Comparison: Todays xrays were compared to previous xrays from 2/20/2023    AP pelvis: Right: mild joint space narrowing, minimal osteophyte formation and No significant changes compared to prior radiographs.;Left: Demonstrate a well positioned total hip without evidence of wear, loosening, fracture or osteolysis, femoral head is concentrically reduced within the acetabulum and No significant changes compared to prior radiographs.        Assessment/Plan:   Diagnosis Plan   1. Status post total hip replacement, left  XR Pelvis 1 or 2 View        Patient is doing well 6 weeks status post left total hip arthroplasty.  Encouraged him continue working on gait training and strengthening.  Specifically he needs to work on his external rotators as those appear to be extremely weak today.  Overall he is doing much better than presurgical.  Continue strengthening and gait training.  Follow-up in 2 months for repeat assessment x-ray P pelvis    Romero Hutchinson MD  03/23/23  09:37 EDT

## 2023-04-03 DIAGNOSIS — E11.65 TYPE 2 DIABETES MELLITUS WITH HYPERGLYCEMIA, WITH LONG-TERM CURRENT USE OF INSULIN: ICD-10-CM

## 2023-04-03 DIAGNOSIS — Z79.4 TYPE 2 DIABETES MELLITUS WITH HYPERGLYCEMIA, WITH LONG-TERM CURRENT USE OF INSULIN: ICD-10-CM

## 2023-04-03 RX ORDER — LANCETS
EACH MISCELLANEOUS
Qty: 100 EACH | Refills: 11 | Status: SHIPPED | OUTPATIENT
Start: 2023-04-03

## 2023-05-07 PROCEDURE — 93296 REM INTERROG EVL PM/IDS: CPT | Performed by: INTERNAL MEDICINE

## 2023-05-07 PROCEDURE — 93295 DEV INTERROG REMOTE 1/2/MLT: CPT | Performed by: INTERNAL MEDICINE

## 2023-05-15 DIAGNOSIS — E11.65 TYPE 2 DIABETES MELLITUS WITH HYPERGLYCEMIA, WITH LONG-TERM CURRENT USE OF INSULIN: ICD-10-CM

## 2023-05-15 DIAGNOSIS — F51.01 PRIMARY INSOMNIA: ICD-10-CM

## 2023-05-15 DIAGNOSIS — K21.9 GASTROESOPHAGEAL REFLUX DISEASE WITHOUT ESOPHAGITIS: ICD-10-CM

## 2023-05-15 DIAGNOSIS — Z79.4 TYPE 2 DIABETES MELLITUS WITH HYPERGLYCEMIA, WITH LONG-TERM CURRENT USE OF INSULIN: ICD-10-CM

## 2023-05-15 RX ORDER — PANTOPRAZOLE SODIUM 40 MG/1
TABLET, DELAYED RELEASE ORAL
Qty: 90 TABLET | Refills: 0 | Status: SHIPPED | OUTPATIENT
Start: 2023-05-15

## 2023-05-15 RX ORDER — SITAGLIPTIN 100 MG/1
TABLET, FILM COATED ORAL
Qty: 90 TABLET | Refills: 0 | Status: SHIPPED | OUTPATIENT
Start: 2023-05-15

## 2023-05-15 RX ORDER — TRAZODONE HYDROCHLORIDE 100 MG/1
TABLET ORAL
Qty: 90 TABLET | Refills: 0 | Status: SHIPPED | OUTPATIENT
Start: 2023-05-15

## 2023-05-16 ENCOUNTER — OFFICE VISIT (OUTPATIENT)
Dept: ORTHOPEDIC SURGERY | Facility: CLINIC | Age: 68
End: 2023-05-16
Payer: MEDICARE

## 2023-05-16 VITALS
BODY MASS INDEX: 35.31 KG/M2 | HEIGHT: 67 IN | DIASTOLIC BLOOD PRESSURE: 82 MMHG | WEIGHT: 225 LBS | SYSTOLIC BLOOD PRESSURE: 126 MMHG

## 2023-05-16 DIAGNOSIS — Z96.642 STATUS POST TOTAL HIP REPLACEMENT, LEFT: Primary | ICD-10-CM

## 2023-05-16 DIAGNOSIS — M76.12 PSOAS TENDINITIS OF LEFT SIDE: ICD-10-CM

## 2023-05-16 RX ORDER — AMOXICILLIN 500 MG/1
2000 TABLET, FILM COATED ORAL ONCE
Qty: 4 TABLET | Refills: 1 | Status: SHIPPED | OUTPATIENT
Start: 2023-05-16 | End: 2023-05-16

## 2023-05-16 NOTE — PROGRESS NOTES
Orthopaedic Clinic Note: Hip Established Patient    Chief Complaint   Patient presents with   • Follow-up     1.5 month follow up - 3 months S/P total hip replacement, left - DOS: 2/13/23        HPI    It has been 6  week(s) since Mr. Garcia's last visit. He returns to clinic today for follow-up left total hip arthroplasty.  He is 3 months out from surgery.  Rates his pain 2/10 on the pain scale.  He is ambulating with no assistive device.  He is primarily complaining of discomfort with climbing stairs.  Denies any pain when walking on level surfaces.  Overall he is happy with his outcome.    Past Medical History:   Diagnosis Date   • Arthritis of back 2015   • Back pain    • Cataract     needs surgery    • Chest pain     Atypical chest pain, noncardiac.  Suspect either musculoskeletal versus gastrointestinal   • CHF (congestive heart failure)    • Chronic low back pain     /multiple lumbar surgeries, currently disabled: L4-L5 discectomy in 2015.    • Clotting disorder    • Coronary artery disease    • Diabetes mellitus     type 2, diagnosed withing past 6months, checks fsbg qam, last a1c 8.4 7-19-18   • Dissecting aortic aneurysm    • Diverticulosis    • Dyslipidemia    • Elevated cholesterol    • Elevated liver function tests    • Fracture, finger 1976   • GERD (gastroesophageal reflux disease)    • Heart murmur    • Hip arthrosis 2019   • History of bleeding peptic ulcer    • History of sepsis 03/2021    UTI that caused sepsis    • History of transfusion 2006    no reaction    • Hyperlipidemia    • Hypertension    • Knee swelling 2019   • Left leg pain    • Low back strain 2002   • Lumbar pain    • Lumbosacral disc disease 2002   • MI (myocardial infarction) 2007   • Morbid obesity    • LUCIAN on CPAP     setting 12   • Severe obstructive sleep apnea    • Shortness of breath    • Temporary low platelet count 03/2021    due to sepsis    • Wears glasses    • Wears glasses       Past Surgical History:   Procedure  Laterality Date   • APPENDECTOMY     • ARTERIOGRAM AORTIC      aortic disection with elephant trunk procedure   • BACK SURGERY  ,2015   • CARDIAC CATHETERIZATION N/A 2018    Procedure: Left Heart Cath;  Surgeon: Uri Willis MD;  Location:  [facility];  Service: Cardiovascular   • CARDIAC CATHETERIZATION  2018   • CARDIAC ELECTROPHYSIOLOGY PROCEDURE N/A 2018    Procedure: Biv ICD Implant w/limited echo on arrival;  Surgeon: Jose Fox DO;  Location:  [facility];  Service: Cardiology   • CARDIAC SURGERY  2006   • COLONOSCOPY     • COLONOSCOPY N/A 2019    Procedure: COLONOSCOPY;  Surgeon: Meir Salguero MD;  Location:  [facility];  Service: Gastroenterology   • CORONARY ANGIOPLASTY WITH STENT PLACEMENT  2007    x 2   • CORONARY ARTERY BYPASS GRAFT  2006    2 grafts    • HIP SURGERY     • LUMBAR DISCECTOMY     • LUMBAR DISCECTOMY FUSION INSTRUMENTATION N/A 10/27/2021    Procedure: LUMBAR FUSION L4-5, L5-S1;  Surgeon: Fer Faulkner MD;  Location:  [facility];  Service: Orthopedic Spine;  Laterality: N/A;   • LUMBAR FUSION     • TOTAL HIP ARTHROPLASTY Left 2023    Procedure: TOTAL HIP ARTHROPLASTY - LEFT;  Surgeon: Romero Hutchinson MD;  Location:  [facility];  Service: Orthopedics;  Laterality: Left;      Family History   Problem Relation Age of Onset   • Coronary artery disease Other    • Rheum arthritis Other    • Heart disease Mother         afib   • Rheum arthritis Mother    • Heart disease Father    • Rheum arthritis Father    • Hyperlipidemia Sister    • Mental illness Brother      Social History     Socioeconomic History   • Marital status:    Tobacco Use   • Smoking status: Former     Packs/day: 3.00     Years: 35.00     Pack years: 105.00     Types: Cigarettes     Quit date: 6/15/2006     Years since quittin.9   • Smokeless tobacco: Never   Vaping Use   • Vaping Use: Never used   Substance and Sexual  Activity   • Alcohol use: No   • Drug use: No   • Sexual activity: Not Currently     Partners: Female      Current Outpatient Medications on File Prior to Visit   Medication Sig Dispense Refill   • Accu-Chek Softclix Lancets lancets USE TO CHECK BLOOD SUGAR 2 TIMES A  each 11   • acetaminophen (TYLENOL) 500 MG tablet Take 2 tablets by mouth Every 6 (Six) Hours As Needed for Mild Pain. Indications: Pain     • aspirin 81 MG EC tablet Take 1 tablet by mouth Daily.     • bisoprolol (ZEBeta) 10 MG tablet Take 1 tablet by mouth Daily. 90 tablet 3   • clopidogrel (PLAVIX) 75 MG tablet Take 1 tablet by mouth Daily. PT TO STOP TAKING PLAVIX 1 WEEK PRIOR TO SURGERY PER DR. BROOKS'S OFFICE. (Patient taking differently: Take 1 tablet by mouth Daily. Indications: Disease involving a Thrombosis or an Embolism) 90 tablet 3   • Easy Touch Pen Needles 31G X 5 MM misc USE DAILY WITH INSULIN 100 each 5   • eplerenone (INSPRA) 25 MG tablet Take 1 tablet by mouth Daily. 90 tablet 3   • glucose blood (Accu-Chek Jodie Plus) test strip USE TO TEST BLOOD SUGAR 2 TIMES A  each 11   • Insulin Glargine (Lantus SoloStar) 100 UNIT/ML injection pen Inject 20 Units under the skin into the appropriate area as directed Daily.     • Januvia 100 MG tablet TAKE ONE TABLET BY MOUTH EVERY DAY 90 tablet 0   • nitroglycerin (NITROSTAT) 0.4 MG SL tablet Place 1 tablet under the tongue Every 5 (Five) Minutes As Needed for Chest Pain. Take no more than 3 doses in 15 minutes. 25 tablet 5   • oxyCODONE-acetaminophen (PERCOCET) 5-325 MG per tablet Take 1 tablet by mouth Every 4 (Four) Hours As Needed for Moderate Pain or Severe Pain. (Patient taking differently: Take 1 tablet by mouth As Needed for Moderate Pain or Severe Pain. Indications: Pain) 30 tablet 0   • pantoprazole (PROTONIX) 40 MG EC tablet TAKE ONE TABLET BY MOUTH EVERY DAY 90 tablet 0   • rosuvastatin (CRESTOR) 10 MG tablet Take 1 tablet by mouth Daily. 90 tablet 3   •  "sacubitril-valsartan (Entresto) 49-51 MG tablet Take 1 tablet by mouth Every 12 (Twelve) Hours. 180 tablet 3   • traZODone (DESYREL) 100 MG tablet TAKE ONE TABLET BY MOUTH EVERY NIGHT AT BEDTIME 90 tablet 0     No current facility-administered medications on file prior to visit.      Allergies   Allergen Reactions   • Ativan [Lorazepam] Mental Status Change     agitation   • Zetia [Ezetimibe] Other (See Comments)     increased LFTs   • Zocor [Simvastatin] Myalgia   • Isosorbide Other (See Comments)     headaches   • Jardiance [Empagliflozin] Other (See Comments)     Blood in urine and burning sensation   • Metformin And Related Diarrhea        Review of Systems   Constitutional: Negative.    HENT: Negative.    Eyes: Negative.    Respiratory: Negative.    Cardiovascular: Negative.    Gastrointestinal: Negative.    Endocrine: Negative.    Genitourinary: Negative.    Musculoskeletal: Positive for arthralgias.   Skin: Negative.    Allergic/Immunologic: Negative.    Neurological: Negative.    Hematological: Negative.    Psychiatric/Behavioral: Negative.         The patient's Review of Systems was personally reviewed and confirmed as accurate.    Physical Exam  Blood pressure 126/82, height 170.2 cm (67.01\"), weight 102 kg (225 lb).    Body mass index is 35.23 kg/m².    GENERAL APPEARANCE: awake, alert, oriented, in no acute distress and well developed, well nourished  LUNGS:  breathing nonlabored  EXTREMITIES: no clubbing, cyanosis  PERIPHERAL PULSES: palpable dorsalis pedis and posterior tibial pulses bilaterally.    GAIT:  Normal            Hip Exam:  Left     RANGE OF MOTION:  EXTENSION/FLEXION:  normal (0-110 degrees)  IR:  20  ER:  40  PAIN WITH HIP MOTION:   Hip pain with flexion only actively.  No pain with passive hip flexion  PAIN WITH LOGROLL:  no      STRENGTH:  ABDUCTOR:    5/5  ADDUCTOR:  5/5  HIP FLEXION:  5/5     GREATER TROCHANTER BURSAL PAIN:   No    SENSATION TO LIGHT TOUCH:  DEEP PERONEAL/SUPERFICIAL " PERONEAL/SURAL/SAPHENOUS/TIBIAL:   intact    EDEMA:  no  ERYTHEMA:  no  WOUNDS/INCISIONS:   yes, well healed surgical incision without evidence of erythema or drainage  _________________________________________________________________  _________________________________________________________________    RADIOGRAPHIC FINDINGS:   Indication: Status post left total hip arthroplasty    Comparison: Todays xrays were compared to previous xrays from 3/23/2023    AP pelvis: Right: mild joint space narrowing, minimal osteophyte formation and No significant changes compared to prior radiographs.;Left: Demonstrate a well positioned total hip without evidence of wear, loosening, fracture or osteolysis, femoral head is concentrically reduced within the acetabulum and No significant changes compared to prior radiographs.      Assessment/Plan:   Diagnosis Plan   1. Status post total hip replacement, left  XR Pelvis 1 or 2 View    amoxicillin (AMOXIL) 500 MG tablet      2. Psoas tendinitis of left side          Patient is doing well 3-month status post left total hip arthroplasty.  He does have some residual discomfort with hip flexion secondary to psoas tendinitis.  Explained this should gradually improve.  Recommend over-the-counter anti-inflammatories for this.  Continued therapy and stretching exercises.  I will send him for prophylactic dental antibiotic in preparation for his dental appointment.  Follow-up in 9 months for repeat evaluation with x-ray AP pelvis.    Romero Hutchinson MD  05/16/23  10:27 EDT

## 2023-07-20 ENCOUNTER — TELEPHONE (OUTPATIENT)
Dept: FAMILY MEDICINE CLINIC | Facility: CLINIC | Age: 68
End: 2023-07-20
Payer: MEDICARE

## 2023-07-20 NOTE — TELEPHONE ENCOUNTER
"Caller: Kai Garcia \"BJ\"    Relationship: Self    Best call back number: [phone]    What are you requesting: C-PAP SUPPLIES    If a prescription is needed, what is your preferred pharmacy and phone number:      [facility]    Additional notes:        "

## 2023-07-24 NOTE — TELEPHONE ENCOUNTER
PATIENTS WIFE IS CALLING TO GET AN UPDATE ON THE PATIENTS CPAP SUPPLIES. PLEASE CALL BACK WITH AN UPDATE WHEN POSSIBLE.

## 2023-07-24 NOTE — TELEPHONE ENCOUNTER
Order has been printed.  Please fax to [facility].  Placed in Lolita's tray.  Let patient's wife know please.

## 2023-08-06 PROCEDURE — 93296 REM INTERROG EVL PM/IDS: CPT | Performed by: INTERNAL MEDICINE

## 2023-08-06 PROCEDURE — 93295 DEV INTERROG REMOTE 1/2/MLT: CPT | Performed by: INTERNAL MEDICINE

## 2023-08-08 NOTE — PROGRESS NOTES
"Subjective:     Encounter Date:08/09/2023    Primary Care Physician: Cheo Weiss MD      Patient ID: Kai Garcia is a 67 y.o. male.    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 Helton, stage I in June 2006 and stage II in March 2007.   Followed chronically with annual CTs by Dr. Helton. Stable by CT 2019  Coronary artery disease:  Cardiac cath prior to "elephant trunk" procedure in June 2006 and the patient underwent CABG at that time consisting of SVG to the OM and SVG to the PDA.   02/08/2007 - inferoposterior MI, cardiac cath showed 100% left circumflex with severe OM-1 stenosis and 100% vein graft to the left circumflex. RCA was diffuse, 90% ostial stenosis at the SVG to the RCA. LAD had only 40% stenosis.   Cardiac cath in March 2007 due to recurrent angina - 2.5 Microdriver stent to the distal native RCA.   4/27/18 cardiac catheter occluded vein grafts x2.  Large posterior basal aneurysm with ejection fraction of 30-35%.  Severe stenosis and distal circumflex which supplies aneurysm.  Non-flow-limiting RCA disease.  MRI 8/18 LVEF 28%  S/P Bi- Ventricular ICD- [facility] 8/9/18, EMILIE Fox MD  Dyslipidemia.   Hypertension.   Diabetes  Chronic low back pain/multiple lumbar surgeries, currently disabled:  L4-L5 discectomy in 2015.   History of bleeding peptic ulcer disease.   Severe obstructive sleep apnea.   Appendectomy  Lumbar fusion 10/2021  Left hip replacement 2023        Allergies   Allergen Reactions    Ativan [Lorazepam] Mental Status Change     agitation    Zetia [Ezetimibe] Other (See Comments)     increased LFTs    Zocor [Simvastatin] Myalgia    Isosorbide Other (See Comments)     headaches    Jardiance [Empagliflozin] Other (See Comments)     Blood in urine and burning sensation    " Metformin And Related Diarrhea         Current Outpatient Medications:     Accu-Chek Softclix Lancets lancets, USE TO CHECK BLOOD SUGAR 2 TIMES A DAY, Disp: 100 each, Rfl: 11    acetaminophen (TYLENOL) 500 MG tablet, Take 2 tablets by mouth Every 6 (Six) Hours As Needed for Mild Pain. Indications: Pain, Disp: , Rfl:     amoxicillin (AMOXIL) 500 MG capsule, , Disp: , Rfl:     aspirin 81 MG EC tablet, Take 1 tablet by mouth Daily., Disp: , Rfl:     bisoprolol (ZEBeta) 10 MG tablet, Take 1 tablet by mouth Daily., Disp: 90 tablet, Rfl: 3    clopidogrel (PLAVIX) 75 MG tablet, Take 1 tablet by mouth Daily. PT TO STOP TAKING PLAVIX 1 WEEK PRIOR TO SURGERY PER DR. BROOKS'S OFFICE. (Patient taking differently: Take 1 tablet by mouth Daily. Indications: Disease involving a Thrombosis or an Embolism), Disp: 90 tablet, Rfl: 3    Easy Touch Pen Needles 31G X 5 MM misc, USE DAILY WITH INSULIN, Disp: 100 each, Rfl: 5    eplerenone (INSPRA) 25 MG tablet, Take 1 tablet by mouth Daily., Disp: 90 tablet, Rfl: 3    glucose blood (Accu-Chek Jodie Plus) test strip, USE TO TEST BLOOD SUGAR 2 TIMES A DAY, Disp: 100 each, Rfl: 11    Insulin Glargine (Lantus SoloStar) 100 UNIT/ML injection pen, Inject 20 Units under the skin into the appropriate area as directed Daily., Disp: , Rfl:     Januvia 100 MG tablet, TAKE ONE TABLET BY MOUTH EVERY DAY, Disp: 90 tablet, Rfl: 0    nitroglycerin (NITROSTAT) 0.4 MG SL tablet, Place 1 tablet under the tongue Every 5 (Five) Minutes As Needed for Chest Pain. Take no more than 3 doses in 15 minutes., Disp: 25 tablet, Rfl: 5    pantoprazole (PROTONIX) 40 MG EC tablet, TAKE ONE TABLET BY MOUTH EVERY DAY, Disp: 90 tablet, Rfl: 0    rosuvastatin (CRESTOR) 10 MG tablet, Take 1 tablet by mouth Daily., Disp: 90 tablet, Rfl: 3    sacubitril-valsartan (Entresto) 49-51 MG tablet, Take 1 tablet by mouth Every 12 (Twelve) Hours., Disp: 180 tablet, Rfl: 3    sildenafil (Viagra) 100 MG tablet, Take 1 tablet by mouth  "Daily As Needed for Erectile Dysfunction., Disp: 10 tablet, Rfl: 2    traZODone (DESYREL) 100 MG tablet, TAKE ONE TABLET BY MOUTH EVERY NIGHT AT BEDTIME, Disp: 90 tablet, Rfl: 0        History of Present Illness    -Today for 6-month follow-up of coronary artery disease, aortic dissection, chronic, and device check.  Since our last visit patient underwent successful left hip replacement, however he notes he has a difficulty walking due to hip pain as he notes his leg is slightly longer than it was previously.  He is nonetheless walking approximately 2 miles daily.  Denies chest pain shortness of breath orthopnea PND tachypalpitations or presyncope.    The following portions of the patient's history were reviewed and updated as appropriate: allergies, current medications, past family history, past medical history, past social history, past surgical history and problem list.      Social History     Tobacco Use    Smoking status: Former     Packs/day: 3.00     Years: 35.00     Pack years: 105.00     Types: Cigarettes     Quit date: 6/15/2006     Years since quittin.1    Smokeless tobacco: Never   Vaping Use    Vaping Use: Never used   Substance Use Topics    Alcohol use: No    Drug use: No         ROS       Objective:   /64 (BP Location: Left arm, Patient Position: Sitting)   Pulse 96   Ht 170.2 cm (67\")   Wt 102 kg (225 lb 4.8 oz)   SpO2 96%   BMI 35.29 kg/mý         Vitals reviewed.   Constitutional:       Appearance: Well-developed and not in distress.   Neck:      Vascular: No JVD.      Trachea: No tracheal deviation.   Pulmonary:      Effort: Pulmonary effort is normal.      Breath sounds: Normal breath sounds.   Cardiovascular:      Normal rate. Regular rhythm.   Edema:     Peripheral edema absent.   Abdominal:      Tenderness: There is no abdominal tenderness.   Musculoskeletal:         General: No deformity. Skin:     General: Skin is warm and dry.   Neurological:      Mental Status: Alert and " oriented to person, place, and time.       Procedures  Device check:  1% atrially paced 99% biventricular pacing.  Normal thresholds impedances throughout.  2 mode switches representing atrial tach/A-fib both less than 1 minute duration.  Estimated battery 6.5 years.  No therapies delivered.        Assessment:   Assessment & Plan      Diagnoses and all orders for this visit:    1. Coronary artery disease involving native coronary artery of native heart without angina pectoris (Primary)      1.  Coronary artery disease, no angina  2.  Ischemic cardiomyopathy with LV posterior aneurysm.  LVEF less than 30%.  Currently volume is normal.  Functional class I-2.  3.  Chronic dissecting aortic aneurysm status post elephant trunk procedure 2006 and 2007.  Has been stable  4.  Normal functioning biventricular ICD  5.  Hypertension well-controlled  6.  Dyslipidemia on high intensity statin.  LDL of 62    Recommendations:  1.  Continue current medical therapy  2.  Continue lifelong ARNI and beta-blocker due to cardiomyopathy as well as chronic dissection which is followed by Dr. Cavazos  3.  Revisit 6 months with a device check         Advance Care Planning   ACP discussion was held with the patient during this visit. Patient has an advance directive in EMR which is still valid.       Uri Willis MD    Dictated utilizing Dragon dictation

## 2023-08-09 ENCOUNTER — OFFICE VISIT (OUTPATIENT)
Dept: CARDIOLOGY | Facility: CLINIC | Age: 68
End: 2023-08-09
Payer: MEDICARE

## 2023-08-09 VITALS
DIASTOLIC BLOOD PRESSURE: 64 MMHG | WEIGHT: 225.3 LBS | SYSTOLIC BLOOD PRESSURE: 118 MMHG | OXYGEN SATURATION: 96 % | HEART RATE: 96 BPM | HEIGHT: 67 IN | BODY MASS INDEX: 35.36 KG/M2

## 2023-08-09 DIAGNOSIS — I25.10 CORONARY ARTERY DISEASE INVOLVING NATIVE CORONARY ARTERY OF NATIVE HEART WITHOUT ANGINA PECTORIS: Primary | ICD-10-CM

## 2023-08-15 DIAGNOSIS — E11.65 TYPE 2 DIABETES MELLITUS WITH HYPERGLYCEMIA, WITH LONG-TERM CURRENT USE OF INSULIN: ICD-10-CM

## 2023-08-15 DIAGNOSIS — F51.01 PRIMARY INSOMNIA: ICD-10-CM

## 2023-08-15 DIAGNOSIS — Z79.4 TYPE 2 DIABETES MELLITUS WITH HYPERGLYCEMIA, WITH LONG-TERM CURRENT USE OF INSULIN: ICD-10-CM

## 2023-08-15 DIAGNOSIS — K21.9 GASTROESOPHAGEAL REFLUX DISEASE WITHOUT ESOPHAGITIS: ICD-10-CM

## 2023-08-15 NOTE — TELEPHONE ENCOUNTER
Caller: Georgia Garcia    Relationship: Emergency Contact    Best call back number: [phone]     Requested Prescriptions:   Requested Prescriptions     Pending Prescriptions Disp Refills    Insulin Glargine (Lantus SoloStar) 100 UNIT/ML injection pen       Sig: Inject 20 Units under the skin into the appropriate area as directed Daily. Indications: Type 2 Diabetes        Pharmacy where request should be sent: [id number] - [place], [place] - [address] [facility] [address] [phone] [facility] [phone] FX     Last office visit with prescribing clinician: 6/20/2023   Last telemedicine visit with prescribing clinician: Visit date not found   Next office visit with prescribing clinician: 9/25/2023     Additional details provided by patient: PATIENTS WIFE STATES THE PRESCRIPTION IS NOT AT THE PHARMACY. SHE IS REQUESTING TO HAVE THE PRESCRIPTION SENT AND REFILLS.     SHE STATES THE PHARMACY HAS ALSO SENT REQUESTS.     Fernando Euceda Rep   08/15/23 10:26 EDT

## 2023-08-16 RX ORDER — SITAGLIPTIN 100 MG/1
TABLET, FILM COATED ORAL
Qty: 90 TABLET | Refills: 0 | Status: SHIPPED | OUTPATIENT
Start: 2023-08-16

## 2023-08-16 RX ORDER — INSULIN GLARGINE 100 [IU]/ML
INJECTION, SOLUTION SUBCUTANEOUS
Qty: 3 ML | Refills: 0 | Status: SHIPPED | OUTPATIENT
Start: 2023-08-16

## 2023-08-16 RX ORDER — PANTOPRAZOLE SODIUM 40 MG/1
TABLET, DELAYED RELEASE ORAL
Qty: 90 TABLET | Refills: 0 | Status: SHIPPED | OUTPATIENT
Start: 2023-08-16

## 2023-08-16 RX ORDER — TRAZODONE HYDROCHLORIDE 100 MG/1
TABLET ORAL
Qty: 90 TABLET | Refills: 0 | Status: SHIPPED | OUTPATIENT
Start: 2023-08-16

## 2023-08-28 DIAGNOSIS — E11.65 TYPE 2 DIABETES MELLITUS WITH HYPERGLYCEMIA, WITH LONG-TERM CURRENT USE OF INSULIN: ICD-10-CM

## 2023-08-28 DIAGNOSIS — Z79.4 TYPE 2 DIABETES MELLITUS WITH HYPERGLYCEMIA, WITH LONG-TERM CURRENT USE OF INSULIN: ICD-10-CM

## 2023-08-28 RX ORDER — INSULIN GLARGINE 100 [IU]/ML
INJECTION, SOLUTION SUBCUTANEOUS
Qty: 15 ML | Refills: 1 | Status: SHIPPED | OUTPATIENT
Start: 2023-08-28

## 2023-08-28 NOTE — TELEPHONE ENCOUNTER
Forwarding to Dr. Weiss. Sent in an additional refill with Dr. Weiss was not in office to cover him last week

## 2023-09-25 ENCOUNTER — OFFICE VISIT (OUTPATIENT)
Dept: FAMILY MEDICINE CLINIC | Facility: CLINIC | Age: 68
End: 2023-09-25

## 2023-09-25 VITALS
RESPIRATION RATE: 18 BRPM | WEIGHT: 229 LBS | DIASTOLIC BLOOD PRESSURE: 78 MMHG | HEART RATE: 69 BPM | TEMPERATURE: 97.1 F | HEIGHT: 67 IN | SYSTOLIC BLOOD PRESSURE: 122 MMHG | BODY MASS INDEX: 35.94 KG/M2 | OXYGEN SATURATION: 94 %

## 2023-09-25 DIAGNOSIS — E11.65 TYPE 2 DIABETES MELLITUS WITH HYPERGLYCEMIA, WITH LONG-TERM CURRENT USE OF INSULIN: Primary | ICD-10-CM

## 2023-09-25 DIAGNOSIS — E78.00 PURE HYPERCHOLESTEROLEMIA: ICD-10-CM

## 2023-09-25 DIAGNOSIS — I10 PRIMARY HYPERTENSION: ICD-10-CM

## 2023-09-25 DIAGNOSIS — G47.33 OSA ON CPAP: ICD-10-CM

## 2023-09-25 DIAGNOSIS — Z12.5 PROSTATE CANCER SCREENING: ICD-10-CM

## 2023-09-25 DIAGNOSIS — D69.6 THROMBOCYTOPENIA: ICD-10-CM

## 2023-09-25 DIAGNOSIS — Z79.4 TYPE 2 DIABETES MELLITUS WITH HYPERGLYCEMIA, WITH LONG-TERM CURRENT USE OF INSULIN: Primary | ICD-10-CM

## 2023-09-25 PROCEDURE — 3044F HG A1C LEVEL LT 7.0%: CPT | Performed by: FAMILY MEDICINE

## 2023-09-25 PROCEDURE — 3074F SYST BP LT 130 MM HG: CPT | Performed by: FAMILY MEDICINE

## 2023-09-25 PROCEDURE — 3078F DIAST BP <80 MM HG: CPT | Performed by: FAMILY MEDICINE

## 2023-09-25 PROCEDURE — 99214 OFFICE O/P EST MOD 30 MIN: CPT | Performed by: FAMILY MEDICINE

## 2023-09-25 PROCEDURE — 1160F RVW MEDS BY RX/DR IN RCRD: CPT | Performed by: FAMILY MEDICINE

## 2023-09-25 PROCEDURE — 1159F MED LIST DOCD IN RCRD: CPT | Performed by: FAMILY MEDICINE

## 2023-09-25 NOTE — PROGRESS NOTES
"Chief Complaint  3 month f/u (Diabetes , pt is fasting)    Subjective          Kai Av Garcia presents to [facility]  Diabetes  He presents for his follow-up diabetic visit. He has type 2 diabetes mellitus. His disease course has been stable. There are no hypoglycemic associated symptoms. Pertinent negatives for diabetes include no chest pain. There are no hypoglycemic complications. Symptoms are improving. Current diabetic treatment includes insulin injections. His weight is stable. He is following a generally healthy diet. His dinner blood glucose range is generally 140-180 mg/dl. (120-130) An ACE inhibitor/angiotensin II receptor blocker is being taken.   Hypertension  This is a chronic problem. The current episode started more than 1 year ago. The problem is unchanged. The problem is controlled. Pertinent negatives include no chest pain, peripheral edema or shortness of breath. Current antihypertension treatment includes angiotensin blockers and beta blockers. The current treatment provides moderate improvement. There are no compliance problems.  Hypertensive end-organ damage includes CAD/MI.       Left hip  Mild soreness inner groin  Walking better  More steps  Knees sore    OCA cpap  Uses all night  Feels better with it    Thrombocytopenia.  No reported bruising.      Review of Systems   Constitutional: Negative.    HENT: Negative.     Respiratory: Negative.  Negative for shortness of breath.    Cardiovascular:  Negative for chest pain.   Musculoskeletal:  Positive for arthralgias.      Objective       Vital Signs:   /78   Pulse 69   Temp 97.1 °F (36.2 °C)   Resp 18   Ht 170.2 cm (67\")   Wt 104 kg (229 lb)   SpO2 94%   BMI 35.87 kg/m²     Physical Exam  Vitals and nursing note reviewed.   Constitutional:       General: He is not in acute distress.     Appearance: Normal appearance. He is well-developed. He is not ill-appearing.   HENT:      Head: Normocephalic " and atraumatic.      Right Ear: Hearing, tympanic membrane, ear canal and external ear normal.      Left Ear: Hearing, tympanic membrane, ear canal and external ear normal.      Nose: Nose normal. No congestion or rhinorrhea.      Mouth/Throat:      Mouth: Mucous membranes are moist.      Pharynx: No oropharyngeal exudate or posterior oropharyngeal erythema.   Eyes:      General: Lids are normal.      Conjunctiva/sclera: Conjunctivae normal.      Pupils: Pupils are equal, round, and reactive to light.   Neck:      Thyroid: No thyromegaly.   Cardiovascular:      Rate and Rhythm: Normal rate and regular rhythm.      Heart sounds: Normal heart sounds. No murmur heard.    No friction rub.   Pulmonary:      Effort: Pulmonary effort is normal. No respiratory distress.      Breath sounds: Normal breath sounds. No wheezing or rales.   Abdominal:      General: Bowel sounds are normal. There is no distension.      Palpations: Abdomen is soft. There is no mass.      Tenderness: There is no abdominal tenderness. There is no guarding or rebound.   Musculoskeletal:      Right lower leg: Edema (Trace) present.      Left lower leg: Edema (Trace) present.   Skin:     General: Skin is warm and dry.   Neurological:      General: No focal deficit present.      Mental Status: He is alert.   Psychiatric:         Mood and Affect: Mood normal.         Speech: Speech normal.         Behavior: Behavior normal.        Result Review :                     Assessment and Plan    Diagnoses and all orders for this visit:    1. Type 2 diabetes mellitus with hyperglycemia, with long-term current use of insulin (Primary)  -     Comprehensive Metabolic Panel  -     Hemoglobin A1c    2. Primary hypertension    3. Thrombocytopenia  -     CBC & Differential    4. LUCIAN on CPAP    5. Pure hypercholesterolemia    6. Prostate cancer screening  -     PSA Screen          DISCUSSION    Diabetes mellitus type 2.  Stable.  Check A1c.  Continue current  medications.    Hypertension.  Stable on medication.  No changes.    Thrombocytopenia.  Recheck CBC.    Obstructive sleep apnea on CPAP.  Continue this.  Doing well.  Has been using it on a nightly basis.    Hyperlipidemia.  Last cholesterol check was good.  Check CMP today.    Prostate cancer screening check PSA.    He does not wish to receive influenza vaccine or RSV vaccine or further COVID vaccinations.    Follow-up for Medicare wellness exam second week of January.    Follow Up   Return in about 15 weeks (around 1/8/2024) for Medicare Wellness exam.    Patient was given instructions and counseling regarding his condition or for health maintenance advice. Please see specific information pulled into the AVS if appropriate.       Cheo Weiss MD

## 2023-09-26 LAB
ALBUMIN SERPL-MCNC: 4.9 G/DL (ref 3.5–5.2)
ALBUMIN/GLOB SERPL: 1.8 G/DL
ALP SERPL-CCNC: 42 U/L (ref 39–117)
ALT SERPL-CCNC: 15 U/L (ref 1–41)
AST SERPL-CCNC: 19 U/L (ref 1–40)
BASOPHILS # BLD AUTO: 0.03 10*3/MM3 (ref 0–0.2)
BASOPHILS NFR BLD AUTO: 0.6 % (ref 0–1.5)
BILIRUB SERPL-MCNC: 0.5 MG/DL (ref 0–1.2)
BUN SERPL-MCNC: 12 MG/DL (ref 8–23)
BUN/CREAT SERPL: 12.4 (ref 7–25)
CALCIUM SERPL-MCNC: 9.6 MG/DL (ref 8.6–10.5)
CHLORIDE SERPL-SCNC: 103 MMOL/L (ref 98–107)
CO2 SERPL-SCNC: 29.7 MMOL/L (ref 22–29)
CREAT SERPL-MCNC: 0.97 MG/DL (ref 0.76–1.27)
EGFRCR SERPLBLD CKD-EPI 2021: 84.5 ML/MIN/1.73
EOSINOPHIL # BLD AUTO: 0.15 10*3/MM3 (ref 0–0.4)
EOSINOPHIL NFR BLD AUTO: 3 % (ref 0.3–6.2)
ERYTHROCYTE [DISTWIDTH] IN BLOOD BY AUTOMATED COUNT: 12.4 % (ref 12.3–15.4)
GLOBULIN SER CALC-MCNC: 2.7 GM/DL
GLUCOSE SERPL-MCNC: 138 MG/DL (ref 65–99)
HBA1C MFR BLD: 6.8 % (ref 4.8–5.6)
HCT VFR BLD AUTO: 40.6 % (ref 37.5–51)
HGB BLD-MCNC: 13.4 G/DL (ref 13–17.7)
IMM GRANULOCYTES # BLD AUTO: 0.01 10*3/MM3 (ref 0–0.05)
IMM GRANULOCYTES NFR BLD AUTO: 0.2 % (ref 0–0.5)
LYMPHOCYTES # BLD AUTO: 1.37 10*3/MM3 (ref 0.7–3.1)
LYMPHOCYTES NFR BLD AUTO: 27 % (ref 19.6–45.3)
MCH RBC QN AUTO: 31.8 PG (ref 26.6–33)
MCHC RBC AUTO-ENTMCNC: 33 G/DL (ref 31.5–35.7)
MCV RBC AUTO: 96.4 FL (ref 79–97)
MONOCYTES # BLD AUTO: 0.43 10*3/MM3 (ref 0.1–0.9)
MONOCYTES NFR BLD AUTO: 8.5 % (ref 5–12)
NEUTROPHILS # BLD AUTO: 3.08 10*3/MM3 (ref 1.7–7)
NEUTROPHILS NFR BLD AUTO: 60.7 % (ref 42.7–76)
NRBC BLD AUTO-RTO: 0 /100 WBC (ref 0–0.2)
PLATELET # BLD AUTO: 97 10*3/MM3 (ref 140–450)
POTASSIUM SERPL-SCNC: 4.6 MMOL/L (ref 3.5–5.2)
PROT SERPL-MCNC: 7.6 G/DL (ref 6–8.5)
PSA SERPL-MCNC: 1.29 NG/ML (ref 0–4)
RBC # BLD AUTO: 4.21 10*6/MM3 (ref 4.14–5.8)
SODIUM SERPL-SCNC: 141 MMOL/L (ref 136–145)
WBC # BLD AUTO: 5.07 10*3/MM3 (ref 3.4–10.8)

## 2023-11-07 DIAGNOSIS — Z79.4 TYPE 2 DIABETES MELLITUS WITH HYPERGLYCEMIA, WITH LONG-TERM CURRENT USE OF INSULIN: ICD-10-CM

## 2023-11-07 DIAGNOSIS — E11.65 TYPE 2 DIABETES MELLITUS WITH HYPERGLYCEMIA, WITH LONG-TERM CURRENT USE OF INSULIN: ICD-10-CM

## 2023-11-07 DIAGNOSIS — F51.01 PRIMARY INSOMNIA: ICD-10-CM

## 2023-11-07 DIAGNOSIS — K21.9 GASTROESOPHAGEAL REFLUX DISEASE WITHOUT ESOPHAGITIS: ICD-10-CM

## 2023-11-07 RX ORDER — INSULIN GLARGINE 100 [IU]/ML
INJECTION, SOLUTION SUBCUTANEOUS
Qty: 15 ML | Refills: 1 | Status: SHIPPED | OUTPATIENT
Start: 2023-11-07

## 2023-11-07 RX ORDER — PANTOPRAZOLE SODIUM 40 MG/1
40 TABLET, DELAYED RELEASE ORAL DAILY
Qty: 90 TABLET | Refills: 0 | Status: SHIPPED | OUTPATIENT
Start: 2023-11-07 | End: 2023-11-13

## 2023-11-07 RX ORDER — TRAZODONE HYDROCHLORIDE 100 MG/1
100 TABLET ORAL
Qty: 90 TABLET | Refills: 0 | Status: SHIPPED | OUTPATIENT
Start: 2023-11-07 | End: 2023-11-13

## 2023-11-07 NOTE — TELEPHONE ENCOUNTER
Caller: [place]    Relationship: Emergency Contact    Best call back number: [phone]     Requested Prescriptions:   Requested Prescriptions     Pending Prescriptions Disp Refills    pantoprazole (PROTONIX) 40 MG EC tablet 90 tablet 0     Sig: Take 1 tablet by mouth Daily. Indications: Heartburn    SITagliptin (Januvia) 100 MG tablet 90 tablet 0     Sig: Take 1 tablet by mouth Daily. Indications: Type 2 Diabetes    traZODone (DESYREL) 100 MG tablet 90 tablet 0     Sig: Take 1 tablet by mouth every night at bedtime. Indications: Trouble Sleeping    Insulin Glargine (Lantus SoloStar) 100 UNIT/ML injection pen 15 mL 1     Sig: ADMINISTER 20 UNITS UNDER THE SKIN DAILY AS DIRECTED FOR DIABETES        Pharmacy where request should be sent: [address] - [phone]  - [phone] FX     Last office visit with prescribing clinician: 9/25/2023   Last telemedicine visit with prescribing clinician: Visit date not found   Next office visit with prescribing clinician: 1/9/2024     Additional details provided by patient: PATIENT SWITCHED PHARMACIES AND WOULD LIKE NEW PRESCRIPTIONS SENT OVER TO THIS NEW PHARMACY.     Does the patient have less than a 3 day supply:  [] Yes  [x] No    Would you like a call back once the refill request has been completed: [] Yes [x] No    If the office needs to give you a call back, can they leave a voicemail: [] Yes [x] No    Cadance Dunaway, RegSched Rep   11/07/23 11:01 EST

## 2023-11-13 DIAGNOSIS — E11.65 TYPE 2 DIABETES MELLITUS WITH HYPERGLYCEMIA, WITH LONG-TERM CURRENT USE OF INSULIN: ICD-10-CM

## 2023-11-13 DIAGNOSIS — K21.9 GASTROESOPHAGEAL REFLUX DISEASE WITHOUT ESOPHAGITIS: ICD-10-CM

## 2023-11-13 DIAGNOSIS — Z79.4 TYPE 2 DIABETES MELLITUS WITH HYPERGLYCEMIA, WITH LONG-TERM CURRENT USE OF INSULIN: ICD-10-CM

## 2023-11-13 DIAGNOSIS — F51.01 PRIMARY INSOMNIA: ICD-10-CM

## 2023-11-13 RX ORDER — SITAGLIPTIN 100 MG/1
100 TABLET, FILM COATED ORAL DAILY
Qty: 90 TABLET | Refills: 0 | Status: SHIPPED | OUTPATIENT
Start: 2023-11-13

## 2023-11-13 RX ORDER — TRAZODONE HYDROCHLORIDE 100 MG/1
100 TABLET ORAL
Qty: 90 TABLET | Refills: 0 | Status: SHIPPED | OUTPATIENT
Start: 2023-11-13

## 2023-11-13 RX ORDER — PANTOPRAZOLE SODIUM 40 MG/1
40 TABLET, DELAYED RELEASE ORAL DAILY
Qty: 90 TABLET | Refills: 0 | Status: SHIPPED | OUTPATIENT
Start: 2023-11-13

## 2023-11-23 DIAGNOSIS — Z79.4 TYPE 2 DIABETES MELLITUS WITH HYPERGLYCEMIA, WITH LONG-TERM CURRENT USE OF INSULIN: ICD-10-CM

## 2023-11-23 DIAGNOSIS — E11.65 TYPE 2 DIABETES MELLITUS WITH HYPERGLYCEMIA, WITH LONG-TERM CURRENT USE OF INSULIN: ICD-10-CM

## 2023-11-27 RX ORDER — INSULIN GLARGINE 100 [IU]/ML
INJECTION, SOLUTION SUBCUTANEOUS
Qty: 15 ML | Refills: 1 | OUTPATIENT
Start: 2023-11-27

## 2024-01-09 ENCOUNTER — OFFICE VISIT (OUTPATIENT)
Dept: FAMILY MEDICINE CLINIC | Facility: CLINIC | Age: 69
End: 2024-01-09
Payer: MEDICARE

## 2024-01-09 VITALS
TEMPERATURE: 97.3 F | SYSTOLIC BLOOD PRESSURE: 126 MMHG | BODY MASS INDEX: 36.26 KG/M2 | WEIGHT: 231 LBS | HEART RATE: 70 BPM | DIASTOLIC BLOOD PRESSURE: 80 MMHG | RESPIRATION RATE: 18 BRPM | HEIGHT: 67 IN

## 2024-01-09 DIAGNOSIS — Z12.11 COLON CANCER SCREENING: ICD-10-CM

## 2024-01-09 DIAGNOSIS — E78.00 PURE HYPERCHOLESTEROLEMIA: ICD-10-CM

## 2024-01-09 DIAGNOSIS — D69.6 THROMBOCYTOPENIA: ICD-10-CM

## 2024-01-09 DIAGNOSIS — R07.9 CHEST PAIN, UNSPECIFIED TYPE: ICD-10-CM

## 2024-01-09 DIAGNOSIS — E11.65 TYPE 2 DIABETES MELLITUS WITH HYPERGLYCEMIA, WITH LONG-TERM CURRENT USE OF INSULIN: ICD-10-CM

## 2024-01-09 DIAGNOSIS — Z00.00 MEDICARE ANNUAL WELLNESS VISIT, SUBSEQUENT: Primary | ICD-10-CM

## 2024-01-09 DIAGNOSIS — Z79.4 TYPE 2 DIABETES MELLITUS WITH HYPERGLYCEMIA, WITH LONG-TERM CURRENT USE OF INSULIN: ICD-10-CM

## 2024-01-09 DIAGNOSIS — I10 PRIMARY HYPERTENSION: ICD-10-CM

## 2024-01-09 DIAGNOSIS — G47.33 OSA ON CPAP: ICD-10-CM

## 2024-01-09 LAB
ALBUMIN SERPL-MCNC: 4.3 G/DL (ref 3.5–5.2)
ALBUMIN/GLOB SERPL: 1.6 G/DL
ALP SERPL-CCNC: 45 U/L (ref 39–117)
ALT SERPL-CCNC: 16 U/L (ref 1–41)
AST SERPL-CCNC: 16 U/L (ref 1–40)
BASOPHILS # BLD AUTO: 0.04 10*3/MM3 (ref 0–0.2)
BASOPHILS NFR BLD AUTO: 0.8 % (ref 0–1.5)
BILIRUB SERPL-MCNC: 0.5 MG/DL (ref 0–1.2)
BUN SERPL-MCNC: 13 MG/DL (ref 8–23)
BUN/CREAT SERPL: 11.7 (ref 7–25)
CALCIUM SERPL-MCNC: 9.7 MG/DL (ref 8.6–10.5)
CHLORIDE SERPL-SCNC: 104 MMOL/L (ref 98–107)
CHOLEST SERPL-MCNC: 121 MG/DL (ref 0–200)
CHOLEST/HDLC SERPL: 2.88 {RATIO}
CO2 SERPL-SCNC: 27.4 MMOL/L (ref 22–29)
CREAT SERPL-MCNC: 1.11 MG/DL (ref 0.76–1.27)
EGFRCR SERPLBLD CKD-EPI 2021: 71.9 ML/MIN/1.73
EOSINOPHIL # BLD AUTO: 0.17 10*3/MM3 (ref 0–0.4)
EOSINOPHIL NFR BLD AUTO: 3.5 % (ref 0.3–6.2)
ERYTHROCYTE [DISTWIDTH] IN BLOOD BY AUTOMATED COUNT: 12.4 % (ref 12.3–15.4)
GLOBULIN SER CALC-MCNC: 2.7 GM/DL
GLUCOSE SERPL-MCNC: 115 MG/DL (ref 65–99)
HBA1C MFR BLD: 6.8 % (ref 4.8–5.6)
HCT VFR BLD AUTO: 39.2 % (ref 37.5–51)
HDLC SERPL-MCNC: 42 MG/DL (ref 40–60)
HGB BLD-MCNC: 13.6 G/DL (ref 13–17.7)
IMM GRANULOCYTES # BLD AUTO: 0.01 10*3/MM3 (ref 0–0.05)
IMM GRANULOCYTES NFR BLD AUTO: 0.2 % (ref 0–0.5)
LDLC SERPL CALC-MCNC: 61 MG/DL (ref 0–100)
LYMPHOCYTES # BLD AUTO: 1.38 10*3/MM3 (ref 0.7–3.1)
LYMPHOCYTES NFR BLD AUTO: 28.7 % (ref 19.6–45.3)
MCH RBC QN AUTO: 32.2 PG (ref 26.6–33)
MCHC RBC AUTO-ENTMCNC: 34.7 G/DL (ref 31.5–35.7)
MCV RBC AUTO: 92.7 FL (ref 79–97)
MONOCYTES # BLD AUTO: 0.46 10*3/MM3 (ref 0.1–0.9)
MONOCYTES NFR BLD AUTO: 9.6 % (ref 5–12)
NEUTROPHILS # BLD AUTO: 2.75 10*3/MM3 (ref 1.7–7)
NEUTROPHILS NFR BLD AUTO: 57.2 % (ref 42.7–76)
NRBC BLD AUTO-RTO: 0 /100 WBC (ref 0–0.2)
PLATELET # BLD AUTO: 94 10*3/MM3 (ref 140–450)
POTASSIUM SERPL-SCNC: 4.7 MMOL/L (ref 3.5–5.2)
PROT SERPL-MCNC: 7 G/DL (ref 6–8.5)
RBC # BLD AUTO: 4.23 10*6/MM3 (ref 4.14–5.8)
SODIUM SERPL-SCNC: 140 MMOL/L (ref 136–145)
TRIGL SERPL-MCNC: 92 MG/DL (ref 0–150)
VLDLC SERPL CALC-MCNC: 18 MG/DL (ref 5–40)
WBC # BLD AUTO: 4.81 10*3/MM3 (ref 3.4–10.8)

## 2024-01-09 NOTE — PROGRESS NOTES
The ABCs of the Annual Wellness Visit  Subsequent Medicare Wellness Visit    Subjective    Kai Garcia is a 68 y.o. male who presents for a Subsequent Medicare Wellness Visit.    The following portions of the patient's history were reviewed and   updated as appropriate: allergies, current medications, past family history, past medical history, past social history, past surgical history, and problem list.    Compared to one year ago, the patient feels his physical   health is better.    Compared to one year ago, the patient feels his mental   health is better.    Recent Hospitalizations:  He was admitted within the past 365 days at [facility].       Current Medical Providers:  Patient Care Team:  Cheo Weiss MD as PCP - General  Uri Willis MD as Consulting Physician (Cardiology)  Fer Faulkner MD as Consulting Physician (Orthopedic Surgery)    Outpatient Medications Prior to Visit   Medication Sig Dispense Refill    Accu-Chek Softclix Lancets lancets USE TO CHECK BLOOD SUGAR 2 TIMES A  each 11    acetaminophen (TYLENOL) 500 MG tablet Take 2 tablets by mouth Every 6 (Six) Hours As Needed for Mild Pain. Indications: Pain      amoxicillin (AMOXIL) 500 MG capsule  (Patient not taking: Reported on 9/25/2023)      aspirin 81 MG EC tablet Take 1 tablet by mouth Daily.      bisoprolol (ZEBeta) 10 MG tablet Take 1 tablet by mouth Daily. 90 tablet 3    clopidogrel (PLAVIX) 75 MG tablet Take 1 tablet by mouth Daily. PT TO STOP TAKING PLAVIX 1 WEEK PRIOR TO SURGERY PER DR. BROOKS'S OFFICE. (Patient taking differently: Take 1 tablet by mouth Daily. Indications: Disease involving a Thrombosis or an Embolism) 90 tablet 3    Easy Touch Pen Needles 31G X 5 MM misc USE DAILY WITH INSULIN 100 each 5    eplerenone (INSPRA) 25 MG tablet Take 1 tablet by mouth Daily. 90 tablet 3    glucose blood (Accu-Chek Jodie Plus) test strip USE TO TEST BLOOD SUGAR 2 TIMES A  each 11    Insulin Glargine (Lantus  SoloStar) 100 UNIT/ML injection pen ADMINISTER 20 UNITS UNDER THE SKIN DAILY AS DIRECTED FOR DIABETES 15 mL 1    Januvia 100 MG tablet TAKE 1 TABLET BY MOUTH EVERY DAY 90 tablet 0    nitroglycerin (NITROSTAT) 0.4 MG SL tablet Place 1 tablet under the tongue Every 5 (Five) Minutes As Needed for Chest Pain. Take no more than 3 doses in 15 minutes. 25 tablet 5    pantoprazole (PROTONIX) 40 MG EC tablet TAKE 1 TABLET BY MOUTH EVERY DAY 90 tablet 0    rosuvastatin (CRESTOR) 10 MG tablet Take 1 tablet by mouth Daily. 90 tablet 3    sacubitril-valsartan (Entresto) 49-51 MG tablet Take 1 tablet by mouth Every 12 (Twelve) Hours. 180 tablet 3    sildenafil (Viagra) 100 MG tablet Take 1 tablet by mouth Daily As Needed for Erectile Dysfunction. 10 tablet 2    traZODone (DESYREL) 100 MG tablet TAKE 1 TABLET BY MOUTH EVERY NIGHT AT BEDTIME 90 tablet 0     No facility-administered medications prior to visit.       No opioid medication identified on active medication list. I have reviewed chart for other potential  high risk medication/s and harmful drug interactions in the elderly.        Aspirin is on active medication list. Aspirin use is indicated based on review of current medical condition/s. Pros and cons of this therapy have been discussed today. Benefits of this medication outweigh potential harm.  Patient has been encouraged to continue taking this medication.  .      Patient Active Problem List   Diagnosis    Multiple vessel coronary artery disease    Abnormal liver function tests    Gastroesophageal reflux disease    Hyperlipidemia    Hyperglycemia    Sleep apnea    Type II diabetes mellitus, uncontrolled    Coronary artery disease involving native coronary artery of native heart    ALT (SGPT) level raised    Elevated AST (SGOT)    Tinnitus    Dyslipidemia    Hypertension    Chronic low back pain    History of bleeding peptic ulcer    LUCIAN on CPAP    Essential hypertension    Coronary artery disease involving native  "coronary artery of native heart without angina pectoris    Ischemic cardiomyopathy EF 40% ()    RBBB    Screen for colon cancer    Sepsis    MORGAN (acute kidney injury)    Acute cystitis without hematuria    Hyponatremia    Thrombocytopenia    Back pain    S/P lumbar fusion    CHF (congestive heart failure)    Acute blood loss anemia, asymptomatic    Postoperative urinary retention    Primary osteoarthritis of left hip    S/P total left hip arthroplasty    Obesity    DM2 (diabetes mellitus, type 2)    Postoperative pain     Advance Care Planning   Advance Care Planning     Advance Directive is on file.  ACP discussion was held with the patient during this visit. Patient has an advance directive in EMR which is still valid.      Objective    Vitals:    24 0830   BP: 126/80   Pulse: 70   Resp: 18   Temp: 97.3 °F (36.3 °C)   Weight: 105 kg (231 lb)   Height: 170.2 cm (67\")   PainSc:   3     Estimated body mass index is 36.18 kg/m² as calculated from the following:    Height as of this encounter: 170.2 cm (67\").    Weight as of this encounter: 105 kg (231 lb).           Does the patient have evidence of cognitive impairment? No          HEALTH RISK ASSESSMENT    Smoking Status:  Social History     Tobacco Use   Smoking Status Former    Packs/day: 3.00    Years: 35.00    Additional pack years: 0.00    Total pack years: 105.00    Types: Cigarettes    Quit date: 6/15/2006    Years since quittin.5   Smokeless Tobacco Never     Alcohol Consumption:  Social History     Substance and Sexual Activity   Alcohol Use No     Fall Risk Screen:    [place] Fall Risk Assessment was completed, and patient is at HIGH risk for falls. Assessment completed on:2024    Depression Screenin/9/2024     8:34 AM   PHQ-2/PHQ-9 Depression Screening   Little Interest or Pleasure in Doing Things 0-->not at all   Feeling Down, Depressed or Hopeless 0-->not at all   PHQ-9: Brief Depression Severity Measure Score 0       Health " Habits and Functional and Cognitive Screenin/9/2024     8:34 AM   Functional & Cognitive Status   Do you have difficulty preparing food and eating? No   Do you have difficulty bathing yourself, getting dressed or grooming yourself? No   Do you have difficulty using the toilet? No   Do you have difficulty moving around from place to place? No   Do you have trouble with steps or getting out of a bed or a chair? No   Current Diet Well Balanced Diet   Dental Exam Up to date   Eye Exam Up to date   Exercise (times per week) 0 times per week   Current Exercises Include No Regular Exercise   Do you need help using the phone?  Yes   Are you deaf or do you have serious difficulty hearing?  Yes   Do you need help to go to places out of walking distance? No   Do you need help shopping? No   Do you need help preparing meals?  No   Do you need help with housework?  No   Do you need help with laundry? No   Do you need help taking your medications? Yes   Do you need help managing money? Yes   Do you ever drive or ride in a car without wearing a seat belt? No   Have you felt unusual stress, anger or loneliness in the last month? Yes   Who do you live with? Spouse   If you need help, do you have trouble finding someone available to you? No   Have you been bothered in the last four weeks by sexual problems? Yes   Do you have difficulty concentrating, remembering or making decisions? Yes       Age-appropriate Screening Schedule:  Refer to the list below for future screening recommendations based on patient's age, sex and/or medical conditions. Orders for these recommended tests are listed in the plan section. The patient has been provided with a written plan.    Health Maintenance   Topic Date Due    TDAP/TD VACCINES (1 - Tdap) Never done    COLORECTAL CANCER SCREENING  2022    DIABETIC FOOT EXAM  2023    INFLUENZA VACCINE  2024 (Originally 2023)    ZOSTER VACCINE (1 of 2) 2025 (Originally  9/13/2004)    BMI FOLLOWUP  02/28/2024    DIABETIC EYE EXAM  03/01/2024    HEMOGLOBIN A1C  03/25/2024    LIPID PANEL  06/20/2024    URINE MICROALBUMIN  06/20/2024    ANNUAL WELLNESS VISIT  01/09/2025    HEPATITIS C SCREENING  Completed    Pneumococcal Vaccine 65+  Completed    AAA SCREEN (ONE-TIME)  Completed    COVID-19 Vaccine  Discontinued                  CMS Preventative Services Quick Reference  Risk Factors Identified During Encounter  Hearing Problem:  has hearing aids  Dental Screening Recommended  Vision Screening Recommended  He has had a fall and tripped. Blacked eye. Recommend caution at home    Does not want any other immunizations    The above risks/problems have been discussed with the patient.  Pertinent information has been shared with the patient in the After Visit Summary.  An After Visit Summary and PPPS were made available to the patient.    Follow Up:   Next Medicare Wellness visit to be scheduled in 1 year.       Additional E&M Note during same encounter follows:  Patient has multiple medical problems which are significant and separately identifiable that require additional work above and beyond the Medicare Wellness Visit.      Chief Complaint  Medicare Wellness-subsequent    Subjective        HPI  Kai Garcia is also being seen today for follow up:    Viagra no help and caused headache    Chest pain   Off and on.   Hurting little now  + stress  Dull pain in center of chest  No nausea  No shortness of breath  Sweats at times  Sees Dr Willis next month  No exertional chest pain   Increased stress and more chest pain then   Used chainsaw recently and no pain with that    Decreased sleep    Stress with issue with neighbor and now has a     DM 2  124 this am  Lantus 20 units    On chol med  On cardiac meds    LUCIAN cpap  Uses nightly   And with nap  Uses 5-7 hrs  Feels better using it                Review of Systems   HENT:  Positive for hearing loss.    Respiratory: Negative.   "Negative for shortness of breath.    Cardiovascular:  Positive for chest pain.   Gastrointestinal: Negative.        Objective   Vital Signs:  /80   Pulse 70   Temp 97.3 °F (36.3 °C)   Resp 18   Ht 170.2 cm (67\")   Wt 105 kg (231 lb)   BMI 36.18 kg/m²     Physical Exam  Vitals and nursing note reviewed.   Constitutional:       General: He is not in acute distress.     Appearance: Normal appearance. He is well-developed. He is not ill-appearing.   HENT:      Head: Normocephalic and atraumatic.      Right Ear: Hearing, tympanic membrane, ear canal and external ear normal.      Left Ear: Hearing, tympanic membrane, ear canal and external ear normal.      Nose: Nose normal. No congestion or rhinorrhea.      Mouth/Throat:      Mouth: Mucous membranes are moist.      Pharynx: No oropharyngeal exudate or posterior oropharyngeal erythema.   Eyes:      General: Lids are normal.      Conjunctiva/sclera: Conjunctivae normal.      Pupils: Pupils are equal, round, and reactive to light.   Neck:      Thyroid: No thyromegaly.   Cardiovascular:      Rate and Rhythm: Normal rate and regular rhythm.      Heart sounds: Normal heart sounds. No murmur heard.     No friction rub.   Pulmonary:      Effort: Pulmonary effort is normal. No respiratory distress.      Breath sounds: Normal breath sounds. No wheezing or rales.   Abdominal:      General: Bowel sounds are normal. There is no distension.      Palpations: Abdomen is soft. There is no mass.      Tenderness: There is abdominal tenderness (Chronic left-sided tenderness.  Unchanged.). There is no guarding or rebound.   Musculoskeletal:      Right lower leg: No edema.      Left lower leg: No edema.   Skin:     General: Skin is warm and dry.   Neurological:      General: No focal deficit present.      Mental Status: He is alert.   Psychiatric:         Mood and Affect: Mood normal.         Speech: Speech normal.         Behavior: Behavior normal.                       "   Assessment and Plan   Diagnoses and all orders for this visit:    1. Medicare annual wellness visit, subsequent (Primary)    2. Type 2 diabetes mellitus with hyperglycemia, with long-term current use of insulin  -     Comprehensive Metabolic Panel  -     Lipid Panel With / Chol / HDL Ratio  -     Hemoglobin A1c    3. Primary hypertension  -     Comprehensive Metabolic Panel    4. LUCIAN on CPAP    5. Pure hypercholesterolemia  -     Comprehensive Metabolic Panel  -     Lipid Panel With / Chol / HDL Ratio    6. Chest pain, unspecified type    7. Thrombocytopenia  -     CBC & Differential      Here for well examination.  Continue routine health maintenance including routine dentistry, eye exam, safety, seatbelt use, exercise and proper nutrition.    Reviewed immunizations.  Reviewed fall risk.  Continue use of hearing aid as needed.    Diabetes mellitus type 2.  Check labs as noted.  Continue insulin.  Continue diet changes.    Hypertension.  Blood pressure stable on current medications.    Obstructive sleep apnea on CPAP.  Continue regular CPAP usage.  He has been doing so.  At least more than 4 hours a night.  Feels better using this.    Hyperlipidemia.  Check CMP and lipid panel.    Chest pain.  Occurs at rest.  He is not having any exertional chest pain.  He was actually able to use a chainsaw recently without any discomfort.  He states that occurs more when he is at rest when he is feeling stressed.  He does have a follow-up with Dr. Willis in February.  He will keep that appointment.  I did encourage him to go to the ER and seek urgent medical attention if he develops any exertional chest pain.    Thrombocytopenia.  Check CBC.    Colon cancer screening.  Due for colonoscopy.  Order placed with Dr. Salguero.         Follow Up   Return in about 3 months (around 4/9/2024).  Patient was given instructions and counseling regarding his condition or for health maintenance advice. Please see specific information pulled  into the AVS if appropriate.       Cheo Weiss MD

## 2024-01-19 ENCOUNTER — PREP FOR SURGERY (OUTPATIENT)
Dept: OTHER | Facility: HOSPITAL | Age: 69
End: 2024-01-19
Payer: MEDICARE

## 2024-01-19 DIAGNOSIS — Z12.11 SCREEN FOR COLON CANCER: Primary | ICD-10-CM

## 2024-01-26 DIAGNOSIS — F51.01 PRIMARY INSOMNIA: ICD-10-CM

## 2024-01-26 DIAGNOSIS — E11.65 TYPE 2 DIABETES MELLITUS WITH HYPERGLYCEMIA, WITH LONG-TERM CURRENT USE OF INSULIN: ICD-10-CM

## 2024-01-26 DIAGNOSIS — K21.9 GASTROESOPHAGEAL REFLUX DISEASE WITHOUT ESOPHAGITIS: ICD-10-CM

## 2024-01-26 DIAGNOSIS — Z79.4 TYPE 2 DIABETES MELLITUS WITH HYPERGLYCEMIA, WITH LONG-TERM CURRENT USE OF INSULIN: ICD-10-CM

## 2024-01-26 RX ORDER — SACUBITRIL AND VALSARTAN 49; 51 MG/1; MG/1
1 TABLET, FILM COATED ORAL EVERY 12 HOURS
Qty: 180 TABLET | Refills: 0 | Status: SHIPPED | OUTPATIENT
Start: 2024-01-26

## 2024-01-26 RX ORDER — ROSUVASTATIN CALCIUM 10 MG/1
10 TABLET, COATED ORAL DAILY
Qty: 90 TABLET | Refills: 0 | Status: SHIPPED | OUTPATIENT
Start: 2024-01-26

## 2024-01-26 RX ORDER — PANTOPRAZOLE SODIUM 40 MG/1
40 TABLET, DELAYED RELEASE ORAL DAILY
Qty: 90 TABLET | Refills: 0 | Status: SHIPPED | OUTPATIENT
Start: 2024-01-26

## 2024-01-26 RX ORDER — BISOPROLOL FUMARATE 10 MG/1
10 TABLET, FILM COATED ORAL DAILY
Qty: 90 TABLET | Refills: 0 | Status: SHIPPED | OUTPATIENT
Start: 2024-01-26

## 2024-01-26 RX ORDER — CLOPIDOGREL BISULFATE 75 MG/1
TABLET ORAL
Qty: 90 TABLET | Refills: 0 | Status: SHIPPED | OUTPATIENT
Start: 2024-01-26

## 2024-01-26 RX ORDER — TRAZODONE HYDROCHLORIDE 100 MG/1
100 TABLET ORAL
Qty: 90 TABLET | Refills: 0 | Status: SHIPPED | OUTPATIENT
Start: 2024-01-26

## 2024-01-26 RX ORDER — EPLERENONE 25 MG/1
25 TABLET, FILM COATED ORAL DAILY
Qty: 90 TABLET | Refills: 0 | Status: SHIPPED | OUTPATIENT
Start: 2024-01-26

## 2024-01-26 RX ORDER — SITAGLIPTIN 100 MG/1
100 TABLET, FILM COATED ORAL DAILY
Qty: 90 TABLET | Refills: 0 | Status: SHIPPED | OUTPATIENT
Start: 2024-01-26

## 2024-02-05 NOTE — PROGRESS NOTES
Subjective:     Encounter Date:02/06/2024    Primary Care Physician: Cheo Weiss MD      Patient ID: Kai Garcia is a 68 y.o. male.    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 Helton, stage I in June 2006 and stage II in March 2007.   Followed chronically with annual CTs by Dr. Helton. Stable by CT 2019  Coronary artery disease:  Cardiac cath prior to “elephant trunk” procedure in June 2006 and the patient underwent CABG at that time consisting of SVG to the OM and SVG to the PDA.   02/08/2007 - inferoposterior MI, cardiac cath showed 100% left circumflex with severe OM-1 stenosis and 100% vein graft to the left circumflex. RCA was diffuse, 90% ostial stenosis at the SVG to the RCA. LAD had only 40% stenosis.   Cardiac cath in March 2007 due to recurrent angina - 2.5 Microdriver stent to the distal native RCA.   4/27/18 cardiac catheter occluded vein grafts ×2.  Large posterior basal aneurysm with ejection fraction of 30-35%.  Severe stenosis and distal circumflex which supplies aneurysm.  Non-flow-limiting RCA disease.  MRI 8/18 LVEF 28%  S/P Bi- Ventricular ICD- [employer] 8/9/18, EMILIE Fox MD  Dyslipidemia.   Hypertension.   Diabetes  Chronic low back pain/multiple lumbar surgeries, currently disabled:  L4-L5 discectomy in 2015.   History of bleeding peptic ulcer disease.   Severe obstructive sleep apnea.   Appendectomy  Lumbar fusion 10/2021  Left hip replacement 2023      Allergies   Allergen Reactions    Ativan [Lorazepam] Mental Status Change     agitation    Zetia [Ezetimibe] Other (See Comments)     increased LFTs    Zocor [Simvastatin] Myalgia    Isosorbide Other (See Comments)     headaches    Jardiance [Empagliflozin] Other (See Comments)     Blood in urine and burning sensation    Metformin And Related  Diarrhea         Current Outpatient Medications:     Accu-Chek Softclix Lancets lancets, USE TO CHECK BLOOD SUGAR 2 TIMES A DAY, Disp: 100 each, Rfl: 11    acetaminophen (TYLENOL) 500 MG tablet, Take 2 tablets by mouth Every 6 (Six) Hours As Needed for Mild Pain. Indications: Pain, Disp: , Rfl:     aspirin 81 MG EC tablet, Take 1 tablet by mouth Daily., Disp: , Rfl:     bisoprolol (ZEBeta) 10 MG tablet, TAKE ONE TABLET BY MOUTH EVERY DAY, Disp: 90 tablet, Rfl: 0    clopidogrel (PLAVIX) 75 MG tablet, TAKE ONE TABLET BY MOUTH EVERY DAY; STOP TAKING 1 WEEK PRIOR TO SURGERY PER TODD'S OFFICE, Disp: 90 tablet, Rfl: 0    Easy Touch Pen Needles 31G X 5 MM misc, USE DAILY WITH INSULIN, Disp: 100 each, Rfl: 5    Entresto 49-51 MG tablet, TAKE ONE TABLET BY MOUTH EVERY TWELVE HOURS, Disp: 180 tablet, Rfl: 0    eplerenone (INSPRA) 25 MG tablet, TAKE ONE TABLET BY MOUTH EVERY DAY, Disp: 90 tablet, Rfl: 0    glucose blood (Accu-Chek Jodie Plus) test strip, USE TO TEST BLOOD SUGAR 2 TIMES A DAY, Disp: 100 each, Rfl: 11    Insulin Glargine (Lantus SoloStar) 100 UNIT/ML injection pen, ADMINISTER 20 UNITS UNDER THE SKIN DAILY AS DIRECTED FOR DIABETES, Disp: 15 mL, Rfl: 1    Januvia 100 MG tablet, TAKE ONE TABLET BY MOUTH EVERY DAY, Disp: 90 tablet, Rfl: 0    nitroglycerin (NITROSTAT) 0.4 MG SL tablet, Place 1 tablet under the tongue Every 5 (Five) Minutes As Needed for Chest Pain. Take no more than 3 doses in 15 minutes., Disp: 25 tablet, Rfl: 5    pantoprazole (PROTONIX) 40 MG EC tablet, TAKE ONE TABLET BY MOUTH EVERY DAY, Disp: 90 tablet, Rfl: 0    rosuvastatin (CRESTOR) 10 MG tablet, TAKE ONE TABLET BY MOUTH EVERY DAY, Disp: 90 tablet, Rfl: 0    sildenafil (Viagra) 100 MG tablet, Take 1 tablet by mouth Daily As Needed for Erectile Dysfunction., Disp: 10 tablet, Rfl: 2    traZODone (DESYREL) 100 MG tablet, TAKE ONE TABLET BY MOUTH AT BEDTIME, Disp: 90 tablet, Rfl: 0    amoxicillin (AMOXIL) 500 MG capsule, , Disp: , Rfl:  "        History of Present Illness    Patient is a 68-year-old  male who presents today for 6-month follow-up of coronary artery disease, ischemic cardiomyopathy and ICD interrogation.  Since last being seen patient notes over the last month he has been experiencing a lot of chest discomfort.  No specific triggering factors noted.  Notes that this is described as a sharp sensation beginning in his upper abdomen and then radiating up through the left side of his chest and sometimes into his shoulder.  Notes over the last 3 weeks he has gained roughly 10 pounds and complains of some edema in his hands and legs.  Also notes that he has been having some difficulty swallowing and of occasionally having some food getting lodged in his throat.  Denies any significant increase in volume intake or sodium intake.  Has had increased shortness of breath.    The following portions of the patient's history were reviewed and updated as appropriate: allergies, current medications, past family history, past medical history, past social history, past surgical history and problem list.      Social History     Tobacco Use    Smoking status: Former     Packs/day: 3.00     Years: 35.00     Additional pack years: 0.00     Total pack years: 105.00     Types: Cigarettes     Quit date: 6/15/2006     Years since quittin.6    Smokeless tobacco: Never   Vaping Use    Vaping Use: Never used   Substance Use Topics    Alcohol use: No    Drug use: No         ROS       Objective:   /80   Pulse 80   Ht 170.2 cm (67\")   Wt 105 kg (230 lb 9.6 oz)   SpO2 94%   BMI 36.12 kg/m²         Vitals reviewed.   Constitutional:       Appearance: Well-developed and not in distress.   Neck:      Vascular: No JVD.      Trachea: No tracheal deviation.   Pulmonary:      Effort: Pulmonary effort is normal.      Breath sounds: Normal breath sounds.   Cardiovascular:      Normal rate. Regular rhythm.      Murmurs: There is no murmur.   Edema:     " Comments: Hand edema noted.  Musculoskeletal:         General: No deformity. Skin:     General: Skin is warm and dry.   Neurological:      Mental Status: Alert and oriented to person, place, and time.         Procedures  Device check:  Normal functioning biventricular ICD.  99% biventricular paced.  Battery voltage of 6 years.  2 events since last interrogation        Assessment:   Assessment & Plan      Diagnoses and all orders for this visit:    1. Coronary artery disease involving native coronary artery of native heart with other form of angina pectoris (Primary), new onset chest pain and dyspnea.  Possible angina.  On aspirin and Plavix.    2. Acute on chronic HFrEF (heart failure with reduced ejection fraction), new onset volume overload.  Known decreased LVEF.  Was 40% by last echo.  On Inspra.    3. Essential hypertension, controlled.  On beta-blocker.    Other orders  -     furosemide (LASIX) 40 MG tablet; Take 1 tablet by mouth Daily.  Dispense: 30 tablet; Refill: 11      Plan:  To further evaluate patient's symptoms will obtain myocardial perfusion study and echocardiogram in the near term.  Discussed the case with Dr. Willis.  At this time we will add Lasix 40 mg daily to his medication regimen.  Check BMP in 1 week.  Would recommend transitioning patient's Januvia to Jardiance.  Will defer to primary care.  Continue other current cardiac medications.  Follow-up in 6 weeks time or sooner if needed.     CONNOR Islas       Advance Care Planning   ACP discussion was held with the patient during this visit. Patient has an advance directive in EMR which is still valid.         Dictated utilizing Dragon dictation

## 2024-02-06 ENCOUNTER — OFFICE VISIT (OUTPATIENT)
Dept: CARDIOLOGY | Facility: CLINIC | Age: 69
End: 2024-02-06
Payer: MEDICARE

## 2024-02-06 VITALS
DIASTOLIC BLOOD PRESSURE: 80 MMHG | HEART RATE: 80 BPM | SYSTOLIC BLOOD PRESSURE: 122 MMHG | HEIGHT: 67 IN | BODY MASS INDEX: 36.19 KG/M2 | OXYGEN SATURATION: 94 % | WEIGHT: 230.6 LBS

## 2024-02-06 DIAGNOSIS — I50.23 ACUTE ON CHRONIC HFREF (HEART FAILURE WITH REDUCED EJECTION FRACTION): ICD-10-CM

## 2024-02-06 DIAGNOSIS — I25.118 CORONARY ARTERY DISEASE INVOLVING NATIVE CORONARY ARTERY OF NATIVE HEART WITH OTHER FORM OF ANGINA PECTORIS: Primary | ICD-10-CM

## 2024-02-06 DIAGNOSIS — I10 ESSENTIAL HYPERTENSION: ICD-10-CM

## 2024-02-06 PROCEDURE — 1160F RVW MEDS BY RX/DR IN RCRD: CPT | Performed by: NURSE PRACTITIONER

## 2024-02-06 PROCEDURE — 99214 OFFICE O/P EST MOD 30 MIN: CPT | Performed by: NURSE PRACTITIONER

## 2024-02-06 PROCEDURE — 3079F DIAST BP 80-89 MM HG: CPT | Performed by: NURSE PRACTITIONER

## 2024-02-06 PROCEDURE — 93284 PRGRMG EVAL IMPLANTABLE DFB: CPT | Performed by: NURSE PRACTITIONER

## 2024-02-06 PROCEDURE — 1159F MED LIST DOCD IN RCRD: CPT | Performed by: NURSE PRACTITIONER

## 2024-02-06 PROCEDURE — 3074F SYST BP LT 130 MM HG: CPT | Performed by: NURSE PRACTITIONER

## 2024-02-06 RX ORDER — FUROSEMIDE 40 MG/1
40 TABLET ORAL DAILY
Qty: 30 TABLET | Refills: 11 | Status: SHIPPED | OUTPATIENT
Start: 2024-02-06

## 2024-02-06 NOTE — LETTER
February 6, 2024       No Recipients    Patient: Kai Garcia   YOB: 1955   Date of Visit: 2/6/2024     Dear Cheo Weiss MD:       Thank you for referring Kai Garcia to me for evaluation. Below are the relevant portions of my assessment and plan of care.    If you have questions, please do not hesitate to call me. I look forward to following Kai along with you.         Sincerely,        CONNOR Islas        CC:   No Recipients    Faye Lagunas APRN  02/06/24 1035  Sign when Signing Visit  Subjective:     Encounter Date:02/06/2024    Primary Care Physician: Cheo Weiss MD      Patient ID: Kai Garcia is a 68 y.o. male.    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 Helton, stage I in June 2006 and stage II in March 2007.   Followed chronically with annual CTs by Dr. Helton. Stable by CT 2019  Coronary artery disease:  Cardiac cath prior to “elephant trunk” procedure in June 2006 and the patient underwent CABG at that time consisting of SVG to the OM and SVG to the PDA.   02/08/2007 - inferoposterior MI, cardiac cath showed 100% left circumflex with severe OM-1 stenosis and 100% vein graft to the left circumflex. RCA was diffuse, 90% ostial stenosis at the SVG to the RCA. LAD had only 40% stenosis.   Cardiac cath in March 2007 due to recurrent angina - 2.5 Microdriver stent to the distal native RCA.   4/27/18 cardiac catheter occluded vein grafts ×2.  Large posterior basal aneurysm with ejection fraction of 30-35%.  Severe stenosis and distal circumflex which supplies aneurysm.  Non-flow-limiting RCA disease.  MRI 8/18 LVEF 28%  S/P Bi- Ventricular ICD- [employer] 8/9/18, EMILIE Fox MD  Dyslipidemia.   Hypertension.   Diabetes  Chronic low back pain/multiple lumbar surgeries, currently  disabled:  L4-L5 discectomy in 2015.   History of bleeding peptic ulcer disease.   Severe obstructive sleep apnea.   Appendectomy  Lumbar fusion 10/2021  Left hip replacement 2023      Allergies   Allergen Reactions   • Ativan [Lorazepam] Mental Status Change     agitation   • Zetia [Ezetimibe] Other (See Comments)     increased LFTs   • Zocor [Simvastatin] Myalgia   • Isosorbide Other (See Comments)     headaches   • Jardiance [Empagliflozin] Other (See Comments)     Blood in urine and burning sensation   • Metformin And Related Diarrhea         Current Outpatient Medications:   •  Accu-Chek Softclix Lancets lancets, USE TO CHECK BLOOD SUGAR 2 TIMES A DAY, Disp: 100 each, Rfl: 11  •  acetaminophen (TYLENOL) 500 MG tablet, Take 2 tablets by mouth Every 6 (Six) Hours As Needed for Mild Pain. Indications: Pain, Disp: , Rfl:   •  aspirin 81 MG EC tablet, Take 1 tablet by mouth Daily., Disp: , Rfl:   •  bisoprolol (ZEBeta) 10 MG tablet, TAKE ONE TABLET BY MOUTH EVERY DAY, Disp: 90 tablet, Rfl: 0  •  clopidogrel (PLAVIX) 75 MG tablet, TAKE ONE TABLET BY MOUTH EVERY DAY; STOP TAKING 1 WEEK PRIOR TO SURGERY PER TODD'S OFFICE, Disp: 90 tablet, Rfl: 0  •  Easy Touch Pen Needles 31G X 5 MM misc, USE DAILY WITH INSULIN, Disp: 100 each, Rfl: 5  •  Entresto 49-51 MG tablet, TAKE ONE TABLET BY MOUTH EVERY TWELVE HOURS, Disp: 180 tablet, Rfl: 0  •  eplerenone (INSPRA) 25 MG tablet, TAKE ONE TABLET BY MOUTH EVERY DAY, Disp: 90 tablet, Rfl: 0  •  glucose blood (Accu-Chek Jodie Plus) test strip, USE TO TEST BLOOD SUGAR 2 TIMES A DAY, Disp: 100 each, Rfl: 11  •  Insulin Glargine (Lantus SoloStar) 100 UNIT/ML injection pen, ADMINISTER 20 UNITS UNDER THE SKIN DAILY AS DIRECTED FOR DIABETES, Disp: 15 mL, Rfl: 1  •  Januvia 100 MG tablet, TAKE ONE TABLET BY MOUTH EVERY DAY, Disp: 90 tablet, Rfl: 0  •  nitroglycerin (NITROSTAT) 0.4 MG SL tablet, Place 1 tablet under the tongue Every 5 (Five) Minutes As Needed for Chest Pain. Take no  more than 3 doses in 15 minutes., Disp: 25 tablet, Rfl: 5  •  pantoprazole (PROTONIX) 40 MG EC tablet, TAKE ONE TABLET BY MOUTH EVERY DAY, Disp: 90 tablet, Rfl: 0  •  rosuvastatin (CRESTOR) 10 MG tablet, TAKE ONE TABLET BY MOUTH EVERY DAY, Disp: 90 tablet, Rfl: 0  •  sildenafil (Viagra) 100 MG tablet, Take 1 tablet by mouth Daily As Needed for Erectile Dysfunction., Disp: 10 tablet, Rfl: 2  •  traZODone (DESYREL) 100 MG tablet, TAKE ONE TABLET BY MOUTH AT BEDTIME, Disp: 90 tablet, Rfl: 0  •  amoxicillin (AMOXIL) 500 MG capsule, , Disp: , Rfl:         History of Present Illness    Patient is a 68-year-old  male who presents today for 6-month follow-up of coronary artery disease, ischemic cardiomyopathy and ICD interrogation.  Since last being seen patient notes over the last month he has been experiencing a lot of chest discomfort.  No specific triggering factors noted.  Notes that this is described as a sharp sensation beginning in his upper abdomen and then radiating up through the left side of his chest and sometimes into his shoulder.  Notes over the last 3 weeks he has gained roughly 10 pounds and complains of some edema in his hands and legs.  Also notes that he has been having some difficulty swallowing and of occasionally having some food getting lodged in his throat.  Denies any significant increase in volume intake or sodium intake.  Has had increased shortness of breath.    The following portions of the patient's history were reviewed and updated as appropriate: allergies, current medications, past family history, past medical history, past social history, past surgical history and problem list.      Social History     Tobacco Use   • Smoking status: Former     Packs/day: 3.00     Years: 35.00     Additional pack years: 0.00     Total pack years: 105.00     Types: Cigarettes     Quit date: 6/15/2006     Years since quittin.6   • Smokeless tobacco: Never   Vaping Use   • Vaping Use: Never used  "  Substance Use Topics   • Alcohol use: No   • Drug use: No         ROS       Objective:   /80   Pulse 80   Ht 170.2 cm (67\")   Wt 105 kg (230 lb 9.6 oz)   SpO2 94%   BMI 36.12 kg/m²         Vitals reviewed.   Constitutional:       Appearance: Well-developed and not in distress.   Neck:      Vascular: No JVD.      Trachea: No tracheal deviation.   Pulmonary:      Effort: Pulmonary effort is normal.      Breath sounds: Normal breath sounds.   Cardiovascular:      Normal rate. Regular rhythm.      Murmurs: There is no murmur.   Edema:     Comments: Hand edema noted.  Musculoskeletal:         General: No deformity. Skin:     General: Skin is warm and dry.   Neurological:      Mental Status: Alert and oriented to person, place, and time.         Procedures  Device check:  Normal functioning biventricular ICD.  99% biventricular paced.  Battery voltage of 6 years.  2 events since last interrogation        Assessment:   Assessment & Plan     Diagnoses and all orders for this visit:    1. Coronary artery disease involving native coronary artery of native heart with other form of angina pectoris (Primary), new onset chest pain and dyspnea.  Possible angina.  On aspirin and Plavix.    2. Acute on chronic HFrEF (heart failure with reduced ejection fraction), new onset volume overload.  Known decreased LVEF.  Was 40% by last echo.  On Inspra.    3. Essential hypertension, controlled.  On beta-blocker.    Other orders  -     furosemide (LASIX) 40 MG tablet; Take 1 tablet by mouth Daily.  Dispense: 30 tablet; Refill: 11      Plan:  To further evaluate patient's symptoms will obtain myocardial perfusion study and echocardiogram in the near term.  Discussed the case with Dr. Willis.  At this time we will add Lasix 40 mg daily to his medication regimen.  Check BMP in 1 week.  Would recommend transitioning patient's Januvia to Jardiance.  Will defer to primary care.  Continue other current cardiac medications.  Follow-up " in 6 weeks time or sooner if needed.     CONNOR Islas       Advance Care Planning  ACP discussion was held with the patient during this visit. Patient has an advance directive in EMR which is still valid.         Dictated utilizing Dragon dictation

## 2024-02-08 ENCOUNTER — HOSPITAL ENCOUNTER (OUTPATIENT)
Dept: CARDIOLOGY | Facility: HOSPITAL | Age: 69
Discharge: HOME | End: 2024-02-08
Admitting: NURSE PRACTITIONER
Payer: MEDICARE

## 2024-02-08 VITALS — HEIGHT: 67 IN | WEIGHT: 231.48 LBS | BODY MASS INDEX: 36.33 KG/M2

## 2024-02-08 DIAGNOSIS — I25.118 CORONARY ARTERY DISEASE INVOLVING NATIVE CORONARY ARTERY OF NATIVE HEART WITH OTHER FORM OF ANGINA PECTORIS: ICD-10-CM

## 2024-02-08 DIAGNOSIS — I50.23 ACUTE ON CHRONIC HFREF (HEART FAILURE WITH REDUCED EJECTION FRACTION): ICD-10-CM

## 2024-02-08 PROCEDURE — 93306 TTE W/DOPPLER COMPLETE: CPT

## 2024-02-09 LAB
BH CV ECHO MEAS - AO MAX PG: 4.1 MMHG
BH CV ECHO MEAS - AO MEAN PG: 2.6 MMHG
BH CV ECHO MEAS - AO ROOT DIAM: 3.5 CM
BH CV ECHO MEAS - AO V2 MAX: 101.6 CM/SEC
BH CV ECHO MEAS - AO V2 VTI: 21.7 CM
BH CV ECHO MEAS - AVA(I,D): 3.3 CM2
BH CV ECHO MEAS - EDV(CUBED): 191.5 ML
BH CV ECHO MEAS - EDV(MOD-SP2): 172 ML
BH CV ECHO MEAS - EDV(MOD-SP4): 125 ML
BH CV ECHO MEAS - EF(MOD-BP): 57 %
BH CV ECHO MEAS - EF(MOD-SP2): 57.6 %
BH CV ECHO MEAS - EF(MOD-SP4): 56 %
BH CV ECHO MEAS - ESV(CUBED): 71.7 ML
BH CV ECHO MEAS - ESV(MOD-SP2): 73 ML
BH CV ECHO MEAS - ESV(MOD-SP4): 55 ML
BH CV ECHO MEAS - FS: 27.9 %
BH CV ECHO MEAS - IVS/LVPW: 1.32 CM
BH CV ECHO MEAS - IVSD: 1.11 CM
BH CV ECHO MEAS - LA DIMENSION: 4.9 CM
BH CV ECHO MEAS - LV DIASTOLIC VOL/BSA (35-75): 58.3 CM2
BH CV ECHO MEAS - LV MASS(C)D: 222.4 GRAMS
BH CV ECHO MEAS - LV MAX PG: 2.8 MMHG
BH CV ECHO MEAS - LV MEAN PG: 1.26 MMHG
BH CV ECHO MEAS - LV SYSTOLIC VOL/BSA (12-30): 25.6 CM2
BH CV ECHO MEAS - LV V1 MAX: 82.9 CM/SEC
BH CV ECHO MEAS - LV V1 VTI: 18.8 CM
BH CV ECHO MEAS - LVIDD: 5.8 CM
BH CV ECHO MEAS - LVIDS: 4.2 CM
BH CV ECHO MEAS - LVOT AREA: 3.8 CM2
BH CV ECHO MEAS - LVOT DIAM: 2.2 CM
BH CV ECHO MEAS - LVPWD: 0.84 CM
BH CV ECHO MEAS - MV A MAX VEL: 71.6 CM/SEC
BH CV ECHO MEAS - MV DEC TIME: 0.17 SEC
BH CV ECHO MEAS - MV E MAX VEL: 56.8 CM/SEC
BH CV ECHO MEAS - MV E/A: 0.79
BH CV ECHO MEAS - PA ACC SLOPE: 857.3 CM/SEC2
BH CV ECHO MEAS - PA ACC TIME: 0.09 SEC
BH CV ECHO MEAS - PI END-D VEL: 123.5 CM/SEC
BH CV ECHO MEAS - RAP SYSTOLE: 3 MMHG
BH CV ECHO MEAS - RVSP: 21 MMHG
BH CV ECHO MEAS - SI(MOD-SP2): 46.1 ML/M2
BH CV ECHO MEAS - SI(MOD-SP4): 32.6 ML/M2
BH CV ECHO MEAS - SV(LVOT): 71.7 ML
BH CV ECHO MEAS - SV(MOD-SP2): 99 ML
BH CV ECHO MEAS - SV(MOD-SP4): 70 ML
BH CV ECHO MEAS - TAPSE (>1.6): 1.6 CM
BH CV ECHO MEAS - TR MAX PG: 17.6 MMHG
BH CV ECHO MEAS - TR MAX VEL: 181.3 CM/SEC
BH CV VAS BP RIGHT ARM: NORMAL MMHG
BH CV XLRA - RV BASE: 4.1 CM
BH CV XLRA - RV LENGTH: 9 CM
BH CV XLRA - RV MID: 4.2 CM
BH CV XLRA - TDI S': 7.57 CM/SEC
LEFT ATRIUM VOLUME INDEX: 29.8 ML/M2

## 2024-02-12 ENCOUNTER — HOSPITAL ENCOUNTER (OUTPATIENT)
Dept: CARDIOLOGY | Facility: HOSPITAL | Age: 69
Discharge: HOME | End: 2024-02-12
Payer: MEDICARE

## 2024-02-12 VITALS
SYSTOLIC BLOOD PRESSURE: 94 MMHG | HEART RATE: 69 BPM | DIASTOLIC BLOOD PRESSURE: 66 MMHG | BODY MASS INDEX: 36.33 KG/M2 | HEIGHT: 67 IN | WEIGHT: 231.48 LBS

## 2024-02-12 DIAGNOSIS — I50.23 ACUTE ON CHRONIC HFREF (HEART FAILURE WITH REDUCED EJECTION FRACTION): ICD-10-CM

## 2024-02-12 DIAGNOSIS — I25.118 CORONARY ARTERY DISEASE INVOLVING NATIVE CORONARY ARTERY OF NATIVE HEART WITH OTHER FORM OF ANGINA PECTORIS: ICD-10-CM

## 2024-02-12 PROCEDURE — 0 TECHNETIUM SESTAMIBI: Performed by: NURSE PRACTITIONER

## 2024-02-12 PROCEDURE — 78452 HT MUSCLE IMAGE SPECT MULT: CPT

## 2024-02-12 PROCEDURE — 93018 CV STRESS TEST I&R ONLY: CPT | Performed by: INTERNAL MEDICINE

## 2024-02-12 PROCEDURE — A9500 TC99M SESTAMIBI: HCPCS | Performed by: NURSE PRACTITIONER

## 2024-02-12 PROCEDURE — 93017 CV STRESS TEST TRACING ONLY: CPT

## 2024-02-12 PROCEDURE — 78452 HT MUSCLE IMAGE SPECT MULT: CPT | Performed by: INTERNAL MEDICINE

## 2024-02-12 PROCEDURE — 25010000002 REGADENOSON 0.4 MG/5ML SOLUTION: Performed by: NURSE PRACTITIONER

## 2024-02-12 RX ORDER — REGADENOSON 0.08 MG/ML
0.4 INJECTION, SOLUTION INTRAVENOUS ONCE
Status: COMPLETED | OUTPATIENT
Start: 2024-02-12 | End: 2024-02-12

## 2024-02-12 RX ADMIN — TECHNETIUM TC 99M SESTAMIBI 1 DOSE: 1 INJECTION INTRAVENOUS at 10:30

## 2024-02-12 RX ADMIN — REGADENOSON 0.4 MG: 0.08 INJECTION, SOLUTION INTRAVENOUS at 12:38

## 2024-02-12 RX ADMIN — TECHNETIUM TC 99M SESTAMIBI 1 DOSE: 1 INJECTION INTRAVENOUS at 12:40

## 2024-02-13 LAB
BH CV REST NUCLEAR ISOTOPE DOSE: 9.9 MCI
BH CV STRESS BP STAGE 2: NORMAL
BH CV STRESS BP STAGE 4: NORMAL
BH CV STRESS COMMENTS STAGE 1: NORMAL
BH CV STRESS DOSE REGADENOSON STAGE 1: 0.4
BH CV STRESS DURATION MIN STAGE 1: 1
BH CV STRESS DURATION MIN STAGE 2: 1
BH CV STRESS DURATION MIN STAGE 3: 1
BH CV STRESS DURATION MIN STAGE 4: 1
BH CV STRESS DURATION SEC STAGE 1: 0
BH CV STRESS DURATION SEC STAGE 2: 0
BH CV STRESS DURATION SEC STAGE 3: 0
BH CV STRESS DURATION SEC STAGE 4: 0
BH CV STRESS HR STAGE 1: 71
BH CV STRESS HR STAGE 2: 88
BH CV STRESS HR STAGE 3: 82
BH CV STRESS HR STAGE 4: 80
BH CV STRESS NUCLEAR ISOTOPE DOSE: 33 MCI
BH CV STRESS O2 STAGE 1: 97
BH CV STRESS O2 STAGE 2: 97
BH CV STRESS O2 STAGE 3: 97
BH CV STRESS O2 STAGE 4: 97
BH CV STRESS PROTOCOL 1: NORMAL
BH CV STRESS RECOVERY BP: NORMAL MMHG
BH CV STRESS RECOVERY HR: 75 BPM
BH CV STRESS RECOVERY O2: 97 %
BH CV STRESS STAGE 1: 1
BH CV STRESS STAGE 2: 2
BH CV STRESS STAGE 3: 3
BH CV STRESS STAGE 4: 4
LV EF NUC BP: 25 %
MAXIMAL PREDICTED HEART RATE: 151 BPM
PERCENT MAX PREDICTED HR: 58.28 %
STRESS BASELINE BP: NORMAL MMHG
STRESS BASELINE HR: 69 BPM
STRESS O2 SAT REST: 97 %
STRESS PERCENT HR: 69 %
STRESS POST ESTIMATED WORKLOAD: 1 METS
STRESS POST EXERCISE DUR MIN: 4 MIN
STRESS POST EXERCISE DUR SEC: 0 SEC
STRESS POST O2 SAT PEAK: 97 %
STRESS POST PEAK BP: NORMAL MMHG
STRESS POST PEAK HR: 88 BPM
STRESS TARGET HR: 128 BPM

## 2024-02-14 ENCOUNTER — TELEPHONE (OUTPATIENT)
Dept: GASTROENTEROLOGY | Facility: CLINIC | Age: 69
End: 2024-02-14
Payer: MEDICARE

## 2024-02-16 ENCOUNTER — OFFICE VISIT (OUTPATIENT)
Dept: ORTHOPEDIC SURGERY | Facility: CLINIC | Age: 69
End: 2024-02-16
Payer: MEDICARE

## 2024-02-16 VITALS
SYSTOLIC BLOOD PRESSURE: 136 MMHG | WEIGHT: 230.4 LBS | DIASTOLIC BLOOD PRESSURE: 74 MMHG | BODY MASS INDEX: 36.16 KG/M2 | HEIGHT: 67 IN

## 2024-02-16 DIAGNOSIS — Z96.642 STATUS POST TOTAL HIP REPLACEMENT, LEFT: Primary | ICD-10-CM

## 2024-02-16 DIAGNOSIS — M76.12 PSOAS TENDINITIS OF LEFT SIDE: ICD-10-CM

## 2024-02-21 ENCOUNTER — LAB (OUTPATIENT)
Dept: LAB | Facility: HOSPITAL | Age: 69
End: 2024-02-21
Payer: MEDICARE

## 2024-02-21 DIAGNOSIS — I50.23 ACUTE ON CHRONIC HFREF (HEART FAILURE WITH REDUCED EJECTION FRACTION): ICD-10-CM

## 2024-02-21 PROCEDURE — 36415 COLL VENOUS BLD VENIPUNCTURE: CPT

## 2024-02-21 PROCEDURE — 80048 BASIC METABOLIC PNL TOTAL CA: CPT

## 2024-02-22 LAB
ANION GAP SERPL CALCULATED.3IONS-SCNC: 11.6 MMOL/L (ref 5–15)
BUN SERPL-MCNC: 12 MG/DL (ref 8–23)
BUN/CREAT SERPL: 9.4 (ref 7–25)
CALCIUM SPEC-SCNC: 9.3 MG/DL (ref 8.6–10.5)
CHLORIDE SERPL-SCNC: 100 MMOL/L (ref 98–107)
CO2 SERPL-SCNC: 28.4 MMOL/L (ref 22–29)
CREAT SERPL-MCNC: 1.27 MG/DL (ref 0.76–1.27)
EGFRCR SERPLBLD CKD-EPI 2021: 61.2 ML/MIN/1.73
GLUCOSE SERPL-MCNC: 174 MG/DL (ref 65–99)
POTASSIUM SERPL-SCNC: 4.1 MMOL/L (ref 3.5–5.2)
SODIUM SERPL-SCNC: 140 MMOL/L (ref 136–145)

## 2024-03-05 NOTE — PROGRESS NOTES
Subjective:     Encounter Date:03/06/2024    Primary Care Physician: Cheo Weiss MD      Patient ID: Kai Garcia is a 68 y.o. male.    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 Helton, stage I in June 2006 and stage II in March 2007.   Followed chronically with annual CTs by Dr. Helton. Stable by CT 2019  Coronary artery disease:  Cardiac cath prior to “elephant trunk” procedure in June 2006 and the patient underwent CABG at that time consisting of SVG to the OM and SVG to the PDA.   02/08/2007 - inferoposterior MI, cardiac cath showed 100% left circumflex with severe OM-1 stenosis and 100% vein graft to the left circumflex. RCA was diffuse, 90% ostial stenosis at the SVG to the RCA. LAD had only 40% stenosis.   Cardiac cath in March 2007 due to recurrent angina - 2.5 Microdriver stent to the distal native RCA.   4/27/18 cardiac catheter occluded vein grafts ×2.  Large posterior basal aneurysm with ejection fraction of 30-35%.  Severe stenosis and distal circumflex which supplies aneurysm.  Non-flow-limiting RCA disease.  MRI 8/18 LVEF 28%  S/P Bi- Ventricular ICD- [employer] 8/9/18, EMILIE Fox MD  2/2024 MPS with large sized infarct basal inferolateral wall with no significant ischemia.  2/2024 echo EF 56 to 60%.  No significant change from 2021 study  Dyslipidemia.   Hypertension.   Diabetes  Chronic low back pain/multiple lumbar surgeries, currently disabled:  L4-L5 discectomy in 2015.   History of bleeding peptic ulcer disease.   Severe obstructive sleep apnea.   Appendectomy  Lumbar fusion 10/2021  Left hip replacement 2023        Allergies   Allergen Reactions    Ativan [Lorazepam] Mental Status Change     agitation    Zetia [Ezetimibe] Other (See Comments)     increased LFTs    Zocor [Simvastatin] Myalgia     Isosorbide Other (See Comments)     headaches    Jardiance [Empagliflozin] Other (See Comments)     Blood in urine and burning sensation    Metformin And Related Diarrhea         Current Outpatient Medications:     Accu-Chek Softclix Lancets lancets, USE TO CHECK BLOOD SUGAR 2 TIMES A DAY, Disp: 100 each, Rfl: 11    acetaminophen (TYLENOL) 500 MG tablet, Take 2 tablets by mouth Every 6 (Six) Hours As Needed for Mild Pain. Indications: Pain, Disp: , Rfl:     aspirin 81 MG EC tablet, Take 1 tablet by mouth Daily., Disp: , Rfl:     bisoprolol (ZEBeta) 10 MG tablet, TAKE ONE TABLET BY MOUTH EVERY DAY, Disp: 90 tablet, Rfl: 0    clopidogrel (PLAVIX) 75 MG tablet, TAKE ONE TABLET BY MOUTH EVERY DAY; STOP TAKING 1 WEEK PRIOR TO SURGERY PER TODD'S OFFICE, Disp: 90 tablet, Rfl: 0    Easy Touch Pen Needles 31G X 5 MM misc, USE DAILY WITH INSULIN, Disp: 100 each, Rfl: 5    Entresto 49-51 MG tablet, TAKE ONE TABLET BY MOUTH EVERY TWELVE HOURS, Disp: 180 tablet, Rfl: 0    eplerenone (INSPRA) 25 MG tablet, TAKE ONE TABLET BY MOUTH EVERY DAY, Disp: 90 tablet, Rfl: 0    furosemide (LASIX) 40 MG tablet, Take 1 tablet by mouth Daily. (Patient taking differently: Take 1 tablet by mouth Daily. Reduced this down to 0.25), Disp: 30 tablet, Rfl: 11    glucose blood (Accu-Chek Jodie Plus) test strip, USE TO TEST BLOOD SUGAR 2 TIMES A DAY, Disp: 100 each, Rfl: 11    Insulin Glargine (Lantus SoloStar) 100 UNIT/ML injection pen, ADMINISTER 20 UNITS UNDER THE SKIN DAILY AS DIRECTED FOR DIABETES, Disp: 15 mL, Rfl: 1    Januvia 100 MG tablet, TAKE ONE TABLET BY MOUTH EVERY DAY, Disp: 90 tablet, Rfl: 0    nitroglycerin (NITROSTAT) 0.4 MG SL tablet, Place 1 tablet under the tongue Every 5 (Five) Minutes As Needed for Chest Pain. Take no more than 3 doses in 15 minutes., Disp: 25 tablet, Rfl: 5    pantoprazole (PROTONIX) 40 MG EC tablet, TAKE ONE TABLET BY MOUTH EVERY DAY, Disp: 90 tablet, Rfl: 0    rosuvastatin (CRESTOR) 10 MG tablet, TAKE ONE  "TABLET BY MOUTH EVERY DAY, Disp: 90 tablet, Rfl: 0    sildenafil (Viagra) 100 MG tablet, Take 1 tablet by mouth Daily As Needed for Erectile Dysfunction., Disp: 10 tablet, Rfl: 2    traZODone (DESYREL) 100 MG tablet, TAKE ONE TABLET BY MOUTH AT BEDTIME, Disp: 90 tablet, Rfl: 0        History of Present Illness    Patient returns today for 1 month follow-up for new onset heart failure.  He was started on furosemide, notes, \"going to the bathroom a lot\".  He lost most of his edema, but notes his weight has not gone down.  He says he has gained 3 pounds a month for the last 4 months, with his notes his blood sugars are much higher than they had been.  He still has some chest discomfort and dyspnea says that occurs predominantly after working.  He has a little bit while working but notes his pain is much worse after he is done and trying to rest.  This is also inquiring on a daily basis, and lasting hours at a time.    The following portions of the patient's history were reviewed and updated as appropriate: allergies, current medications, past family history, past medical history, past social history, past surgical history and problem list.    Family History   Problem Relation Age of Onset    Coronary artery disease Other     Rheum arthritis Other     Heart disease Mother         afib    Rheum arthritis Mother     Heart disease Father     Rheum arthritis Father     Hyperlipidemia Sister     Heart disease Sister     Mental illness Brother        Social History     Tobacco Use    Smoking status: Former     Current packs/day: 0.00     Average packs/day: 3.0 packs/day for 35.0 years (105.0 ttl pk-yrs)     Types: Cigarettes     Start date: 6/15/1971     Quit date: 6/15/2006     Years since quittin.7    Smokeless tobacco: Never   Vaping Use    Vaping status: Never Used   Substance Use Topics    Alcohol use: No    Drug use: No         ROS       Objective:   /76   Pulse 67   Ht 168.9 cm (66.5\")   Wt 106 kg (234 lb " 6.4 oz)   SpO2 94%   BMI 37.27 kg/m²         Vitals reviewed.   Constitutional:       Appearance: Well-developed and not in distress.   Neck:      Vascular: No JVD.      Trachea: No tracheal deviation.   Pulmonary:      Effort: Pulmonary effort is normal.      Breath sounds: Normal breath sounds.   Cardiovascular:      Normal rate. Regular rhythm.   Edema:     Peripheral edema absent.   Abdominal:      Tenderness: There is no abdominal tenderness.   Musculoskeletal:         General: No deformity. Skin:     General: Skin is warm and dry.   Neurological:      Mental Status: Alert and oriented to person, place, and time.         Procedures          Assessment:   Assessment & Plan      Diagnoses and all orders for this visit:    1. Coronary artery disease involving native coronary artery of native heart without angina pectoris (Primary)      1.  Coronary artery disease.  Known inferobasal aneurysm.  No ischemia by recent nuclear study.  2.  Atypical chest pain, not consistent with ischemia  3.  Ischemic cardiomyopathy with LV aneurysm.  LVEF 25-30%.  (Echocardiogram always over because his LVEF as it fails to include his aneurysm).  4.  Hypertension well-controlled  5.  Dyslipidemia well-controlled on high intensity statin  6.  Type a aortic dissection, now 18 years status post elephant trunk procedure.  No CT scan in 5 years.  7.  Diabetes    Recommendations:  1.  Will check CTA of chest and abdomen to evaluate his aortic dissection, well as his LV aneurysm.  2.  If above negative, would consider EGD, patient discussed with his gastroenterologist  3.  Continue current medical therapy.  Discussed the ability to take 20 mg of furosemide daily for maintenance and just take an additional dose as needed edema/volume.  4.  Patient is not on SGLT2 inhibitor, has had frequent UTIs when started on in the past.  If change to his medical therapy for his diabetes is entertained by his primary physician would recommend GLP-1  agonist.  5.  Discussed need to decrease p.o. fluid and sodium intake.  6.  Revisit in 6 months apparent symptom change         Advance Care Planning   ACP discussion was held with the patient during this visit. Patient has an advance directive in EMR which is still valid.       Uri Willis MD    Dictated utilizing Dragon dictation

## 2024-03-06 ENCOUNTER — OFFICE VISIT (OUTPATIENT)
Dept: CARDIOLOGY | Facility: CLINIC | Age: 69
End: 2024-03-06
Payer: MEDICARE

## 2024-03-06 VITALS
WEIGHT: 234.4 LBS | OXYGEN SATURATION: 94 % | SYSTOLIC BLOOD PRESSURE: 117 MMHG | BODY MASS INDEX: 36.79 KG/M2 | HEIGHT: 67 IN | DIASTOLIC BLOOD PRESSURE: 76 MMHG | HEART RATE: 67 BPM

## 2024-03-06 DIAGNOSIS — I71.019 DISSECTION OF THORACIC AORTA, UNSPECIFIED PART: Primary | ICD-10-CM

## 2024-03-06 DIAGNOSIS — I25.10 CORONARY ARTERY DISEASE INVOLVING NATIVE CORONARY ARTERY OF NATIVE HEART WITHOUT ANGINA PECTORIS: Primary | ICD-10-CM

## 2024-03-06 PROCEDURE — 1159F MED LIST DOCD IN RCRD: CPT | Performed by: INTERNAL MEDICINE

## 2024-03-06 PROCEDURE — 99214 OFFICE O/P EST MOD 30 MIN: CPT | Performed by: INTERNAL MEDICINE

## 2024-03-06 PROCEDURE — 3074F SYST BP LT 130 MM HG: CPT | Performed by: INTERNAL MEDICINE

## 2024-03-06 PROCEDURE — 3078F DIAST BP <80 MM HG: CPT | Performed by: INTERNAL MEDICINE

## 2024-03-06 PROCEDURE — 1160F RVW MEDS BY RX/DR IN RCRD: CPT | Performed by: INTERNAL MEDICINE

## 2024-03-12 ENCOUNTER — TELEPHONE (OUTPATIENT)
Dept: GASTROENTEROLOGY | Facility: CLINIC | Age: 69
End: 2024-03-12
Payer: MEDICARE

## 2024-03-12 ENCOUNTER — PRE-ADMISSION TESTING (OUTPATIENT)
Dept: PREADMISSION TESTING | Facility: HOSPITAL | Age: 69
End: 2024-03-12
Payer: MEDICARE

## 2024-03-12 VITALS — WEIGHT: 237.55 LBS | BODY MASS INDEX: 37.28 KG/M2 | HEIGHT: 67 IN

## 2024-03-12 LAB
APTT PPP: 26.1 SECONDS (ref 22–39)
DEPRECATED RDW RBC AUTO: 41.6 FL (ref 37–54)
ERYTHROCYTE [DISTWIDTH] IN BLOOD BY AUTOMATED COUNT: 12.6 % (ref 12.3–15.4)
HCT VFR BLD AUTO: 40.4 % (ref 37.5–51)
HGB BLD-MCNC: 13.9 G/DL (ref 13–17.7)
INR PPP: 1.11 (ref 0.89–1.12)
MCH RBC QN AUTO: 31.4 PG (ref 26.6–33)
MCHC RBC AUTO-ENTMCNC: 34.4 G/DL (ref 31.5–35.7)
MCV RBC AUTO: 91.2 FL (ref 79–97)
PLATELET # BLD AUTO: 87 10*3/MM3 (ref 140–450)
PMV BLD AUTO: 10.7 FL (ref 6–12)
PROTHROMBIN TIME: 14.5 SECONDS (ref 12.2–14.5)
QT INTERVAL: 430 MS
QTC INTERVAL: 460 MS
RBC # BLD AUTO: 4.43 10*6/MM3 (ref 4.14–5.8)
WBC NRBC COR # BLD AUTO: 4.87 10*3/MM3 (ref 3.4–10.8)

## 2024-03-12 PROCEDURE — 93005 ELECTROCARDIOGRAM TRACING: CPT

## 2024-03-12 PROCEDURE — 85027 COMPLETE CBC AUTOMATED: CPT

## 2024-03-12 PROCEDURE — 85730 THROMBOPLASTIN TIME PARTIAL: CPT

## 2024-03-12 PROCEDURE — 85610 PROTHROMBIN TIME: CPT

## 2024-03-12 PROCEDURE — 36415 COLL VENOUS BLD VENIPUNCTURE: CPT

## 2024-03-12 NOTE — TELEPHONE ENCOUNTER
CALLED JEFFERSON BACK TO LET HER KNOW CARDIAC CLEARANCE IS IN THE CHART AND PATIENT IS AWARE OF HOW MANY DAYS TO HOLD BLOOD THINNER.

## 2024-03-12 NOTE — PAT
An arrival time for procedure was not provided during PAT visit. If patient had any questions or concerns about their arrival time, they were instructed to contact their surgeon/physician.  Additionally, if the patient referred to an arrival time that was acquired from their my chart account, patient was encouraged to verify that time with their surgeon/physician. Arrival times are NOT provided in Pre Admission Testing Department.    Per Anesthesia Request, patient instructed not to take their ACE/ARB medications on the AM of surgery.    Patient denies any current skin issues.     Cardiac risk assessment from Dr. Willis office 2/14/24 in chart.     Device check on chart from 2/14/24.    Patient instructed to hold Plavix 5 days prior to procedure.      Redraw potassium in pre op, specimen hemolyzed.

## 2024-03-15 RX ORDER — SODIUM PICOSULFATE, MAGNESIUM OXIDE, AND ANHYDROUS CITRIC ACID 10; 3.5; 12 MG/160ML; G/160ML; G/160ML
350 LIQUID ORAL TAKE AS DIRECTED
Qty: 350 ML | Refills: 0 | Status: SHIPPED | OUTPATIENT
Start: 2024-03-15

## 2024-03-18 ENCOUNTER — ANESTHESIA EVENT (OUTPATIENT)
Dept: GASTROENTEROLOGY | Facility: HOSPITAL | Age: 69
End: 2024-03-18
Payer: MEDICARE

## 2024-03-18 RX ORDER — DROPERIDOL 2.5 MG/ML
0.62 INJECTION, SOLUTION INTRAMUSCULAR; INTRAVENOUS ONCE AS NEEDED
OUTPATIENT
Start: 2024-03-18

## 2024-03-18 RX ORDER — HYDROMORPHONE HYDROCHLORIDE 1 MG/ML
0.5 INJECTION, SOLUTION INTRAMUSCULAR; INTRAVENOUS; SUBCUTANEOUS
OUTPATIENT
Start: 2024-03-18

## 2024-03-18 RX ORDER — FENTANYL CITRATE 50 UG/ML
50 INJECTION, SOLUTION INTRAMUSCULAR; INTRAVENOUS
OUTPATIENT
Start: 2024-03-18

## 2024-03-19 ENCOUNTER — HOSPITAL ENCOUNTER (OUTPATIENT)
Facility: HOSPITAL | Age: 69
Setting detail: HOSPITAL OUTPATIENT SURGERY
Discharge: HOME | End: 2024-03-19
Attending: INTERNAL MEDICINE | Admitting: INTERNAL MEDICINE
Payer: MEDICARE

## 2024-03-19 ENCOUNTER — ANESTHESIA (OUTPATIENT)
Dept: GASTROENTEROLOGY | Facility: HOSPITAL | Age: 69
End: 2024-03-19
Payer: MEDICARE

## 2024-03-19 VITALS
HEART RATE: 63 BPM | RESPIRATION RATE: 18 BRPM | SYSTOLIC BLOOD PRESSURE: 111 MMHG | TEMPERATURE: 97.5 F | DIASTOLIC BLOOD PRESSURE: 73 MMHG | OXYGEN SATURATION: 96 %

## 2024-03-19 DIAGNOSIS — Z12.11 SCREEN FOR COLON CANCER: ICD-10-CM

## 2024-03-19 LAB
GLUCOSE BLDC GLUCOMTR-MCNC: 113 MG/DL (ref 70–130)
GLUCOSE BLDC GLUCOMTR-MCNC: 120 MG/DL (ref 70–130)
POTASSIUM SERPL-SCNC: 4.1 MMOL/L (ref 3.5–5.2)

## 2024-03-19 PROCEDURE — 82948 REAGENT STRIP/BLOOD GLUCOSE: CPT

## 2024-03-19 PROCEDURE — 88305 TISSUE EXAM BY PATHOLOGIST: CPT | Performed by: INTERNAL MEDICINE

## 2024-03-19 PROCEDURE — 25810000003 LACTATED RINGERS PER 1000 ML: Performed by: ANESTHESIOLOGY

## 2024-03-19 PROCEDURE — 84132 ASSAY OF SERUM POTASSIUM: CPT

## 2024-03-19 PROCEDURE — 25010000002 PROPOFOL 10 MG/ML EMULSION: Performed by: NURSE ANESTHETIST, CERTIFIED REGISTERED

## 2024-03-19 PROCEDURE — 36415 COLL VENOUS BLD VENIPUNCTURE: CPT

## 2024-03-19 PROCEDURE — 82948 REAGENT STRIP/BLOOD GLUCOSE: CPT | Performed by: FAMILY MEDICINE

## 2024-03-19 RX ORDER — SODIUM CHLORIDE, SODIUM LACTATE, POTASSIUM CHLORIDE, CALCIUM CHLORIDE 600; 310; 30; 20 MG/100ML; MG/100ML; MG/100ML; MG/100ML
9 INJECTION, SOLUTION INTRAVENOUS CONTINUOUS
Status: DISCONTINUED | OUTPATIENT
Start: 2024-03-19 | End: 2024-03-19 | Stop reason: HOSPADM

## 2024-03-19 RX ORDER — LIDOCAINE HYDROCHLORIDE 10 MG/ML
0.5 INJECTION, SOLUTION EPIDURAL; INFILTRATION; INTRACAUDAL; PERINEURAL ONCE AS NEEDED
Status: DISCONTINUED | OUTPATIENT
Start: 2024-03-19 | End: 2024-03-19 | Stop reason: HOSPADM

## 2024-03-19 RX ORDER — SODIUM CHLORIDE 0.9 % (FLUSH) 0.9 %
10 SYRINGE (ML) INJECTION AS NEEDED
Status: DISCONTINUED | OUTPATIENT
Start: 2024-03-19 | End: 2024-03-19 | Stop reason: HOSPADM

## 2024-03-19 RX ORDER — SODIUM CHLORIDE 9 MG/ML
40 INJECTION, SOLUTION INTRAVENOUS AS NEEDED
Status: DISCONTINUED | OUTPATIENT
Start: 2024-03-19 | End: 2024-03-19 | Stop reason: HOSPADM

## 2024-03-19 RX ORDER — FAMOTIDINE 10 MG/ML
20 INJECTION, SOLUTION INTRAVENOUS ONCE
Status: DISCONTINUED | OUTPATIENT
Start: 2024-03-19 | End: 2024-03-19 | Stop reason: HOSPADM

## 2024-03-19 RX ORDER — SODIUM CHLORIDE 0.9 % (FLUSH) 0.9 %
10 SYRINGE (ML) INJECTION EVERY 12 HOURS SCHEDULED
Status: DISCONTINUED | OUTPATIENT
Start: 2024-03-19 | End: 2024-03-19 | Stop reason: HOSPADM

## 2024-03-19 RX ORDER — FAMOTIDINE 20 MG/1
20 TABLET, FILM COATED ORAL ONCE
Status: DISCONTINUED | OUTPATIENT
Start: 2024-03-19 | End: 2024-03-19 | Stop reason: HOSPADM

## 2024-03-19 RX ORDER — MIDAZOLAM HYDROCHLORIDE 1 MG/ML
0.5 INJECTION INTRAMUSCULAR; INTRAVENOUS
Status: DISCONTINUED | OUTPATIENT
Start: 2024-03-19 | End: 2024-03-19 | Stop reason: HOSPADM

## 2024-03-19 RX ORDER — LIDOCAINE HYDROCHLORIDE 10 MG/ML
INJECTION, SOLUTION EPIDURAL; INFILTRATION; INTRACAUDAL; PERINEURAL AS NEEDED
Status: DISCONTINUED | OUTPATIENT
Start: 2024-03-19 | End: 2024-03-19 | Stop reason: SURG

## 2024-03-19 RX ORDER — PROPOFOL 10 MG/ML
VIAL (ML) INTRAVENOUS AS NEEDED
Status: DISCONTINUED | OUTPATIENT
Start: 2024-03-19 | End: 2024-03-19 | Stop reason: SURG

## 2024-03-19 RX ADMIN — PROPOFOL 50 MG: 10 INJECTION, EMULSION INTRAVENOUS at 10:06

## 2024-03-19 RX ADMIN — PROPOFOL 50 MG: 10 INJECTION, EMULSION INTRAVENOUS at 10:23

## 2024-03-19 RX ADMIN — PROPOFOL 50 MG: 10 INJECTION, EMULSION INTRAVENOUS at 10:12

## 2024-03-19 RX ADMIN — PROPOFOL 50 MG: 10 INJECTION, EMULSION INTRAVENOUS at 10:28

## 2024-03-19 RX ADMIN — SODIUM CHLORIDE, POTASSIUM CHLORIDE, SODIUM LACTATE AND CALCIUM CHLORIDE 9 ML/HR: 600; 310; 30; 20 INJECTION, SOLUTION INTRAVENOUS at 08:28

## 2024-03-19 RX ADMIN — LIDOCAINE HYDROCHLORIDE 50 MG: 10 INJECTION, SOLUTION EPIDURAL; INFILTRATION; INTRACAUDAL; PERINEURAL at 10:03

## 2024-03-19 RX ADMIN — PROPOFOL 50 MG: 10 INJECTION, EMULSION INTRAVENOUS at 10:15

## 2024-03-19 RX ADMIN — PROPOFOL 100 MG: 10 INJECTION, EMULSION INTRAVENOUS at 10:03

## 2024-03-19 RX ADMIN — PROPOFOL 50 MG: 10 INJECTION, EMULSION INTRAVENOUS at 10:09

## 2024-03-19 NOTE — H&P
[facility]    Referring Provider: Meir Salguero MD    Reason for Consultation: here for screening colonoscopy    Chief complaint here for screening colonoscopy    History of present illness:  Kai Garcia is a 68 y.o. male who presents to inpatient endoscopy for screening colonoscopy. Last colonoscopy around 3 years ago. Has been having intermittent chest pain and follows with cardiology. Has had reassuring stress test per his report.    Allergies:  Ativan [lorazepam], Zetia [ezetimibe], Zocor [simvastatin], Isosorbide, Jardiance [empagliflozin], and Metformin and related    Scheduled Meds:  famotidine, 20 mg, Intravenous, Once  famotidine, 20 mg, Oral, Once  sodium chloride, 10 mL, Intravenous, Q12H         Infusions:  lactated ringers, 9 mL/hr, Last Rate: 9 mL/hr (03/19/24 0828)        PRN Meds:    lidocaine PF 1%    midazolam    sodium chloride    sodium chloride    Home Meds:  Medications Prior to Admission   Medication Sig Dispense Refill Last Dose    Accu-Chek Softclix Lancets lancets USE TO CHECK BLOOD SUGAR 2 TIMES A  each 11 3/18/2024    acetaminophen (TYLENOL) 500 MG tablet Take 2 tablets by mouth Every 6 (Six) Hours As Needed for Mild Pain. Indications: Pain   3/18/2024    aspirin 81 MG EC tablet Take 1 tablet by mouth Daily.   3/19/2024    bisoprolol (ZEBeta) 10 MG tablet TAKE ONE TABLET BY MOUTH EVERY DAY 90 tablet 0 3/19/2024    Easy Touch Pen Needles 31G X 5 MM misc USE DAILY WITH INSULIN 100 each 5 3/18/2024    Entresto 49-51 MG tablet TAKE ONE TABLET BY MOUTH EVERY TWELVE HOURS 180 tablet 0 3/18/2024    eplerenone (INSPRA) 25 MG tablet TAKE ONE TABLET BY MOUTH EVERY DAY 90 tablet 0 3/19/2024    furosemide (LASIX) 40 MG tablet Take 1 tablet by mouth Daily. (Patient taking differently: Take 10 mg by mouth Daily. Reduced this down to 0.25) 30 tablet 11 3/19/2024    glucose blood (Accu-Chek Jodie Plus) test strip USE TO TEST BLOOD SUGAR 2 TIMES A  each 11 3/18/2024     Insulin Glargine (Lantus SoloStar) 100 UNIT/ML injection pen ADMINISTER 20 UNITS UNDER THE SKIN DAILY AS DIRECTED FOR DIABETES (Patient taking differently: Inject 20 Units under the skin into the appropriate area as directed Every Night. ADMINISTER 20 UNITS UNDER THE SKIN DAILY AS DIRECTED FOR DIABETES) 15 mL 1 3/18/2024    Januvia 100 MG tablet TAKE ONE TABLET BY MOUTH EVERY DAY 90 tablet 0 3/18/2024    pantoprazole (PROTONIX) 40 MG EC tablet TAKE ONE TABLET BY MOUTH EVERY DAY 90 tablet 0 3/18/2024    rosuvastatin (CRESTOR) 10 MG tablet TAKE ONE TABLET BY MOUTH EVERY DAY 90 tablet 0 3/18/2024    sildenafil (Viagra) 100 MG tablet Take 1 tablet by mouth Daily As Needed for Erectile Dysfunction. 10 tablet 2 3/18/2024    Sod Picosulfate-Mag Ox-Cit Acd (Clenpiq) 10-3.5-12 MG-GM -GM/160ML solution Take 350 mL by mouth Take As Directed. 350 mL 0 3/18/2024    traZODone (DESYREL) 100 MG tablet TAKE ONE TABLET BY MOUTH AT BEDTIME (Patient taking differently: Take 1 tablet by mouth As Needed for Sleep.) 90 tablet 0 3/18/2024    clopidogrel (PLAVIX) 75 MG tablet TAKE ONE TABLET BY MOUTH EVERY DAY; STOP TAKING 1 WEEK PRIOR TO SURGERY PER TODD'S OFFICE (Patient taking differently: Take 1 tablet by mouth Daily. Patient instructed to hold 5 days prior to colonoscopy) 90 tablet 0 3/14/2024    nitroglycerin (NITROSTAT) 0.4 MG SL tablet Place 1 tablet under the tongue Every 5 (Five) Minutes As Needed for Chest Pain. Take no more than 3 doses in 15 minutes. 25 tablet 5        ROS: Review of Systems  All other systems reviewed and are negative.    PAST MED HX: Pt  has a past medical history of Arthritis of back (2015), Cataract, Chest pain, CHF (congestive heart failure), Coronary artery disease (2007), Diabetes mellitus, Dissecting aortic aneurysm, Diverticulosis, Dyslipidemia, Elevated liver function tests, Fracture, finger (1976), GERD (gastroesophageal reflux disease), Heart murmur, Hip arthrosis (2019), History of bleeding  peptic ulcer, History of sepsis (03/2021), History of transfusion (2006), Hyperlipidemia, Hypertension, Knee swelling (2019), Left leg pain, Lumbosacral disc disease (2002), MI (myocardial infarction) (2007), Morbid obesity, LUCIAN on CPAP, Shortness of breath, Temporary low platelet count (03/2021), and Wears glasses.  PAST SURG HX: Pt  has a past surgical history that includes Lumbar fusion; Lumbar discectomy; Coronary artery bypass graft (2006); Coronary angioplasty with stent (2007); Arteriogram Aortic (2006); Cardiac catheterization (N/A, 04/27/2018); Appendectomy; Cardiac electrophysiology procedure (N/A, 08/09/2018); Colonoscopy (N/A, 06/18/2019); lumbar discectomy fusion instrumentation (N/A, 10/27/2021); Total hip arthroplasty (Left, 02/13/2023); and Coronary stent placement (3/2007).  FAM HX: family history includes Coronary artery disease in an other family member; Heart disease in his father, mother, and sister; Hyperlipidemia in his sister; Mental illness in his brother; Rheum arthritis in his father, mother, and another family member.  SOC HX: Pt  reports that he quit smoking about 17 years ago. His smoking use included cigarettes. He started smoking about 52 years ago. He has a 105 pack-year smoking history. He has never used smokeless tobacco. He reports that he does not drink alcohol and does not use drugs.    /83 (BP Location: Right arm, Patient Position: Lying)   Pulse 63   Temp 97.5 °F (36.4 °C) (Temporal)   Resp 18   SpO2 95%     Physical Exam  Wt Readings from Last 3 Encounters:   03/12/24 108 kg (237 lb 8.7 oz)   03/06/24 106 kg (234 lb 6.4 oz)   02/16/24 105 kg (230 lb 6.4 oz)   ,body mass index is unknown because there is no height or weight on file.    General Appearance:  Vitals as above. no acute distress  Head/face:  Normocephalic, atraumatic  Eyes:   EOMI, no conjunctivitis or icterus   Nose/Sinuses:  Nares patent bilaterally without discharge or lesions  Mouth/Throat:  Normal  oral movements without dyskinesia  Neck:  trachea is midline, no thyromegaly  Lungs:  Normal work of breathing effort, no overt rales  Heart:  Regular rate, no overt palpable thrill or grade VI M  Abdomen:  Nondistended, no guarding or rebound tenderness  Neurologic:  Alert; no focal deficits; age appropriate behavior and speech  Psychiatric: mood and affect are congruent  Vascular: extremities without edema  Skin: no rash or cyanosis.          Results Review:   I reviewed the patient's new clinical results.    Lab Results   Component Value Date    WBC 4.87 03/12/2024    HGB 13.9 03/12/2024    HCT 40.4 03/12/2024    MCV 91.2 03/12/2024    PLT 87 (L) 03/12/2024       Lab Results   Component Value Date    GLUCOSE 174 (H) 02/21/2024    BUN 12 02/21/2024    CREATININE 1.27 02/21/2024    EGFRIFNONA 65 12/07/2021    EGFRIFAFRI 79 12/07/2021    BCR 9.4 02/21/2024    CO2 28.4 02/21/2024    CALCIUM 9.3 02/21/2024    PROTENTOTREF 7.0 01/09/2024    ALBUMIN 4.3 01/09/2024    LABIL2 1.6 01/09/2024    AST 16 01/09/2024    ALT 16 01/09/2024       ASSESSMENTS/PLANS  1.) HCM  To screening colonoscopy    2.) Atypical chest pain  He is taking protonix at night. Recommend to take protonix 40 mg 30 minutes before first and last meal. If persistent chest pain, recommend scheduling EGD.    The risk of endoscopy was discussed including the risk of anesthesia, bowel perforation, bleeding, missed lesion, infection, and death should a severe complication occur.  The patient determined that the diagnostic benefit outweighed the risk.             I discussed the patient's findings and my recommendations with the patient    Meir Salguero MD  03/19/24  09:49 EDT

## 2024-03-19 NOTE — ANESTHESIA POSTPROCEDURE EVALUATION
Patient: Kai Garcia    Procedure Summary       Date: 03/19/24 Room / Location:  [facility]    Anesthesia Start: 0957 Anesthesia Stop: 1048    Procedure: COLONOSCOPY Diagnosis:       Screen for colon cancer      (Screen for colon cancer [Z12.11])    Surgeons: Meir Salguero MD Provider: Shyam Gonzalez MD    Anesthesia Type: general ASA Status: 4            Anesthesia Type: general    Vitals  No vitals data found for the desired time range.          Post Anesthesia Care and Evaluation    Patient location during evaluation: PACU  Patient participation: complete - patient participated  Level of consciousness: awake and alert  Pain management: adequate    Airway patency: patent  Anesthetic complications: No anesthetic complications  PONV Status: none  Cardiovascular status: hemodynamically stable and acceptable  Respiratory status: nonlabored ventilation, acceptable and nasal cannula  Hydration status: acceptable    Comments: 82/65 rr14 hr 62 paced 97.1 94%

## 2024-03-19 NOTE — ANESTHESIA PREPROCEDURE EVALUATION
Anesthesia Evaluation     Patient summary reviewed and Nursing notes reviewed                Airway   Mallampati: II  TM distance: >3 FB  Neck ROM: full  No difficulty expected  Dental - normal exam     Pulmonary - normal exam   (+) ,shortness of breath, sleep apnea  Cardiovascular - normal exam    (+) hypertension, valvular problems/murmurs murmur, past MI , CAD, CABG, cardiac stents , dysrhythmias, CHF , PVD (H/O AORTIC DISSECTION REPAIR/ELEPHANT TRUNK), hyperlipidemia      Neuro/Psych- negative ROS  GI/Hepatic/Renal/Endo    (+) obesity, GERD, renal disease-, diabetes mellitus    Musculoskeletal     (+) back pain  Abdominal  - normal exam    Bowel sounds: normal.   Substance History - negative use     OB/GYN negative ob/gyn ROS         Other   arthritis,                   Anesthesia Plan    ASA 4     general     (PROPOFOL)  intravenous induction     Anesthetic plan, risks, benefits, and alternatives have been provided, discussed and informed consent has been obtained with: patient.    Plan discussed with CRNA.    CODE STATUS:

## 2024-03-27 ENCOUNTER — APPOINTMENT (OUTPATIENT)
Dept: CT IMAGING | Facility: HOSPITAL | Age: 69
End: 2024-03-27
Payer: MEDICARE

## 2024-03-27 ENCOUNTER — HOSPITAL ENCOUNTER (OUTPATIENT)
Dept: CT IMAGING | Facility: HOSPITAL | Age: 69
Discharge: HOME | End: 2024-03-27
Admitting: INTERNAL MEDICINE
Payer: MEDICARE

## 2024-03-27 DIAGNOSIS — I71.019 DISSECTION OF THORACIC AORTA, UNSPECIFIED PART: ICD-10-CM

## 2024-03-27 LAB — CREAT BLDA-MCNC: 1.2 MG/DL (ref 0.6–1.3)

## 2024-03-27 PROCEDURE — 74170 CT ABD WO CNTRST FLWD CNTRST: CPT

## 2024-03-27 PROCEDURE — 25510000001 IOPAMIDOL 61 % SOLUTION: Performed by: INTERNAL MEDICINE

## 2024-03-27 PROCEDURE — 82565 ASSAY OF CREATININE: CPT

## 2024-03-27 PROCEDURE — 71270 CT THORAX DX C-/C+: CPT

## 2024-03-27 RX ADMIN — IOPAMIDOL 85 ML: 612 INJECTION, SOLUTION INTRAVENOUS at 10:17

## 2024-04-04 DIAGNOSIS — Z79.4 TYPE 2 DIABETES MELLITUS WITH HYPERGLYCEMIA, WITH LONG-TERM CURRENT USE OF INSULIN: ICD-10-CM

## 2024-04-04 DIAGNOSIS — E11.65 TYPE 2 DIABETES MELLITUS WITH HYPERGLYCEMIA, WITH LONG-TERM CURRENT USE OF INSULIN: ICD-10-CM

## 2024-04-04 RX ORDER — INSULIN GLARGINE 100 [IU]/ML
INJECTION, SOLUTION SUBCUTANEOUS
Qty: 15 ML | Refills: 1 | Status: SHIPPED | OUTPATIENT
Start: 2024-04-04

## 2024-04-10 ENCOUNTER — TELEPHONE (OUTPATIENT)
Dept: CARDIOLOGY | Facility: CLINIC | Age: 69
End: 2024-04-10
Payer: MEDICARE

## 2024-04-10 DIAGNOSIS — I25.10 CORONARY ARTERY DISEASE INVOLVING NATIVE CORONARY ARTERY OF NATIVE HEART WITHOUT ANGINA PECTORIS: Primary | ICD-10-CM

## 2024-04-10 DIAGNOSIS — I42.0 CARDIOMYOPATHY, DILATED: ICD-10-CM

## 2024-04-10 NOTE — TELEPHONE ENCOUNTER
This is a patient of Dr Willis. We received an alert from his latitude monitor showing early battery depletion. After speaking with Reuben oliveros [employer], it needs to be changed out within 90 days. I called and explained this to Mr Garcia and he wants it done as soon as possible. I will put the order in today.Report in [employer] for your review.

## 2024-04-11 PROBLEM — I42.0 CARDIOMYOPATHY, DILATED: Status: ACTIVE | Noted: 2024-04-10

## 2024-04-15 DIAGNOSIS — F51.01 PRIMARY INSOMNIA: ICD-10-CM

## 2024-04-15 DIAGNOSIS — E11.65 TYPE 2 DIABETES MELLITUS WITH HYPERGLYCEMIA, WITH LONG-TERM CURRENT USE OF INSULIN: ICD-10-CM

## 2024-04-15 DIAGNOSIS — Z79.4 TYPE 2 DIABETES MELLITUS WITH HYPERGLYCEMIA, WITH LONG-TERM CURRENT USE OF INSULIN: ICD-10-CM

## 2024-04-15 DIAGNOSIS — K21.9 GASTROESOPHAGEAL REFLUX DISEASE WITHOUT ESOPHAGITIS: ICD-10-CM

## 2024-04-15 RX ORDER — TRAZODONE HYDROCHLORIDE 100 MG/1
100 TABLET ORAL
Qty: 90 TABLET | Refills: 0 | Status: SHIPPED | OUTPATIENT
Start: 2024-04-15

## 2024-04-15 RX ORDER — PANTOPRAZOLE SODIUM 40 MG/1
40 TABLET, DELAYED RELEASE ORAL DAILY
Qty: 90 TABLET | Refills: 0 | Status: SHIPPED | OUTPATIENT
Start: 2024-04-15

## 2024-04-15 RX ORDER — SACUBITRIL AND VALSARTAN 49; 51 MG/1; MG/1
1 TABLET, FILM COATED ORAL EVERY 12 HOURS
Qty: 180 TABLET | Refills: 0 | Status: SHIPPED | OUTPATIENT
Start: 2024-04-15

## 2024-04-15 RX ORDER — BISOPROLOL FUMARATE 10 MG/1
10 TABLET, FILM COATED ORAL DAILY
Qty: 90 TABLET | Refills: 0 | Status: SHIPPED | OUTPATIENT
Start: 2024-04-15

## 2024-04-15 RX ORDER — ROSUVASTATIN CALCIUM 10 MG/1
10 TABLET, COATED ORAL DAILY
Qty: 90 TABLET | Refills: 0 | Status: SHIPPED | OUTPATIENT
Start: 2024-04-15

## 2024-04-15 RX ORDER — CLOPIDOGREL BISULFATE 75 MG/1
75 TABLET ORAL DAILY
Qty: 90 TABLET | Refills: 3 | Status: SHIPPED | OUTPATIENT
Start: 2024-04-15

## 2024-04-15 RX ORDER — SITAGLIPTIN 100 MG/1
100 TABLET, FILM COATED ORAL DAILY
Qty: 90 TABLET | Refills: 0 | Status: SHIPPED | OUTPATIENT
Start: 2024-04-15

## 2024-04-16 ENCOUNTER — OFFICE VISIT (OUTPATIENT)
Dept: FAMILY MEDICINE CLINIC | Facility: CLINIC | Age: 69
End: 2024-04-16
Payer: MEDICARE

## 2024-04-16 ENCOUNTER — PREP FOR SURGERY (OUTPATIENT)
Dept: OTHER | Facility: HOSPITAL | Age: 69
End: 2024-04-16
Payer: MEDICARE

## 2024-04-16 VITALS
HEIGHT: 67 IN | WEIGHT: 232 LBS | HEART RATE: 70 BPM | TEMPERATURE: 98.1 F | DIASTOLIC BLOOD PRESSURE: 82 MMHG | OXYGEN SATURATION: 95 % | BODY MASS INDEX: 36.41 KG/M2 | RESPIRATION RATE: 20 BRPM | SYSTOLIC BLOOD PRESSURE: 122 MMHG

## 2024-04-16 DIAGNOSIS — Z79.4 TYPE 2 DIABETES MELLITUS WITH HYPERGLYCEMIA, WITH LONG-TERM CURRENT USE OF INSULIN: Primary | ICD-10-CM

## 2024-04-16 DIAGNOSIS — G47.33 OSA ON CPAP: ICD-10-CM

## 2024-04-16 DIAGNOSIS — E11.65 TYPE 2 DIABETES MELLITUS WITH HYPERGLYCEMIA, WITH LONG-TERM CURRENT USE OF INSULIN: Primary | ICD-10-CM

## 2024-04-16 DIAGNOSIS — D69.6 THROMBOCYTOPENIA: ICD-10-CM

## 2024-04-16 DIAGNOSIS — I10 PRIMARY HYPERTENSION: ICD-10-CM

## 2024-04-16 DIAGNOSIS — Z45.02 ICD (IMPLANTABLE CARDIOVERTER-DEFIBRILLATOR) BATTERY DEPLETION: Primary | ICD-10-CM

## 2024-04-16 PROCEDURE — 3079F DIAST BP 80-89 MM HG: CPT | Performed by: FAMILY MEDICINE

## 2024-04-16 PROCEDURE — 1160F RVW MEDS BY RX/DR IN RCRD: CPT | Performed by: FAMILY MEDICINE

## 2024-04-16 PROCEDURE — 1159F MED LIST DOCD IN RCRD: CPT | Performed by: FAMILY MEDICINE

## 2024-04-16 PROCEDURE — 99214 OFFICE O/P EST MOD 30 MIN: CPT | Performed by: FAMILY MEDICINE

## 2024-04-16 PROCEDURE — 3044F HG A1C LEVEL LT 7.0%: CPT | Performed by: FAMILY MEDICINE

## 2024-04-16 PROCEDURE — 3074F SYST BP LT 130 MM HG: CPT | Performed by: FAMILY MEDICINE

## 2024-04-16 RX ORDER — SODIUM CHLORIDE 0.9 % (FLUSH) 0.9 %
3 SYRINGE (ML) INJECTION EVERY 12 HOURS SCHEDULED
OUTPATIENT
Start: 2024-04-16

## 2024-04-16 RX ORDER — SODIUM CHLORIDE 9 MG/ML
40 INJECTION, SOLUTION INTRAVENOUS AS NEEDED
OUTPATIENT
Start: 2024-04-16

## 2024-04-16 RX ORDER — NITROGLYCERIN 0.4 MG/1
0.4 TABLET SUBLINGUAL
OUTPATIENT
Start: 2024-04-16

## 2024-04-16 RX ORDER — ACETAMINOPHEN 325 MG/1
650 TABLET ORAL EVERY 4 HOURS PRN
OUTPATIENT
Start: 2024-04-16

## 2024-04-16 RX ORDER — ONDANSETRON 2 MG/ML
4 INJECTION INTRAMUSCULAR; INTRAVENOUS EVERY 6 HOURS PRN
OUTPATIENT
Start: 2024-04-16

## 2024-04-16 RX ORDER — CEFAZOLIN SODIUM 2 G/100ML
2 INJECTION, SOLUTION INTRAVENOUS ONCE
OUTPATIENT
Start: 2024-04-16 | End: 2024-04-16

## 2024-04-16 RX ORDER — SODIUM CHLORIDE 0.9 % (FLUSH) 0.9 %
10 SYRINGE (ML) INJECTION AS NEEDED
OUTPATIENT
Start: 2024-04-16

## 2024-04-16 NOTE — PROGRESS NOTES
Chief Complaint  Follow-up, Diabetes, and Chest Pain    Subjective          Kai Garcia presents to [facility]    History of Present Illness  The patient is here for follow-up. He is accompanied by his wife.    The patient reports persistent chest pain, a symptom that remains unchanged since his last visit. He has consulted with his cardiologist,Dr Willis and  Dr. Fer Pak, who conducted various tests, all of which yielded no significant findings. However, his pacemaker has been deteriorating, necessitating immediate hospitalization and a pacemaker replacement is scheduled for 04 / 30/2024. Battery is depleting .    The patient consulted with Dr. Willis on 03/06/2024 for chest pain, during which a CTA of his chest and abdomen was performed. The CTA revealed subtle liver cirrhosis and emphysema, a condition previously not mentioned. The patient has a history of smoking and has been using a CPAP machine for several years. He also reports frequent coughing, which he attributes to postnasal drip and allergies. He has undergone multiple stress tests, including an echocardiogram.    The patient's wife has expressed concerns about his liver cirrhosis. The patient has a history of alcohol consumption, starting at prior to  age of 20, but ceased right after. He has a history of fatty liver. A colonoscopy performed by Dr. Salguero on 03/19/2025 revealed 4 polyps. A follow-up colonoscopy is due for 2027.    The patient has been monitoring his blood glucose levels at home, which were 171 this morning. Over the past 90 days, his blood glucose levels have been increasing. Despite not making any changes to his diet, his wife reports that his blood glucose levels have been increasing since Christmas. He has lost some weight. He is currently on a regimen of 22 units of insulin.    Supplemental Information  His platelet count is always low. He got new glasses since his last visit. His last eye  "exam was on 04/01/2024. He has had his teeth cleaned. [facility] sent their home health person to his house again.  He still has chronic abd pain.        OTHER NOTES:        Review of Systems   Respiratory: Negative.     Cardiovascular: Negative.  Positive for chest pain.   Gastrointestinal:  Positive for abdominal pain.   All other systems reviewed and are negative.       Objective       Vital Signs:   /82   Pulse 70   Temp 98.1 °F (36.7 °C)   Resp 20   Ht 170.2 cm (67.01\")   Wt 105 kg (232 lb)   SpO2 95%   BMI 36.33 kg/m²     Physical Exam  Vitals and nursing note reviewed.   Constitutional:       General: He is not in acute distress.     Appearance: Normal appearance. He is well-developed. He is obese. He is not ill-appearing.   HENT:      Head: Normocephalic and atraumatic.      Right Ear: Hearing, tympanic membrane, ear canal and external ear normal.      Left Ear: Hearing, tympanic membrane, ear canal and external ear normal.      Nose: Nose normal. No congestion or rhinorrhea.      Mouth/Throat:      Mouth: Mucous membranes are moist.      Pharynx: No oropharyngeal exudate or posterior oropharyngeal erythema.   Eyes:      General: Lids are normal.      Conjunctiva/sclera: Conjunctivae normal.      Pupils: Pupils are equal, round, and reactive to light.   Neck:      Thyroid: No thyromegaly.   Cardiovascular:      Rate and Rhythm: Normal rate and regular rhythm.      Heart sounds: Normal heart sounds. No murmur heard.     No friction rub.   Pulmonary:      Effort: Pulmonary effort is normal. No respiratory distress.      Breath sounds: Normal breath sounds. No wheezing or rales.   Abdominal:      General: Bowel sounds are normal. There is no distension.      Palpations: Abdomen is soft. There is no mass.      Tenderness: There is abdominal tenderness (chronic and unchanged). There is no guarding or rebound.   Musculoskeletal:      Right lower leg: No edema.      Left lower leg: No edema. "   Skin:     General: Skin is warm and dry.   Neurological:      General: No focal deficit present.      Mental Status: He is alert.   Psychiatric:         Mood and Affect: Mood normal.         Speech: Speech normal.         Behavior: Behavior normal.            Physical Exam      Result Review :            Other Results    Results  Laboratory Studies  Blood sugar was 171. Last A1c was 6.8 in January.    Imaging  CTA of chest and abdomen showed evidence of emphysema.             Assessment and Plan    Diagnoses and all orders for this visit:    1. Type 2 diabetes mellitus with hyperglycemia, with long-term current use of insulin (Primary)  -     Comprehensive Metabolic Panel  -     Hemoglobin A1c    2. Primary hypertension  -     Comprehensive Metabolic Panel  -     Hemoglobin A1c    3. Thrombocytopenia  -     CBC & Differential    4. LUCIAN on CPAP               DISCUSSION    Assessment & Plan  The patient is here for follow-up.    1. Type 2 Diabetes Mellitus, characterized by elevated blood glucose levels.  A reevaluation of the patient's A1c will be conducted today. The subsequent treatment plan will be determined based on the results of the laboratory tests. The patient is advised to continue with the current medication regimen, which includes Lantus.    2. Hypertension.  The patient's blood pressure is well-regulated at 122/82. There will be no alterations to the patient's current medication regimen. A Complete Blood Count (CBC) and Comprehensive Metabolic Panel (CMP) will be conducted.    3. Thrombocytopenia.  A  (CBC) will be conducted today.    4. Obstructive Sleep Apnea, managed with Continuous Positive Airway Pressure (CPAP).  The patient requires a new prescription for supplies. The supplies will be faxed to [facility] in [place], [place].    5. Possible Emphysema.  The patient was reassured about his mild emphysema, with no current symptoms reported.    6. Subtle Liver Changes.  Subtle liver changes were  observed, and the patient's liver function tests have consistently returned normal results. Continued monitoring of the patient's liver changes will be conducted.    Follow-up  The patient is scheduled for a follow-up visit in 3 months, or earlier if any complications arise.        Follow Up   Return in about 3 months (around 7/16/2024), or if symptoms worsen or fail to improve.    Patient was given instructions and counseling regarding his condition or for health maintenance advice. Please see specific information pulled into the AVS if appropriate.       Cheo Weiss MD    Patient or patient representative verbalized consent for the use of Ambient Listening during the visit with  Cheo Weiss MD for chart documentation. 4/16/2024  09:30 EDT

## 2024-04-17 LAB
ALBUMIN SERPL-MCNC: 4.5 G/DL (ref 3.5–5.2)
ALBUMIN/GLOB SERPL: 1.5 G/DL
ALP SERPL-CCNC: 37 U/L (ref 39–117)
ALT SERPL-CCNC: 21 U/L (ref 1–41)
AST SERPL-CCNC: 24 U/L (ref 1–40)
BASOPHILS # BLD AUTO: 0.05 10*3/MM3 (ref 0–0.2)
BASOPHILS NFR BLD AUTO: 1 % (ref 0–1.5)
BILIRUB SERPL-MCNC: 0.5 MG/DL (ref 0–1.2)
BUN SERPL-MCNC: 17 MG/DL (ref 8–23)
BUN/CREAT SERPL: 14.9 (ref 7–25)
CALCIUM SERPL-MCNC: 9.8 MG/DL (ref 8.6–10.5)
CHLORIDE SERPL-SCNC: 103 MMOL/L (ref 98–107)
CO2 SERPL-SCNC: 25.7 MMOL/L (ref 22–29)
CREAT SERPL-MCNC: 1.14 MG/DL (ref 0.76–1.27)
EGFRCR SERPLBLD CKD-EPI 2021: 69.6 ML/MIN/1.73
EOSINOPHIL # BLD AUTO: 0.16 10*3/MM3 (ref 0–0.4)
EOSINOPHIL NFR BLD AUTO: 3.1 % (ref 0.3–6.2)
ERYTHROCYTE [DISTWIDTH] IN BLOOD BY AUTOMATED COUNT: 13 % (ref 12.3–15.4)
GLOBULIN SER CALC-MCNC: 3 GM/DL
GLUCOSE SERPL-MCNC: 154 MG/DL (ref 65–99)
HBA1C MFR BLD: 7.1 % (ref 4.8–5.6)
HCT VFR BLD AUTO: 39.4 % (ref 37.5–51)
HGB BLD-MCNC: 13.1 G/DL (ref 13–17.7)
IMM GRANULOCYTES # BLD AUTO: 0.01 10*3/MM3 (ref 0–0.05)
IMM GRANULOCYTES NFR BLD AUTO: 0.2 % (ref 0–0.5)
LYMPHOCYTES # BLD AUTO: 1.1 10*3/MM3 (ref 0.7–3.1)
LYMPHOCYTES NFR BLD AUTO: 21.2 % (ref 19.6–45.3)
MCH RBC QN AUTO: 30.8 PG (ref 26.6–33)
MCHC RBC AUTO-ENTMCNC: 33.2 G/DL (ref 31.5–35.7)
MCV RBC AUTO: 92.5 FL (ref 79–97)
MONOCYTES # BLD AUTO: 0.54 10*3/MM3 (ref 0.1–0.9)
MONOCYTES NFR BLD AUTO: 10.4 % (ref 5–12)
NEUTROPHILS # BLD AUTO: 3.32 10*3/MM3 (ref 1.7–7)
NEUTROPHILS NFR BLD AUTO: 64.1 % (ref 42.7–76)
PLATELET # BLD AUTO: 99 10*3/MM3 (ref 140–450)
POTASSIUM SERPL-SCNC: 4.3 MMOL/L (ref 3.5–5.2)
PROT SERPL-MCNC: 7.5 G/DL (ref 6–8.5)
RBC # BLD AUTO: 4.26 10*6/MM3 (ref 4.14–5.8)
SODIUM SERPL-SCNC: 140 MMOL/L (ref 136–145)
WBC # BLD AUTO: 5.18 10*3/MM3 (ref 3.4–10.8)

## 2024-04-18 DIAGNOSIS — E11.65 TYPE 2 DIABETES MELLITUS WITH HYPERGLYCEMIA, WITH LONG-TERM CURRENT USE OF INSULIN: ICD-10-CM

## 2024-04-18 DIAGNOSIS — Z79.4 TYPE 2 DIABETES MELLITUS WITH HYPERGLYCEMIA, WITH LONG-TERM CURRENT USE OF INSULIN: ICD-10-CM

## 2024-04-18 RX ORDER — FLURBIPROFEN SODIUM 0.3 MG/ML
SOLUTION/ DROPS OPHTHALMIC
Qty: 100 EACH | Refills: 3 | Status: SHIPPED | OUTPATIENT
Start: 2024-04-18

## 2024-04-18 RX ORDER — LANCETS
EACH MISCELLANEOUS
Qty: 100 EACH | Refills: 3 | Status: SHIPPED | OUTPATIENT
Start: 2024-04-18

## 2024-04-18 RX ORDER — BLOOD SUGAR DIAGNOSTIC
STRIP MISCELLANEOUS
Qty: 100 EACH | Refills: 3 | Status: SHIPPED | OUTPATIENT
Start: 2024-04-18

## 2024-04-23 ENCOUNTER — PRE-ADMISSION TESTING (OUTPATIENT)
Dept: PREADMISSION TESTING | Facility: HOSPITAL | Age: 69
End: 2024-04-23
Payer: MEDICARE

## 2024-04-23 DIAGNOSIS — Z45.02 ICD (IMPLANTABLE CARDIOVERTER-DEFIBRILLATOR) BATTERY DEPLETION: ICD-10-CM

## 2024-04-23 LAB
ANION GAP SERPL CALCULATED.3IONS-SCNC: 10 MMOL/L (ref 5–15)
BUN SERPL-MCNC: 15 MG/DL (ref 8–23)
BUN/CREAT SERPL: 15 (ref 7–25)
CALCIUM SPEC-SCNC: 8.9 MG/DL (ref 8.6–10.5)
CHLORIDE SERPL-SCNC: 101 MMOL/L (ref 98–107)
CO2 SERPL-SCNC: 26 MMOL/L (ref 22–29)
CREAT SERPL-MCNC: 1 MG/DL (ref 0.76–1.27)
EGFRCR SERPLBLD CKD-EPI 2021: 81.5 ML/MIN/1.73
GLUCOSE SERPL-MCNC: 207 MG/DL (ref 65–99)
POTASSIUM SERPL-SCNC: 4.1 MMOL/L (ref 3.5–5.2)
SODIUM SERPL-SCNC: 137 MMOL/L (ref 136–145)

## 2024-04-23 PROCEDURE — 36415 COLL VENOUS BLD VENIPUNCTURE: CPT

## 2024-04-23 PROCEDURE — 80048 BASIC METABOLIC PNL TOTAL CA: CPT

## 2024-04-23 NOTE — DISCHARGE INSTRUCTIONS
Dear Patient,    Do NOT eat, drink, or smoke after midnight the night before your procedure.   Take your medications as instructed by your doctor.    Glasses and jewelry may be worn, but dentures must be removed prior to your procedure.    Leave any items you consider valuable at home.      MORNING of your Procedure, please bring the following:     -Photo ID and insurance card(s)    -ALL medications in their ORIGINAL CONTAINERS or current medication list.    -Co-pay and/or deductible required by your insurance   -Copy of living will or power of  document (if not brought to Pre-Admission Testing department)   -CPAP mask and tubing, not your machine (if applicable)   -Relaxation aids (music, books, magazines)   -Skin Prep Instruction Sheet (if applicable)       Check in is on the 2nd floor of the [facility].  Your procedure will be performed in the cath lab or EP lab.  During your procedure, your family will wait in the cath lab waiting area where you checked in.      Need to make arrangements for transportation prior to discharge.    Educational handout related to procedure was given in Pre-Admission testing.  The educational handout is for informational purposes only.  If you have any questions about your procedure, please speak with your physician.      Please note:  If you are scheduled to have one of the following procedures: Pulmonary Vein Ablation, Lead Extraction, MitraClip, Cerebral Coilings or Embolization, please let your family know that after your procedure you will be going to recovery unit on the 2nd floor of the [facility].  When the physician is finished speaking with your family after your procedure is completed, your family will be directed or escorted to the surgery waiting area in the [facility].  This is where your family will wait until you are given a room assignment and then your family will be directed to the appropriate unit.

## 2024-04-23 NOTE — PAT
An arrival time for procedure was not provided during PAT visit. If patient had any questions or concerns about their arrival time, they were instructed to contact their surgeon/physician.  Additionally, if the patient referred to an arrival time that was acquired from their my chart account, patient was encouraged to verify that time with their surgeon/physician. Arrival times are NOT provided in Pre Admission Testing Department.    Patient denies any current skin issues.     PATIENT STATES HE WAS INSTRUCTED TO CONTINUE PLAVIX BY DR MOLINA.    Patient instructed to bring CPAP mask and tubing to the hospital for overnight stay.  Explained that it is not necessary to bring their CPAP machine to the hospital instead a CPAP machine will be provided for use by the hospital. If patient knows their CPAP settings, those settings will be implemented.  If not, the CPAP machine will be utilized on the auto setting using their mask and tubing.    Patient verbalized understanding.    PATIENT INSTRUCTED TO BRING IN PILL BOTTLES DAY OF PROCEDURE.    PATIENT STATES DR MOLINA'S OFFICE MAILED PATIENT WIPES AND INSTRUCTIONS.

## 2024-04-30 ENCOUNTER — HOSPITAL ENCOUNTER (OUTPATIENT)
Facility: HOSPITAL | Age: 69
Setting detail: HOSPITAL OUTPATIENT SURGERY
Discharge: HOME | End: 2024-04-30
Attending: INTERNAL MEDICINE | Admitting: INTERNAL MEDICINE
Payer: MEDICARE

## 2024-04-30 VITALS
BODY MASS INDEX: 36.47 KG/M2 | WEIGHT: 232.4 LBS | TEMPERATURE: 97.3 F | SYSTOLIC BLOOD PRESSURE: 118 MMHG | HEART RATE: 72 BPM | OXYGEN SATURATION: 91 % | DIASTOLIC BLOOD PRESSURE: 74 MMHG | HEIGHT: 67 IN | RESPIRATION RATE: 8 BRPM

## 2024-04-30 DIAGNOSIS — I25.10 CORONARY ARTERY DISEASE INVOLVING NATIVE CORONARY ARTERY OF NATIVE HEART WITHOUT ANGINA PECTORIS: ICD-10-CM

## 2024-04-30 DIAGNOSIS — I42.0 CARDIOMYOPATHY, DILATED: ICD-10-CM

## 2024-04-30 DIAGNOSIS — Z45.02 ICD (IMPLANTABLE CARDIOVERTER-DEFIBRILLATOR) BATTERY DEPLETION: ICD-10-CM

## 2024-04-30 LAB
BUN BLDA-MCNC: 14 MG/DL (ref 8–26)
CA-I BLDA-SCNC: 1.25 MMOL/L (ref 1.2–1.32)
CHLORIDE BLDA-SCNC: 100 MMOL/L (ref 98–109)
CO2 BLDA-SCNC: 25 MMOL/L (ref 24–29)
CREAT BLDA-MCNC: 1.1 MG/DL (ref 0.6–1.3)
EGFRCR SERPLBLD CKD-EPI 2021: 72.7 ML/MIN/1.73
GLUCOSE BLDC GLUCOMTR-MCNC: 153 MG/DL (ref 70–130)
HCT VFR BLDA CALC: 38 % (ref 38–51)
HGB BLDA-MCNC: 12.9 G/DL (ref 12–17)
POTASSIUM BLDA-SCNC: 4 MMOL/L (ref 3.5–4.9)
SODIUM BLD-SCNC: 140 MMOL/L (ref 138–146)

## 2024-04-30 PROCEDURE — 25010000002 MIDAZOLAM PER 1 MG: Performed by: INTERNAL MEDICINE

## 2024-04-30 PROCEDURE — 94660 CPAP INITIATION&MGMT: CPT

## 2024-04-30 PROCEDURE — 25810000003 SODIUM CHLORIDE 0.9 % SOLUTION: Performed by: INTERNAL MEDICINE

## 2024-04-30 PROCEDURE — 25010000002 ONDANSETRON PER 1 MG: Performed by: INTERNAL MEDICINE

## 2024-04-30 PROCEDURE — 25010000002 FENTANYL CITRATE (PF) 50 MCG/ML SOLUTION: Performed by: INTERNAL MEDICINE

## 2024-04-30 PROCEDURE — C1882 AICD, OTHER THAN SING/DUAL: HCPCS | Performed by: INTERNAL MEDICINE

## 2024-04-30 PROCEDURE — 99153 MOD SED SAME PHYS/QHP EA: CPT | Performed by: INTERNAL MEDICINE

## 2024-04-30 PROCEDURE — 85014 HEMATOCRIT: CPT

## 2024-04-30 PROCEDURE — 99152 MOD SED SAME PHYS/QHP 5/>YRS: CPT | Performed by: INTERNAL MEDICINE

## 2024-04-30 PROCEDURE — 33264 RMVL & RPLCMT DFB GEN MLT LD: CPT | Performed by: INTERNAL MEDICINE

## 2024-04-30 PROCEDURE — 80047 BASIC METABLC PNL IONIZED CA: CPT

## 2024-04-30 PROCEDURE — 25010000002 BUPIVACAINE 0.5 % SOLUTION: Performed by: INTERNAL MEDICINE

## 2024-04-30 PROCEDURE — S0260 H&P FOR SURGERY: HCPCS

## 2024-04-30 PROCEDURE — 94799 UNLISTED PULMONARY SVC/PX: CPT

## 2024-04-30 PROCEDURE — 25010000002 CEFAZOLIN PER 500 MG: Performed by: PHYSICIAN ASSISTANT

## 2024-04-30 PROCEDURE — 25010000002 CEFAZOLIN PER 500 MG: Performed by: INTERNAL MEDICINE

## 2024-04-30 DEVICE — CARDIAC RESYNCHRONIZATION THERAPY DEFIBRILLATOR
Type: IMPLANTABLE DEVICE | Site: CHEST | Status: FUNCTIONAL
Brand: VIGILANT™ X4 CRT-D

## 2024-04-30 RX ORDER — BUPIVACAINE HYDROCHLORIDE 5 MG/ML
INJECTION, SOLUTION PERINEURAL
Status: DISCONTINUED | OUTPATIENT
Start: 2024-04-30 | End: 2024-04-30 | Stop reason: HOSPADM

## 2024-04-30 RX ORDER — MIDAZOLAM HYDROCHLORIDE 1 MG/ML
INJECTION INTRAMUSCULAR; INTRAVENOUS
Status: DISCONTINUED | OUTPATIENT
Start: 2024-04-30 | End: 2024-04-30 | Stop reason: HOSPADM

## 2024-04-30 RX ORDER — SODIUM CHLORIDE 9 MG/ML
INJECTION, SOLUTION INTRAVENOUS
Status: COMPLETED | OUTPATIENT
Start: 2024-04-30 | End: 2024-04-30

## 2024-04-30 RX ORDER — ONDANSETRON 2 MG/ML
INJECTION INTRAMUSCULAR; INTRAVENOUS
Status: DISCONTINUED | OUTPATIENT
Start: 2024-04-30 | End: 2024-04-30 | Stop reason: HOSPADM

## 2024-04-30 RX ORDER — ONDANSETRON 2 MG/ML
4 INJECTION INTRAMUSCULAR; INTRAVENOUS EVERY 6 HOURS PRN
Status: DISCONTINUED | OUTPATIENT
Start: 2024-04-30 | End: 2024-04-30 | Stop reason: HOSPADM

## 2024-04-30 RX ORDER — SODIUM CHLORIDE 9 MG/ML
40 INJECTION, SOLUTION INTRAVENOUS AS NEEDED
Status: DISCONTINUED | OUTPATIENT
Start: 2024-04-30 | End: 2024-04-30 | Stop reason: HOSPADM

## 2024-04-30 RX ORDER — SODIUM CHLORIDE 0.9 % (FLUSH) 0.9 %
10 SYRINGE (ML) INJECTION AS NEEDED
Status: DISCONTINUED | OUTPATIENT
Start: 2024-04-30 | End: 2024-04-30 | Stop reason: HOSPADM

## 2024-04-30 RX ORDER — ACETAMINOPHEN 325 MG/1
650 TABLET ORAL EVERY 4 HOURS PRN
Status: DISCONTINUED | OUTPATIENT
Start: 2024-04-30 | End: 2024-04-30 | Stop reason: HOSPADM

## 2024-04-30 RX ORDER — SODIUM CHLORIDE 0.9 % (FLUSH) 0.9 %
3 SYRINGE (ML) INJECTION EVERY 12 HOURS SCHEDULED
Status: DISCONTINUED | OUTPATIENT
Start: 2024-04-30 | End: 2024-04-30 | Stop reason: HOSPADM

## 2024-04-30 RX ORDER — LIDOCAINE HYDROCHLORIDE 5 MG/ML
INJECTION, SOLUTION INFILTRATION; PERINEURAL
Status: DISCONTINUED | OUTPATIENT
Start: 2024-04-30 | End: 2024-04-30 | Stop reason: HOSPADM

## 2024-04-30 RX ORDER — ETOMIDATE 2 MG/ML
INJECTION INTRAVENOUS
Status: DISCONTINUED | OUTPATIENT
Start: 2024-04-30 | End: 2024-04-30 | Stop reason: HOSPADM

## 2024-04-30 RX ORDER — NITROGLYCERIN 0.4 MG/1
0.4 TABLET SUBLINGUAL
Status: DISCONTINUED | OUTPATIENT
Start: 2024-04-30 | End: 2024-04-30 | Stop reason: HOSPADM

## 2024-04-30 RX ORDER — SODIUM CHLORIDE 0.9 % (FLUSH) 0.9 %
10 SYRINGE (ML) INJECTION EVERY 12 HOURS SCHEDULED
Status: DISCONTINUED | OUTPATIENT
Start: 2024-04-30 | End: 2024-04-30 | Stop reason: HOSPADM

## 2024-04-30 RX ORDER — FENTANYL CITRATE 50 UG/ML
INJECTION, SOLUTION INTRAMUSCULAR; INTRAVENOUS
Status: DISCONTINUED | OUTPATIENT
Start: 2024-04-30 | End: 2024-04-30 | Stop reason: HOSPADM

## 2024-04-30 RX ADMIN — SODIUM CHLORIDE 2 G: 900 INJECTION INTRAVENOUS at 08:06

## 2024-04-30 NOTE — DISCHARGE INSTRUCTIONS
DR. MOLINA DEVICE GENERATOR CHANGE  Please do not lift more than 10 pounds or raise the affected arm above the shoulder for 2 weeks   after the device was implanted (this does not apply to subcutaneous ICDs).  Avoid activities that involve heavy lifting or rough contact that could result in blows to your   implant site. This allows the incision time to heal.  You may shower the day after your procedure.   Your pressure dressing will be removed prior to being discharged.   An Aquacel dressing will be underneath the pressure dressing and will be removed at the wound   check appointment.   Do not apply creams, lotions or powders to the incision.   Please avoid allowing a bra strap or suspenders to lay over the incision until it is completely   healed.   Do not use a hot tub or swim for 2 weeks.   Do not drive for 24 hours.   Call your doctor if you have any swelling, redness or discharge around your incision, notice   anything unusual or unexpected or you develop a fever that does not go away in two to three   days.   Take Tylenol and ibuprofen for pain control.  Call your doctor if you hear any beeping sounds/vibratory alerts from your device as this   indicates your device needs to be checked immediately.  You will be scheduled for a 7-10 day wound check appointment and a 3 month follow up to   check your device.   Carry your medical device ID card with you at all times.   Please call our office at [phone] with any questions about the device or incision.

## 2024-04-30 NOTE — H&P
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 Helton, stage I in 2006 and stage II in 2007.   Followed chronically with annual CTs by Dr. Helton. Stable by CT 2019  Coronary artery disease / ICM  Cardiac cath prior to “elephant trunk” procedure in 2006 and the patient underwent CABG at that time consisting of SVG to the OM and SVG to the PDA.   2007 - inferoposterior MI, cardiac cath showed 100% left circumflex with severe OM-1 stenosis and 100% vein graft to the left circumflex. RCA was diffuse, 90% ostial stenosis at the SVG to the RCA. LAD had only 40% stenosis.   Cardiac cath in 2007 due to recurrent angina - 2.5 Microdriver stent to the distal native RCA.   18 cardiac catheter occluded vein grafts ×2.  Large posterior basal aneurysm with ejection fraction of 30-35%.  Severe stenosis and distal circumflex which supplies aneurysm.  Non-flow-limiting RCA disease.  MRI  LVEF 28%  S/P Bi- Ventricular ICD- [employer] 18, EMILIE Fox MD  Dyslipidemia.   Hypertension.   Diabetes  Chronic low back pain/multiple lumbar surgeries, currently disabled:  L4-L5 discectomy in 2015.   History of bleeding peptic ulcer disease.   Severe obstructive sleep apnea.   Appendectomy  Lumbar fusion 10/2021  Left hip replacement     ALLERGIES:   Allergies   Allergen Reactions    Ativan [Lorazepam] Mental Status Change     agitation    Zetia [Ezetimibe] Other (See Comments)     increased LFTs    Zocor [Simvastatin] Myalgia    Isosorbide Other (See Comments)     headaches    Jardiance [Empagliflozin] Other (See Comments)     Blood in urine and burning  sensation    Metformin And Related Diarrhea       HOME MEDICINES:   Prior to Admission Medications       Prescriptions Last Dose Informant Patient Reported? Taking?    acetaminophen (TYLENOL) 500 MG tablet 4/29/2024 Self Yes Yes    Take 2 tablets by mouth Every 6 (Six) Hours As Needed for Mild Pain. Indications: Pain    aspirin 81 MG EC tablet 4/30/2024 Self No Yes    Take 1 tablet by mouth Daily.    bisoprolol (ZEBeta) 10 MG tablet 4/30/2024 Self No Yes    TAKE ONE TABLET BY MOUTH EVERY DAY    clopidogrel (PLAVIX) 75 MG tablet 4/30/2024 Self No Yes    Take 1 tablet by mouth Daily.    Entresto 49-51 MG tablet 4/30/2024 Self No Yes    TAKE ONE TABLET BY MOUTH EVERY TWELVE HOURS    eplerenone (INSPRA) 25 MG tablet 4/30/2024 Self No Yes    TAKE ONE TABLET BY MOUTH EVERY DAY    furosemide (LASIX) 40 MG tablet 4/30/2024 Self No Yes    Take 1 tablet by mouth Daily.    Patient taking differently:  Take 10 mg by mouth Daily. Reduced this down to 0.25    Insulin Glargine (Lantus SoloStar) 100 UNIT/ML injection pen 4/29/2024 Self No Yes    INJECT 20 UNITS SUBCUTANEOUSLY DAILY AS DIRECTED FOR DIABETES    Patient taking differently:  Inject 25 Units under the skin into the appropriate area as directed Every Night. INJECT 25 UNITS SUBCUTANEOUSLY DAILY AS DIRECTED FOR DIABETES    Januvia 100 MG tablet 4/30/2024 Self No Yes    TAKE ONE TABLET BY MOUTH EVERY DAY    pantoprazole (PROTONIX) 40 MG EC tablet 4/29/2024 Self No Yes    TAKE ONE TABLET BY MOUTH EVERY DAY    rosuvastatin (CRESTOR) 10 MG tablet 4/29/2024 Self No Yes    TAKE ONE TABLET BY MOUTH EVERY DAY    traZODone (DESYREL) 100 MG tablet Past Week Self No Yes    TAKE ONE TABLET BY MOUTH AT BEDTIME    Patient taking differently:  Take 1 tablet by mouth Every Night.    Accu-Chek Softclix Lancets lancets   No No    USE TO CHECK BLOOD SUGAR TWICE DAILY    glucose blood (Accu-Chek Jodie Plus) test strip   No No    USE TO TEST BLOOD SUGAR TWICE DAILY    Insulin Pen Needle (B-D  UF III MINI PEN NEEDLES) 31G X 5 MM misc   No No    USE DAILY WITH INSULIN    nitroglycerin (NITROSTAT) 0.4 MG SL tablet Unknown Self No No    Place 1 tablet under the tongue Every 5 (Five) Minutes As Needed for Chest Pain. Take no more than 3 doses in 15 minutes.    sildenafil (Viagra) 100 MG tablet  Self No No    Take 1 tablet by mouth Daily As Needed for Erectile Dysfunction.            Subjective     HPI: Kai Garcia is a 68 y.o. male past medical history listed above presents today to [facility] for BiV ICD (BSC) generator change.  Patient's device showed early battery depletion on 4/10/2024.  Patient denies shortness of breath, lightheadedness, dizziness, palpitations, syncope, any recent illnesses or fevers.    ROS: All systems have been reviewed and are negative with the exception of those mentioned in the HPI and problem list above.    Past Medical History:   Past Medical History:   Diagnosis Date    Arthritis of back 2015    Cataract     needs surgery     Chest pain     Atypical chest pain, noncardiac.  Suspect either musculoskeletal versus gastrointestinal    CHF (congestive heart failure)     2/2024 echo EF 56-60%    Coronary artery disease 2007    CABG x 2    Diabetes mellitus     Dissecting aortic aneurysm     Type A dissection, s/p elephant trunk procedure by Dr. Helton, stage 1 in June 2006 and stage 2 in March 2007.  Followed by Dr. Helton. Stable by CT 2019    Diverticulosis     Dyslipidemia     Elevated liver function tests     Fracture, finger 1976    GERD (gastroesophageal reflux disease)     Heart murmur     Hip arthrosis 2019    History of bleeding peptic ulcer     History of sepsis 03/2021    UTI that caused sepsis     History of transfusion 2006    1 unit PRBC, no reaction    Hyperlipidemia     Hypertension     Knee swelling 2019    Left leg pain     Lumbosacral disc disease 2002    MI (myocardial infarction) 2007    Morbid obesity     LUCIAN on CPAP     setting 12     Shortness of breath     Temporary low platelet count 2021    due to sepsis     Wears glasses       Surgical History:   Past Surgical History:   Procedure Laterality Date    APPENDECTOMY      ARTERIOGRAM AORTIC  2006    aortic disection with elephant trunk procedure    CARDIAC CATHETERIZATION N/A 2018    Procedure: Left Heart Cath;  Surgeon: Uri Willis MD;  Location:  [facility];  Service: Cardiovascular    CARDIAC ELECTROPHYSIOLOGY PROCEDURE N/A 2018    Procedure: Biv ICD Implant w/limited echo on arrival;  Surgeon: Jose Fox DO;  Location:  [facility];  Service: Cardiology    COLONOSCOPY N/A 2019    Procedure: COLONOSCOPY;  Surgeon: Meir Salguero MD;  Location:  [facility];  Service: Gastroenterology    COLONOSCOPY N/A 3/19/2024    Procedure: COLONOSCOPY;  Surgeon: Mier Salguero MD;  Location:  [facility];  Service: Gastroenterology;  Laterality: N/A;    CORONARY ANGIOPLASTY WITH STENT PLACEMENT  2007    x 2    CORONARY ARTERY BYPASS GRAFT  2006    2 grafts     CORONARY STENT PLACEMENT  3/2007    LUMBAR DISCECTOMY      LUMBAR DISCECTOMY FUSION INSTRUMENTATION N/A 10/27/2021    Procedure: LUMBAR FUSION L4-5, L5-S1;  Surgeon: Fer Faulkner MD;  Location:  [facility];  Service: Orthopedic Spine;  Laterality: N/A;    LUMBAR FUSION      TOTAL HIP ARTHROPLASTY Left 2023    Procedure: TOTAL HIP ARTHROPLASTY - LEFT;  Surgeon: Romero Hutchinson MD;  Location:  [facility];  Service: Orthopedics;  Laterality: Left;     Social History:   Social History     Socioeconomic History    Marital status:    Tobacco Use    Smoking status: Former     Current packs/day: 0.00     Average packs/day: 3.0 packs/day for 35.0 years (105.0 ttl pk-yrs)     Types: Cigarettes     Start date: 6/15/1971     Quit date: 6/15/2006     Years since quittin.8    Smokeless tobacco: Never   Vaping Use    Vaping status: Never Used   Substance and  Sexual Activity    Alcohol use: No    Drug use: No    Sexual activity: Defer     Partners: Female     Family History:   Family History   Problem Relation Age of Onset    Coronary artery disease Other     Rheum arthritis Other     Heart disease Mother         afib    Rheum arthritis Mother     Heart disease Father     Rheum arthritis Father     Hyperlipidemia Sister     Heart disease Sister     Mental illness Brother        Objective   There were no vitals taken for this visit.  No intake or output data in the 24 hours ending 04/30/24 0715    PHYSICAL EXAM:  CONSTITUTIONAL: Well nourished, cooperative, in no acute distress  HEENT: Normocephalic, atraumatic, PERRLA, no JVD  CARDIOVASCULAR:  Regular rhythm and normal rate, no murmur, gallop, rub.   RESPIRATORY: Clear to auscultation, normal respiratory effort, no wheezing, rales or ronchi, on room air  EXTREMITIES: No gross deformities, no edema. Peripheral pulses are present and equal bilaterally  SKIN: Warm, dry. No bleeding, bruising or rash  NEUROLOGICAL: No focal deficits  PSYCHIATRIC: Normal mood and affect. Behavior is normal     Labs/Diagnostic Data  Results from last 7 days   Lab Units 04/23/24  0945   SODIUM mmol/L 137   POTASSIUM mmol/L 4.1   CHLORIDE mmol/L 101   CO2 mmol/L 26.0   BUN mg/dL 15   CREATININE mg/dL 1.00   GLUCOSE mg/dL 207*   CALCIUM mg/dL 8.9                                       EKG/Telemetry: paced    Radiology Data:   No radiology results for the last day   Results for orders placed during the hospital encounter of 02/08/24    Adult Transthoracic Echo Complete W/ Cont if Necessary Per Protocol    Interpretation Summary    Left ventricular ejection fraction appears to be 56 - 60%.    The following left ventricular wall segments are aneurysmal: basal inferior.    Left ventricular diastolic function is consistent with (grade I) impaired relaxation.    The right ventricular cavity is mildly dilated.    The right atrial cavity is dilated.     Calculated right ventricular systolic pressure from tricuspid regurgitation is 21 mmHg.    No significant change from 2021 study.       Current Medications:  ceFAZolin, 2 g, Intravenous, Once  sodium chloride, 3 mL, Intravenous, Q12H           Assessment:     Ischemic cardiomyopathy  S/p BiV ICD (BSC) 2018  Echo, 2/8/2024: EF 56-60%  Coronary disease  Hypertension  Type a aortic dissection s/p elephant trunk procedure    Plan:     Patient is here today for a BiV ICD generator change (BSC).  Will receive 2 g Ancef for for surgical prophylaxis.  Procedure, risks, and alternatives have been discussed with the patient he is agreeable to proceed further recommendations to follow.    Electronically signed by CNONOR Youssef, 04/30/24, 7:15 AM EDT.     Please note that portions of this note were dictated utilizing Dragon dictation.

## 2024-05-07 ENCOUNTER — OFFICE VISIT (OUTPATIENT)
Dept: CARDIOLOGY | Facility: CLINIC | Age: 69
End: 2024-05-07
Payer: MEDICARE

## 2024-05-07 DIAGNOSIS — Z45.02 ICD (IMPLANTABLE CARDIOVERTER-DEFIBRILLATOR) BATTERY DEPLETION: Primary | ICD-10-CM

## 2024-06-14 ENCOUNTER — TELEPHONE (OUTPATIENT)
Dept: CARDIOLOGY | Facility: CLINIC | Age: 69
End: 2024-06-14
Payer: MEDICARE

## 2024-06-14 NOTE — TELEPHONE ENCOUNTER
Called Mr Garcia due to [employer] home monitor didn't send scheduled reading.  Left message for a return call.

## 2024-07-08 DIAGNOSIS — F51.01 PRIMARY INSOMNIA: ICD-10-CM

## 2024-07-08 DIAGNOSIS — E11.65 TYPE 2 DIABETES MELLITUS WITH HYPERGLYCEMIA, WITH LONG-TERM CURRENT USE OF INSULIN: ICD-10-CM

## 2024-07-08 DIAGNOSIS — Z79.4 TYPE 2 DIABETES MELLITUS WITH HYPERGLYCEMIA, WITH LONG-TERM CURRENT USE OF INSULIN: ICD-10-CM

## 2024-07-08 DIAGNOSIS — K21.9 GASTROESOPHAGEAL REFLUX DISEASE WITHOUT ESOPHAGITIS: ICD-10-CM

## 2024-07-08 RX ORDER — PANTOPRAZOLE SODIUM 40 MG/1
40 TABLET, DELAYED RELEASE ORAL DAILY
Qty: 90 TABLET | Refills: 0 | Status: SHIPPED | OUTPATIENT
Start: 2024-07-08

## 2024-07-08 RX ORDER — BISOPROLOL FUMARATE 10 MG/1
10 TABLET, FILM COATED ORAL DAILY
Qty: 90 TABLET | Refills: 3 | Status: SHIPPED | OUTPATIENT
Start: 2024-07-08

## 2024-07-08 RX ORDER — SITAGLIPTIN 100 MG/1
100 TABLET, FILM COATED ORAL DAILY
Qty: 90 TABLET | Refills: 0 | Status: SHIPPED | OUTPATIENT
Start: 2024-07-08

## 2024-07-08 RX ORDER — ROSUVASTATIN CALCIUM 10 MG/1
10 TABLET, COATED ORAL DAILY
Qty: 90 TABLET | Refills: 3 | Status: SHIPPED | OUTPATIENT
Start: 2024-07-08

## 2024-07-08 RX ORDER — SACUBITRIL AND VALSARTAN 49; 51 MG/1; MG/1
1 TABLET, FILM COATED ORAL EVERY 12 HOURS
Qty: 180 TABLET | Refills: 3 | Status: SHIPPED | OUTPATIENT
Start: 2024-07-08

## 2024-07-08 RX ORDER — EPLERENONE 25 MG/1
25 TABLET, FILM COATED ORAL DAILY
Qty: 90 TABLET | Refills: 1 | Status: SHIPPED | OUTPATIENT
Start: 2024-07-08

## 2024-07-08 RX ORDER — TRAZODONE HYDROCHLORIDE 100 MG/1
100 TABLET ORAL
Qty: 90 TABLET | Refills: 0 | Status: SHIPPED | OUTPATIENT
Start: 2024-07-08

## 2024-07-08 NOTE — TELEPHONE ENCOUNTER
Lab Results   Component Value Date    GLUCOSE 207 (H) 04/23/2024    BUN 15 04/23/2024    CREATININE 1.10 04/30/2024    EGFRRESULT 69.6 04/16/2024    EGFR 72.7 04/30/2024    BCR 15.0 04/23/2024    K 4.1 04/23/2024    CO2 26.0 04/23/2024    CALCIUM 8.9 04/23/2024    PROTENTOTREF 7.5 04/16/2024    ALBUMIN 4.5 04/16/2024    BILITOT 0.5 04/16/2024    AST 24 04/16/2024    ALT 21 04/16/2024

## 2024-07-23 ENCOUNTER — OFFICE VISIT (OUTPATIENT)
Dept: FAMILY MEDICINE CLINIC | Facility: CLINIC | Age: 69
End: 2024-07-23
Payer: MEDICARE

## 2024-07-23 VITALS
WEIGHT: 236 LBS | DIASTOLIC BLOOD PRESSURE: 78 MMHG | HEART RATE: 76 BPM | HEIGHT: 67 IN | RESPIRATION RATE: 18 BRPM | TEMPERATURE: 97.5 F | BODY MASS INDEX: 37.04 KG/M2 | SYSTOLIC BLOOD PRESSURE: 120 MMHG

## 2024-07-23 DIAGNOSIS — E11.65 TYPE 2 DIABETES MELLITUS WITH HYPERGLYCEMIA, WITH LONG-TERM CURRENT USE OF INSULIN: Primary | ICD-10-CM

## 2024-07-23 DIAGNOSIS — E78.00 PURE HYPERCHOLESTEROLEMIA: ICD-10-CM

## 2024-07-23 DIAGNOSIS — R80.9 TYPE 2 DIABETES MELLITUS WITH MICROALBUMINURIA: ICD-10-CM

## 2024-07-23 DIAGNOSIS — Z79.4 TYPE 2 DIABETES MELLITUS WITH HYPERGLYCEMIA, WITH LONG-TERM CURRENT USE OF INSULIN: Primary | ICD-10-CM

## 2024-07-23 DIAGNOSIS — E11.29 TYPE 2 DIABETES MELLITUS WITH MICROALBUMINURIA: ICD-10-CM

## 2024-07-23 DIAGNOSIS — I10 PRIMARY HYPERTENSION: ICD-10-CM

## 2024-07-23 DIAGNOSIS — D69.6 THROMBOCYTOPENIA: ICD-10-CM

## 2024-07-23 LAB
POC CREATININE URINE: ABNORMAL
POC MICROALBUMIN URINE: ABNORMAL

## 2024-07-23 PROCEDURE — G2211 COMPLEX E/M VISIT ADD ON: HCPCS | Performed by: FAMILY MEDICINE

## 2024-07-23 PROCEDURE — 1125F AMNT PAIN NOTED PAIN PRSNT: CPT | Performed by: FAMILY MEDICINE

## 2024-07-23 PROCEDURE — 82044 UR ALBUMIN SEMIQUANTITATIVE: CPT | Performed by: FAMILY MEDICINE

## 2024-07-23 PROCEDURE — 99214 OFFICE O/P EST MOD 30 MIN: CPT | Performed by: FAMILY MEDICINE

## 2024-07-23 PROCEDURE — 1160F RVW MEDS BY RX/DR IN RCRD: CPT | Performed by: FAMILY MEDICINE

## 2024-07-23 PROCEDURE — 3051F HG A1C>EQUAL 7.0%<8.0%: CPT | Performed by: FAMILY MEDICINE

## 2024-07-23 PROCEDURE — 1159F MED LIST DOCD IN RCRD: CPT | Performed by: FAMILY MEDICINE

## 2024-07-23 PROCEDURE — 3078F DIAST BP <80 MM HG: CPT | Performed by: FAMILY MEDICINE

## 2024-07-23 PROCEDURE — 3074F SYST BP LT 130 MM HG: CPT | Performed by: FAMILY MEDICINE

## 2024-07-23 NOTE — PROGRESS NOTES
"Chief Complaint  Diabetes (3 month f/u )    Subjective          Kai Av Garcia presents to [facility]    History of Present Illness  The patient is a 68-year-old male who presents for follow-up of multiple medical concerns. He is accompanied by his wife.    The patient reports an overall improvement in his condition, with blood glucose levels fluctuating between 100 and 100. He has increased his insulin dosage to 25 units. His wife notes that his blood glucose levels occasionally drop to the 90s, leading to episodes of shakiness and perspiration.    The patient denies experiencing chest pain since the placement of a new defibrillator. He describes a gradual reduction in chest pain levels. He also reports shallow breathing during periods of heat, necessitating deep breathing to prevent stress.    He has had increased frequency of urination since cardiology started a diuretic.    Otherwise has been feeling okay.    OTHER NOTES:        Review of Systems   Constitutional: Negative.    HENT: Negative.     Respiratory:  Negative for shortness of breath.    Cardiovascular: Negative.  Negative for chest pain.   Gastrointestinal:  Positive for abdominal pain (chronic).   All other systems reviewed and are negative.       Objective       Vital Signs:   /78   Pulse 76   Temp 97.5 °F (36.4 °C)   Resp 18   Ht 170.2 cm (67\")   Wt 107 kg (236 lb)   BMI 36.96 kg/m²     Physical Exam  Vitals and nursing note reviewed.   Constitutional:       General: He is not in acute distress.     Appearance: Normal appearance. He is well-developed. He is obese. He is not ill-appearing.   HENT:      Head: Normocephalic and atraumatic.      Right Ear: Hearing, tympanic membrane, ear canal and external ear normal.      Left Ear: Hearing, tympanic membrane, ear canal and external ear normal.      Nose: Nose normal. No congestion or rhinorrhea.      Mouth/Throat:      Mouth: Mucous membranes are moist. "      Pharynx: No oropharyngeal exudate or posterior oropharyngeal erythema.   Eyes:      General: Lids are normal.      Conjunctiva/sclera: Conjunctivae normal.      Pupils: Pupils are equal, round, and reactive to light.   Neck:      Thyroid: No thyromegaly.   Cardiovascular:      Rate and Rhythm: Normal rate and regular rhythm.      Heart sounds: Normal heart sounds. No murmur heard.     No friction rub.   Pulmonary:      Effort: Pulmonary effort is normal. No respiratory distress.      Breath sounds: Normal breath sounds. No wheezing or rales.   Abdominal:      General: Bowel sounds are normal. There is no distension.      Palpations: Abdomen is soft. There is no mass.      Tenderness: There is abdominal tenderness (chronic and unchanged). There is no guarding or rebound.   Musculoskeletal:      Right lower leg: No edema.      Left lower leg: No edema.   Skin:     General: Skin is warm and dry.   Neurological:      General: No focal deficit present.      Mental Status: He is alert.   Psychiatric:         Mood and Affect: Mood normal.         Speech: Speech normal.         Behavior: Behavior normal.            Physical Exam      Result Review :            Other Results    Results  Laboratory Studies  Blood sugar levels around 210 per patient  Microalbumin today was positive/abnormal.             Assessment and Plan    Diagnoses and all orders for this visit:    1. Type 2 diabetes mellitus with hyperglycemia, with long-term current use of insulin (Primary)  -     Comprehensive Metabolic Panel  -     Hemoglobin A1c  -     POC Microalbumin    2. Primary hypertension  -     CBC & Differential  -     Comprehensive Metabolic Panel    3. Pure hypercholesterolemia  -     Comprehensive Metabolic Panel  -     Lipid Panel With / Chol / HDL Ratio    4. Thrombocytopenia  -     CBC & Differential               DISCUSSION    Assessment & Plan  1. Diabetes mellitus type 2.  A reevaluation of the patient's blood work, including  CMP, A1c, and microalbumin, will be conducted. Further treatment plans will be determined based on the results of the tests.    2. Hypertension.  The patient's blood pressure is well-regulated. The current medication regimen will be continued. A CBC and CMP will be conducted.    3. Hyperlipidemia.  A CMP and lipid panel will be conducted. The patient will persist with rosuvastatin.    4. Thrombocytopenia.  The CBC will be rechecked.    Microalbuminuria.  Will continue to monitor kidney function and blood sugars.  He is on Entresto but has valsartan.    Follow-up  A follow-up appointment is scheduled for 3 months from now, or earlier if any complications arise.        Follow Up   Return in about 3 months (around 10/23/2024).    Patient was given instructions and counseling regarding his condition or for health maintenance advice. Please see specific information pulled into the AVS if appropriate.       Cheo Weiss MD    Patient or patient representative verbalized consent for the use of Ambient Listening during the visit with  Cheo Weiss MD for chart documentation. 7/23/2024  08:34 EDT

## 2024-07-24 LAB
ALBUMIN SERPL-MCNC: 4.3 G/DL (ref 3.5–5.2)
ALBUMIN/GLOB SERPL: 1.7 G/DL
ALP SERPL-CCNC: 41 U/L (ref 39–117)
ALT SERPL-CCNC: 20 U/L (ref 1–41)
AST SERPL-CCNC: 18 U/L (ref 1–40)
BASOPHILS # BLD AUTO: 0.03 10*3/MM3 (ref 0–0.2)
BASOPHILS NFR BLD AUTO: 0.6 % (ref 0–1.5)
BILIRUB SERPL-MCNC: 0.3 MG/DL (ref 0–1.2)
BUN SERPL-MCNC: 11 MG/DL (ref 8–23)
BUN/CREAT SERPL: 10.5 (ref 7–25)
CALCIUM SERPL-MCNC: 9.6 MG/DL (ref 8.6–10.5)
CHLORIDE SERPL-SCNC: 99 MMOL/L (ref 98–107)
CHOLEST SERPL-MCNC: 132 MG/DL (ref 0–200)
CHOLEST/HDLC SERPL: 3.47 {RATIO}
CO2 SERPL-SCNC: 24.9 MMOL/L (ref 22–29)
CREAT SERPL-MCNC: 1.05 MG/DL (ref 0.76–1.27)
EGFRCR SERPLBLD CKD-EPI 2021: 76.8 ML/MIN/1.73
EOSINOPHIL # BLD AUTO: 0.16 10*3/MM3 (ref 0–0.4)
EOSINOPHIL NFR BLD AUTO: 3.1 % (ref 0.3–6.2)
ERYTHROCYTE [DISTWIDTH] IN BLOOD BY AUTOMATED COUNT: 12.5 % (ref 12.3–15.4)
GLOBULIN SER CALC-MCNC: 2.6 GM/DL
GLUCOSE SERPL-MCNC: 165 MG/DL (ref 65–99)
HBA1C MFR BLD: 7.3 % (ref 4.8–5.6)
HCT VFR BLD AUTO: 39 % (ref 37.5–51)
HDLC SERPL-MCNC: 38 MG/DL (ref 40–60)
HGB BLD-MCNC: 13.1 G/DL (ref 13–17.7)
IMM GRANULOCYTES # BLD AUTO: 0.01 10*3/MM3 (ref 0–0.05)
IMM GRANULOCYTES NFR BLD AUTO: 0.2 % (ref 0–0.5)
LDLC SERPL CALC-MCNC: 59 MG/DL (ref 0–100)
LYMPHOCYTES # BLD AUTO: 1.06 10*3/MM3 (ref 0.7–3.1)
LYMPHOCYTES NFR BLD AUTO: 20.5 % (ref 19.6–45.3)
MCH RBC QN AUTO: 32 PG (ref 26.6–33)
MCHC RBC AUTO-ENTMCNC: 33.6 G/DL (ref 31.5–35.7)
MCV RBC AUTO: 95.4 FL (ref 79–97)
MONOCYTES # BLD AUTO: 0.45 10*3/MM3 (ref 0.1–0.9)
MONOCYTES NFR BLD AUTO: 8.7 % (ref 5–12)
NEUTROPHILS # BLD AUTO: 3.47 10*3/MM3 (ref 1.7–7)
NEUTROPHILS NFR BLD AUTO: 66.9 % (ref 42.7–76)
NRBC BLD AUTO-RTO: 0 /100 WBC (ref 0–0.2)
PLATELET # BLD AUTO: 96 10*3/MM3 (ref 140–450)
POTASSIUM SERPL-SCNC: 4.3 MMOL/L (ref 3.5–5.2)
PROT SERPL-MCNC: 6.9 G/DL (ref 6–8.5)
RBC # BLD AUTO: 4.09 10*6/MM3 (ref 4.14–5.8)
SODIUM SERPL-SCNC: 136 MMOL/L (ref 136–145)
TRIGL SERPL-MCNC: 212 MG/DL (ref 0–150)
VLDLC SERPL CALC-MCNC: 35 MG/DL (ref 5–40)
WBC # BLD AUTO: 5.18 10*3/MM3 (ref 3.4–10.8)

## 2024-07-31 NOTE — PROGRESS NOTES
Kai Garcia  [id number]  1955  [phone]      08/01/2024      [facility] CARDIOLOGY     Referring Provider: No ref. provider found     Cheo Weiss MD  [address] [facility] [address]    Chief Complaint   Patient presents with    ICD (implantable cardioverter-defibrillator)       Problem List  Coronary artery disease / ICM  Cardiac cath prior to “elephant trunk” procedure in June 2006 and the patient underwent CABG at that time consisting of SVG to the OM and SVG to the PDA.   02/08/2007 - inferoposterior MI, cardiac cath showed 100% left circumflex with severe OM-1 stenosis and 100% vein graft to the left circumflex. RCA was diffuse, 90% ostial stenosis at the SVG to the RCA. LAD had only 40% stenosis.   Cardiac cath in March 2007 due to recurrent angina - 2.5 Microdriver stent to the distal native RCA.   4/27/18 cardiac catheter occluded vein grafts ×2.  Large posterior basal aneurysm with ejection fraction of 30-35%.  Severe stenosis and distal circumflex which supplies aneurysm.  Non-flow-limiting RCA disease.  MRI 8/18 LVEF 28%  S/P Bi- Ventricular ICD- [employer] 8/9/18, EMILIE Fox MD  BSC BiV ICD gen change, 4/30/24  Dissecting aortic aneurysm  a. 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 Helton, stage I in June 2006 and stage II in March 2007.  b. Followed chronically with annual CTs by Dr. Helton. Stable by CT 2019  Dyslipidemia.   Hypertension.   Diabetes  Chronic low back pain/multiple lumbar surgeries, currently disabled:  L4-L5 discectomy in 2015.   History of bleeding peptic ulcer disease.   Severe obstructive sleep apnea.   Appendectomy  Lumbar fusion 10/2021  Left hip replacement 2023      History of Present Illness   Kai Garcia is a 68 y.o. male who presents to my electrophysiology clinic for follow up of ICM/HTN.  In April, patient had BiV ICD  generator change. Since the procedure, he has been doing well.  Incision site healing well-has some minor itching.  Pocket site pain improved.     Outpatient Medications Marked as Taking for the 8/1/24 encounter (Office Visit) with Fer Pak MD   Medication Sig Dispense Refill    Accu-Chek Softclix Lancets lancets USE TO CHECK BLOOD SUGAR TWICE DAILY 100 each 3    acetaminophen (TYLENOL) 500 MG tablet Take 2 tablets by mouth Every 6 (Six) Hours As Needed for Mild Pain. Indications: Pain      aspirin 81 MG EC tablet Take 1 tablet by mouth Daily.      bisoprolol (ZEBeta) 10 MG tablet TAKE ONE TABLET BY MOUTH EVERY DAY 90 tablet 3    clopidogrel (PLAVIX) 75 MG tablet Take 1 tablet by mouth Daily. 90 tablet 3    eplerenone (INSPRA) 25 MG tablet TAKE ONE TABLET BY MOUTH EVERY DAY 90 tablet 1    furosemide (LASIX) 40 MG tablet Take 1 tablet by mouth Daily. (Patient taking differently: Take 10 mg by mouth Daily. Reduced this down to 0.25) 30 tablet 11    glucose blood (Accu-Chek Jodie Plus) test strip USE TO TEST BLOOD SUGAR TWICE DAILY 100 each 3    Insulin Glargine (Lantus SoloStar) 100 UNIT/ML injection pen INJECT 20 UNITS SUBCUTANEOUSLY DAILY AS DIRECTED FOR DIABETES (Patient taking differently: Inject 25 Units under the skin into the appropriate area as directed Every Night. INJECT 25 UNITS SUBCUTANEOUSLY DAILY AS DIRECTED FOR DIABETES) 15 mL 1    Insulin Pen Needle (B-D UF III MINI PEN NEEDLES) 31G X 5 MM misc USE DAILY WITH INSULIN 100 each 3    Januvia 100 MG tablet TAKE ONE TABLET BY MOUTH EVERY DAY 90 tablet 0    nitroglycerin (NITROSTAT) 0.4 MG SL tablet Place 1 tablet under the tongue Every 5 (Five) Minutes As Needed for Chest Pain. Take no more than 3 doses in 15 minutes. 25 tablet 5    pantoprazole (PROTONIX) 40 MG EC tablet TAKE ONE TABLET BY MOUTH EVERY DAY 90 tablet 0    rosuvastatin (CRESTOR) 10 MG tablet TAKE ONE TABLET BY MOUTH EVERY DAY 90 tablet 3    sacubitril-valsartan (Entresto) 49-51 MG tablet  "TAKE ONE TABLET BY MOUTH EVERY TWELVE HOURS 180 tablet 3    sildenafil (Viagra) 100 MG tablet Take 1 tablet by mouth Daily As Needed for Erectile Dysfunction. 10 tablet 2    traZODone (DESYREL) 100 MG tablet TAKE ONE TABLET BY MOUTH AT BEDTIME 90 tablet 0            Physical Exam  Vitals:    08/01/24 1401   BP: 118/86   BP Location: Right arm   Patient Position: Sitting   Cuff Size: Adult   Pulse: 70   SpO2: 97%   Weight: 108 kg (237 lb)   Height: 168.9 cm (66.5\")     Body mass index is 37.68 kg/m².  GENERAL: Well-developed, well-nourished patient in no acute distress.  HEENT: NC, AC, PERRLA. MMM  NECK: No JVD. No carotid bruits auscultated.  LUNGS: Clear to auscultation bilaterally.  CARDIOVASCULAR: RRR No murmurs, gallops or rubs noted.   ABDOMEN: Soft, nontender. Positive bowel sounds.  MUSCULOSKELETAL: No gross deformities. No clubbing, cyanosis  EXT: pulses intact, No edema  SKIN: Pink, warm  Neuro: Nonfocal exam. Gait intact    Diagnostic Data  Procedures    Lab Results   Component Value Date    GLUCOSE 165 (H) 07/23/2024    CALCIUM 9.6 07/23/2024     07/23/2024    K 4.3 07/23/2024    CO2 24.9 07/23/2024    CL 99 07/23/2024    BUN 11 07/23/2024    CREATININE 1.05 07/23/2024    EGFRIFAFRI 79 12/07/2021    EGFRIFNONA 65 12/07/2021    BCR 10.5 07/23/2024    ANIONGAP 10.0 04/23/2024     Lab Results   Component Value Date    WBC 5.18 07/23/2024    HGB 13.1 07/23/2024    HCT 39.0 07/23/2024    MCV 95.4 07/23/2024    PLT 96 (L) 07/23/2024     Lab Results   Component Value Date    INR 1.11 03/12/2024    INR 1.06 02/02/2023    INR 1.15 06/07/2019    PROTIME 14.5 03/12/2024    PROTIME 13.7 02/02/2023    PROTIME 14.2 06/07/2019     No results found for: \"TSH\", \"Q5RXRJR\", \"P8NBHCL\", \"THYROIDAB\"    I personally viewed and interpreted the patient's EKG/Telemetry/lab data    Kai Garcia  reports that he quit smoking about 18 years ago. His smoking use included cigarettes. He started smoking about 53 years ago. He " has a 105 pack-year smoking history. He has never used smokeless tobacco. I have educated him on the risk of diseases from using tobacco products such as cancer, COPD, and heart disease.       I spent 3  minutes counseling the patient.           ACP discussion was declined by the patient. Patient has an advance directive in EMR which is still valid.     Assessment and Plan  Diagnoses and all orders for this visit:    1. Ischemic cardiomyopathy (Primary)    2. Coronary artery disease involving native coronary artery of native heart with other form of angina pectoris    3. Primary hypertension        Ischemic cardiomyopathy  -S/p BiV ICD ([facility]) 2018  -Echo, 2/8/2024: EF 56-60%  -Gen change in April  -Continue GDMT    Device interrogation August 2024  [employer] BiV ICD DDDR 60  Atrial pacing less than 1% P wave 4.7 pacing threshold 0.7@0.4 impedance 583  RV pacing 99% R wave greater than 25 pacing threshold 0.6@0.4 impedance 1163  LV pacing 99% R wave greater than 23.7 pacing threshold 0.6@0.4 impedance 814  Battery life 11 years left  1 episode of mode switch that is less than 1 minute less than 1% total in terms of A-fib burden      Coronary artery disease  -Continue ASA, statin and BB  -Follows with Dr. Willis     Hypertension  -Well controlled, continue current medication regimen      Follow-Up  Return in about 1 year (around 8/1/2025).      Thank you for allowing me to participate in the care of your patient. Please to not hesitate to contact me with additional questions or concerns.     Fer Pak MD [facility]  Cardiac Electrophysiologist  [facility]       no

## 2024-08-01 ENCOUNTER — OFFICE VISIT (OUTPATIENT)
Dept: CARDIOLOGY | Facility: CLINIC | Age: 69
End: 2024-08-01
Payer: MEDICARE

## 2024-08-01 VITALS
SYSTOLIC BLOOD PRESSURE: 118 MMHG | BODY MASS INDEX: 37.2 KG/M2 | OXYGEN SATURATION: 97 % | WEIGHT: 237 LBS | DIASTOLIC BLOOD PRESSURE: 86 MMHG | HEART RATE: 70 BPM | HEIGHT: 67 IN

## 2024-08-01 DIAGNOSIS — I25.5 ISCHEMIC CARDIOMYOPATHY: Primary | Chronic | ICD-10-CM

## 2024-08-01 DIAGNOSIS — I10 PRIMARY HYPERTENSION: Chronic | ICD-10-CM

## 2024-08-01 DIAGNOSIS — I25.118 CORONARY ARTERY DISEASE INVOLVING NATIVE CORONARY ARTERY OF NATIVE HEART WITH OTHER FORM OF ANGINA PECTORIS: Chronic | ICD-10-CM

## 2024-09-10 NOTE — PROGRESS NOTES
Subjective:     Encounter Date:09/11/2024    Primary Care Physician: Cheo Weiss MD      Patient ID: Kai Garcia is a 68 y.o. male.    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 Helton, stage I in June 2006 and stage II in March 2007.   Followed chronically with annual CTs by Dr. Helton. Stable by CT 2019  Coronary artery disease:  Cardiac cath prior to “elephant trunk” procedure in June 2006 and the patient underwent CABG at that time consisting of SVG to the OM and SVG to the PDA.   02/08/2007 - inferoposterior MI, cardiac cath showed 100% left circumflex with severe OM-1 stenosis and 100% vein graft to the left circumflex. RCA was diffuse, 90% ostial stenosis at the SVG to the RCA. LAD had only 40% stenosis.   Cardiac cath in March 2007 due to recurrent angina - 2.5 Microdriver stent to the distal native RCA.   4/27/18 cardiac catheter occluded vein grafts ×2.  Large posterior basal aneurysm with ejection fraction of 30-35%.  Severe stenosis and distal circumflex which supplies aneurysm.  Non-flow-limiting RCA disease.  MRI 8/18 LVEF 28%  S/P Bi- Ventricular ICD- [employer] 8/9/18, EMILIE Fox MD  2/2024 MPS with large sized infarct basal inferolateral wall with no significant ischemia.  2/2024 echo EF 56 to 60%.  No significant change from 2021 study  4/30/2022 generator change Dr. Pak BSC  Dyslipidemia.   Hypertension.   Diabetes  Chronic low back pain/multiple lumbar surgeries, currently disabled:  L4-L5 discectomy in 2015.   History of bleeding peptic ulcer disease.   Severe obstructive sleep apnea.   Appendectomy  Lumbar fusion 10/2021  Left hip replacement 2023      Allergies   Allergen Reactions    Ativan [Lorazepam] Mental Status Change     agitation    Zetia [Ezetimibe] Other (See Comments)     increased LFTs     Zocor [Simvastatin] Myalgia    Isosorbide Other (See Comments)     headaches    Jardiance [Empagliflozin] Other (See Comments)     Blood in urine and burning sensation    Metformin And Related Diarrhea         Current Outpatient Medications:     Accu-Chek Softclix Lancets lancets, USE TO CHECK BLOOD SUGAR TWICE DAILY, Disp: 100 each, Rfl: 3    acetaminophen (TYLENOL) 500 MG tablet, Take 2 tablets by mouth Every 6 (Six) Hours As Needed for Mild Pain. Indications: Pain, Disp: , Rfl:     aspirin 81 MG EC tablet, Take 1 tablet by mouth Daily., Disp: , Rfl:     bisoprolol (ZEBeta) 10 MG tablet, TAKE ONE TABLET BY MOUTH EVERY DAY, Disp: 90 tablet, Rfl: 3    clopidogrel (PLAVIX) 75 MG tablet, Take 1 tablet by mouth Daily., Disp: 90 tablet, Rfl: 3    eplerenone (INSPRA) 25 MG tablet, TAKE ONE TABLET BY MOUTH EVERY DAY, Disp: 90 tablet, Rfl: 1    furosemide (LASIX) 40 MG tablet, Take 1 tablet by mouth Daily. (Patient taking differently: Take 10 mg by mouth Daily. Reduced this down to 0.25), Disp: 30 tablet, Rfl: 11    glucose blood (Accu-Chek Jodie Plus) test strip, USE TO TEST BLOOD SUGAR TWICE DAILY, Disp: 100 each, Rfl: 3    Insulin Glargine (Lantus SoloStar) 100 UNIT/ML injection pen, INJECT 20 UNITS SUBCUTANEOUSLY DAILY AS DIRECTED FOR DIABETES (Patient taking differently: Inject 25 Units under the skin into the appropriate area as directed Every Night. INJECT 25 UNITS SUBCUTANEOUSLY DAILY AS DIRECTED FOR DIABETES), Disp: 15 mL, Rfl: 1    Insulin Pen Needle (B-D UF III MINI PEN NEEDLES) 31G X 5 MM misc, USE DAILY WITH INSULIN, Disp: 100 each, Rfl: 3    Januvia 100 MG tablet, TAKE ONE TABLET BY MOUTH EVERY DAY, Disp: 90 tablet, Rfl: 0    nitroglycerin (NITROSTAT) 0.4 MG SL tablet, Place 1 tablet under the tongue Every 5 (Five) Minutes As Needed for Chest Pain. Take no more than 3 doses in 15 minutes., Disp: 25 tablet, Rfl: 5    pantoprazole (PROTONIX) 40 MG EC tablet, TAKE ONE TABLET BY MOUTH EVERY DAY, Disp: 90  "tablet, Rfl: 0    rosuvastatin (CRESTOR) 10 MG tablet, TAKE ONE TABLET BY MOUTH EVERY DAY, Disp: 90 tablet, Rfl: 3    sacubitril-valsartan (Entresto) 49-51 MG tablet, TAKE ONE TABLET BY MOUTH EVERY TWELVE HOURS, Disp: 180 tablet, Rfl: 3    sildenafil (Viagra) 100 MG tablet, Take 1 tablet by mouth Daily As Needed for Erectile Dysfunction., Disp: 10 tablet, Rfl: 2    traZODone (DESYREL) 100 MG tablet, TAKE ONE TABLET BY MOUTH AT BEDTIME, Disp: 90 tablet, Rfl: 0        History of Present Illness    Patient returns today for 6-month follow-up of ischemic cardiomyopathy and coronary disease since her last visit, he underwent BiV ICD generator change due to issues with his previous device.  He has not had any defibrillations.  He actually overall feels good.  No shortness of breath chest pain.  He is physically active without any symptoms.    The following portions of the patient's history were reviewed and updated as appropriate: allergies, current medications, past family history, past medical history, past social history, past surgical history and problem list.      Social History     Tobacco Use    Smoking status: Former     Current packs/day: 0.00     Average packs/day: 3.0 packs/day for 35.0 years (105.0 ttl pk-yrs)     Types: Cigarettes     Start date: 6/15/1971     Quit date: 6/15/2006     Years since quittin.2    Smokeless tobacco: Never   Vaping Use    Vaping status: Never Used   Substance Use Topics    Alcohol use: No    Drug use: No         ROS       Objective:   /65   Pulse 74   Ht 170.8 cm (67.25\")   Wt 108 kg (238 lb)   SpO2 94%   BMI 37.00 kg/m²         Vitals reviewed.   Constitutional:       Appearance: Well-developed and not in distress.   Neck:      Vascular: No JVD.      Trachea: No tracheal deviation.   Pulmonary:      Effort: Pulmonary effort is normal.      Breath sounds: Normal breath sounds.   Cardiovascular:      Normal rate. Regular rhythm.   Edema:     Peripheral edema absent. "   Abdominal:      Tenderness: There is no abdominal tenderness.   Musculoskeletal:         General: No deformity. Skin:     General: Skin is warm and dry.   Neurological:      Mental Status: Alert and oriented to person, place, and time.         Procedures  Device check:  Normal functioning device.  1% atrial paced 100% biventricular pacing.  Normal thresholds impedances battery voltage 11 years left.  Charge time 9.5 seconds.  No therapies.        Assessment:   Assessment & Plan      Diagnoses and all orders for this visit:    1. Coronary artery disease involving native coronary artery of native heart without angina pectoris (Primary)      1.  Coronary artery disease.  Known inferobasal aneurysm.  No ischemia by recent nuclear study.  2.  Type a aortic dissection, now 18 years status post elephant trunk procedure.    3.  Ischemic cardiomyopathy with LV aneurysm.  LVEF 25-30%.  (Echocardiogram always over because his LVEF as it fails to include his aneurysm).  4.  Hypertension well-controlled  5.  Dyslipidemia well-controlled on high intensity statin    Recommendations:  1.  Continue current medical therapy  2.  Revisit 6 months with a device check.     Uri Willis MD        Advance Care Planning   ACP discussion was held with the patient during this visit. Patient has an advance directive in EMR which is still valid.         Dictated utilizing Dragon dictation

## 2024-09-11 ENCOUNTER — OFFICE VISIT (OUTPATIENT)
Dept: CARDIOLOGY | Facility: CLINIC | Age: 69
End: 2024-09-11
Payer: MEDICARE

## 2024-09-11 VITALS
HEART RATE: 74 BPM | HEIGHT: 67 IN | BODY MASS INDEX: 37.35 KG/M2 | WEIGHT: 238 LBS | SYSTOLIC BLOOD PRESSURE: 104 MMHG | OXYGEN SATURATION: 94 % | DIASTOLIC BLOOD PRESSURE: 65 MMHG

## 2024-09-11 DIAGNOSIS — I25.10 CORONARY ARTERY DISEASE INVOLVING NATIVE CORONARY ARTERY OF NATIVE HEART WITHOUT ANGINA PECTORIS: Primary | ICD-10-CM

## 2024-09-11 DIAGNOSIS — Z79.4 TYPE 2 DIABETES MELLITUS WITH HYPERGLYCEMIA, WITH LONG-TERM CURRENT USE OF INSULIN: ICD-10-CM

## 2024-09-11 DIAGNOSIS — E11.65 TYPE 2 DIABETES MELLITUS WITH HYPERGLYCEMIA, WITH LONG-TERM CURRENT USE OF INSULIN: ICD-10-CM

## 2024-09-11 DIAGNOSIS — I25.10 CORONARY ARTERY DISEASE INVOLVING NATIVE CORONARY ARTERY OF NATIVE HEART WITHOUT ANGINA PECTORIS: ICD-10-CM

## 2024-09-11 RX ORDER — INSULIN GLARGINE 100 [IU]/ML
INJECTION, SOLUTION SUBCUTANEOUS
Qty: 15 ML | Refills: 1 | Status: SHIPPED | OUTPATIENT
Start: 2024-09-11

## 2024-09-11 RX ORDER — SACUBITRIL AND VALSARTAN 49; 51 MG/1; MG/1
1 TABLET, FILM COATED ORAL EVERY 12 HOURS
Qty: 180 TABLET | Refills: 3 | Status: SHIPPED | OUTPATIENT
Start: 2024-09-11

## 2024-09-11 RX ORDER — EPLERENONE 25 MG/1
25 TABLET, FILM COATED ORAL DAILY
Qty: 90 TABLET | Refills: 3 | Status: SHIPPED | OUTPATIENT
Start: 2024-09-11

## 2024-09-11 RX ORDER — CLOPIDOGREL BISULFATE 75 MG/1
75 TABLET ORAL DAILY
Qty: 90 TABLET | Refills: 3 | Status: SHIPPED | OUTPATIENT
Start: 2024-09-11

## 2024-09-11 RX ORDER — NITROGLYCERIN 0.4 MG/1
0.4 TABLET SUBLINGUAL
Qty: 25 TABLET | Refills: 5 | Status: SHIPPED | OUTPATIENT
Start: 2024-09-11

## 2024-09-11 RX ORDER — BISOPROLOL FUMARATE 10 MG/1
10 TABLET, FILM COATED ORAL DAILY
Qty: 90 TABLET | Refills: 3 | Status: SHIPPED | OUTPATIENT
Start: 2024-09-11

## 2024-09-11 RX ORDER — ROSUVASTATIN CALCIUM 10 MG/1
10 TABLET, COATED ORAL DAILY
Qty: 90 TABLET | Refills: 3 | Status: SHIPPED | OUTPATIENT
Start: 2024-09-11

## 2024-09-19 PROCEDURE — 93296 REM INTERROG EVL PM/IDS: CPT | Performed by: INTERNAL MEDICINE

## 2024-09-19 PROCEDURE — 93295 DEV INTERROG REMOTE 1/2/MLT: CPT | Performed by: INTERNAL MEDICINE

## 2024-10-08 DIAGNOSIS — K21.9 GASTROESOPHAGEAL REFLUX DISEASE WITHOUT ESOPHAGITIS: ICD-10-CM

## 2024-10-08 DIAGNOSIS — E11.65 TYPE 2 DIABETES MELLITUS WITH HYPERGLYCEMIA, WITH LONG-TERM CURRENT USE OF INSULIN: ICD-10-CM

## 2024-10-08 DIAGNOSIS — F51.01 PRIMARY INSOMNIA: ICD-10-CM

## 2024-10-08 DIAGNOSIS — Z79.4 TYPE 2 DIABETES MELLITUS WITH HYPERGLYCEMIA, WITH LONG-TERM CURRENT USE OF INSULIN: ICD-10-CM

## 2024-10-09 RX ORDER — SITAGLIPTIN 100 MG/1
100 TABLET, FILM COATED ORAL DAILY
Qty: 90 TABLET | Refills: 0 | Status: SHIPPED | OUTPATIENT
Start: 2024-10-09

## 2024-10-09 RX ORDER — PANTOPRAZOLE SODIUM 40 MG/1
40 TABLET, DELAYED RELEASE ORAL DAILY
Qty: 90 TABLET | Refills: 0 | Status: SHIPPED | OUTPATIENT
Start: 2024-10-09

## 2024-10-09 RX ORDER — TRAZODONE HYDROCHLORIDE 100 MG/1
100 TABLET ORAL
Qty: 90 TABLET | Refills: 0 | Status: SHIPPED | OUTPATIENT
Start: 2024-10-09

## 2024-10-24 ENCOUNTER — OFFICE VISIT (OUTPATIENT)
Dept: FAMILY MEDICINE CLINIC | Facility: CLINIC | Age: 69
End: 2024-10-24
Payer: MEDICARE

## 2024-10-24 VITALS
WEIGHT: 235 LBS | SYSTOLIC BLOOD PRESSURE: 116 MMHG | RESPIRATION RATE: 18 BRPM | TEMPERATURE: 99.3 F | HEIGHT: 67 IN | DIASTOLIC BLOOD PRESSURE: 68 MMHG | BODY MASS INDEX: 36.88 KG/M2 | HEART RATE: 78 BPM

## 2024-10-24 DIAGNOSIS — L30.9 DERMATITIS OF LEFT FOOT: Primary | ICD-10-CM

## 2024-10-24 DIAGNOSIS — Z12.5 PROSTATE CANCER SCREENING: ICD-10-CM

## 2024-10-24 DIAGNOSIS — I10 PRIMARY HYPERTENSION: ICD-10-CM

## 2024-10-24 DIAGNOSIS — I25.5 ISCHEMIC CARDIOMYOPATHY: Chronic | ICD-10-CM

## 2024-10-24 DIAGNOSIS — Z79.4 TYPE 2 DIABETES MELLITUS WITH HYPERGLYCEMIA, WITH LONG-TERM CURRENT USE OF INSULIN: ICD-10-CM

## 2024-10-24 DIAGNOSIS — E78.00 PURE HYPERCHOLESTEROLEMIA: ICD-10-CM

## 2024-10-24 DIAGNOSIS — E11.65 TYPE 2 DIABETES MELLITUS WITH HYPERGLYCEMIA, WITH LONG-TERM CURRENT USE OF INSULIN: ICD-10-CM

## 2024-10-24 PROCEDURE — 3078F DIAST BP <80 MM HG: CPT | Performed by: FAMILY MEDICINE

## 2024-10-24 PROCEDURE — 99214 OFFICE O/P EST MOD 30 MIN: CPT | Performed by: FAMILY MEDICINE

## 2024-10-24 PROCEDURE — 1125F AMNT PAIN NOTED PAIN PRSNT: CPT | Performed by: FAMILY MEDICINE

## 2024-10-24 PROCEDURE — 3074F SYST BP LT 130 MM HG: CPT | Performed by: FAMILY MEDICINE

## 2024-10-24 PROCEDURE — 1159F MED LIST DOCD IN RCRD: CPT | Performed by: FAMILY MEDICINE

## 2024-10-24 PROCEDURE — 1160F RVW MEDS BY RX/DR IN RCRD: CPT | Performed by: FAMILY MEDICINE

## 2024-10-24 PROCEDURE — 3052F HG A1C>EQUAL 8.0%<EQUAL 9.0%: CPT | Performed by: FAMILY MEDICINE

## 2024-10-24 RX ORDER — CLOTRIMAZOLE AND BETAMETHASONE DIPROPIONATE 10; .64 MG/G; MG/G
1 CREAM TOPICAL 2 TIMES DAILY
Qty: 45 G | Refills: 1 | Status: SHIPPED | OUTPATIENT
Start: 2024-10-24

## 2024-10-24 NOTE — PROGRESS NOTES
"Chief Complaint  Diabetes (3 month f/u ) and Rash (On foot)    Subjective          Kai Av Garcia presents to [facility]    HPI         The patient is a 69-year-old male here for a follow-up visit, accompanied by his wife.    He reports a worsening rash on his foot, which initially appeared as a single red spot but has since spread. The rash is intensely itchy. He has been applying hydrocortisone and triple antibiotic ointment daily for the past 60 days, excluding today due to the scheduled examination. Despite this treatment, the rash has become increasingly red.    He also mentions elevated blood sugar levels, ranging from 213 to 225, even though he maintains his usual diet and activities. His insulin dosage varies between 25 to 30 units, but this does not seem to affect his twice-daily readings, which consistently exceed 180 to 190. He continues to take Januvia, having previously stopped metformin and Jardiance due to adverse effects.    He experiences minimal chest pain and has recently consulted a cardiologist. A defibrillator was implanted earlier this year. He also reports shortness of breath and persistent stomach pain, with a noticeable bulge at the navel.    Additionally, he has had several teeth extracted and is due for a PSA test. He does not require any new prescriptions at this time.       OTHER NOTES:          Review of Systems   Constitutional: Negative.    Respiratory:  Positive for shortness of breath (some).    Cardiovascular:  Positive for chest pain (minimal).   Gastrointestinal:  Positive for abdominal pain (chronic).        Objective       Vital Signs:   /68   Pulse 78   Temp 99.3 °F (37.4 °C)   Resp 18   Ht 170.2 cm (67\")   Wt 107 kg (235 lb)   BMI 36.81 kg/m²     Physical Exam  Vitals and nursing note reviewed.   Constitutional:       General: He is not in acute distress.     Appearance: Normal appearance. He is well-developed. He is not " ill-appearing.   HENT:      Head: Normocephalic and atraumatic.      Right Ear: Tympanic membrane, ear canal and external ear normal.      Left Ear: Tympanic membrane, ear canal and external ear normal.      Mouth/Throat:      Mouth: Mucous membranes are moist.      Pharynx: No oropharyngeal exudate or posterior oropharyngeal erythema.   Cardiovascular:      Rate and Rhythm: Normal rate and regular rhythm.      Heart sounds: Normal heart sounds.   Pulmonary:      Effort: Pulmonary effort is normal. No respiratory distress.      Breath sounds: Normal breath sounds. No wheezing or rales.   Abdominal:      Tenderness: There is abdominal tenderness (left mid abd). There is no guarding or rebound.   Musculoskeletal:      Right lower leg: No edema.      Left lower leg: No edema.   Skin:     General: Skin is warm and dry.   Neurological:      Mental Status: He is alert.   Psychiatric:         Mood and Affect: Mood normal.         Behavior: Behavior normal.                    Result Review :            Other Results    Results  Laboratory Studies  Blood sugar levels are 225 and 213.             Assessment and Plan    Diagnoses and all orders for this visit:    1. Dermatitis of left foot (Primary)  -     clotrimazole-betamethasone (LOTRISONE) 1-0.05 % cream; Apply 1 Application topically to the appropriate area as directed 2 (Two) Times a Day.  Dispense: 45 g; Refill: 1    2. Type 2 diabetes mellitus with hyperglycemia, with long-term current use of insulin  -     Comprehensive Metabolic Panel  -     Hemoglobin A1c    3. Ischemic cardiomyopathy    4. Primary hypertension  -     CBC & Differential  -     Comprehensive Metabolic Panel    5. Pure hypercholesterolemia  -     Comprehensive Metabolic Panel  -     Lipid Panel With / Chol / HDL Ratio    6. Prostate cancer screening  -     PSA Screen               DISCUSSION       1. Dermatitis.  He reports a worsening rash on his left foot, which has been present for 60 days and is  spreading. The rash is itchy and has not improved with hydrocortisone and triple antibiotic ointment. A combination cream with a steroid and antifungal will be prescribed to be applied twice daily. This treatment may take several weeks to show improvement. If there is no improvement after one to two weeks, he should inform the clinic.  Start Lotrisone as noted.    2. Diabetes Mellitus.  His blood sugar levels are consistently high, ranging from 213 to 225. He is currently taking insulin (25-30 units) and Januvia. His previous medications, metformin and Jardiance, were not well-tolerated. Blood work, including A1c levels, will be conducted to assess his current status. Depending on the results, his insulin dosage may need to be increased.    3. Chest Pain.  He reports minimal chest pain and has recently had a defibrillator implanted at the beginning of the year. He saw his cardiologist on September 11, 2024. No new symptoms related to his heart were reported.    4. Shortness of Breath.  He experiences some shortness of breath. No new treatment was discussed during this visit.    5. Abdominal Pain.  He continues to experience abdominal pain, which he suspects may be related to diverticulosis. Previous tests have not identified a hernia, although he now reports a bulge at the navel, which could indicate a hernia. Further evaluation may be needed if symptoms persist.  Call with any increasing pain or swelling of the abdomen.    6. Health Maintenance.  He is due for a PSA test this year. Blood work will be conducted to include this test.           Follow Up   Return in about 3 months (around 1/24/2025).    Patient was given instructions and counseling regarding his condition or for health maintenance advice. Please see specific information pulled into the AVS if appropriate.       Cheo Weiss MD    Patient or patient representative verbalized consent for the use of Ambient Listening during the visit with  Cheo BASS  MD Abhishek for chart documentation. 10/25/2024  11:28 EDT

## 2024-10-25 LAB
ALBUMIN SERPL-MCNC: 4.6 G/DL (ref 3.9–4.9)
ALP SERPL-CCNC: 38 IU/L (ref 44–121)
ALT SERPL-CCNC: 36 IU/L (ref 0–44)
AST SERPL-CCNC: 32 IU/L (ref 0–40)
BASOPHILS # BLD AUTO: 0.1 X10E3/UL (ref 0–0.2)
BASOPHILS NFR BLD AUTO: 1 %
BILIRUB SERPL-MCNC: 0.5 MG/DL (ref 0–1.2)
BUN SERPL-MCNC: 17 MG/DL (ref 8–27)
BUN/CREAT SERPL: 13 (ref 10–24)
CALCIUM SERPL-MCNC: 9.3 MG/DL (ref 8.6–10.2)
CHLORIDE SERPL-SCNC: 101 MMOL/L (ref 96–106)
CHOLEST SERPL-MCNC: 136 MG/DL (ref 100–199)
CHOLEST/HDLC SERPL: 3.5 RATIO (ref 0–5)
CO2 SERPL-SCNC: 24 MMOL/L (ref 20–29)
CREAT SERPL-MCNC: 1.32 MG/DL (ref 0.76–1.27)
EGFRCR SERPLBLD CKD-EPI 2021: 58 ML/MIN/1.73
EOSINOPHIL # BLD AUTO: 0.2 X10E3/UL (ref 0–0.4)
EOSINOPHIL NFR BLD AUTO: 3 %
ERYTHROCYTE [DISTWIDTH] IN BLOOD BY AUTOMATED COUNT: 12.4 % (ref 11.6–15.4)
GLOBULIN SER CALC-MCNC: 3 G/DL (ref 1.5–4.5)
GLUCOSE SERPL-MCNC: 155 MG/DL (ref 70–99)
HBA1C MFR BLD: 8.5 % (ref 4.8–5.6)
HCT VFR BLD AUTO: 43.5 % (ref 37.5–51)
HDLC SERPL-MCNC: 39 MG/DL
HGB BLD-MCNC: 14.2 G/DL (ref 13–17.7)
IMM GRANULOCYTES # BLD AUTO: 0 X10E3/UL (ref 0–0.1)
IMM GRANULOCYTES NFR BLD AUTO: 0 %
LDLC SERPL CALC-MCNC: 74 MG/DL (ref 0–99)
LYMPHOCYTES # BLD AUTO: 1.5 X10E3/UL (ref 0.7–3.1)
LYMPHOCYTES NFR BLD AUTO: 27 %
MCH RBC QN AUTO: 31.8 PG (ref 26.6–33)
MCHC RBC AUTO-ENTMCNC: 32.6 G/DL (ref 31.5–35.7)
MCV RBC AUTO: 97 FL (ref 79–97)
MONOCYTES # BLD AUTO: 0.6 X10E3/UL (ref 0.1–0.9)
MONOCYTES NFR BLD AUTO: 11 %
NEUTROPHILS # BLD AUTO: 3 X10E3/UL (ref 1.4–7)
NEUTROPHILS NFR BLD AUTO: 58 %
PLATELET # BLD AUTO: 108 X10E3/UL (ref 150–450)
POTASSIUM SERPL-SCNC: 4.5 MMOL/L (ref 3.5–5.2)
PROT SERPL-MCNC: 7.6 G/DL (ref 6–8.5)
PSA SERPL-MCNC: 1.5 NG/ML (ref 0–4)
RBC # BLD AUTO: 4.47 X10E6/UL (ref 4.14–5.8)
SODIUM SERPL-SCNC: 138 MMOL/L (ref 134–144)
TRIGL SERPL-MCNC: 128 MG/DL (ref 0–149)
VLDLC SERPL CALC-MCNC: 23 MG/DL (ref 5–40)
WBC # BLD AUTO: 5.3 X10E3/UL (ref 3.4–10.8)

## 2024-12-27 RX ORDER — FUROSEMIDE 40 MG/1
40 TABLET ORAL DAILY
Qty: 30 TABLET | Refills: 1 | Status: SHIPPED | OUTPATIENT
Start: 2024-12-27

## 2024-12-27 RX ORDER — EPLERENONE 25 MG/1
25 TABLET, FILM COATED ORAL DAILY
Qty: 90 TABLET | Refills: 1 | Status: SHIPPED | OUTPATIENT
Start: 2024-12-27

## 2025-01-10 ENCOUNTER — TELEPHONE (OUTPATIENT)
Dept: FAMILY MEDICINE CLINIC | Facility: CLINIC | Age: 70
End: 2025-01-10
Payer: MEDICARE

## 2025-01-10 DIAGNOSIS — Z79.4 TYPE 2 DIABETES MELLITUS WITH HYPERGLYCEMIA, WITH LONG-TERM CURRENT USE OF INSULIN: ICD-10-CM

## 2025-01-10 DIAGNOSIS — E11.65 TYPE 2 DIABETES MELLITUS WITH HYPERGLYCEMIA, WITH LONG-TERM CURRENT USE OF INSULIN: ICD-10-CM

## 2025-01-10 RX ORDER — INSULIN GLARGINE 100 [IU]/ML
INJECTION, SOLUTION SUBCUTANEOUS
Qty: 18 ML | Refills: 5 | Status: SHIPPED | OUTPATIENT
Start: 2025-01-10

## 2025-01-10 NOTE — TELEPHONE ENCOUNTER
Caller: Georgia Garcia    Relationship: Emergency Contact    Best call back number: [phone]     Requested Prescriptions:   Requested Prescriptions     Pending Prescriptions Disp Refills    Insulin Glargine (Lantus SoloStar) 100 UNIT/ML injection pen 15 mL 1     Sig: INJECT 20 UNITS SUBCUTANEOUSLY DAILY AS DIRECTED FOR DIABETES        Pharm[facility] where request should be sent[facility] [id number] [facility] [address] [facility] [phone]  - [phone] FX     Last office visit with prescribing clinician: 10/24/2024   Last telemedicine visit with prescribing clinician: Visit date not found   Next office visit with prescribing clinician: 3/7/2025     Additional details provided by patient: PATIENTS WIFE STATES THAT SHE WAS TO REMIND PCP THAT THE MEDICATION NEED TO GO UP IN DOSAGE.     Does the patient have less than a 3 day supply:  [x] Yes  [] No    Would you like a call back once the refill request has been completed: [] Yes [x] No    If the office needs to give you a call back, can they leave a voicemail: [] Yes [x] No    Fernando Walters Rep   01/10/25 10:47 EST

## 2025-01-14 DIAGNOSIS — K21.9 GASTROESOPHAGEAL REFLUX DISEASE WITHOUT ESOPHAGITIS: ICD-10-CM

## 2025-01-14 DIAGNOSIS — E11.65 TYPE 2 DIABETES MELLITUS WITH HYPERGLYCEMIA, WITH LONG-TERM CURRENT USE OF INSULIN: ICD-10-CM

## 2025-01-14 DIAGNOSIS — Z79.4 TYPE 2 DIABETES MELLITUS WITH HYPERGLYCEMIA, WITH LONG-TERM CURRENT USE OF INSULIN: ICD-10-CM

## 2025-01-14 DIAGNOSIS — F51.01 PRIMARY INSOMNIA: ICD-10-CM

## 2025-01-14 RX ORDER — SITAGLIPTIN 100 MG/1
100 TABLET, FILM COATED ORAL DAILY
Qty: 90 TABLET | Refills: 0 | Status: SHIPPED | OUTPATIENT
Start: 2025-01-14

## 2025-01-14 RX ORDER — TRAZODONE HYDROCHLORIDE 100 MG/1
100 TABLET ORAL
Qty: 90 TABLET | Refills: 0 | Status: SHIPPED | OUTPATIENT
Start: 2025-01-14

## 2025-01-14 RX ORDER — PANTOPRAZOLE SODIUM 40 MG/1
40 TABLET, DELAYED RELEASE ORAL DAILY
Qty: 90 TABLET | Refills: 0 | Status: SHIPPED | OUTPATIENT
Start: 2025-01-14

## 2025-01-15 RX ORDER — FUROSEMIDE 40 MG/1
40 TABLET ORAL DAILY
Qty: 30 TABLET | Refills: 1 | Status: SHIPPED | OUTPATIENT
Start: 2025-01-15

## 2025-03-06 RX ORDER — FUROSEMIDE 40 MG/1
40 TABLET ORAL DAILY
Qty: 30 TABLET | Refills: 1 | Status: SHIPPED | OUTPATIENT
Start: 2025-03-06 | End: 2025-03-07

## 2025-03-07 ENCOUNTER — OFFICE VISIT (OUTPATIENT)
Dept: FAMILY MEDICINE CLINIC | Facility: CLINIC | Age: 70
End: 2025-03-07
Payer: MEDICARE

## 2025-03-07 VITALS
WEIGHT: 236 LBS | RESPIRATION RATE: 18 BRPM | HEIGHT: 67 IN | DIASTOLIC BLOOD PRESSURE: 76 MMHG | BODY MASS INDEX: 37.04 KG/M2 | SYSTOLIC BLOOD PRESSURE: 118 MMHG | TEMPERATURE: 97.3 F

## 2025-03-07 DIAGNOSIS — I25.5 ISCHEMIC CARDIOMYOPATHY: Chronic | ICD-10-CM

## 2025-03-07 DIAGNOSIS — I10 ESSENTIAL HYPERTENSION: ICD-10-CM

## 2025-03-07 DIAGNOSIS — E11.65 TYPE 2 DIABETES MELLITUS WITH HYPERGLYCEMIA, WITH LONG-TERM CURRENT USE OF INSULIN: ICD-10-CM

## 2025-03-07 DIAGNOSIS — H91.93 BILATERAL HEARING LOSS, UNSPECIFIED HEARING LOSS TYPE: Primary | ICD-10-CM

## 2025-03-07 DIAGNOSIS — Z79.4 TYPE 2 DIABETES MELLITUS WITH HYPERGLYCEMIA, WITH LONG-TERM CURRENT USE OF INSULIN: ICD-10-CM

## 2025-03-07 DIAGNOSIS — G47.33 OSA ON CPAP: ICD-10-CM

## 2025-03-07 DIAGNOSIS — D69.6 THROMBOCYTOPENIA: ICD-10-CM

## 2025-03-07 LAB
EXPIRATION DATE: ABNORMAL
Lab: ABNORMAL
POC ALBUMIN, URINE: 150 MG/L
POC CREATININE, URINE: 100 MG/DL
POC URINE ALB/CREA RATIO: >300 MG/G

## 2025-03-07 NOTE — PROGRESS NOTES
Chief Complaint  Diabetes (3 month f/u)    Subjective          Kai Av Garcia presents to [facility]    HPI         The patient is a 69-year-old male who is here for a follow-up visit. He is accompanied by his wife.    He has been experiencing progressive hearing loss since his open-heart surgery 20 years ago. He was fitted with hearing aids 15 years ago by Dr. Mackey but discontinued their use due to exacerbation of tinnitus and associated headaches. He reports difficulty in remembering to remove the coffee stem and often requires increased volume for television and computer audio. He expresses disinterest in using hearing aids.    His blood glucose levels have been consistently between 166 and 180, with no readings below 140 in the past month. He reports frequent feelings of shakiness and perspiration. He is currently on a regimen of 25 to 30 units of insulin and Januvia.    He has discontinued furosemide due to excessive urination. He continues to take Entresto for his cardiac condition and has a scheduled appointment with his cardiologist on 03/12/2025. He reports occasional palpitations while at his computer and experienced battery depletion in his ICD a year ago, necessitating replacement. He describes sudden chest discomfort akin to being struck hard, which may be due to ICD shocks. He also reports easy fatigability, particularly during outdoor activities, and anticipates worsening symptoms with the onset of summer. He experiences diaphoresis at temperatures around 65 degrees.    He reports regular bowel movements without diarrhea but continues to experience abdominal pain, which he attributes to diverticulitis.    He uses a CPAP machine for at least 4 hours nightly and has an upcoming prescription renewal in 04/2025.    He reports improvement in his hip and knee conditions, although prolonged walking exacerbates the discomfort.    MEDICATIONS  - Current: Insulin  -  "Current: Januvia  - Current: Entresto  - Discontinued: Furosemide       OTHER NOTES:          Review of Systems   Constitutional:  Positive for fatigue. Negative for unexpected weight loss.   Respiratory:  Negative for shortness of breath.    Cardiovascular:  Positive for chest pain.   Gastrointestinal:  Positive for abdominal pain. Negative for diarrhea.   Endocrine: Positive for polyuria.   Neurological:  Positive for tremors. Negative for speech difficulty and confusion.        Objective       Vital Signs:   /76   Temp 97.3 °F (36.3 °C)   Resp 18   Ht 170.2 cm (67\")   Wt 107 kg (236 lb)   BMI 36.96 kg/m²     Physical Exam  Vitals and nursing note reviewed.   Constitutional:       General: He is not in acute distress.     Appearance: Normal appearance. He is well-developed. He is obese. He is not ill-appearing.   HENT:      Head: Normocephalic and atraumatic.      Right Ear: Tympanic membrane, ear canal and external ear normal.      Left Ear: Tympanic membrane, ear canal and external ear normal.      Mouth/Throat:      Mouth: Mucous membranes are moist.      Pharynx: No oropharyngeal exudate or posterior oropharyngeal erythema.   Cardiovascular:      Rate and Rhythm: Normal rate and regular rhythm.      Heart sounds: Normal heart sounds.   Pulmonary:      Effort: Pulmonary effort is normal. No respiratory distress.      Breath sounds: Normal breath sounds. No wheezing or rales.   Abdominal:      Tenderness: There is abdominal tenderness (Chronic left upper quadrant). There is no guarding or rebound.   Musculoskeletal:      Right lower leg: No edema.      Left lower leg: No edema.   Skin:     General: Skin is warm and dry.   Neurological:      Mental Status: He is alert.   Psychiatric:         Mood and Affect: Mood normal.         Behavior: Behavior normal.             Oral exam was performed.  Lungs were auscultated.  There is a little bit of swelling in the musculoskeletal system.       Result Review : "            Other Results    Results  - Laboratory Studies:    - Blood sugar levels between 166-180             Assessment and Plan    Diagnoses and all orders for this visit:    1. Bilateral hearing loss, unspecified hearing loss type (Primary)    2. Type 2 diabetes mellitus with hyperglycemia, with long-term current use of insulin  -     Comprehensive Metabolic Panel  -     Hemoglobin A1c  -     POC Albumin/Creatinine Ratio Urine    3. Ischemic cardiomyopathy  -     CBC & Differential  -     Comprehensive Metabolic Panel    4. Essential hypertension  -     CBC & Differential  -     Comprehensive Metabolic Panel    5. Thrombocytopenia  -     CBC & Differential    6. LUCIAN on CPAP               DISCUSSION       Hearing impairment.  He has been informed about the potential risk of dementia associated with untreated hearing loss.  He does not wish a referral at this time to audiology or ENT.    Diabetes Mellitus.  His blood sugar levels have been running between 166-180 mg/dL. He is currently taking 25-30 units of insulin and Januvia. He reports no episodes of low blood sugar but feels shaky and sweaty at times. Labs will be ordered to check his diabetes control, kidney, and liver function.    Cardiac issues.  He reports episodes of chest pain and palpitations, which may be related to his ICD. He is currently taking Entresto. He will follow up with his cardiologist on March 12, 2025, for further evaluation.    Diverticulitis.  He continues to experience stomach pain, which he attributes to diverticulitis. No changes in symptoms reported.    Sleep apnea.  He continues to use his CPAP machine for at least 4 hours a night. No issues reported with the device.    Hip and knee pain.  He reports that his hip and knee pain have improved but still experiences discomfort with excessive walking. No new interventions needed at this time.    Thrombocytopenia.  Labs will be ordered to monitor his platelet count, as they tend to run  low.    PROCEDURE  The patient underwent open heart surgery 20 years ago.     Urine microalbumin did show protein.  Continue Entresto.  Continue to monitor diabetes and GFR.      Follow Up   Return in about 3 months (around 6/7/2025).    Patient was given instructions and counseling regarding his condition or for health maintenance advice. Please see specific information pulled into the AVS if appropriate.       Cheo Weiss MD    Patient or patient representative verbalized consent for the use of Ambient Listening during the visit with  Cheo Weiss MD for chart documentation. 3/7/2025  11:28 EST

## 2025-03-08 LAB
ALBUMIN SERPL-MCNC: 4.5 G/DL (ref 3.9–4.9)
ALP SERPL-CCNC: 39 IU/L (ref 44–121)
ALT SERPL-CCNC: 40 IU/L (ref 0–44)
AST SERPL-CCNC: 45 IU/L (ref 0–40)
BASOPHILS # BLD AUTO: 0.1 X10E3/UL (ref 0–0.2)
BASOPHILS NFR BLD AUTO: 1 %
BILIRUB SERPL-MCNC: 0.7 MG/DL (ref 0–1.2)
BUN SERPL-MCNC: 12 MG/DL (ref 8–27)
BUN/CREAT SERPL: 11 (ref 10–24)
CALCIUM SERPL-MCNC: 9.3 MG/DL (ref 8.6–10.2)
CHLORIDE SERPL-SCNC: 100 MMOL/L (ref 96–106)
CO2 SERPL-SCNC: 24 MMOL/L (ref 20–29)
CREAT SERPL-MCNC: 1.07 MG/DL (ref 0.76–1.27)
EGFRCR SERPLBLD CKD-EPI 2021: 75 ML/MIN/1.73
EOSINOPHIL # BLD AUTO: 0.2 X10E3/UL (ref 0–0.4)
EOSINOPHIL NFR BLD AUTO: 3 %
ERYTHROCYTE [DISTWIDTH] IN BLOOD BY AUTOMATED COUNT: 12.9 % (ref 11.6–15.4)
GLOBULIN SER CALC-MCNC: 3 G/DL (ref 1.5–4.5)
GLUCOSE SERPL-MCNC: 147 MG/DL (ref 70–99)
HBA1C MFR BLD: 8.1 % (ref 4.8–5.6)
HCT VFR BLD AUTO: 42.4 % (ref 37.5–51)
HGB BLD-MCNC: 13.5 G/DL (ref 13–17.7)
IMM GRANULOCYTES # BLD AUTO: 0 X10E3/UL (ref 0–0.1)
IMM GRANULOCYTES NFR BLD AUTO: 0 %
LYMPHOCYTES # BLD AUTO: 1.4 X10E3/UL (ref 0.7–3.1)
LYMPHOCYTES NFR BLD AUTO: 25 %
MCH RBC QN AUTO: 31 PG (ref 26.6–33)
MCHC RBC AUTO-ENTMCNC: 31.8 G/DL (ref 31.5–35.7)
MCV RBC AUTO: 98 FL (ref 79–97)
MONOCYTES # BLD AUTO: 0.5 X10E3/UL (ref 0.1–0.9)
MONOCYTES NFR BLD AUTO: 9 %
MORPHOLOGY BLD-IMP: ABNORMAL
NEUTROPHILS # BLD AUTO: 3.6 X10E3/UL (ref 1.4–7)
NEUTROPHILS NFR BLD AUTO: 62 %
PLATELET # BLD AUTO: 97 X10E3/UL (ref 150–450)
POTASSIUM SERPL-SCNC: 4.6 MMOL/L (ref 3.5–5.2)
PROT SERPL-MCNC: 7.5 G/DL (ref 6–8.5)
RBC # BLD AUTO: 4.35 X10E6/UL (ref 4.14–5.8)
SODIUM SERPL-SCNC: 137 MMOL/L (ref 134–144)
WBC # BLD AUTO: 5.8 X10E3/UL (ref 3.4–10.8)

## 2025-03-11 NOTE — PROGRESS NOTES
Subjective:     Encounter Date:03/12/2025    Primary Care Physician: Cheo Weiss MD      Patient ID: Kai Garcia is a 69 y.o. male.    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 Helton, stage I in June 2006 and stage II in March 2007.   Followed chronically with annual CTs by Dr. Helton. Stable by CT 2019  Coronary artery disease:  Cardiac cath prior to “elephant trunk” procedure in June 2006 and the patient underwent CABG at that time consisting of SVG to the OM and SVG to the PDA.   02/08/2007 - inferoposterior MI, cardiac cath showed 100% left circumflex with severe OM-1 stenosis and 100% vein graft to the left circumflex. RCA was diffuse, 90% ostial stenosis at the SVG to the RCA. LAD had only 40% stenosis.   Cardiac cath in March 2007 due to recurrent angina - 2.5 Microdriver stent to the distal native RCA.   4/27/18 cardiac catheter occluded vein grafts ×2.  Large posterior basal aneurysm with ejection fraction of 30-35%.  Severe stenosis and distal circumflex which supplies aneurysm.  Non-flow-limiting RCA disease.  MRI 8/18 LVEF 28%  S/P Bi- Ventricular ICD- [employer] 8/9/18, EMILIE Fox MD  2/2024 MPS with large sized infarct basal inferolateral wall with no significant ischemia.  2/2024 echo EF 56 to 60%.  No significant change from 2021 study  4/30/2022 generator change Dr. Pak [facility]  Dyslipidemia.   Hypertension.   Diabetes  Chronic low back pain/multiple lumbar surgeries, currently disabled:  L4-L5 discectomy in 2015.   History of bleeding peptic ulcer disease.   Severe obstructive sleep apnea.   Appendectomy  Lumbar fusion 10/2021  Left hip replacement 2023      Allergies   Allergen Reactions    Ativan [Lorazepam] Mental Status Change     agitation    Zetia [Ezetimibe] Other (See Comments)     increased LFTs     Zocor [Simvastatin] Myalgia    Isosorbide Other (See Comments)     headaches    Jardiance [Empagliflozin] Other (See Comments)     Blood in urine and burning sensation    Metformin And Related Diarrhea         Current Outpatient Medications:     Accu-Chek Softclix Lancets lancets, USE TO CHECK BLOOD SUGAR TWICE DAILY, Disp: 100 each, Rfl: 3    acetaminophen (TYLENOL) 500 MG tablet, Take 2 tablets by mouth Every 6 (Six) Hours As Needed for Mild Pain. Indications: Pain, Disp: , Rfl:     aspirin 81 MG EC tablet, Take 1 tablet by mouth Daily., Disp: , Rfl:     bisoprolol (ZEBeta) 10 MG tablet, Take 1 tablet by mouth Daily., Disp: 90 tablet, Rfl: 3    clopidogrel (PLAVIX) 75 MG tablet, Take 1 tablet by mouth Daily., Disp: 90 tablet, Rfl: 3    eplerenone (INSPRA) 25 MG tablet, TAKE ONE TABLET BY MOUTH EVERY DAY, Disp: 90 tablet, Rfl: 1    glucose blood (Accu-Chek Jodie Plus) test strip, USE TO TEST BLOOD SUGAR TWICE DAILY, Disp: 100 each, Rfl: 3    Insulin Glargine (Lantus SoloStar) 100 UNIT/ML injection pen, INJECT 32 UNITS SUBCUTANEOUSLY DAILY AS DIRECTED FOR DIABETES, Disp: 18 mL, Rfl: 5    Insulin Pen Needle (B-D UF III MINI PEN NEEDLES) 31G X 5 MM misc, USE DAILY WITH INSULIN, Disp: 100 each, Rfl: 3    Januvia 100 MG tablet, TAKE ONE TABLET BY MOUTH EVERY DAY, Disp: 90 tablet, Rfl: 0    nitroglycerin (NITROSTAT) 0.4 MG SL tablet, Place 1 tablet under the tongue Every 5 (Five) Minutes As Needed for Chest Pain. Take no more than 3 doses in 15 minutes.  Indications: Acute Angina Pectoris, Disp: 25 tablet, Rfl: 5    pantoprazole (PROTONIX) 40 MG EC tablet, TAKE ONE TABLET BY MOUTH EVERY DAY, Disp: 90 tablet, Rfl: 0    rosuvastatin (CRESTOR) 10 MG tablet, Take 1 tablet by mouth Daily., Disp: 90 tablet, Rfl: 3    sacubitril-valsartan (Entresto) 49-51 MG tablet, Take 1 tablet by mouth Every 12 (Twelve) Hours., Disp: 180 tablet, Rfl: 3    traZODone (DESYREL) 100 MG tablet, TAKE ONE TABLET BY MOUTH AT BEDTIME, Disp: 90  "tablet, Rfl: 0        History of Present Illness    Patient is a 69-year-old  male who presents today for 6-month follow-up of coronary artery disease, ischemic cardiomyopathy, history of aortic dissection and cardiac risk factors.  Since last being seen patient notes overall being relatively well from cardiac standpoint.  Notes over the last week or 2 that he has had 4 episodes of significant left-sided chest discomfort that lasted for about 30 minutes.  This occurred after he had been working out in the yard.  He thought that his ICD had shocked him but interrogation reveals no discharges.  No reported syncope or presyncope.  Notes blood pressure has been overall controlled at home.    The following portions of the patient's history were reviewed and updated as appropriate: allergies, current medications, past family history, past medical history, past social history, past surgical history and problem list.      Social History     Tobacco Use    Smoking status: Former     Current packs/day: 0.00     Average packs/day: 3.0 packs/day for 35.0 years (105.0 ttl pk-yrs)     Types: Cigarettes     Start date: 6/15/1971     Quit date: 6/15/2006     Years since quittin.7    Smokeless tobacco: Never   Vaping Use    Vaping status: Never Used   Substance Use Topics    Alcohol use: No    Drug use: No         ROS       Objective:   /96   Pulse 69   Ht 167.6 cm (66\")   Wt 108 kg (237 lb 12.8 oz)   SpO2 94%   BMI 38.38 kg/m²         Vitals reviewed.   Constitutional:       Appearance: Well-developed and not in distress.   Neck:      Vascular: No JVD.      Trachea: No tracheal deviation.   Pulmonary:      Effort: Pulmonary effort is normal.      Breath sounds: Normal breath sounds.   Cardiovascular:      Normal rate. Regular rhythm.      Murmurs: There is no murmur.   Edema:     Peripheral edema absent.   Musculoskeletal:         General: No deformity. Neurological:      Mental Status: Alert and oriented " to person, place, and time.         Procedures          Assessment:   Assessment & Plan      Diagnoses and all orders for this visit:    1. Coronary artery disease involving native coronary artery of native heart without angina pectoris (Primary), recent chest discomfort.  Patient not concerned for angina.  On Plavix.    2. Ischemic cardiomyopathy, no evidence of volume overload.  On Inspra.    3. Primary hypertension, controlled at home.  On beta-blocker.    4. Dissection of thoracic aorta, unspecified part, stable.    5. Acute on chronic HFrEF (heart failure with reduced ejection fraction), stable.  On Inspra and Entresto.      Plan:  Discussed potential further evaluation of recurrent left-sided chest discomfort.  At this time patient wishes to defer.  Continue current cardiac medications.  Follow-up in 6 months time with a device check or sooner if needed.       Faye RYAN     Advance Care Planning   ACP discussion was held with the patient during this visit. Patient has an advance directive in EMR which is still valid.         Dictated utilizing Dragon dictation

## 2025-03-12 ENCOUNTER — OFFICE VISIT (OUTPATIENT)
Dept: CARDIOLOGY | Facility: CLINIC | Age: 70
End: 2025-03-12
Payer: MEDICARE

## 2025-03-12 VITALS
HEART RATE: 69 BPM | SYSTOLIC BLOOD PRESSURE: 142 MMHG | WEIGHT: 237.8 LBS | DIASTOLIC BLOOD PRESSURE: 96 MMHG | OXYGEN SATURATION: 94 % | BODY MASS INDEX: 38.22 KG/M2 | HEIGHT: 66 IN

## 2025-03-12 DIAGNOSIS — I25.5 ISCHEMIC CARDIOMYOPATHY: ICD-10-CM

## 2025-03-12 DIAGNOSIS — I25.10 CORONARY ARTERY DISEASE INVOLVING NATIVE CORONARY ARTERY OF NATIVE HEART WITHOUT ANGINA PECTORIS: ICD-10-CM

## 2025-03-12 DIAGNOSIS — I25.10 CORONARY ARTERY DISEASE INVOLVING NATIVE CORONARY ARTERY OF NATIVE HEART WITHOUT ANGINA PECTORIS: Primary | ICD-10-CM

## 2025-03-12 DIAGNOSIS — I10 PRIMARY HYPERTENSION: ICD-10-CM

## 2025-03-12 DIAGNOSIS — I50.23 ACUTE ON CHRONIC HFREF (HEART FAILURE WITH REDUCED EJECTION FRACTION): ICD-10-CM

## 2025-03-12 DIAGNOSIS — I71.019 DISSECTION OF THORACIC AORTA, UNSPECIFIED PART: ICD-10-CM

## 2025-03-12 RX ORDER — ROSUVASTATIN CALCIUM 10 MG/1
10 TABLET, COATED ORAL DAILY
Qty: 90 TABLET | Refills: 3 | Status: SHIPPED | OUTPATIENT
Start: 2025-03-12

## 2025-03-12 RX ORDER — EPLERENONE 25 MG/1
25 TABLET ORAL DAILY
Qty: 90 TABLET | Refills: 3 | Status: SHIPPED | OUTPATIENT
Start: 2025-03-12

## 2025-03-12 RX ORDER — NITROGLYCERIN 0.4 MG/1
0.4 TABLET SUBLINGUAL
Qty: 25 TABLET | Refills: 5 | Status: SHIPPED | OUTPATIENT
Start: 2025-03-12

## 2025-03-12 RX ORDER — CLOPIDOGREL BISULFATE 75 MG/1
75 TABLET ORAL DAILY
Qty: 90 TABLET | Refills: 3 | Status: SHIPPED | OUTPATIENT
Start: 2025-03-12

## 2025-03-12 RX ORDER — SACUBITRIL AND VALSARTAN 49; 51 MG/1; MG/1
1 TABLET, FILM COATED ORAL EVERY 12 HOURS
Qty: 180 TABLET | Refills: 3 | Status: SHIPPED | OUTPATIENT
Start: 2025-03-12

## 2025-03-12 RX ORDER — BISOPROLOL FUMARATE 10 MG/1
10 TABLET, FILM COATED ORAL DAILY
Qty: 90 TABLET | Refills: 3 | Status: SHIPPED | OUTPATIENT
Start: 2025-03-12

## 2025-03-12 NOTE — LETTER
March 12, 2025     Cheo Weiss MD  [address]  [place]  [facility] [address]    Patient: Kai Garcia   YOB: 1955   Date of Visit: 3/12/2025     Dear Cheo Weiss MD:       Thank you for referring Kai Garcia to me for evaluation. Below are the relevant portions of my assessment and plan of care.    If you have questions, please do not hesitate to call me. I look forward to following Kai along with you.         Sincerely,        CONNOR Islas        CC: No Recipients    Faye Lagunas APRN  03/12/25 1202  Sign when Signing Visit  Subjective:     Encounter Date:03/12/2025    Primary Care Physician: Cheo Weiss MD      Patient ID: Kai Garcia is a 69 y.o. male.    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 Helton, stage I in June 2006 and stage II in March 2007.   Followed chronically with annual CTs by Dr. Helton. Stable by CT 2019  Coronary artery disease:  Cardiac cath prior to “elephant trunk” procedure in June 2006 and the patient underwent CABG at that time consisting of SVG to the OM and SVG to the PDA.   02/08/2007 - inferoposterior MI, cardiac cath showed 100% left circumflex with severe OM-1 stenosis and 100% vein graft to the left circumflex. RCA was diffuse, 90% ostial stenosis at the SVG to the RCA. LAD had only 40% stenosis.   Cardiac cath in March 2007 due to recurrent angina - 2.5 Microdriver stent to the distal native RCA.   4/27/18 cardiac catheter occluded vein grafts ×2.  Large posterior basal aneurysm with ejection fraction of 30-35%.  Severe stenosis and distal circumflex which supplies aneurysm.  Non-flow-limiting RCA disease.  MRI 8/18 LVEF 28%  S/P Bi- Ventricular ICD- [employer] 8/9/18, EMILIE Fox MD  2/2024 MPS with large sized infarct basal inferolateral  wall with no significant ischemia.  2/2024 echo EF 56 to 60%.  No significant change from 2021 study  4/30/2022 generator change Dr. Pak [facility]  Dyslipidemia.   Hypertension.   Diabetes  Chronic low back pain/multiple lumbar surgeries, currently disabled:  L4-L5 discectomy in 2015.   History of bleeding peptic ulcer disease.   Severe obstructive sleep apnea.   Appendectomy  Lumbar fusion 10/2021  Left hip replacement 2023      Allergies   Allergen Reactions   • Ativan [Lorazepam] Mental Status Change     agitation   • Zetia [Ezetimibe] Other (See Comments)     increased LFTs   • Zocor [Simvastatin] Myalgia   • Isosorbide Other (See Comments)     headaches   • Jardiance [Empagliflozin] Other (See Comments)     Blood in urine and burning sensation   • Metformin And Related Diarrhea         Current Outpatient Medications:   •  Accu-Chek Softclix Lancets lancets, USE TO CHECK BLOOD SUGAR TWICE DAILY, Disp: 100 each, Rfl: 3  •  acetaminophen (TYLENOL) 500 MG tablet, Take 2 tablets by mouth Every 6 (Six) Hours As Needed for Mild Pain. Indications: Pain, Disp: , Rfl:   •  aspirin 81 MG EC tablet, Take 1 tablet by mouth Daily., Disp: , Rfl:   •  bisoprolol (ZEBeta) 10 MG tablet, Take 1 tablet by mouth Daily., Disp: 90 tablet, Rfl: 3  •  clopidogrel (PLAVIX) 75 MG tablet, Take 1 tablet by mouth Daily., Disp: 90 tablet, Rfl: 3  •  eplerenone (INSPRA) 25 MG tablet, TAKE ONE TABLET BY MOUTH EVERY DAY, Disp: 90 tablet, Rfl: 1  •  glucose blood (Accu-Chek Jodie Plus) test strip, USE TO TEST BLOOD SUGAR TWICE DAILY, Disp: 100 each, Rfl: 3  •  Insulin Glargine (Lantus SoloStar) 100 UNIT/ML injection pen, INJECT 32 UNITS SUBCUTANEOUSLY DAILY AS DIRECTED FOR DIABETES, Disp: 18 mL, Rfl: 5  •  Insulin Pen Needle (B-D UF III MINI PEN NEEDLES) 31G X 5 MM misc, USE DAILY WITH INSULIN, Disp: 100 each, Rfl: 3  •  Januvia 100 MG tablet, TAKE ONE TABLET BY MOUTH EVERY DAY, Disp: 90 tablet, Rfl: 0  •  nitroglycerin (NITROSTAT) 0.4 MG SL tablet,  Place 1 tablet under the tongue Every 5 (Five) Minutes As Needed for Chest Pain. Take no more than 3 doses in 15 minutes.  Indications: Acute Angina Pectoris, Disp: 25 tablet, Rfl: 5  •  pantoprazole (PROTONIX) 40 MG EC tablet, TAKE ONE TABLET BY MOUTH EVERY DAY, Disp: 90 tablet, Rfl: 0  •  rosuvastatin (CRESTOR) 10 MG tablet, Take 1 tablet by mouth Daily., Disp: 90 tablet, Rfl: 3  •  sacubitril-valsartan (Entresto) 49-51 MG tablet, Take 1 tablet by mouth Every 12 (Twelve) Hours., Disp: 180 tablet, Rfl: 3  •  traZODone (DESYREL) 100 MG tablet, TAKE ONE TABLET BY MOUTH AT BEDTIME, Disp: 90 tablet, Rfl: 0        History of Present Illness    Patient is a 69-year-old  male who presents today for 6-month follow-up of coronary artery disease, ischemic cardiomyopathy, history of aortic dissection and cardiac risk factors.  Since last being seen patient notes overall being relatively well from cardiac standpoint.  Notes over the last week or 2 that he has had 4 episodes of significant left-sided chest discomfort that lasted for about 30 minutes.  This occurred after he had been working out in the yard.  He thought that his ICD had shocked him but interrogation reveals no discharges.  No reported syncope or presyncope.  Notes blood pressure has been overall controlled at home.    The following portions of the patient's history were reviewed and updated as appropriate: allergies, current medications, past family history, past medical history, past social history, past surgical history and problem list.      Social History     Tobacco Use   • Smoking status: Former     Current packs/day: 0.00     Average packs/day: 3.0 packs/day for 35.0 years (105.0 ttl pk-yrs)     Types: Cigarettes     Start date: 6/15/1971     Quit date: 6/15/2006     Years since quittin.7   • Smokeless tobacco: Never   Vaping Use   • Vaping status: Never Used   Substance Use Topics   • Alcohol use: No   • Drug use: No         ROS      "  Objective:   /96   Pulse 69   Ht 167.6 cm (66\")   Wt 108 kg (237 lb 12.8 oz)   SpO2 94%   BMI 38.38 kg/m²         Vitals reviewed.   Constitutional:       Appearance: Well-developed and not in distress.   Neck:      Vascular: No JVD.      Trachea: No tracheal deviation.   Pulmonary:      Effort: Pulmonary effort is normal.      Breath sounds: Normal breath sounds.   Cardiovascular:      Normal rate. Regular rhythm.      Murmurs: There is no murmur.   Edema:     Peripheral edema absent.   Musculoskeletal:         General: No deformity. Neurological:      Mental Status: Alert and oriented to person, place, and time.         Procedures          Assessment:   Assessment & Plan     Diagnoses and all orders for this visit:    1. Coronary artery disease involving native coronary artery of native heart without angina pectoris (Primary), recent chest discomfort.  Patient not concerned for angina.  On Plavix.    2. Ischemic cardiomyopathy, no evidence of volume overload.  On Inspra.    3. Primary hypertension, controlled at home.  On beta-blocker.    4. Dissection of thoracic aorta, unspecified part, stable.    5. Acute on chronic HFrEF (heart failure with reduced ejection fraction), stable.  On Inspra and Entresto.      Plan:  Discussed potential further evaluation of recurrent left-sided chest discomfort.  At this time patient wishes to defer.  Continue current cardiac medications.  Follow-up in 6 months time with a device check or sooner if needed.       Faye RYAN     Advance Care Planning  {Advance Directive Status:25314}        Dictated utilizing Dragon dictation  "

## 2025-03-12 NOTE — LETTER
March 12, 2025     No Recipients    Patient: Kai Garcia   YOB: 1955   Date of Visit: 3/12/2025     Dear Cheo Weiss MD:       Thank you for referring Kai Garcia to me for evaluation. Below are the relevant portions of my assessment and plan of care.    If you have questions, please do not hesitate to call me. I look forward to following Kai along with you.         Sincerely,        CONNOR Islas        CC: No Recipients    Faye Lagunas APRN  03/12/25 1204  Sign when Signing Visit  Subjective:     Encounter Date:03/12/2025    Primary Care Physician: Cheo Weiss MD      Patient ID: Kai Garcia is a 69 y.o. male.    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 Hleton, stage I in June 2006 and stage II in March 2007.   Followed chronically with annual CTs by Dr. Helton. Stable by CT 2019  Coronary artery disease:  Cardiac cath prior to “elephant trunk” procedure in June 2006 and the patient underwent CABG at that time consisting of SVG to the OM and SVG to the PDA.   02/08/2007 - inferoposterior MI, cardiac cath showed 100% left circumflex with severe OM-1 stenosis and 100% vein graft to the left circumflex. RCA was diffuse, 90% ostial stenosis at the SVG to the RCA. LAD had only 40% stenosis.   Cardiac cath in March 2007 due to recurrent angina - 2.5 Microdriver stent to the distal native RCA.   4/27/18 cardiac catheter occluded vein grafts ×2.  Large posterior basal aneurysm with ejection fraction of 30-35%.  Severe stenosis and distal circumflex which supplies aneurysm.  Non-flow-limiting RCA disease.  MRI 8/18 LVEF 28%  S/P Bi- Ventricular ICD- [email or web address] 8/9/18, EMILIE Fxo MD  2/2024 MPS with large sized infarct basal inferolateral wall with no significant ischemia.  2/2024 echo EF  56 to 60%.  No significant change from 2021 study  4/30/2022 generator change Dr. Pak [facility]  Dyslipidemia.   Hypertension.   Diabetes  Chronic low back pain/multiple lumbar surgeries, currently disabled:  L4-L5 discectomy in 2015.   History of bleeding peptic ulcer disease.   Severe obstructive sleep apnea.   Appendectomy  Lumbar fusion 10/2021  Left hip replacement 2023      Allergies   Allergen Reactions   • Ativan [Lorazepam] Mental Status Change     agitation   • Zetia [Ezetimibe] Other (See Comments)     increased LFTs   • Zocor [Simvastatin] Myalgia   • Isosorbide Other (See Comments)     headaches   • Jardiance [Empagliflozin] Other (See Comments)     Blood in urine and burning sensation   • Metformin And Related Diarrhea         Current Outpatient Medications:   •  Accu-Chek Softclix Lancets lancets, USE TO CHECK BLOOD SUGAR TWICE DAILY, Disp: 100 each, Rfl: 3  •  acetaminophen (TYLENOL) 500 MG tablet, Take 2 tablets by mouth Every 6 (Six) Hours As Needed for Mild Pain. Indications: Pain, Disp: , Rfl:   •  aspirin 81 MG EC tablet, Take 1 tablet by mouth Daily., Disp: , Rfl:   •  bisoprolol (ZEBeta) 10 MG tablet, Take 1 tablet by mouth Daily., Disp: 90 tablet, Rfl: 3  •  clopidogrel (PLAVIX) 75 MG tablet, Take 1 tablet by mouth Daily., Disp: 90 tablet, Rfl: 3  •  eplerenone (INSPRA) 25 MG tablet, TAKE ONE TABLET BY MOUTH EVERY DAY, Disp: 90 tablet, Rfl: 1  •  glucose blood (Accu-Chek Jodie Plus) test strip, USE TO TEST BLOOD SUGAR TWICE DAILY, Disp: 100 each, Rfl: 3  •  Insulin Glargine (Lantus SoloStar) 100 UNIT/ML injection pen, INJECT 32 UNITS SUBCUTANEOUSLY DAILY AS DIRECTED FOR DIABETES, Disp: 18 mL, Rfl: 5  •  Insulin Pen Needle (B-D UF III MINI PEN NEEDLES) 31G X 5 MM misc, USE DAILY WITH INSULIN, Disp: 100 each, Rfl: 3  •  Januvia 100 MG tablet, TAKE ONE TABLET BY MOUTH EVERY DAY, Disp: 90 tablet, Rfl: 0  •  nitroglycerin (NITROSTAT) 0.4 MG SL tablet, Place 1 tablet under the tongue Every 5 (Five)  Minutes As Needed for Chest Pain. Take no more than 3 doses in 15 minutes.  Indications: Acute Angina Pectoris, Disp: 25 tablet, Rfl: 5  •  pantoprazole (PROTONIX) 40 MG EC tablet, TAKE ONE TABLET BY MOUTH EVERY DAY, Disp: 90 tablet, Rfl: 0  •  rosuvastatin (CRESTOR) 10 MG tablet, Take 1 tablet by mouth Daily., Disp: 90 tablet, Rfl: 3  •  sacubitril-valsartan (Entresto) 49-51 MG tablet, Take 1 tablet by mouth Every 12 (Twelve) Hours., Disp: 180 tablet, Rfl: 3  •  traZODone (DESYREL) 100 MG tablet, TAKE ONE TABLET BY MOUTH AT BEDTIME, Disp: 90 tablet, Rfl: 0        History of Present Illness    Patient is a 69-year-old  male who presents today for 6-month follow-up of coronary artery disease, ischemic cardiomyopathy, history of aortic dissection and cardiac risk factors.  Since last being seen patient notes overall being relatively well from cardiac standpoint.  Notes over the last week or 2 that he has had 4 episodes of significant left-sided chest discomfort that lasted for about 30 minutes.  This occurred after he had been working out in the yard.  He thought that his ICD had shocked him but interrogation reveals no discharges.  No reported syncope or presyncope.  Notes blood pressure has been overall controlled at home.    The following portions of the patient's history were reviewed and updated as appropriate: allergies, current medications, past family history, past medical history, past social history, past surgical history and problem list.      Social History     Tobacco Use   • Smoking status: Former     Current packs/day: 0.00     Average packs/day: 3.0 packs/day for 35.0 years (105.0 ttl pk-yrs)     Types: Cigarettes     Start date: 6/15/1971     Quit date: 6/15/2006     Years since quittin.7   • Smokeless tobacco: Never   Vaping Use   • Vaping status: Never Used   Substance Use Topics   • Alcohol use: No   • Drug use: No         ROS       Objective:   /96   Pulse 69   Ht 167.6 cm  "(66\")   Wt 108 kg (237 lb 12.8 oz)   SpO2 94%   BMI 38.38 kg/m²         Vitals reviewed.   Constitutional:       Appearance: Well-developed and not in distress.   Neck:      Vascular: No JVD.      Trachea: No tracheal deviation.   Pulmonary:      Effort: Pulmonary effort is normal.      Breath sounds: Normal breath sounds.   Cardiovascular:      Normal rate. Regular rhythm.      Murmurs: There is no murmur.   Edema:     Peripheral edema absent.   Musculoskeletal:         General: No deformity. Neurological:      Mental Status: Alert and oriented to person, place, and time.         Procedures          Assessment:   Assessment & Plan     Diagnoses and all orders for this visit:    1. Coronary artery disease involving native coronary artery of native heart without angina pectoris (Primary), recent chest discomfort.  Patient not concerned for angina.  On Plavix.    2. Ischemic cardiomyopathy, no evidence of volume overload.  On Inspra.    3. Primary hypertension, controlled at home.  On beta-blocker.    4. Dissection of thoracic aorta, unspecified part, stable.    5. Acute on chronic HFrEF (heart failure with reduced ejection fraction), stable.  On Inspra and Entresto.      Plan:  Discussed potential further evaluation of recurrent left-sided chest discomfort.  At this time patient wishes to defer.  Continue current cardiac medications.  Follow-up in 6 months time with a device check or sooner if needed.       Faye RYAN     Advance Care Planning  {Advance Directive Status:32744}        Dictated utilizing Dragon dictation  "

## 2025-03-12 NOTE — LETTER
March 12, 2025     Cheo Weiss MD  [address]  [place]  [facility] [address]    Patient: Kai Garcia   YOB: 1955   Date of Visit: 3/12/2025     Dear Cheo Weiss MD:       Thank you for referring Kai Garcia to me for evaluation. Below are the relevant portions of my assessment and plan of care.    If you have questions, please do not hesitate to call me. I look forward to following Kai along with you.         Sincerely,        CONNOR Islas        CC: No Recipients    Faye Lagunas APRN  03/12/25 1204  Sign when Signing Visit  Subjective:     Encounter Date:03/12/2025    Primary Care Physician: Cheo Weiss MD      Patient ID: Kai Garcia is a 69 y.o. male.    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 Helton, stage I in June 2006 and stage II in March 2007.   Followed chronically with annual CTs by Dr. Helton. Stable by CT 2019  Coronary artery disease:  Cardiac cath prior to “elephant trunk” procedure in June 2006 and the patient underwent CABG at that time consisting of SVG to the OM and SVG to the PDA.   02/08/2007 - inferoposterior MI, cardiac cath showed 100% left circumflex with severe OM-1 stenosis and 100% vein graft to the left circumflex. RCA was diffuse, 90% ostial stenosis at the SVG to the RCA. LAD had only 40% stenosis.   Cardiac cath in March 2007 due to recurrent angina - 2.5 Microdriver stent to the distal native RCA.   4/27/18 cardiac catheter occluded vein grafts ×2.  Large posterior basal aneurysm with ejection fraction of 30-35%.  Severe stenosis and distal circumflex which supplies aneurysm.  Non-flow-limiting RCA disease.  MRI 8/18 LVEF 28%  S/P Bi- Ventricular ICD- [facility] [facility] 8/9/18, EMILIE Fox MD  2/2024 MPS with large sized infarct basal inferolateral  wall with no significant ischemia.  2/2024 echo EF 56 to 60%.  No significant change from 2021 study  4/30/2022 generator change Dr. Pak BSC  Dyslipidemia.   Hypertension.   Diabetes  Chronic low back pain/multiple lumbar surgeries, currently disabled:  L4-L5 discectomy in 2015.   History of bleeding peptic ulcer disease.   Severe obstructive sleep apnea.   Appendectomy  Lumbar fusion 10/2021  Left hip replacement 2023      Allergies   Allergen Reactions   • Ativan [Lorazepam] Mental Status Change     agitation   • Zetia [Ezetimibe] Other (See Comments)     increased LFTs   • Zocor [Simvastatin] Myalgia   • Isosorbide Other (See Comments)     headaches   • Jardiance [Empagliflozin] Other (See Comments)     Blood in urine and burning sensation   • Metformin And Related Diarrhea         Current Outpatient Medications:   •  Accu-Chek Softclix Lancets lancets, USE TO CHECK BLOOD SUGAR TWICE DAILY, Disp: 100 each, Rfl: 3  •  acetaminophen (TYLENOL) 500 MG tablet, Take 2 tablets by mouth Every 6 (Six) Hours As Needed for Mild Pain. Indications: Pain, Disp: , Rfl:   •  aspirin 81 MG EC tablet, Take 1 tablet by mouth Daily., Disp: , Rfl:   •  bisoprolol (ZEBeta) 10 MG tablet, Take 1 tablet by mouth Daily., Disp: 90 tablet, Rfl: 3  •  clopidogrel (PLAVIX) 75 MG tablet, Take 1 tablet by mouth Daily., Disp: 90 tablet, Rfl: 3  •  eplerenone (INSPRA) 25 MG tablet, TAKE ONE TABLET BY MOUTH EVERY DAY, Disp: 90 tablet, Rfl: 1  •  glucose blood (Accu-Chek Jodie Plus) test strip, USE TO TEST BLOOD SUGAR TWICE DAILY, Disp: 100 each, Rfl: 3  •  Insulin Glargine (Lantus SoloStar) 100 UNIT/ML injection pen, INJECT 32 UNITS SUBCUTANEOUSLY DAILY AS DIRECTED FOR DIABETES, Disp: 18 mL, Rfl: 5  •  Insulin Pen Needle (B-D UF III MINI PEN NEEDLES) 31G X 5 MM misc, USE DAILY WITH INSULIN, Disp: 100 each, Rfl: 3  •  Januvia 100 MG tablet, TAKE ONE TABLET BY MOUTH EVERY DAY, Disp: 90 tablet, Rfl: 0  •  nitroglycerin (NITROSTAT) 0.4 MG SL tablet,  Place 1 tablet under the tongue Every 5 (Five) Minutes As Needed for Chest Pain. Take no more than 3 doses in 15 minutes.  Indications: Acute Angina Pectoris, Disp: 25 tablet, Rfl: 5  •  pantoprazole (PROTONIX) 40 MG EC tablet, TAKE ONE TABLET BY MOUTH EVERY DAY, Disp: 90 tablet, Rfl: 0  •  rosuvastatin (CRESTOR) 10 MG tablet, Take 1 tablet by mouth Daily., Disp: 90 tablet, Rfl: 3  •  sacubitril-valsartan (Entresto) 49-51 MG tablet, Take 1 tablet by mouth Every 12 (Twelve) Hours., Disp: 180 tablet, Rfl: 3  •  traZODone (DESYREL) 100 MG tablet, TAKE ONE TABLET BY MOUTH AT BEDTIME, Disp: 90 tablet, Rfl: 0        History of Present Illness    Patient is a 69-year-old  male who presents today for 6-month follow-up of coronary artery disease, ischemic cardiomyopathy, history of aortic dissection and cardiac risk factors.  Since last being seen patient notes overall being relatively well from cardiac standpoint.  Notes over the last week or 2 that he has had 4 episodes of significant left-sided chest discomfort that lasted for about 30 minutes.  This occurred after he had been working out in the yard.  He thought that his ICD had shocked him but interrogation reveals no discharges.  No reported syncope or presyncope.  Notes blood pressure has been overall controlled at home.    The following portions of the patient's history were reviewed and updated as appropriate: allergies, current medications, past family history, past medical history, past social history, past surgical history and problem list.      Social History     Tobacco Use   • Smoking status: Former     Current packs/day: 0.00     Average packs/day: 3.0 packs/day for 35.0 years (105.0 ttl pk-yrs)     Types: Cigarettes     Start date: 6/15/1971     Quit date: 6/15/2006     Years since quittin.7   • Smokeless tobacco: Never   Vaping Use   • Vaping status: Never Used   Substance Use Topics   • Alcohol use: No   • Drug use: No         ROS      "  Objective:   /96   Pulse 69   Ht 167.6 cm (66\")   Wt 108 kg (237 lb 12.8 oz)   SpO2 94%   BMI 38.38 kg/m²         Vitals reviewed.   Constitutional:       Appearance: Well-developed and not in distress.   Neck:      Vascular: No JVD.      Trachea: No tracheal deviation.   Pulmonary:      Effort: Pulmonary effort is normal.      Breath sounds: Normal breath sounds.   Cardiovascular:      Normal rate. Regular rhythm.      Murmurs: There is no murmur.   Edema:     Peripheral edema absent.   Musculoskeletal:         General: No deformity. Neurological:      Mental Status: Alert and oriented to person, place, and time.         Procedures          Assessment:   Assessment & Plan     Diagnoses and all orders for this visit:    1. Coronary artery disease involving native coronary artery of native heart without angina pectoris (Primary), recent chest discomfort.  Patient not concerned for angina.  On Plavix.    2. Ischemic cardiomyopathy, no evidence of volume overload.  On Inspra.    3. Primary hypertension, controlled at home.  On beta-blocker.    4. Dissection of thoracic aorta, unspecified part, stable.    5. Acute on chronic HFrEF (heart failure with reduced ejection fraction), stable.  On Inspra and Entresto.      Plan:  Discussed potential further evaluation of recurrent left-sided chest discomfort.  At this time patient wishes to defer.  Continue current cardiac medications.  Follow-up in 6 months time with a device check or sooner if needed.       Faye RYAN     Advance Care Planning  ACP discussion was held with the patient during this visit. Patient has an advance directive in EMR which is still valid.         Dictated utilizing Dragon dictation  "

## 2025-03-20 PROCEDURE — 93296 REM INTERROG EVL PM/IDS: CPT | Performed by: INTERNAL MEDICINE

## 2025-03-20 PROCEDURE — 93295 DEV INTERROG REMOTE 1/2/MLT: CPT | Performed by: INTERNAL MEDICINE

## 2025-04-14 DIAGNOSIS — Z79.4 TYPE 2 DIABETES MELLITUS WITH HYPERGLYCEMIA, WITH LONG-TERM CURRENT USE OF INSULIN: ICD-10-CM

## 2025-04-14 DIAGNOSIS — E11.65 TYPE 2 DIABETES MELLITUS WITH HYPERGLYCEMIA, WITH LONG-TERM CURRENT USE OF INSULIN: ICD-10-CM

## 2025-04-14 DIAGNOSIS — F51.01 PRIMARY INSOMNIA: ICD-10-CM

## 2025-04-14 DIAGNOSIS — K21.9 GASTROESOPHAGEAL REFLUX DISEASE WITHOUT ESOPHAGITIS: ICD-10-CM

## 2025-04-15 RX ORDER — SITAGLIPTIN 100 MG/1
100 TABLET, FILM COATED ORAL DAILY
Qty: 90 TABLET | Refills: 0 | Status: SHIPPED | OUTPATIENT
Start: 2025-04-15

## 2025-04-15 RX ORDER — PANTOPRAZOLE SODIUM 40 MG/1
40 TABLET, DELAYED RELEASE ORAL DAILY
Qty: 90 TABLET | Refills: 0 | Status: SHIPPED | OUTPATIENT
Start: 2025-04-15

## 2025-04-15 RX ORDER — TRAZODONE HYDROCHLORIDE 100 MG/1
100 TABLET ORAL
Qty: 90 TABLET | Refills: 0 | Status: SHIPPED | OUTPATIENT
Start: 2025-04-15

## 2025-04-15 NOTE — TELEPHONE ENCOUNTER
Rx Refill Note  Requested Prescriptions     Pending Prescriptions Disp Refills    traZODone (DESYREL) 100 MG tablet [Pharmacy Med Name: trazodone 100 mg tablet] 90 tablet 0     Sig: TAKE ONE TABLET BY MOUTH AT BEDTIME    Januvia 100 MG tablet [Pharmacy Med Name: Januvia 100 mg tablet] 90 tablet 0     Sig: TAKE ONE TABLET BY MOUTH EVERY DAY    pantoprazole (PROTONIX) 40 MG EC tablet [Pharmacy Med Name: pantoprazole 40 mg tablet,delayed release] 90 tablet 0     Sig: TAKE ONE TABLET BY MOUTH EVERY DAY      Last office visit with prescribing clinician: 3/7/2025   Last telemedicine visit with prescribing clinician: Visit date not found   Next office visit with prescribing clinician: 6/13/2025                         Would you like a call back once the refill request has been completed: [] Yes [] No    If the office needs to give you a call back, can they leave a voicemail: [] Yes [] No    Fadumo Feldman MA  04/15/25, 10:17 EDT

## 2025-04-29 DIAGNOSIS — E11.65 TYPE 2 DIABETES MELLITUS WITH HYPERGLYCEMIA, WITH LONG-TERM CURRENT USE OF INSULIN: ICD-10-CM

## 2025-04-29 DIAGNOSIS — Z79.4 TYPE 2 DIABETES MELLITUS WITH HYPERGLYCEMIA, WITH LONG-TERM CURRENT USE OF INSULIN: ICD-10-CM

## 2025-04-30 RX ORDER — FLURBIPROFEN SODIUM 0.3 MG/ML
SOLUTION/ DROPS OPHTHALMIC
Qty: 100 EACH | Refills: 1 | Status: SHIPPED | OUTPATIENT
Start: 2025-04-30

## 2025-04-30 RX ORDER — BLOOD SUGAR DIAGNOSTIC
STRIP MISCELLANEOUS
Qty: 100 EACH | Refills: 1 | Status: SHIPPED | OUTPATIENT
Start: 2025-04-30

## 2025-04-30 RX ORDER — LANCETS
1 EACH MISCELLANEOUS 2 TIMES DAILY
Qty: 100 EACH | Refills: 1 | Status: SHIPPED | OUTPATIENT
Start: 2025-04-30

## 2025-05-02 RX ORDER — FUROSEMIDE 40 MG/1
40 TABLET ORAL DAILY
Qty: 30 TABLET | Refills: 1 | OUTPATIENT
Start: 2025-05-02

## 2025-05-05 DIAGNOSIS — Z79.4 TYPE 2 DIABETES MELLITUS WITH HYPERGLYCEMIA, WITH LONG-TERM CURRENT USE OF INSULIN: ICD-10-CM

## 2025-05-05 DIAGNOSIS — E11.65 TYPE 2 DIABETES MELLITUS WITH HYPERGLYCEMIA, WITH LONG-TERM CURRENT USE OF INSULIN: ICD-10-CM

## 2025-05-06 RX ORDER — FLURBIPROFEN SODIUM 0.3 MG/ML
SOLUTION/ DROPS OPHTHALMIC
Qty: 100 EACH | Refills: 1 | OUTPATIENT
Start: 2025-05-06

## 2025-05-06 RX ORDER — LANCETS
1 EACH MISCELLANEOUS 2 TIMES DAILY
Qty: 100 EACH | Refills: 1 | OUTPATIENT
Start: 2025-05-06

## 2025-05-06 RX ORDER — BLOOD SUGAR DIAGNOSTIC
STRIP MISCELLANEOUS
Qty: 100 EACH | Refills: 1 | OUTPATIENT
Start: 2025-05-06

## 2025-06-13 ENCOUNTER — OFFICE VISIT (OUTPATIENT)
Dept: FAMILY MEDICINE CLINIC | Facility: CLINIC | Age: 70
End: 2025-06-13
Payer: MEDICARE

## 2025-06-13 VITALS
DIASTOLIC BLOOD PRESSURE: 82 MMHG | SYSTOLIC BLOOD PRESSURE: 142 MMHG | HEART RATE: 67 BPM | HEIGHT: 66 IN | BODY MASS INDEX: 37.25 KG/M2 | TEMPERATURE: 97.4 F | WEIGHT: 231.8 LBS | OXYGEN SATURATION: 96 %

## 2025-06-13 DIAGNOSIS — I25.10 CORONARY ARTERY DISEASE INVOLVING NATIVE CORONARY ARTERY OF NATIVE HEART WITHOUT ANGINA PECTORIS: ICD-10-CM

## 2025-06-13 DIAGNOSIS — Z79.4 TYPE 2 DIABETES MELLITUS WITH HYPERGLYCEMIA, WITH LONG-TERM CURRENT USE OF INSULIN: ICD-10-CM

## 2025-06-13 DIAGNOSIS — R07.9 CHEST PAIN, UNSPECIFIED TYPE: Primary | ICD-10-CM

## 2025-06-13 DIAGNOSIS — E11.65 TYPE 2 DIABETES MELLITUS WITH HYPERGLYCEMIA, WITH LONG-TERM CURRENT USE OF INSULIN: ICD-10-CM

## 2025-06-13 DIAGNOSIS — I25.5 ISCHEMIC CARDIOMYOPATHY: ICD-10-CM

## 2025-06-13 DIAGNOSIS — G47.33 OSA ON CPAP: ICD-10-CM

## 2025-06-13 RX ORDER — INSULIN GLARGINE 100 [IU]/ML
INJECTION, SOLUTION SUBCUTANEOUS
Qty: 36 ML | Refills: 3 | Status: SHIPPED | OUTPATIENT
Start: 2025-06-13

## 2025-06-13 RX ORDER — FUROSEMIDE 40 MG/1
1 TABLET ORAL DAILY
COMMUNITY
Start: 2025-04-14

## 2025-06-13 NOTE — PROGRESS NOTES
"Chief Complaint  Bilateral hearing loss    Subjective          Kai Av Garcia presents to [facility]    HPI         69-year-old male presents for follow-up with wife. Reports improved health but new-onset chest pain and dizziness for 2-3 weeks. Chest pain, rated 2/10, occurs during activity and at rest, reminiscent of previous cardiac episodes. Shortness of breath necessitates pauses. Uses CPAP machine day and night. Medications: aspirin, Inspra, Entresto for cardiomyopathy. Experienced severe weakness during physical activity. Blood glucose levels 170-180, 169 this morning. On Lantus 30 units, requests 90-day supply. Diagnosed with macular degeneration on 04/02/2025, discontinued AREDS due to migraines. Reports intermittent leg and finger swelling, chronic right-sided abdominal soreness.       OTHER NOTES:          Review of Systems   Respiratory:  Positive for shortness of breath.    Cardiovascular:  Positive for chest pain and leg swelling (comes and goes and on diuretic).   All other systems reviewed and are negative.       Objective       Vital Signs:   /82   Pulse 67   Temp 97.4 °F (36.3 °C) (Infrared)   Ht 167.6 cm (66\")   Wt 105 kg (231 lb 12.8 oz)   SpO2 96%   BMI 37.41 kg/m²     Physical Exam  Vitals and nursing note reviewed.   Constitutional:       General: He is not in acute distress.     Appearance: Normal appearance. He is not ill-appearing or toxic-appearing.   HENT:      Head: Normocephalic.   Neurological:      Mental Status: He is alert.   Psychiatric:         Mood and Affect: Mood normal.         Behavior: Behavior normal.             Ears: External ear canals and tympanic membranes intact. Mouth/Throat: Mucous membranes moist, no erythema, no exudate. Respiratory: Clear to auscultation, no wheezing, rales, or rhonchi. Cardiovascular: Regular rate and rhythm, no murmurs, rubs, or gallops. Gastrointestinal: Right-sided abdominal soreness which is " chronic and unchanged.       Result Review :            Other Results    Results  - Laboratory Studies:    - Blood Glucose: 169 mg/dL    - Diagnostic Testing:    - EKG (06/13/2025): pacemaker functioning with minor changes      ECG 12 Lead    Date/Time: 6/13/2025 10:05 AM  Performed by: Cheo Weiss MD    Authorized by: Cheo Weiss MD  Comparison: compared with previous ECG from 3/12/2025  Comparison to previous ECG: May have lead reversal.  The V1 - V3 is positive versus negative previously  Rhythm: sinus rhythm  Rate: normal  Conduction: conduction normal  QRS axis: normal  Other: no other findings  Other findings: non-specific ST-T wave changes    Clinical impression: non-specific ECG  Comments: [place]             Assessment and Plan    Diagnoses and all orders for this visit:    1. Chest pain, unspecified type (Primary)    2. Coronary artery disease involving native coronary artery of native heart without angina pectoris  -     CBC & Differential  -     Comprehensive Metabolic Panel    3. Ischemic cardiomyopathy    4. Type 2 diabetes mellitus with hyperglycemia, with long-term current use of insulin  -     Insulin Glargine (Lantus SoloStar) 100 UNIT/ML injection pen; INJECT 40 UNITS SUBCUTANEOUSLY DAILY AS DIRECTED FOR DIABETES  Dispense: 36 mL; Refill: 3  -     Comprehensive Metabolic Panel  -     Lipid Panel With / Chol / HDL Ratio  -     Hemoglobin A1c    5. LUCIAN on CPAP    Other orders  -     ECG 12 Lead               DISCUSSION       Chest pain: New-onset chest pain and dizziness for 2-3 weeks, occurs during activity and at rest. EKG shows pacemaker functioning with minor changes.  No acute ischemic changes.  Advised to contact cardiologist Dr. Willis. Seek immediate medical attention if symptoms worsen.  Recommend ER evaluation if chest pain returns.  He agrees.  He agrees to follow-up with Dr. Willis.    Dizziness: New symptom in last 2-3 weeks, occurs with chest pain. Further evaluation by  cardiologist recommended.    Shortness of breath: Occurs during activities, uses CPAP machine day and night. Advised to continue CPAP use.    Diabetes mellitus: Blood glucose 170-180 mg/dL, 169 mg/dL this morning. On Lantus 30 units, prescription for 90-day supply sent. Continue daily monitoring and log.  Check A1c.    Macular degeneration: Diagnosed, discontinued AREDS due to migraines. Documented.    Health maintenance: Blood work today to monitor cholesterol.    Follow-up: In 3 months for Medicare wellness visit.           Follow Up   Return in about 3 months (around 9/13/2025) for Medicare Wellness exam.    Patient was given instructions and counseling regarding his condition or for health maintenance advice. Please see specific information pulled into the AVS if appropriate.       Cheo Weiss MD    Patient or patient representative verbalized consent for the use of Ambient Listening during the visit with  Cheo Weiss MD for chart documentation. 6/13/2025  17:39 EDT

## 2025-06-14 LAB
ALBUMIN SERPL-MCNC: 4.4 G/DL (ref 3.5–5.2)
ALBUMIN/GLOB SERPL: 1.5 G/DL
ALP SERPL-CCNC: 38 U/L (ref 39–117)
ALT SERPL-CCNC: 32 U/L (ref 1–41)
AST SERPL-CCNC: 35 U/L (ref 1–40)
BASOPHILS # BLD AUTO: 0.04 10*3/MM3 (ref 0–0.2)
BASOPHILS NFR BLD AUTO: 0.8 % (ref 0–1.5)
BILIRUB SERPL-MCNC: 0.5 MG/DL (ref 0–1.2)
BUN SERPL-MCNC: 14 MG/DL (ref 8–23)
BUN/CREAT SERPL: 14 (ref 7–25)
CALCIUM SERPL-MCNC: 9.5 MG/DL (ref 8.6–10.5)
CHLORIDE SERPL-SCNC: 102 MMOL/L (ref 98–107)
CHOLEST SERPL-MCNC: 144 MG/DL (ref 0–200)
CHOLEST/HDLC SERPL: 3.51 {RATIO}
CO2 SERPL-SCNC: 24.7 MMOL/L (ref 22–29)
CREAT SERPL-MCNC: 1 MG/DL (ref 0.76–1.27)
EGFRCR SERPLBLD CKD-EPI 2021: 81 ML/MIN/1.73
EOSINOPHIL # BLD AUTO: 0.14 10*3/MM3 (ref 0–0.4)
EOSINOPHIL NFR BLD AUTO: 2.8 % (ref 0.3–6.2)
ERYTHROCYTE [DISTWIDTH] IN BLOOD BY AUTOMATED COUNT: 12.8 % (ref 12.3–15.4)
GLOBULIN SER CALC-MCNC: 3 GM/DL
GLUCOSE SERPL-MCNC: 173 MG/DL (ref 65–99)
HBA1C MFR BLD: 8 % (ref 4.8–5.6)
HCT VFR BLD AUTO: 41.2 % (ref 37.5–51)
HDLC SERPL-MCNC: 41 MG/DL (ref 40–60)
HGB BLD-MCNC: 13.7 G/DL (ref 13–17.7)
IMM GRANULOCYTES # BLD AUTO: 0.01 10*3/MM3 (ref 0–0.05)
IMM GRANULOCYTES NFR BLD AUTO: 0.2 % (ref 0–0.5)
LDLC SERPL CALC-MCNC: 76 MG/DL (ref 0–100)
LYMPHOCYTES # BLD AUTO: 1.45 10*3/MM3 (ref 0.7–3.1)
LYMPHOCYTES NFR BLD AUTO: 29.4 % (ref 19.6–45.3)
MCH RBC QN AUTO: 31.9 PG (ref 26.6–33)
MCHC RBC AUTO-ENTMCNC: 33.3 G/DL (ref 31.5–35.7)
MCV RBC AUTO: 95.8 FL (ref 79–97)
MONOCYTES # BLD AUTO: 0.5 10*3/MM3 (ref 0.1–0.9)
MONOCYTES NFR BLD AUTO: 10.1 % (ref 5–12)
NEUTROPHILS # BLD AUTO: 2.8 10*3/MM3 (ref 1.7–7)
NEUTROPHILS NFR BLD AUTO: 56.7 % (ref 42.7–76)
NRBC BLD AUTO-RTO: 0 /100 WBC (ref 0–0.2)
PLATELET # BLD AUTO: 90 10*3/MM3 (ref 140–450)
POTASSIUM SERPL-SCNC: 4.3 MMOL/L (ref 3.5–5.2)
PROT SERPL-MCNC: 7.4 G/DL (ref 6–8.5)
RBC # BLD AUTO: 4.3 10*6/MM3 (ref 4.14–5.8)
SODIUM SERPL-SCNC: 138 MMOL/L (ref 136–145)
TRIGL SERPL-MCNC: 156 MG/DL (ref 0–150)
VLDLC SERPL CALC-MCNC: 27 MG/DL (ref 5–40)
WBC # BLD AUTO: 4.94 10*3/MM3 (ref 3.4–10.8)

## 2025-06-16 DIAGNOSIS — E11.65 TYPE 2 DIABETES MELLITUS WITH HYPERGLYCEMIA, WITH LONG-TERM CURRENT USE OF INSULIN: Primary | ICD-10-CM

## 2025-06-16 DIAGNOSIS — Z79.4 TYPE 2 DIABETES MELLITUS WITH HYPERGLYCEMIA, WITH LONG-TERM CURRENT USE OF INSULIN: Primary | ICD-10-CM

## 2025-06-16 RX ORDER — SEMAGLUTIDE 0.68 MG/ML
0.25 INJECTION, SOLUTION SUBCUTANEOUS WEEKLY
Qty: 3 ML | Refills: 2 | Status: SHIPPED | OUTPATIENT
Start: 2025-06-16

## 2025-06-19 LAB
MC_CV_MDC_IDC_RATE_1: 180
MC_CV_MDC_IDC_RATE_1: 220
MC_CV_MDC_IDC_SHOCK_MEASURED_IMPEDANCE: 67
MC_CV_MDC_IDC_THERAPIES: NORMAL
MC_CV_MDC_IDC_ZONE_ID: 1
MC_CV_MDC_IDC_ZONE_ID: 2
MDC_IDC_MSMT_BATTERY_REMAINING_LONGEVITY: 132 MO
MDC_IDC_MSMT_BATTERY_REMAINING_PERCENTAGE: 100 %
MDC_IDC_MSMT_BATTERY_STATUS: NORMAL
MDC_IDC_MSMT_CAP_CHARGE_TIME: 10.4
MDC_IDC_MSMT_LEADCHNL_LV_DTM: NORMAL
MDC_IDC_MSMT_LEADCHNL_LV_IMPEDANCE_VALUE: 761
MDC_IDC_MSMT_LEADCHNL_LV_PACING_THRESHOLD_AMPLITUDE: 0.5
MDC_IDC_MSMT_LEADCHNL_LV_PACING_THRESHOLD_POLARITY: NORMAL
MDC_IDC_MSMT_LEADCHNL_LV_PACING_THRESHOLD_PULSEWIDTH: 0.4
MDC_IDC_MSMT_LEADCHNL_LV_SENSING_INTR_AMPL: 24
MDC_IDC_MSMT_LEADCHNL_RA_DTM: NORMAL
MDC_IDC_MSMT_LEADCHNL_RA_IMPEDANCE_VALUE: 432
MDC_IDC_MSMT_LEADCHNL_RA_PACING_THRESHOLD_AMPLITUDE: 0.4
MDC_IDC_MSMT_LEADCHNL_RA_PACING_THRESHOLD_POLARITY: NORMAL
MDC_IDC_MSMT_LEADCHNL_RA_PACING_THRESHOLD_PULSEWIDTH: 0.4
MDC_IDC_MSMT_LEADCHNL_RA_SENSING_INTR_AMPL: 4.2
MDC_IDC_MSMT_LEADCHNL_RV_DTM: NORMAL
MDC_IDC_MSMT_LEADCHNL_RV_IMPEDANCE_VALUE: 1183
MDC_IDC_MSMT_LEADCHNL_RV_PACING_THRESHOLD_AMPLITUDE: 0.5
MDC_IDC_MSMT_LEADCHNL_RV_PACING_THRESHOLD_POLARITY: NORMAL
MDC_IDC_MSMT_LEADCHNL_RV_PACING_THRESHOLD_PULSEWIDTH: 0.4
MDC_IDC_MSMT_LEADCHNL_RV_SENSING_INTR_AMPL: 19.8
MDC_IDC_PG_IMPLANT_DTM: NORMAL
MDC_IDC_PG_MFG: NORMAL
MDC_IDC_PG_MODEL: NORMAL
MDC_IDC_PG_SERIAL: NORMAL
MDC_IDC_PG_TYPE: NORMAL
MDC_IDC_SESS_DTM: NORMAL
MDC_IDC_SESS_TYPE: NORMAL
MDC_IDC_SET_BRADY_AT_MODE_SWITCH_RATE: 170
MDC_IDC_SET_BRADY_LOWRATE: 60
MDC_IDC_SET_BRADY_MAX_SENSOR_RATE: 130
MDC_IDC_SET_BRADY_MAX_TRACKING_RATE: 130
MDC_IDC_SET_BRADY_MODE: NORMAL
MDC_IDC_SET_BRADY_PAV_DELAY: 210
MDC_IDC_SET_BRADY_SAV_DELAY: 140
MDC_IDC_SET_CRT_PACED_CHAMBERS: NORMAL
MDC_IDC_SET_LEADCHNL_LV_PACING_AMPLITUDE: 1.5
MDC_IDC_SET_LEADCHNL_LV_PACING_PULSEWIDTH: 0.4
MDC_IDC_SET_LEADCHNL_RA_PACING_AMPLITUDE: 2
MDC_IDC_SET_LEADCHNL_RA_PACING_POLARITY: NORMAL
MDC_IDC_SET_LEADCHNL_RA_PACING_PULSEWIDTH: 0.4
MDC_IDC_SET_LEADCHNL_RA_SENSING_POLARITY: NORMAL
MDC_IDC_SET_LEADCHNL_RA_SENSING_SENSITIVITY: 0.25
MDC_IDC_SET_LEADCHNL_RV_PACING_AMPLITUDE: 2
MDC_IDC_SET_LEADCHNL_RV_PACING_POLARITY: NORMAL
MDC_IDC_SET_LEADCHNL_RV_PACING_PULSEWIDTH: 0.4
MDC_IDC_SET_LEADCHNL_RV_SENSING_POLARITY: NORMAL
MDC_IDC_SET_LEADCHNL_RV_SENSING_SENSITIVITY: 0.6
MDC_IDC_SET_ZONE_STATUS: NORMAL
MDC_IDC_SET_ZONE_STATUS: NORMAL
MDC_IDC_SET_ZONE_TYPE: NORMAL
MDC_IDC_SET_ZONE_TYPE: NORMAL
MDC_IDC_STAT_AT_BURDEN_PERCENT: 0
MDC_IDC_STAT_BRADY_RA_PERCENT_PACED: 1
MDC_IDC_STAT_BRADY_RV_PERCENT_PACED: 20
MDC_IDC_STAT_CRT_LV_PERCENT_PACED: 99
MDC_IDC_STAT_TACHYTHERAPY_ATP_DELIVERED_RECENT: 0
MDC_IDC_STAT_TACHYTHERAPY_SHOCKS_ABORTED_RECENT: 0
MDC_IDC_STAT_TACHYTHERAPY_SHOCKS_DELIVERED_RECENT: 0

## 2025-07-04 ENCOUNTER — HOSPITAL ENCOUNTER (EMERGENCY)
Facility: HOSPITAL | Age: 70
Discharge: HOME | End: 2025-07-04
Attending: STUDENT IN AN ORGANIZED HEALTH CARE EDUCATION/TRAINING PROGRAM
Payer: MEDICARE

## 2025-07-04 ENCOUNTER — APPOINTMENT (OUTPATIENT)
Dept: CT IMAGING | Facility: HOSPITAL | Age: 70
End: 2025-07-04
Payer: MEDICARE

## 2025-07-04 VITALS
OXYGEN SATURATION: 92 % | HEART RATE: 77 BPM | HEIGHT: 66 IN | SYSTOLIC BLOOD PRESSURE: 126 MMHG | DIASTOLIC BLOOD PRESSURE: 84 MMHG | BODY MASS INDEX: 36.96 KG/M2 | RESPIRATION RATE: 16 BRPM | TEMPERATURE: 97.9 F | WEIGHT: 230 LBS

## 2025-07-04 DIAGNOSIS — S16.1XXA STRAIN OF NECK MUSCLE, INITIAL ENCOUNTER: ICD-10-CM
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
## 2025-07-04 DIAGNOSIS — R07.9 CHEST PAIN, UNSPECIFIED TYPE: ICD-10-CM

## 2025-07-04 DIAGNOSIS — S06.0X0A CONCUSSION WITHOUT LOSS OF CONSCIOUSNESS, INITIAL ENCOUNTER: ICD-10-CM

## 2025-07-04 LAB
ALBUMIN SERPL-MCNC: 4.3 G/DL (ref 3.5–5.2)
ALBUMIN/GLOB SERPL: 1.5 G/DL
ALP SERPL-CCNC: 39 U/L (ref 39–117)
ALT SERPL W P-5'-P-CCNC: 39 U/L (ref 1–41)
ANION GAP SERPL CALCULATED.3IONS-SCNC: 9.7 MMOL/L (ref 5–15)
AST SERPL-CCNC: 37 U/L (ref 1–40)
BASOPHILS # BLD AUTO: 0.04 10*3/MM3 (ref 0–0.2)
BASOPHILS NFR BLD AUTO: 0.7 % (ref 0–1.5)
BILIRUB SERPL-MCNC: 0.4 MG/DL (ref 0–1.2)
BUN SERPL-MCNC: 14.6 MG/DL (ref 8–23)
BUN/CREAT SERPL: 13.8 (ref 7–25)
CALCIUM SPEC-SCNC: 9.5 MG/DL (ref 8.6–10.5)
CHLORIDE SERPL-SCNC: 99 MMOL/L (ref 98–107)
CO2 SERPL-SCNC: 25.3 MMOL/L (ref 22–29)
CREAT SERPL-MCNC: 1.06 MG/DL (ref 0.76–1.27)
DEPRECATED RDW RBC AUTO: 41.7 FL (ref 37–54)
EGFRCR SERPLBLD CKD-EPI 2021: 75.5 ML/MIN/1.73
EOSINOPHIL # BLD AUTO: 0.13 10*3/MM3 (ref 0–0.4)
EOSINOPHIL NFR BLD AUTO: 2.4 % (ref 0.3–6.2)
ERYTHROCYTE [DISTWIDTH] IN BLOOD BY AUTOMATED COUNT: 12.2 % (ref 12.3–15.4)
GEN 5 1HR TROPONIN T REFLEX: 14 NG/L
GLOBULIN UR ELPH-MCNC: 2.9 GM/DL
GLUCOSE SERPL-MCNC: 305 MG/DL (ref 65–99)
HCT VFR BLD AUTO: 38.5 % (ref 37.5–51)
HGB BLD-MCNC: 13.2 G/DL (ref 13–17.7)
IMM GRANULOCYTES # BLD AUTO: 0.03 10*3/MM3 (ref 0–0.05)
IMM GRANULOCYTES NFR BLD AUTO: 0.5 % (ref 0–0.5)
LYMPHOCYTES # BLD AUTO: 1.68 10*3/MM3 (ref 0.7–3.1)
LYMPHOCYTES NFR BLD AUTO: 30.4 % (ref 19.6–45.3)
MAGNESIUM SERPL-MCNC: 2 MG/DL (ref 1.6–2.4)
MCH RBC QN AUTO: 31.8 PG (ref 26.6–33)
MCHC RBC AUTO-ENTMCNC: 34.3 G/DL (ref 31.5–35.7)
MCV RBC AUTO: 92.8 FL (ref 79–97)
MONOCYTES # BLD AUTO: 0.49 10*3/MM3 (ref 0.1–0.9)
MONOCYTES NFR BLD AUTO: 8.9 % (ref 5–12)
NEUTROPHILS NFR BLD AUTO: 3.16 10*3/MM3 (ref 1.7–7)
NEUTROPHILS NFR BLD AUTO: 57.1 % (ref 42.7–76)
NRBC BLD AUTO-RTO: 0 /100 WBC (ref 0–0.2)
NT-PROBNP SERPL-MCNC: 380 PG/ML (ref 0–900)
PLATELET # BLD AUTO: 79 10*3/MM3 (ref 140–450)
PMV BLD AUTO: 10.6 FL (ref 6–12)
POTASSIUM SERPL-SCNC: 4.3 MMOL/L (ref 3.5–5.2)
PROT SERPL-MCNC: 7.2 G/DL (ref 6–8.5)
RBC # BLD AUTO: 4.15 10*6/MM3 (ref 4.14–5.8)
SODIUM SERPL-SCNC: 134 MMOL/L (ref 136–145)
TROPONIN T NUMERIC DELTA: 0 NG/L
TROPONIN T SERPL HS-MCNC: 14 NG/L
TSH SERPL DL<=0.05 MIU/L-ACNC: 1 UIU/ML (ref 0.27–4.2)
WBC NRBC COR # BLD AUTO: 5.53 10*3/MM3 (ref 3.4–10.8)

## 2025-07-04 PROCEDURE — 84443 ASSAY THYROID STIM HORMONE: CPT | Performed by: STUDENT IN AN ORGANIZED HEALTH CARE EDUCATION/TRAINING PROGRAM

## 2025-07-04 PROCEDURE — 85025 COMPLETE CBC W/AUTO DIFF WBC: CPT | Performed by: STUDENT IN AN ORGANIZED HEALTH CARE EDUCATION/TRAINING PROGRAM

## 2025-07-04 PROCEDURE — 93005 ELECTROCARDIOGRAM TRACING: CPT | Performed by: STUDENT IN AN ORGANIZED HEALTH CARE EDUCATION/TRAINING PROGRAM

## 2025-07-04 PROCEDURE — 63710000001 ONDANSETRON ODT 4 MG TABLET DISPERSIBLE: Performed by: STUDENT IN AN ORGANIZED HEALTH CARE EDUCATION/TRAINING PROGRAM

## 2025-07-04 PROCEDURE — 70450 CT HEAD/BRAIN W/O DYE: CPT

## 2025-07-04 PROCEDURE — 83735 ASSAY OF MAGNESIUM: CPT | Performed by: STUDENT IN AN ORGANIZED HEALTH CARE EDUCATION/TRAINING PROGRAM

## 2025-07-04 PROCEDURE — 99284 EMERGENCY DEPT VISIT MOD MDM: CPT

## 2025-07-04 PROCEDURE — 71250 CT THORAX DX C-: CPT

## 2025-07-04 PROCEDURE — 72125 CT NECK SPINE W/O DYE: CPT

## 2025-07-04 PROCEDURE — 84484 ASSAY OF TROPONIN QUANT: CPT | Performed by: STUDENT IN AN ORGANIZED HEALTH CARE EDUCATION/TRAINING PROGRAM

## 2025-07-04 PROCEDURE — 36415 COLL VENOUS BLD VENIPUNCTURE: CPT

## 2025-07-04 PROCEDURE — 80053 COMPREHEN METABOLIC PANEL: CPT | Performed by: STUDENT IN AN ORGANIZED HEALTH CARE EDUCATION/TRAINING PROGRAM

## 2025-07-04 PROCEDURE — 83880 ASSAY OF NATRIURETIC PEPTIDE: CPT | Performed by: STUDENT IN AN ORGANIZED HEALTH CARE EDUCATION/TRAINING PROGRAM

## 2025-07-04 RX ORDER — LIDOCAINE 4 G/G
1 PATCH TOPICAL ONCE
Status: DISCONTINUED | OUTPATIENT
Start: 2025-07-04 | End: 2025-07-04 | Stop reason: HOSPADM

## 2025-07-04 RX ORDER — IBUPROFEN 800 MG/1
800 TABLET, FILM COATED ORAL ONCE
Status: DISCONTINUED | OUTPATIENT
Start: 2025-07-04 | End: 2025-07-04 | Stop reason: HOSPADM

## 2025-07-04 RX ORDER — ONDANSETRON 4 MG/1
4 TABLET, ORALLY DISINTEGRATING ORAL ONCE
Status: COMPLETED | OUTPATIENT
Start: 2025-07-04 | End: 2025-07-04

## 2025-07-04 RX ORDER — ACETAMINOPHEN 500 MG
1000 TABLET ORAL ONCE
Status: COMPLETED | OUTPATIENT
Start: 2025-07-04 | End: 2025-07-04

## 2025-07-04 RX ORDER — ASPIRIN 81 MG/1
324 TABLET, CHEWABLE ORAL ONCE
Status: DISCONTINUED | OUTPATIENT
Start: 2025-07-04 | End: 2025-07-04 | Stop reason: HOSPADM

## 2025-07-04 RX ADMIN — LIDOCAINE 1 PATCH: 4 PATCH TOPICAL at 19:28

## 2025-07-04 RX ADMIN — ONDANSETRON 4 MG: 4 TABLET, ORALLY DISINTEGRATING ORAL at 18:30

## 2025-07-04 RX ADMIN — ACETAMINOPHEN 1000 MG: 500 TABLET, FILM COATED ORAL at 18:30

## 2025-07-05 NOTE — ED PROVIDER NOTES
EMERGENCY DEPARTMENT ENCOUNTER    Pt Name: Kai Garcia  MRN: [id number]  Pt :   1955  Room Number:    Date of encounter:  2025  PCP: Cheo Weiss MD  ED Provider: Carlos Musa MD    Historian: Patient, family      HPI:  Chief Complaint: Fall, posterior head and neck pain        Context: Kia Garcia is a 69-year-old man on dual antiplatelet therapy who presents because of headache and neck pain after a fall from standing he says he fell hitting the back of his head on a rail he now has pain in his neck and upper thoracic spine as well as occipital skull.  He has had dizziness and headaches since the fall.  Denies other injuries or complaints at this time.      PAST MEDICAL HISTORY  Past Medical History:   Diagnosis Date    Abnormal ECG 2007    Aneurysm     Arthritis of back     Cataract     needs surgery     Chest pain     Atypical chest pain, noncardiac.  Suspect either musculoskeletal versus gastrointestinal    CHF (congestive heart failure)     2024 echo EF 56-60%    Clotting disorder     Colon polyp     Congenital heart disease 2006    Coronary artery disease 2007    CABG x 2    Diabetes mellitus     Dissecting aortic aneurysm     Type A dissection, s/p elephant trunk procedure by Dr. Helton, stage 1 in 2006 and stage 2 in 2007.  Followed by Dr. Helton. Stable by CT     Diverticulosis     Dyslipidemia     Elevated liver function tests     Fracture, finger     GERD (gastroesophageal reflux disease)     Heart murmur     Hip arthrosis 2019    History of bleeding peptic ulcer     History of sepsis 2021    UTI that caused sepsis     History of transfusion     1 unit PRBC, no reaction    Hyperlipidemia     Hypertension     Knee swelling 2019    Left leg pain     Low back strain     Lumbosacral disc disease     MI (myocardial infarction) 2007    Morbid obesity     LUCIAN on CPAP     setting 12    Shortness of breath     Temporary low  platelet count 03/2021    due to sepsis     Wears glasses          PAST SURGICAL HISTORY  Past Surgical History:   Procedure Laterality Date    APPENDECTOMY      ARTERIOGRAM AORTIC  2006    aortic disection with elephant trunk procedure    BACK SURGERY  2002,2015 2021    CARDIAC CATHETERIZATION N/A 04/27/2018    Procedure: Left Heart Cath;  Surgeon: Uri Willis MD;  Location:  [facility];  Service: Cardiovascular    CARDIAC DEFIBRILLATOR PLACEMENT  2018/2024    CARDIAC ELECTROPHYSIOLOGY PROCEDURE N/A 08/09/2018    Procedure: Biv ICD Implant w/limited echo on arrival;  Surgeon: Jose Fox DO;  Location:  [facility];  Service: Cardiology    COLONOSCOPY N/A 06/18/2019    Procedure: COLONOSCOPY;  Surgeon: Meir Salguero MD;  Location:  [facility];  Service: Gastroenterology    COLONOSCOPY N/A 03/19/2024    Procedure: COLONOSCOPY;  Surgeon: Meir Salguero MD;  Location:  [facility];  Service: Gastroenterology;  Laterality: N/A;    CORONARY ANGIOPLASTY WITH STENT PLACEMENT  2007    x 2    CORONARY ARTERY BYPASS GRAFT  2006    2 grafts     CORONARY STENT PLACEMENT  3/2007    ICD GENERATOR REPLACEMENT N/A 04/30/2024    Procedure: ICD Generator Change - Bi-Ventricular/Early battery depletion/BSC/2-4 weeks. You can speak with his wife per Mr Garcia./No meds to hold/;  Surgeon: Fer Pak MD;  Location:  [facility];  Service: Cardiovascular;  Laterality: N/A;    INSERT / REPLACE / REMOVE PACEMAKER  2018    LUMBAR DISCECTOMY      LUMBAR DISCECTOMY FUSION INSTRUMENTATION N/A 10/27/2021    Procedure: LUMBAR FUSION L4-5, L5-S1;  Surgeon: Fer Faulkner MD;  Location:  [facility];  Service: Orthopedic Spine;  Laterality: N/A;    LUMBAR FUSION      TOTAL HIP ARTHROPLASTY Left 02/13/2023    Procedure: TOTAL HIP ARTHROPLASTY - LEFT;  Surgeon: Romero Hutchinson MD;  Location:  [facility];  Service: Orthopedics;  Laterality: Left;         FAMILY  HISTORY  Family History   Problem Relation Age of Onset    Heart disease Mother         afib    Rheum arthritis Mother     Arthritis Mother     Heart disease Father     Rheum arthritis Father     Hyperlipidemia Sister     Heart disease Sister     Heart attack Sister     Mental illness Brother     Cancer Brother         metastatic brain cancer    Coronary artery disease Other     Rheum arthritis Other          SOCIAL HISTORY  Social History     Socioeconomic History    Marital status:    Tobacco Use    Smoking status: Former     Current packs/day: 0.00     Average packs/day: 3.0 packs/day for 35.0 years (105.0 ttl pk-yrs)     Types: Cigarettes     Start date: 6/15/1971     Quit date: 6/15/2006     Years since quittin.0    Smokeless tobacco: Never   Vaping Use    Vaping status: Never Used   Substance and Sexual Activity    Alcohol use: No    Drug use: No    Sexual activity: Not Currently     Partners: Female         ALLERGIES  Ativan [lorazepam], Zetia [ezetimibe], Zocor [simvastatin], Isosorbide, Jardiance [empagliflozin], and Metformin and related        REVIEW OF SYSTEMS  Review of Systems       All systems reviewed and negative except for those discussed in HPI.       PHYSICAL EXAM    I have reviewed the triage vital signs and nursing notes.    ED Triage Vitals [25 1731]   Temp Heart Rate Resp BP SpO2   97.9 °F (36.6 °C) 81 16 158/91 99 %      Temp src Heart Rate Source Patient Position BP Location FiO2 (%)   Oral Monitor Sitting Right arm --       Physical Exam  GENERAL:   Appears uncomfortable  HENT: Nares patent.  Tenderness along the low mid occipital area and C-spine and upper T-spine  EYES: No scleral icterus.  CV: Regular rhythm, regular rate.  RESPIRATORY: Normal effort.  No audible wheezes, rales or rhonchi.  ABDOMEN: Soft, nontender  MUSCULOSKELETAL: No deformities.   NEURO: Alert, moves all extremities, follows commands.  SKIN: Warm, dry, no rash visualized.      LAB RESULTS  Recent  Results (from the past 24 hours)   ECG 12 Lead Chest Pain    Collection Time: 07/04/25  6:38 PM   Result Value Ref Range    QT Interval 430 ms    QTC Interval 493 ms   Comprehensive Metabolic Panel    Collection Time: 07/04/25  7:01 PM    Specimen: Blood   Result Value Ref Range    Glucose 305 (H) 65 - 99 mg/dL    BUN 14.6 8.0 - 23.0 mg/dL    Creatinine 1.06 0.76 - 1.27 mg/dL    Sodium 134 (L) 136 - 145 mmol/L    Potassium 4.3 3.5 - 5.2 mmol/L    Chloride 99 98 - 107 mmol/L    CO2 25.3 22.0 - 29.0 mmol/L    Calcium 9.5 8.6 - 10.5 mg/dL    Total Protein 7.2 6.0 - 8.5 g/dL    Albumin 4.3 3.5 - 5.2 g/dL    ALT (SGPT) 39 1 - 41 U/L    AST (SGOT) 37 1 - 40 U/L    Alkaline Phosphatase 39 39 - 117 U/L    Total Bilirubin 0.4 0.0 - 1.2 mg/dL    Globulin 2.9 gm/dL    A/G Ratio 1.5 g/dL    BUN/Creatinine Ratio 13.8 7.0 - 25.0    Anion Gap 9.7 5.0 - 15.0 mmol/L    eGFR 75.5 >60.0 mL/min/1.73   High Sensitivity Troponin T    Collection Time: 07/04/25  7:01 PM    Specimen: Blood   Result Value Ref Range    HS Troponin T 14 <22 ng/L   BNP    Collection Time: 07/04/25  7:01 PM    Specimen: Blood   Result Value Ref Range    proBNP 380.0 0.0 - 900.0 pg/mL   Magnesium    Collection Time: 07/04/25  7:01 PM    Specimen: Blood   Result Value Ref Range    Magnesium 2.0 1.6 - 2.4 mg/dL   TSH Rfx On Abnormal To Free T4    Collection Time: 07/04/25  7:01 PM    Specimen: Blood   Result Value Ref Range    TSH 1.000 0.270 - 4.200 uIU/mL   CBC Auto Differential    Collection Time: 07/04/25  7:01 PM    Specimen: Blood   Result Value Ref Range    WBC 5.53 3.40 - 10.80 10*3/mm3    RBC 4.15 4.14 - 5.80 10*6/mm3    Hemoglobin 13.2 13.0 - 17.7 g/dL    Hematocrit 38.5 37.5 - 51.0 %    MCV 92.8 79.0 - 97.0 fL    MCH 31.8 26.6 - 33.0 pg    MCHC 34.3 31.5 - 35.7 g/dL    RDW 12.2 (L) 12.3 - 15.4 %    RDW-SD 41.7 37.0 - 54.0 fl    MPV 10.6 6.0 - 12.0 fL    Platelets 79 (L) 140 - 450 10*3/mm3    Neutrophil % 57.1 42.7 - 76.0 %    Lymphocyte % 30.4 19.6 -  45.3 %    Monocyte % 8.9 5.0 - 12.0 %    Eosinophil % 2.4 0.3 - 6.2 %    Basophil % 0.7 0.0 - 1.5 %    Immature Grans % 0.5 0.0 - 0.5 %    Neutrophils, Absolute 3.16 1.70 - 7.00 10*3/mm3    Lymphocytes, Absolute 1.68 0.70 - 3.10 10*3/mm3    Monocytes, Absolute 0.49 0.10 - 0.90 10*3/mm3    Eosinophils, Absolute 0.13 0.00 - 0.40 10*3/mm3    Basophils, Absolute 0.04 0.00 - 0.20 10*3/mm3    Immature Grans, Absolute 0.03 0.00 - 0.05 10*3/mm3    nRBC 0.0 0.0 - 0.2 /100 WBC   High Sensitivity Troponin T 1Hr    Collection Time: 07/04/25  8:22 PM    Specimen: Blood   Result Value Ref Range    HS Troponin T 14 <22 ng/L    Troponin T Numeric Delta 0 Abnormal if >/=3 ng/L       If labs were ordered, I independently reviewed the results and considered them in treating the patient.        RADIOLOGY  CT Head Without Contrast  Result Date: 7/4/2025  CT HEAD WO CONTRAST, CT CERVICAL SPINE WO CONTRAST, CT CHEST WO CONTRAST DIAGNOSTIC Date of Exam: 7/4/2025 5:49 PM EDT Indication: fall with posterior trauma on thinners. Comparison: 3/27/2024 CT chest Technique: Axial CT images were obtained of the head, cervical spine and chest without contrast administration.  Automated exposure control and iterative construction methods were used. Findings: HEAD: Parenchyma:No acute intraparenchymal hemorrhage. No loss of gray-white differentiation to suggest large territory infarct. Mild parenchymal volume loss. Scattered periventricular and subcortical white matter hypodensities, nonspecific, but most often consistent with small vessel ischemic changes. No midline shift or herniation. Ventricles and extra axial spaces:Prominent ventricles and sulci secondary to volume loss. No extra axial fluid collection seen. Other:Orbits are grossly intact. Scattered paranasal sinus mucosal thickening. Mastoid air cells are clear. Calvarium is intact. Intracranial atherosclerotic calcification is present. CERVICAL: Alignment: Straightening of the normal  cervical lordosis. Minimal anterolisthesis of C4 on C5. Posterior element alignment is intact. Atlantoaxial interval is intact. Occipital condyles are appropriately situated. Bones: No evidence of fracture the vertebral bodies or posterior elements. Normal vertebral body heights. Vertebral artery foramina appear intact. Degenerative changes: Disc degenerative disease most Joel at C5-6 with C6-7. Multilevel facet arthropathy. Uncovertebral hypertrophy most apparent at C5-6 and C6-7. Moderate canal stenosis at C5-6. Mild to moderate bilateral neural foraminal stenosis at  C5-6. Soft tissue: Thyroid is unremarkable. Aerodigestive tract is grossly patent. No suspicious adenopathy. Vascular calcifications. Visualized lung apices are clear. CHEST: Lower neck: Visualized thyroid is unremarkable. No suspicious axillary or supraclavicular adenopathy. Parenchyma: No suspicious pulmonary nodules. Calcified granulomas. Airways: Central and segmental airways are grossly patent. Pleura: No pleural effusion or pneumothorax. Heart and pericardium: Coronary calcifications seen.  No substantial pericardial effusion. No substantial valvular calcification. Vessels: Normal caliber of the main pulmonary artery and aorta. Aortic atherosclerotic calcification is seen. Postsurgical changes of the aorta. Mediastinum and brandon: No anterior mediastinal mass seen. No suspicious adenopathy on this noncontrast exam. Unremarkable appearance of the esophagus. Chest wall and bones: No acute osseous or soft tissue abnormality seen. Degenerative changes of the spine. Median sternotomy wires. Left chest wall pacemaker. Upper abdomen: Unremarkable.     Impression: 1.No evidence of acute intracranial traumatic injury. 2.No evidence of traumatic fracture or malalignment of the cervical spine. 3.No evidence of traumatic injury to the chest. Electronically Signed: Eran Rutledge MD  7/4/2025 6:49 PM EDT  Workstation ID: [id number]    CT Cervical Spine  Without Contrast  Result Date: 7/4/2025  CT HEAD WO CONTRAST, CT CERVICAL SPINE WO CONTRAST, CT CHEST WO CONTRAST DIAGNOSTIC Date of Exam: 7/4/2025 5:49 PM EDT Indication: fall with posterior trauma on thinners. Comparison: 3/27/2024 CT chest Technique: Axial CT images were obtained of the head, cervical spine and chest without contrast administration.  Automated exposure control and iterative construction methods were used. Findings: HEAD: Parenchyma:No acute intraparenchymal hemorrhage. No loss of gray-white differentiation to suggest large territory infarct. Mild parenchymal volume loss. Scattered periventricular and subcortical white matter hypodensities, nonspecific, but most often consistent with small vessel ischemic changes. No midline shift or herniation. Ventricles and extra axial spaces:Prominent ventricles and sulci secondary to volume loss. No extra axial fluid collection seen. Other:Orbits are grossly intact. Scattered paranasal sinus mucosal thickening. Mastoid air cells are clear. Calvarium is intact. Intracranial atherosclerotic calcification is present. CERVICAL: Alignment: Straightening of the normal cervical lordosis. Minimal anterolisthesis of C4 on C5. Posterior element alignment is intact. Atlantoaxial interval is intact. Occipital condyles are appropriately situated. Bones: No evidence of fracture the vertebral bodies or posterior elements. Normal vertebral body heights. Vertebral artery foramina appear intact. Degenerative changes: Disc degenerative disease most Joel at C5-6 with C6-7. Multilevel facet arthropathy. Uncovertebral hypertrophy most apparent at C5-6 and C6-7. Moderate canal stenosis at C5-6. Mild to moderate bilateral neural foraminal stenosis at  C5-6. Soft tissue: Thyroid is unremarkable. Aerodigestive tract is grossly patent. No suspicious adenopathy. Vascular calcifications. Visualized lung apices are clear. CHEST: Lower neck: Visualized thyroid is unremarkable. No  suspicious axillary or supraclavicular adenopathy. Parenchyma: No suspicious pulmonary nodules. Calcified granulomas. Airways: Central and segmental airways are grossly patent. Pleura: No pleural effusion or pneumothorax. Heart and pericardium: Coronary calcifications seen.  No substantial pericardial effusion. No substantial valvular calcification. Vessels: Normal caliber of the main pulmonary artery and aorta. Aortic atherosclerotic calcification is seen. Postsurgical changes of the aorta. Mediastinum and brandon: No anterior mediastinal mass seen. No suspicious adenopathy on this noncontrast exam. Unremarkable appearance of the esophagus. Chest wall and bones: No acute osseous or soft tissue abnormality seen. Degenerative changes of the spine. Median sternotomy wires. Left chest wall pacemaker. Upper abdomen: Unremarkable.     Impression: 1.No evidence of acute intracranial traumatic injury. 2.No evidence of traumatic fracture or malalignment of the cervical spine. 3.No evidence of traumatic injury to the chest. Electronically Signed: Eran Rutledge MD  7/4/2025 6:49 PM EDT  Workstation ID: [id number]    CT Chest Without Contrast Diagnostic  Result Date: 7/4/2025  CT HEAD WO CONTRAST, CT CERVICAL SPINE WO CONTRAST, CT CHEST WO CONTRAST DIAGNOSTIC Date of Exam: 7/4/2025 5:49 PM EDT Indication: fall with posterior trauma on thinners. Comparison: 3/27/2024 CT chest Technique: Axial CT images were obtained of the head, cervical spine and chest without contrast administration.  Automated exposure control and iterative construction methods were used. Findings: HEAD: Parenchyma:No acute intraparenchymal hemorrhage. No loss of gray-white differentiation to suggest large territory infarct. Mild parenchymal volume loss. Scattered periventricular and subcortical white matter hypodensities, nonspecific, but most often consistent with small vessel ischemic changes. No midline shift or herniation. Ventricles and extra axial  spaces:Prominent ventricles and sulci secondary to volume loss. No extra axial fluid collection seen. Other:Orbits are grossly intact. Scattered paranasal sinus mucosal thickening. Mastoid air cells are clear. Calvarium is intact. Intracranial atherosclerotic calcification is present. CERVICAL: Alignment: Straightening of the normal cervical lordosis. Minimal anterolisthesis of C4 on C5. Posterior element alignment is intact. Atlantoaxial interval is intact. Occipital condyles are appropriately situated. Bones: No evidence of fracture the vertebral bodies or posterior elements. Normal vertebral body heights. Vertebral artery foramina appear intact. Degenerative changes: Disc degenerative disease most Joel at C5-6 with C6-7. Multilevel facet arthropathy. Uncovertebral hypertrophy most apparent at C5-6 and C6-7. Moderate canal stenosis at C5-6. Mild to moderate bilateral neural foraminal stenosis at  C5-6. Soft tissue: Thyroid is unremarkable. Aerodigestive tract is grossly patent. No suspicious adenopathy. Vascular calcifications. Visualized lung apices are clear. CHEST: Lower neck: Visualized thyroid is unremarkable. No suspicious axillary or supraclavicular adenopathy. Parenchyma: No suspicious pulmonary nodules. Calcified granulomas. Airways: Central and segmental airways are grossly patent. Pleura: No pleural effusion or pneumothorax. Heart and pericardium: Coronary calcifications seen.  No substantial pericardial effusion. No substantial valvular calcification. Vessels: Normal caliber of the main pulmonary artery and aorta. Aortic atherosclerotic calcification is seen. Postsurgical changes of the aorta. Mediastinum and brandon: No anterior mediastinal mass seen. No suspicious adenopathy on this noncontrast exam. Unremarkable appearance of the esophagus. Chest wall and bones: No acute osseous or soft tissue abnormality seen. Degenerative changes of the spine. Median sternotomy wires. Left chest wall pacemaker.  Upper abdomen: Unremarkable.     Impression: 1.No evidence of acute intracranial traumatic injury. 2.No evidence of traumatic fracture or malalignment of the cervical spine. 3.No evidence of traumatic injury to the chest. Electronically Signed: Eran Rutledge MD  7/4/2025 6:49 PM EDT  Workstation ID: [id number]      I ordered and independently reviewed the above noted radiographic studies.      I viewed images of CT scan of the head C-spine and chest which showed no intracranial hemorrhage or fractures, no spinal injuries fractures or dislocations and no rib fractures or pneumothorax per my independent interpretation.    See radiologist's dictation for official interpretation.        PROCEDURES    Procedures    ECG 12 Lead Chest Pain   Preliminary Result   Test Reason : cp   Blood Pressure :   */*   mmHG   Vent. Rate :  79 BPM     Atrial Rate :  79 BPM      P-R Int : 162 ms          QRS Dur : 188 ms       QT Int : 430 ms       P-R-T Axes :  11 241  63 degrees     QTcB Int : 493 ms      Atrial-sensed ventricular-paced rhythm   Abnormal ECG   When compared with ECG of 12-Mar-2024 09:17,   Vent. rate has increased by  10 bpm      Referred By: edmd           Confirmed By:           MEDICATIONS GIVEN IN ER    Medications   aspirin chewable tablet 324 mg (324 mg Oral Not Given 7/4/25 1927)   ibuprofen (ADVIL,MOTRIN) tablet 800 mg (800 mg Oral Not Given 7/4/25 1925)   Lidocaine 4 % 1 patch (1 patch Transdermal Medication Applied 7/4/25 1928)   acetaminophen (TYLENOL) tablet 1,000 mg (1,000 mg Oral Given 7/4/25 1830)   ondansetron ODT (ZOFRAN-ODT) disintegrating tablet 4 mg (4 mg Oral Given 7/4/25 1830)         MEDICAL DECISION MAKING, PROGRESS, and CONSULTS    All labs, if obtained, have been independently reviewed by me.  All radiology studies, if obtained, have been reviewed by me and the radiologist dictating the report.  All EKGs, if obtained, have been independently viewed and interpreted by me/my attending  physician.      Discussion below represents my analysis of pertinent findings related to patient's condition, differential diagnosis, treatment plan and final disposition.              HEART Score: 4                             Differential diagnosis:    Intracranial hemorrhage, skull fracture, concussion, spinal injury, other traumatic injury, myocardial infarction, angina, rib fracture, pneumothorax      Additional sources:    - Discussed/ obtained information from independent historians: Family    - External (non-ED) record review: Outpatient PCP note showing history of:    Bilateral hearing loss, unspecified hearing loss type    Type 2 diabetes mellitus with hyperglycemia, with long-term current use of insulin    Ischemic cardiomyopathy    Essential hypertension    Thrombocytopenia    LUCIAN on CPAP    - Chronic or social conditions impacting care: CAD, diabetes, hypertension        Orders placed during this visit:  Orders Placed This Encounter   Procedures    CT Head Without Contrast    CT Cervical Spine Without Contrast    CT Chest Without Contrast Diagnostic    Comprehensive Metabolic Panel    High Sensitivity Troponin T    BNP    Magnesium    TSH Rfx On Abnormal To Free T4    CBC Auto Differential    High Sensitivity Troponin T 1Hr    ECG 12 Lead Chest Pain    CBC & Differential         Additional orders considered but not ordered:      ED Course:    Consultants:      ED Course as of 07/04/25 2056 Fri Jul 04, 2025 1741 This is a 69-year-old man on dual antiplatelet therapy who presents because of headache and neck pain after a fall from standing he says he fell hitting the back of his head on a rail he now has pain in his neck and upper thoracic spine as well as occipital skull.  He has had dizziness and headaches since the fall.  Denies other injuries or complaints at this time. [CC]   1741 He arrived awake and alert he has significant occipital tenderness and midline C and upper T-spine tenderness no  other appreciated injuries on musculoskeletal exam with dual antiplatelets and the dizziness and headache I have concern for intracranial hemorrhage with his age proceeding to head CT as well as CT scan of the C-spine and T-spine and posterior chest wall.  Along with those are negative I think he can safely discharge with diagnosis of concussion will reevaluate pending imaging. [CC]   1836 While being evaluated in the emergency department he developed acute chest pain we have added on ECG troponins giving aspirin. [CC]   2001 Initial ECG is paced but shows no abnormalities initial troponin not elevated because of the acute onset of the chest pain I will need a repeat troponin patient says he thinks it is anxiety.  He is agreeable to waiting for second troponin as long as that is negative I think can safely discharge. [CC]   2055 Repeat troponin remains not elevated at this point I think he can safely discharge.  Counseled on return precautions verbally expressed understanding of these. [CC]      ED Course User Index  [CC] Carlos Musa MD              Shared Decision Making:  After my consideration of clinical presentation and any laboratory/radiology studies obtained, I discussed the findings with the patient/patient representative who is in agreement with the treatment plan and the final disposition.   Risks and benefits of discharge and/or observation/admission were discussed.       AS OF 20:56 EDT VITALS:    BP - 132/71  HR - 73  TEMP - 97.9 °F (36.6 °C) (Oral)  O2 SATS - 94%                  DIAGNOSIS  Final diagnoses:   Fall, initial encounter   Concussion without loss of consciousness, initial encounter   Strain of neck muscle, initial encounter   Chest pain, unspecified type         DISPOSITION  DISCHARGE    Patient discharged in stable condition.    Reviewed implications of results, diagnosis, meds, responsibility to follow up, warning signs and symptoms of possible worsening, potential  complications and reasons to return to ER.    Patient/Family voiced understanding of above instructions.    Discussed plan for discharge, as there is no emergent indication for admission.  Pt/family is agreeable and understands need for follow up and possible repeat testing.  Pt/family is aware that discharge does not mean that nothing is wrong but that it indicates no emergency is currently present that requires admission and they must continue care with follow-up as given below or with a physician of their choice.     FOLLOW-UP  No follow-up provider specified.       Medication List      No changes were made to your prescriptions during this visit.             Please note that portions of this document were completed with voice recognition software.        Carlos Musa MD  07/04/25 [id number]

## 2025-07-05 NOTE — DISCHARGE INSTRUCTIONS
Follow-up with your PCP.  Treat pain as needed with Tylenol and/or ibuprofen.  While today's workup was reassuring if symptoms change or worsen please return to the ED or seek other medical care.

## 2025-07-07 LAB
QT INTERVAL: 430 MS
QTC INTERVAL: 493 MS

## 2025-07-11 DIAGNOSIS — E11.65 TYPE 2 DIABETES MELLITUS WITH HYPERGLYCEMIA, WITH LONG-TERM CURRENT USE OF INSULIN: ICD-10-CM

## 2025-07-11 DIAGNOSIS — K21.9 GASTROESOPHAGEAL REFLUX DISEASE WITHOUT ESOPHAGITIS: ICD-10-CM

## 2025-07-11 DIAGNOSIS — F51.01 PRIMARY INSOMNIA: ICD-10-CM

## 2025-07-11 DIAGNOSIS — Z79.4 TYPE 2 DIABETES MELLITUS WITH HYPERGLYCEMIA, WITH LONG-TERM CURRENT USE OF INSULIN: ICD-10-CM

## 2025-07-11 RX ORDER — SITAGLIPTIN 100 MG/1
100 TABLET, FILM COATED ORAL DAILY
Qty: 90 TABLET | Refills: 0 | OUTPATIENT
Start: 2025-07-11

## 2025-07-11 RX ORDER — PANTOPRAZOLE SODIUM 40 MG/1
40 TABLET, DELAYED RELEASE ORAL DAILY
Qty: 90 TABLET | Refills: 0 | Status: SHIPPED | OUTPATIENT
Start: 2025-07-11

## 2025-07-11 RX ORDER — TRAZODONE HYDROCHLORIDE 100 MG/1
100 TABLET ORAL
Qty: 90 TABLET | Refills: 0 | Status: SHIPPED | OUTPATIENT
Start: 2025-07-11

## 2025-07-19 DIAGNOSIS — E11.65 TYPE 2 DIABETES MELLITUS WITH HYPERGLYCEMIA, WITH LONG-TERM CURRENT USE OF INSULIN: ICD-10-CM

## 2025-07-19 DIAGNOSIS — Z79.4 TYPE 2 DIABETES MELLITUS WITH HYPERGLYCEMIA, WITH LONG-TERM CURRENT USE OF INSULIN: ICD-10-CM

## 2025-07-21 RX ORDER — BLOOD SUGAR DIAGNOSTIC
STRIP MISCELLANEOUS
Qty: 100 EACH | Refills: 1 | Status: SHIPPED | OUTPATIENT
Start: 2025-07-21

## 2025-08-27 LAB
MC_CV_MDC_IDC_RATE_1: 180
MC_CV_MDC_IDC_RATE_1: 220
MC_CV_MDC_IDC_SHOCK_MEASURED_IMPEDANCE: 67
MC_CV_MDC_IDC_THERAPIES: NORMAL
MC_CV_MDC_IDC_ZONE_ID: 1
MC_CV_MDC_IDC_ZONE_ID: 2
MDC_IDC_MSMT_BATTERY_REMAINING_LONGEVITY: 132 MO
MDC_IDC_MSMT_BATTERY_REMAINING_PERCENTAGE: 100 %
MDC_IDC_MSMT_BATTERY_STATUS: NORMAL
MDC_IDC_MSMT_CAP_CHARGE_TIME: 10.4
MDC_IDC_MSMT_LEADCHNL_LV_DTM: NORMAL
MDC_IDC_MSMT_LEADCHNL_LV_IMPEDANCE_VALUE: 752
MDC_IDC_MSMT_LEADCHNL_LV_PACING_THRESHOLD_AMPLITUDE: 0.5
MDC_IDC_MSMT_LEADCHNL_LV_PACING_THRESHOLD_POLARITY: NORMAL
MDC_IDC_MSMT_LEADCHNL_LV_PACING_THRESHOLD_PULSEWIDTH: 0.4
MDC_IDC_MSMT_LEADCHNL_LV_SENSING_INTR_AMPL: 23.6
MDC_IDC_MSMT_LEADCHNL_RA_DTM: NORMAL
MDC_IDC_MSMT_LEADCHNL_RA_IMPEDANCE_VALUE: 436
MDC_IDC_MSMT_LEADCHNL_RA_PACING_THRESHOLD_AMPLITUDE: 0.5
MDC_IDC_MSMT_LEADCHNL_RA_PACING_THRESHOLD_POLARITY: NORMAL
MDC_IDC_MSMT_LEADCHNL_RA_PACING_THRESHOLD_PULSEWIDTH: 0.4
MDC_IDC_MSMT_LEADCHNL_RA_SENSING_INTR_AMPL: 4.4
MDC_IDC_MSMT_LEADCHNL_RV_DTM: NORMAL
MDC_IDC_MSMT_LEADCHNL_RV_IMPEDANCE_VALUE: 1108
MDC_IDC_MSMT_LEADCHNL_RV_PACING_THRESHOLD_AMPLITUDE: 0.5
MDC_IDC_MSMT_LEADCHNL_RV_PACING_THRESHOLD_POLARITY: NORMAL
MDC_IDC_MSMT_LEADCHNL_RV_PACING_THRESHOLD_PULSEWIDTH: 0.4
MDC_IDC_MSMT_LEADCHNL_RV_SENSING_INTR_AMPL: 20.5
MDC_IDC_PG_IMPLANT_DTM: NORMAL
MDC_IDC_PG_MFG: NORMAL
MDC_IDC_PG_MODEL: NORMAL
MDC_IDC_PG_SERIAL: NORMAL
MDC_IDC_PG_TYPE: NORMAL
MDC_IDC_SESS_DTM: NORMAL
MDC_IDC_SESS_TYPE: NORMAL
MDC_IDC_SET_BRADY_AT_MODE_SWITCH_RATE: 170
MDC_IDC_SET_BRADY_LOWRATE: 60
MDC_IDC_SET_BRADY_MAX_SENSOR_RATE: 130
MDC_IDC_SET_BRADY_MAX_TRACKING_RATE: 130
MDC_IDC_SET_BRADY_MODE: NORMAL
MDC_IDC_SET_BRADY_PAV_DELAY: 210
MDC_IDC_SET_BRADY_SAV_DELAY: 140
MDC_IDC_SET_CRT_PACED_CHAMBERS: NORMAL
MDC_IDC_SET_LEADCHNL_LV_PACING_AMPLITUDE: 1.5
MDC_IDC_SET_LEADCHNL_LV_PACING_PULSEWIDTH: 0.4
MDC_IDC_SET_LEADCHNL_RA_PACING_AMPLITUDE: 2
MDC_IDC_SET_LEADCHNL_RA_PACING_POLARITY: NORMAL
MDC_IDC_SET_LEADCHNL_RA_PACING_PULSEWIDTH: 0.4
MDC_IDC_SET_LEADCHNL_RA_SENSING_POLARITY: NORMAL
MDC_IDC_SET_LEADCHNL_RA_SENSING_SENSITIVITY: 0.25
MDC_IDC_SET_LEADCHNL_RV_PACING_AMPLITUDE: 2
MDC_IDC_SET_LEADCHNL_RV_PACING_POLARITY: NORMAL
MDC_IDC_SET_LEADCHNL_RV_PACING_PULSEWIDTH: 0.4
MDC_IDC_SET_LEADCHNL_RV_SENSING_POLARITY: NORMAL
MDC_IDC_SET_LEADCHNL_RV_SENSING_SENSITIVITY: 0.6
MDC_IDC_SET_ZONE_STATUS: NORMAL
MDC_IDC_SET_ZONE_STATUS: NORMAL
MDC_IDC_SET_ZONE_TYPE: NORMAL
MDC_IDC_SET_ZONE_TYPE: NORMAL
MDC_IDC_STAT_AT_BURDEN_PERCENT: 1
MDC_IDC_STAT_BRADY_RA_PERCENT_PACED: 1
MDC_IDC_STAT_BRADY_RV_PERCENT_PACED: 25
MDC_IDC_STAT_CRT_LV_PERCENT_PACED: 99
MDC_IDC_STAT_TACHYTHERAPY_ATP_DELIVERED_RECENT: 0
MDC_IDC_STAT_TACHYTHERAPY_SHOCKS_ABORTED_RECENT: 0
MDC_IDC_STAT_TACHYTHERAPY_SHOCKS_DELIVERED_RECENT: 0

## (undated) DEVICE — DRSNG SURESITE123 4X4.8IN

## (undated) DEVICE — SAFELINER SUCTION CANISTER 1000CC: Brand: DEROYAL

## (undated) DEVICE — HYBRID CO2 TUBING/CAP SET FOR OLYMPUS® SCOPES & CO2 SOURCE: Brand: ERBE

## (undated) DEVICE — FIRST STEP BEDSIDE ADD WATER KIT - RESEALABLE STAND-UP POUCH, ENDOSCOPIC CLEANING PAD - 1 POUCH: Brand: FIRST STEP BEDSIDE ADD WATER KIT - RESEALABLE STAND-UP POUCH, ENDOSCOPIC CLEANIN

## (undated) DEVICE — BLANKT WARM UPPR/BDY ARM/OUT 57X196CM

## (undated) DEVICE — SYR LUERLOK 50ML

## (undated) DEVICE — INTRO TEAR AWAY/LVD W/SD PRT 9.5F 13CM

## (undated) DEVICE — PATIENT RETURN ELECTRODE, SINGLE-USE, CONTACT QUALITY MONITORING, ADULT, WITH 9FT CORD, FOR PATIENTS WEIGING OVER 33LBS. (15KG): Brand: MEGADYNE

## (undated) DEVICE — DRSNG SURG AQUACEL AG/ADVNTGE 9X15CM 3.5X6IN

## (undated) DEVICE — CAUTERY TIP POLISHER: Brand: DEVON

## (undated) DEVICE — ST EXT IV SMRTSTE 2VLV FIX M LL 6ML 41

## (undated) DEVICE — PENCL ROCKRSWCH MEGADYNE W/HOLSTR 10FT SS

## (undated) DEVICE — SYR SLP TP 10ML DISP

## (undated) DEVICE — CATH COURNARD DECCA CSL 6F120CM

## (undated) DEVICE — KT DRN EVAC WND PVC PCH WTROC RND 10F400

## (undated) DEVICE — SOLIDIFIER LIQ PREMISORB 1500CC

## (undated) DEVICE — ANTIBACTERIAL UNDYED BRAIDED (POLYGLACTIN 910), SYNTHETIC ABSORBABLE SUTURE: Brand: COATED VICRYL

## (undated) DEVICE — HEWSON SUTURE RETRIEVER: Brand: HEWSON SUTURE RETRIEVER

## (undated) DEVICE — ELECTRD BLD EXT EDGE 1P COAT 4IN STRL

## (undated) DEVICE — SET PRIMARY GRVTY 10DP MALE LL 104IN

## (undated) DEVICE — TB SXN FRAZIER 12F STRL

## (undated) DEVICE — SNAR POLYP CAPTIVATOR MICROHEX 13 240CM

## (undated) DEVICE — INTRO TEAR AWAY/LVD W/SD PRT 6F 13CM

## (undated) DEVICE — 3.0MM PRECISION NEURO (MATCH HEAD)

## (undated) DEVICE — GW J TP FIX CORE .035 150

## (undated) DEVICE — THE SINGLE USE ETRAP – POLYP TRAP IS USED FOR SUCTION RETRIEVAL OF ENDOSCOPICALLY REMOVED POLYPS.: Brand: ETRAP

## (undated) DEVICE — MEDI-VAC YANKAUER SUCTION HANDLE W/BULBOUS TIP: Brand: CARDINAL HEALTH

## (undated) DEVICE — SINGLE-USE BIOPSY FORCEPS: Brand: RADIAL JAW 4

## (undated) DEVICE — SINGLE-USE POLYPECTOMY SNARE: Brand: SENSATION SHORT THROW

## (undated) DEVICE — CATH DIAG EXPO .045 FL3.5 5F 100CM

## (undated) DEVICE — OIL CARTRIDGE: Brand: CORE, MAESTRO

## (undated) DEVICE — GLV SURG SENSICARE PI MIC PF SZ9 LF STRL

## (undated) DEVICE — SUT VIC 1 CTX 36IN OBGYN VCP977H

## (undated) DEVICE — TOTAL TRAY, 16FR 10ML SIL FOLEY, URN: Brand: MEDLINE

## (undated) DEVICE — TUBING, SUCTION, 1/4" X 10', STRAIGHT: Brand: MEDLINE

## (undated) DEVICE — ADULT, W/LG. BACK PAD, RADIOTRANSPARENT ELEMENT AND LEAD WIRE COMPATIBLE W/: Brand: DEFIBRILLATION ELECTRODES

## (undated) DEVICE — SST TWIST DRILL, STANDARD, 2MM DIA. X 127MM: Brand: MICROAIRE®

## (undated) DEVICE — PEG PAD FOR SURG DISP

## (undated) DEVICE — 3M™ STERI-STRIP™ REINFORCED ADHESIVE SKIN CLOSURES, R1547, 1/2 IN X 4 IN (12 MM X 100 MM), 6 STRIPS/ENVELOPE: Brand: 3M™ STERI-STRIP™

## (undated) DEVICE — THE CARR-LOCKE INJECTION NEEDLE IS A SINGLE USE, DISPOSABLE, FLEXIBLE SHEATH INJECTION NEEDLE USED FOR THE INJECTION OF VARIOUS TYPES OF MEDIA THROUGH FLEXIBLE ENDOSCOPES.

## (undated) DEVICE — LIMB HOLDER, WRIST/ANKLE: Brand: DEROYAL

## (undated) DEVICE — INTRO TEAR AWAY/LVD W/SD PRT 9F 13CM

## (undated) DEVICE — JACKSON TABLE POSITIONER KIT: Brand: MEDLINE INDUSTRIES, INC.

## (undated) DEVICE — PLASMABLADE PS210-030S 3.0S LOCK: Brand: PLASMABLADE™

## (undated) DEVICE — DIFFUSER: Brand: CORE, MAESTRO

## (undated) DEVICE — LUBE GEL ENDOGLIDE 1.1OZ

## (undated) DEVICE — CATH DIAG EXPO M/ PK 6FR FL4/FR4 PIG 3PK

## (undated) DEVICE — PK HIP TOTL UNIV 10

## (undated) DEVICE — ST INF PRI SMRTSTE 20DRP 2VLV 24ML 117

## (undated) DEVICE — GUIDE CATHETER: Brand: ACUITY® PRO

## (undated) DEVICE — ADHS SKIN PREMIERPRO EXOFIN TOPICAL HI/VISC .5ML

## (undated) DEVICE — DRAPE,REIN 53X77,STERILE: Brand: MEDLINE

## (undated) DEVICE — SOL NACL 0.9PCT 1000ML

## (undated) DEVICE — ELECTRD RETRN/GRND MEGADYNE SGL/PLT W/CORD 9FT DISP

## (undated) DEVICE — DECANT BG O JET

## (undated) DEVICE — IRRIGATOR BULB ASEPTO 60CC STRL

## (undated) DEVICE — 3M™ MEDIPORE™ H SOFT CLOTH SURGICAL TAPE, 2863, 3 IN X 10 YD, 12/CASE: Brand: 3M™ MEDIPORE™

## (undated) DEVICE — CONTN GRAD MEAS TRIANG 32OZ BLK

## (undated) DEVICE — NEEDLE, QUINCKE 22GX3.5": Brand: MEDLINE INDUSTRIES, INC.

## (undated) DEVICE — STRYKER PERFORMANCE SERIES SAGITTAL BLADE: Brand: STRYKER PERFORMANCE SERIES

## (undated) DEVICE — LEX ELECTRO PHYSIOLOGY: Brand: MEDLINE INDUSTRIES, INC.

## (undated) DEVICE — DRSNG SURG AQUACEL AG/ADVNTGE 9X25CM 3.5X10IN

## (undated) DEVICE — CANN NASL CO2 DIVIDED A/

## (undated) DEVICE — HI-TORQUE WHISPER MS GUIDE WIRE .014 STRAIGHT TIP 3.0 CM X 190 CM: Brand: HI-TORQUE WHISPER

## (undated) DEVICE — DISPOSABLE TUBING SET AND EXTENDER FILTER TUBING

## (undated) DEVICE — GOWN,REINF,POLY,ECL,PP SLV,3XL,XLONG: Brand: MEDLINE

## (undated) DEVICE — ST EXT IV SMARTSITE 2VLV SP M LL 5ML IV1

## (undated) DEVICE — ADAPT CLN LUM OLYMP AIR/H20

## (undated) DEVICE — PENCL E/S HNDSWCH ROCKRBTN HOLSTR 10FT

## (undated) DEVICE — ANGIO-SEAL VIP VASCULAR CLOSURE DEVICE: Brand: ANGIO-SEAL

## (undated) DEVICE — SCRW CORT VIPER PLS5.5 TI FIX 7X45MM
Type: IMPLANTABLE DEVICE | Site: SPINE LUMBAR | Status: NON-FUNCTIONAL
Removed: 2021-10-27

## (undated) DEVICE — PK CATH CARD 10

## (undated) DEVICE — PROXIMATE RH ROTATING HEAD SKIN STAPLERS (35 WIDE) CONTAINS 35 STAINLESS STEEL STAPLES: Brand: PROXIMATE

## (undated) DEVICE — BIT DRL 3.3X25MM DISP

## (undated) DEVICE — BALN PRESS WEDGE 6F 110CM

## (undated) DEVICE — THE MILL DISPOSABLE - FINE

## (undated) DEVICE — KT ORCA ORCAPOD DISP STRL

## (undated) DEVICE — GAUZE,SPONGE,4"X4",16PLY,XRAY,STRL,LF: Brand: MEDLINE

## (undated) DEVICE — SNAP KOVER: Brand: UNBRANDED

## (undated) DEVICE — PINNACLE INTRODUCER SHEATH: Brand: PINNACLE

## (undated) DEVICE — MEDI-VAC YANKAUER SUCTION HANDLE: Brand: CARDINAL HEALTH

## (undated) DEVICE — PAD ARMBRD SURG CONVOL 7.5X20X2IN

## (undated) DEVICE — COATED BRAIDED POLYESTER: Brand: TI-CRON

## (undated) DEVICE — UNDERGLV SURG BIOGEL INDICAT PI SZ8 BLU

## (undated) DEVICE — SOL IRR H2O BTL 1000ML STRL

## (undated) DEVICE — INTRO ACCSR BLNT TP

## (undated) DEVICE — NDL HYPO ECLPS SFTY 22G 1 1/2IN

## (undated) DEVICE — TRAP FLD MINIVAC MEGADYNE 100ML

## (undated) DEVICE — ADAPT LUER STUB INTRAMEDIC PE/200 17G

## (undated) DEVICE — MODEL BT2000 P/N 700287-012KIT CONTENTS: MANIFOLD WITH SALINE AND CONTRAST PORTS, SALINE TUBING WITH SPIKE AND HAND SYRINGE, TRANSDUCER: Brand: BT2000 AUTOMATED MANIFOLD KIT

## (undated) DEVICE — Device

## (undated) DEVICE — ADULT, W/LG. BACK PAD, RADIOTRANSPARENT ELEMENT AND LEAD WIRE: Brand: DEFIBRILLATION ELECTRODES

## (undated) DEVICE — LUBE JELLY FOIL PACK 1.4 OZ: Brand: MEDLINE INDUSTRIES, INC.

## (undated) DEVICE — STERILE PVP: Brand: MEDLINE INDUSTRIES, INC.

## (undated) DEVICE — PK SPINE ORTHO 10

## (undated) DEVICE — DECANTER: Brand: UNBRANDED

## (undated) DEVICE — GLV SURG SENSICARE PI ORTHO SZ9 LF STRL

## (undated) DEVICE — DRP C/ARMOR

## (undated) DEVICE — SNAR POLYP CAPTIVATOR HEX 27MM 240CM

## (undated) DEVICE — SUT MONOCRYL PLS ANTIB UND 3/0  PS1 27IN

## (undated) DEVICE — GLV SURG SENSICARE PI ORTHO SZ8 LF STRL

## (undated) DEVICE — SYR LUERLOK 30CC

## (undated) DEVICE — BNDG ELAS CO-FLEX SLF ADHR 6IN 5YD LF STRL

## (undated) DEVICE — MODEL AT P65, P/N 701554-001KIT CONTENTS: HAND CONTROLLER, 3-WAY HIGH-PRESSURE STOPCOCK WITH ROTATING END AND PREMIUM HIGH-PRESSURE TUBING: Brand: ANGIOTOUCH® KIT

## (undated) DEVICE — DISPOSABLE BIPOLAR FORCEPS 7 3/4" (19.7CM) SCOVILLE BAYONET, INSULATED, 1.5MM TIP AND 12 FT. (3.6M) CABLE: Brand: KIRWAN

## (undated) DEVICE — PILLW ABD MD

## (undated) DEVICE — 450 ML BOTTLE OF 0.05% CHLORHEXIDINE GLUCONATE IN 99.95% STERILE WATER FOR IRRIGATION, USP AND APPLICATOR.: Brand: IRRISEPT ANTIMICROBIAL WOUND LAVAGE